# Patient Record
Sex: MALE | Race: BLACK OR AFRICAN AMERICAN | Employment: UNEMPLOYED | URBAN - METROPOLITAN AREA
[De-identification: names, ages, dates, MRNs, and addresses within clinical notes are randomized per-mention and may not be internally consistent; named-entity substitution may affect disease eponyms.]

---

## 2017-04-24 ENCOUNTER — ED HISTORICAL/CONVERTED ENCOUNTER (OUTPATIENT)
Dept: OTHER | Age: 27
End: 2017-04-24

## 2017-06-12 ENCOUNTER — ED HISTORICAL/CONVERTED ENCOUNTER (OUTPATIENT)
Dept: OTHER | Age: 27
End: 2017-06-12

## 2017-11-02 ENCOUNTER — ED HISTORICAL/CONVERTED ENCOUNTER (OUTPATIENT)
Dept: OTHER | Age: 27
End: 2017-11-02

## 2019-05-17 ENCOUNTER — ED HISTORICAL/CONVERTED ENCOUNTER (OUTPATIENT)
Dept: OTHER | Age: 29
End: 2019-05-17

## 2019-06-25 ENCOUNTER — ED HISTORICAL/CONVERTED ENCOUNTER (OUTPATIENT)
Dept: OTHER | Age: 29
End: 2019-06-25

## 2019-10-25 ENCOUNTER — ED HISTORICAL/CONVERTED ENCOUNTER (OUTPATIENT)
Dept: OTHER | Age: 29
End: 2019-10-25

## 2021-05-10 ENCOUNTER — HOSPITAL ENCOUNTER (EMERGENCY)
Age: 31
Discharge: STILL ADMITTED OR ADMINISTRATIVE | End: 2021-05-10

## 2022-01-25 ENCOUNTER — TRANSCRIBE ORDER (OUTPATIENT)
Dept: SCHEDULING | Age: 32
End: 2022-01-25

## 2022-01-25 DIAGNOSIS — M79.89 PAIN AND SWELLING OF LOWER LEG, RIGHT: Primary | ICD-10-CM

## 2022-01-25 DIAGNOSIS — M79.661 PAIN AND SWELLING OF LOWER LEG, RIGHT: Primary | ICD-10-CM

## 2022-01-26 ENCOUNTER — HOSPITAL ENCOUNTER (OUTPATIENT)
Dept: NON INVASIVE DIAGNOSTICS | Age: 32
Discharge: HOME | End: 2022-01-26
Attending: INTERNAL MEDICINE
Payer: MEDICAID

## 2022-01-26 DIAGNOSIS — M79.661 PAIN AND SWELLING OF LOWER LEG, RIGHT: ICD-10-CM

## 2022-01-26 DIAGNOSIS — M79.89 PAIN AND SWELLING OF LOWER LEG, RIGHT: ICD-10-CM

## 2022-01-26 PROCEDURE — 93971 EXTREMITY STUDY: CPT

## 2022-02-04 ENCOUNTER — OFFICE VISIT (OUTPATIENT)
Dept: SURGERY | Age: 32
End: 2022-02-04

## 2022-02-04 DIAGNOSIS — R10.31 RIGHT INGUINAL PAIN: Primary | ICD-10-CM

## 2022-02-11 ENCOUNTER — HOSPITAL ENCOUNTER (OUTPATIENT)
Dept: CT IMAGING | Age: 32
Discharge: HOME | End: 2022-02-11
Attending: SURGERY
Payer: MEDICAID

## 2022-02-11 ENCOUNTER — OFFICE VISIT (OUTPATIENT)
Dept: SURGERY | Age: 32
End: 2022-02-11
Payer: MEDICAID

## 2022-02-11 VITALS
TEMPERATURE: 98.4 F | HEIGHT: 66 IN | HEART RATE: 71 BPM | BODY MASS INDEX: 43.13 KG/M2 | RESPIRATION RATE: 16 BRPM | SYSTOLIC BLOOD PRESSURE: 122 MMHG | DIASTOLIC BLOOD PRESSURE: 72 MMHG | WEIGHT: 268.4 LBS | OXYGEN SATURATION: 98 %

## 2022-02-11 DIAGNOSIS — R19.09 RIGHT GROIN MASS: Primary | ICD-10-CM
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
## 2022-02-11 DIAGNOSIS — R19.09 RIGHT GROIN MASS: ICD-10-CM

## 2022-02-11 PROCEDURE — 74011000636 HC RX REV CODE- 636: Performed by: SURGERY

## 2022-02-11 PROCEDURE — 75635 CT ANGIO ABDOMINAL ARTERIES: CPT

## 2022-02-11 PROCEDURE — 99203 OFFICE O/P NEW LOW 30 MIN: CPT | Performed by: SURGERY

## 2022-02-11 RX ORDER — ALBUTEROL SULFATE 0.63 MG/3ML
3 SOLUTION RESPIRATORY (INHALATION) AS NEEDED
COMMUNITY
Start: 2021-12-30

## 2022-02-11 RX ORDER — BUDESONIDE AND FORMOTEROL FUMARATE DIHYDRATE 160; 4.5 UG/1; UG/1
2 AEROSOL RESPIRATORY (INHALATION) DAILY
COMMUNITY
Start: 2022-02-08

## 2022-02-11 RX ORDER — TORSEMIDE 10 MG/1
1 TABLET ORAL DAILY
COMMUNITY
Start: 2022-01-25 | End: 2022-05-07

## 2022-02-11 RX ORDER — ALBUTEROL SULFATE 90 UG/1
2 AEROSOL, METERED RESPIRATORY (INHALATION)
COMMUNITY
Start: 2022-02-08

## 2022-02-11 RX ORDER — POTASSIUM CHLORIDE 750 MG/1
10 CAPSULE, EXTENDED RELEASE ORAL DAILY
COMMUNITY
Start: 2022-01-25 | End: 2022-03-04

## 2022-02-11 RX ADMIN — IOPAMIDOL 100 ML: 755 INJECTION, SOLUTION INTRAVENOUS at 13:06

## 2022-02-11 NOTE — PROGRESS NOTES
Chief Complaint   Patient presents with    New Patient     Right Leg Swelling for weeks     Visit Vitals  BP (!) 147/92 (BP 1 Location: Left arm, BP Patient Position: Sitting)   Pulse 71   Temp 98.4 °F (36.9 °C) (Temporal)   Resp 16   Ht 5' 6\" (1.676 m)   Wt 268 lb 6.4 oz (121.7 kg)   SpO2 98%   BMI 43.32 kg/m²       Elevated BP rechecked manually and it was 122/72.  Dr Sabine Carmen aware

## 2022-02-11 NOTE — PROGRESS NOTES
Vascular History and Physical    Patient: Daisy Hayes  MRN: [id number]    YOB: 1990  Age: 27 y.o. Sex: male     Chief Complaint:  Chief Complaint   Patient presents with    New Patient     Right Leg Swelling for weeks       History of Present Illness: Daisy Hayes is a 27 y.o. very pleasant man complaining of the right groin thigh and leg swelling. He apparently fell 12 flights of stairs about 6 weeks ago. And since then a few weeks later his right leg started swelling. He had ultrasound done on the right thigh and leg which showed greater than 20 cm size mass. He is complaining of pain and swelling in the right leg. Ultrasound did not show any deep vein thrombosis. Social History:     Social Connections:     Frequency of Communication with Friends and Family: Not on file    Frequency of Social Gatherings with Friends and Family: Not on file    Attends [employer] Services: Not on file    Active Member of Clubs or Organizations: Not on file    Attends Club or Organization Meetings: Not on file    Marital Status: Not on file       Past Medical History:  Past Medical History:   Diagnosis Date    Hypertension        Surgical History:  History reviewed. No pertinent surgical history. Allergies:  No Known Allergies    Current Meds:  Current Outpatient Medications   Medication Sig Dispense Refill    albuterol (ACCUNEB) 0.63 mg/3 mL nebulizer solution 3 mL by Nebulization route as needed.  ProAir HFA 90 mcg/actuation inhaler Take 2 Puffs by inhalation every six (6) hours as needed.  potassium chloride SA (MICRO-K) 10 mEq capsule Take 10 mEq by mouth daily.  torsemide (DEMADEX) 10 mg tablet Take 1 Tablet by mouth daily.  Symbicort 160-4.5 mcg/actuation HFAA Take 2 Puffs by inhalation daily. Review of Systems:  I reviewed the rest of organ systems personally and they are negative. Pertinent findings discussed in HPI.     Physical Examination    Visit Vitals  /72 (BP 1 Location: Left arm, BP Patient Position: Sitting)   Pulse 71   Temp 98.4 °F (36.9 °C) (Temporal)   Resp 16   Ht 5' 6\" (1.676 m)   Wt 268 lb 6.4 oz (121.7 kg)   SpO2 98%   BMI 43.32 kg/m²     Gen: Well developed, well nourished 27 y.o. male in no acute distress  Head: normocephalic, atraumatic  Mouth: Clear, no overt lesions, oral mucosa pink and moist  Neck: supple, no masses, no adenopathy or carotid bruits, trachea midline  Resp: clear to auscultation bilaterally, no wheeze, rhonchi or rales, excursions normal and symmetrical  Cardio: Regular rate and rhythm, no murmurs, clicks, gallops or rubs, no edema or varicosities  Abdomen: soft, nontender, nondistended, normoactive bowel sounds, no hernias, no hepatosplenomegaly   Neuro: sensation and strength grossly intact and symmetrical  Psych: alert and oriented to person, place and time  Vascular examination: Vascular examination he has extensive swelling on the right foot to the all the way to the right groin and forearm mass noted on the right thigh area. Skin: warm and moist.    Labs:  No visits with results within 1 Month(s) from this visit. Latest known visit with results is:   No results found for any previous visit. Images:  No images are attached to the encounter. Duane Leonard MD    Assessments:  Patient Active Problem List   Diagnosis Code    Right groin mass R19.09    Fall W19. XXXA       Plans: I am wondering if he sustained arterial injury or venous injury after fall. So I will order stat CT angiogram of abdominal aorta distal runoff. We will get this test done today. And I will reassess him again 2 days after CT scan is complete.           Duane Leonard MD

## 2022-02-14 ENCOUNTER — OFFICE VISIT (OUTPATIENT)
Dept: SURGERY | Age: 32
End: 2022-02-14
Payer: MEDICAID

## 2022-02-14 VITALS
HEIGHT: 66 IN | OXYGEN SATURATION: 97 % | DIASTOLIC BLOOD PRESSURE: 78 MMHG | HEART RATE: 86 BPM | RESPIRATION RATE: 16 BRPM | SYSTOLIC BLOOD PRESSURE: 127 MMHG | BODY MASS INDEX: 43.04 KG/M2 | WEIGHT: 267.8 LBS | TEMPERATURE: 98 F

## 2022-02-14 DIAGNOSIS — R19.09 RIGHT GROIN MASS: ICD-10-CM

## 2022-02-14 DIAGNOSIS — R10.31 RIGHT INGUINAL PAIN: Primary | ICD-10-CM

## 2022-02-14 PROCEDURE — 99213 OFFICE O/P EST LOW 20 MIN: CPT | Performed by: SURGERY

## 2022-02-14 RX ORDER — OXYCODONE AND ACETAMINOPHEN 5; 325 MG/1; MG/1
1 TABLET ORAL
Qty: 30 TABLET | Refills: 0 | Status: ON HOLD | OUTPATIENT
Start: 2022-02-14 | End: 2022-03-04

## 2022-02-14 NOTE — PROGRESS NOTES
Chief Complaint   Patient presents with    Follow-up     CT Scan results from 02/11/2022     Visit Vitals  /78 (BP 1 Location: Left arm, BP Patient Position: Sitting)   Pulse 86   Temp 98 °F (36.7 °C) (Temporal)   Resp 16   Ht 5' 6\" (1.676 m)   Wt 267 lb 12.8 oz (121.5 kg)   SpO2 97%   BMI 43.22 kg/m²     1. Have you been to the ER, urgent care clinic since your last visit? Hospitalized since your last visit? No    2. Have you seen or consulted any other health care providers outside of the [address] [place] [address] since your last visit? Include any pap smears or colon screening.  No

## 2022-02-17 ENCOUNTER — TELEPHONE (OUTPATIENT)
Dept: SURGERY | Age: 32
End: 2022-02-17

## 2022-02-17 DIAGNOSIS — R19.09 RIGHT GROIN MASS: Primary | ICD-10-CM

## 2022-02-17 PROBLEM — R10.31 RIGHT INGUINAL PAIN: Status: ACTIVE | Noted: 2022-02-17

## 2022-02-17 NOTE — PROGRESS NOTES
VASCULAR FOLLOW UP      Subjective:   CHIEF COMPLAINTS:  Patient had a CT scan  PRESENTATION OF ILLNESS:  Facundo Devries is a 27 y.o. very pleasant man had developed a large groin mass since January. Patient had a CT scan abdomen pelvis and thigh for follow-up. Patient says swelling and pain has not subsided. Also bilateral leg swelling has increased in size as well. Patient denies chest pain shortness breath. Past Medical History:   Diagnosis Date    Hypertension       No past surgical history on file. Family History   Problem Relation Age of Onset    Diabetes Mother     Hypertension Mother     Myasthenia Gravis Maternal Grandmother     Cancer Maternal Grandfather       Social History     Tobacco Use    Smoking status: Former Smoker     Packs/day: 0.50     Years: 2.00     Pack years: 1.00    Smokeless tobacco: Never Used   Substance Use Topics    Alcohol use: Yes       Prior to Admission medications    Medication Sig Start Date End Date Taking? Authorizing Provider   oxyCODONE-acetaminophen (PERCOCET) 5-325 mg per tablet Take 1 Tablet by mouth every eight (8) hours as needed for Pain for up to 14 days. Max Daily Amount: 3 Tablets. 2/14/22 2/28/22 Yes Jesica Kulkarni MD   albuterol (ACCUNEB) 0.63 mg/3 mL nebulizer solution 3 mL by Nebulization route as needed. 12/30/21  Yes Provider, Historical   ProAir HFA 90 mcg/actuation inhaler Take 2 Puffs by inhalation every six (6) hours as needed. 2/8/22  Yes Provider, Historical   potassium chloride SA (MICRO-K) 10 mEq capsule Take 10 mEq by mouth daily. 1/25/22  Yes Provider, Historical   torsemide (DEMADEX) 10 mg tablet Take 1 Tablet by mouth daily. 1/25/22  Yes Provider, Historical   Symbicort 160-4.5 mcg/actuation HFAA Take 2 Puffs by inhalation daily.  2/8/22  Yes Provider, Historical     No Known Allergies     Review of Systems:  I reviewed the rest of organ systems personally and they were negative signed by Dr. Burroughs    Objective:     Visit Vitals  /78 (BP 1 Location: Left arm, BP Patient Position: Sitting)   Pulse 86   Temp 98 °F (36.7 °C) (Temporal)   Resp 16   Ht 5' 6\" (1.676 m)   Wt 267 lb 12.8 oz (121.5 kg)   SpO2 97%   BMI 43.22 kg/m²     VITAL SIGNS REVIEWED. Physical Exam:  Patient is well-nourished pleasant in conversation is appropriate. Head and neck examination atraumatic, normocephalic. Gaze appropriate. Conversation appropriate. Neck examination shows supple. No mass. No obvious carotid bruit. Chest examination shows lungs are clear bilaterally well-expanded, no crackles or wheezes. Cardiovascular system regular rate, no obvious murmur. Skin warm to touch  and moist, no skin lesions. Abdomen is soft ,not tender or distended bowel sounds present. No palpable mass. Neurological examinations, no focal neuro deficits moving all 4 extremities. Cranial nerves intact. Sensation is intact as well. Hematologic: No obvious bruise or swelling or obvious lymphadenopathy. Psychosocial: Appropriate. Has good effect. Musculoskeletal system: No muscle wasting, appropriate movements upper and lower extremity. Clinical examination shows on the right groin and right thigh swelling has not improved. Data Review:   No visits with results within 1 Month(s) from this visit. Latest known visit with results is:   No results found for any previous visit. Assessment:     Problem List Items Addressed This Visit        Other    Right groin mass    Right inguinal pain - Primary    Relevant Medications    oxyCODONE-acetaminophen (PERCOCET) 5-325 mg per tablet              Plan:     CT scan abdomen pelvis and the thigh area shows large complex soft tissue mass extending to the right retroperitoneal iliac vessel area. This most likely not related to vascular structure most likely soft tissue tumor. Patient will need to see specialist on this, we will send him to the surgical oncologist at [facility].   We will make urgent referral.        Yasemin Razo MD

## 2022-02-18 ENCOUNTER — TELEPHONE (OUTPATIENT)
Dept: SURGERY | Age: 32
End: 2022-02-18

## 2022-02-18 NOTE — TELEPHONE ENCOUNTER
Called patient back at number provided. No answer so I left a detailed message letting him know that the referral had been faxed to [facility] and they should call him to schedule him an appointment. Advised to call back with questions/concerns.

## 2022-02-18 NOTE — TELEPHONE ENCOUNTER
Patient called today. He was seen in office yesterday. The provider is referring him to  [facility]  surgical oncology. Patient want to know the physician name and number to make appointment. He can be reach at [phone].

## 2022-02-21 NOTE — TELEPHONE ENCOUNTER
Patient called today regarding referral to [facility]. He has not heard anything about an appointment. He is out of pain medication and is in a great deal of pain. Please call him with an update. Thanks!

## 2022-02-21 NOTE — TELEPHONE ENCOUNTER
Called patient back and explained to him that the referral was faxed on Friday, 2/18/2021, and it takes 24-48 hours for the request to be processed. Advised him that [facility] will call him to schedule. I confirmed patient's pharmacy and advised him that Dr. Sánchez Him isn't in the office yet, but will let him know of the refill request for his pain medication. Patient verbalized understanding.

## 2022-02-22 ENCOUNTER — APPOINTMENT (OUTPATIENT)
Dept: CT IMAGING | Age: 32
DRG: 134 | End: 2022-02-22
Attending: EMERGENCY MEDICINE
Payer: MEDICAID

## 2022-02-22 ENCOUNTER — APPOINTMENT (OUTPATIENT)
Dept: GENERAL RADIOLOGY | Age: 32
DRG: 134 | End: 2022-02-22
Attending: EMERGENCY MEDICINE
Payer: MEDICAID

## 2022-02-22 ENCOUNTER — HOSPITAL ENCOUNTER (INPATIENT)
Age: 32
LOS: 1 days | Discharge: ANOTHER HOSPITAL | DRG: 134 | End: 2022-02-23
Attending: EMERGENCY MEDICINE | Admitting: HOSPITALIST
Payer: MEDICAID

## 2022-02-22 DIAGNOSIS — I26.99 BILATERAL PULMONARY EMBOLISM (HCC): Primary | ICD-10-CM

## 2022-02-22 LAB
ALBUMIN SERPL-MCNC: 3.2 G/DL (ref 3.5–5)
ALBUMIN/GLOB SERPL: 0.8 {RATIO} (ref 1.1–2.2)
ALP SERPL-CCNC: 103 U/L (ref 45–117)
ALT SERPL-CCNC: 42 U/L (ref 12–78)
ANION GAP SERPL CALC-SCNC: 7 MMOL/L (ref 5–15)
APTT PPP: 32.6 SEC (ref 21.2–34.1)
APTT PPP: 38.4 SEC (ref 21.2–34.1)
APTT PPP: 44.1 SEC (ref 21.2–34.1)
AST SERPL W P-5'-P-CCNC: 34 U/L (ref 15–37)
ATRIAL RATE: 88 BPM
BASOPHILS # BLD: 0.1 K/UL (ref 0–0.1)
BASOPHILS NFR BLD: 1 % (ref 0–1)
BILIRUB SERPL-MCNC: 0.6 MG/DL (ref 0.2–1)
BNP SERPL-MCNC: 34 PG/ML
BUN SERPL-MCNC: 12 MG/DL (ref 6–20)
BUN/CREAT SERPL: 11 (ref 12–20)
CA-I BLD-MCNC: 9.5 MG/DL (ref 8.5–10.1)
CALCULATED P AXIS, ECG09: 54 DEGREES
CALCULATED R AXIS, ECG10: 20 DEGREES
CALCULATED T AXIS, ECG11: 55 DEGREES
CHLORIDE SERPL-SCNC: 101 MMOL/L (ref 97–108)
CO2 SERPL-SCNC: 25 MMOL/L (ref 21–32)
CREAT SERPL-MCNC: 1.09 MG/DL (ref 0.7–1.3)
D DIMER PPP FEU-MCNC: 3.54 UG/ML(FEU)
DIAGNOSIS, 93000: NORMAL
DIFFERENTIAL METHOD BLD: ABNORMAL
EOSINOPHIL # BLD: 0.6 K/UL (ref 0–0.4)
EOSINOPHIL NFR BLD: 5 % (ref 0–7)
ERYTHROCYTE [DISTWIDTH] IN BLOOD BY AUTOMATED COUNT: 12.9 % (ref 11.5–14.5)
GLOBULIN SER CALC-MCNC: 4.1 G/DL (ref 2–4)
GLUCOSE SERPL-MCNC: 106 MG/DL (ref 65–100)
HCT VFR BLD AUTO: 35.2 % (ref 36.6–50.3)
HGB BLD-MCNC: 11.6 G/DL (ref 12.1–17)
IMM GRANULOCYTES # BLD AUTO: 0.1 K/UL (ref 0–0.04)
IMM GRANULOCYTES NFR BLD AUTO: 1 % (ref 0–0.5)
LYMPHOCYTES # BLD: 0.8 K/UL (ref 0.8–3.5)
LYMPHOCYTES NFR BLD: 7 % (ref 12–49)
MCH RBC QN AUTO: 30.1 PG (ref 26–34)
MCHC RBC AUTO-ENTMCNC: 33 G/DL (ref 30–36.5)
MCV RBC AUTO: 91.4 FL (ref 80–99)
MONOCYTES # BLD: 1.2 K/UL (ref 0–1)
MONOCYTES NFR BLD: 11 % (ref 5–13)
NEUTS SEG # BLD: 8.4 K/UL (ref 1.8–8)
NEUTS SEG NFR BLD: 75 % (ref 32–75)
NRBC # BLD: 0 K/UL (ref 0–0.01)
NRBC BLD-RTO: 0 PER 100 WBC
P-R INTERVAL, ECG05: 158 MS
PLATELET # BLD AUTO: 299 K/UL (ref 150–400)
PMV BLD AUTO: 9.6 FL (ref 8.9–12.9)
POTASSIUM SERPL-SCNC: 4 MMOL/L (ref 3.5–5.1)
PROT SERPL-MCNC: 7.3 G/DL (ref 6.4–8.2)
Q-T INTERVAL, ECG07: 358 MS
QRS DURATION, ECG06: 74 MS
QTC CALCULATION (BEZET), ECG08: 433 MS
RBC # BLD AUTO: 3.85 M/UL (ref 4.1–5.7)
SODIUM SERPL-SCNC: 133 MMOL/L (ref 136–145)
THERAPEUTIC RANGE,PTTT: ABNORMAL SEC (ref 82–109)
THERAPEUTIC RANGE,PTTT: ABNORMAL SEC (ref 82–109)
THERAPEUTIC RANGE,PTTT: NORMAL SEC (ref 82–109)
TROPONIN-HIGH SENSITIVITY: 18 NG/L (ref 0–76)
VENTRICULAR RATE, ECG03: 88 BPM
WBC # BLD AUTO: 11.1 K/UL (ref 4.1–11.1)

## 2022-02-22 PROCEDURE — 71275 CT ANGIOGRAPHY CHEST: CPT

## 2022-02-22 PROCEDURE — 85730 THROMBOPLASTIN TIME PARTIAL: CPT

## 2022-02-22 PROCEDURE — 85379 FIBRIN DEGRADATION QUANT: CPT

## 2022-02-22 PROCEDURE — 96376 TX/PRO/DX INJ SAME DRUG ADON: CPT

## 2022-02-22 PROCEDURE — 36415 COLL VENOUS BLD VENIPUNCTURE: CPT

## 2022-02-22 PROCEDURE — 65270000029 HC RM PRIVATE

## 2022-02-22 PROCEDURE — 99285 EMERGENCY DEPT VISIT HI MDM: CPT

## 2022-02-22 PROCEDURE — 96375 TX/PRO/DX INJ NEW DRUG ADDON: CPT

## 2022-02-22 PROCEDURE — 74011250636 HC RX REV CODE- 250/636: Performed by: EMERGENCY MEDICINE

## 2022-02-22 PROCEDURE — 84484 ASSAY OF TROPONIN QUANT: CPT

## 2022-02-22 PROCEDURE — 93005 ELECTROCARDIOGRAM TRACING: CPT

## 2022-02-22 PROCEDURE — 94640 AIRWAY INHALATION TREATMENT: CPT

## 2022-02-22 PROCEDURE — 71045 X-RAY EXAM CHEST 1 VIEW: CPT

## 2022-02-22 PROCEDURE — 85025 COMPLETE CBC W/AUTO DIFF WBC: CPT

## 2022-02-22 PROCEDURE — 74011000250 HC RX REV CODE- 250: Performed by: HOSPITALIST

## 2022-02-22 PROCEDURE — 74011250636 HC RX REV CODE- 250/636: Performed by: HOSPITALIST

## 2022-02-22 PROCEDURE — 96374 THER/PROPH/DIAG INJ IV PUSH: CPT

## 2022-02-22 PROCEDURE — 80053 COMPREHEN METABOLIC PANEL: CPT

## 2022-02-22 PROCEDURE — 74011000636 HC RX REV CODE- 636: Performed by: EMERGENCY MEDICINE

## 2022-02-22 PROCEDURE — 83880 ASSAY OF NATRIURETIC PEPTIDE: CPT

## 2022-02-22 RX ORDER — TORSEMIDE 10 MG/1
10 TABLET ORAL DAILY
Status: DISCONTINUED | OUTPATIENT
Start: 2022-02-23 | End: 2022-02-23 | Stop reason: HOSPADM

## 2022-02-22 RX ORDER — ALBUTEROL SULFATE 2.5 MG/.5ML
0.63 SOLUTION RESPIRATORY (INHALATION)
Status: DISCONTINUED | OUTPATIENT
Start: 2022-02-22 | End: 2022-02-23 | Stop reason: HOSPADM

## 2022-02-22 RX ORDER — BUDESONIDE AND FORMOTEROL FUMARATE DIHYDRATE 160; 4.5 UG/1; UG/1
2 AEROSOL RESPIRATORY (INHALATION) DAILY
Status: DISCONTINUED | OUTPATIENT
Start: 2022-02-23 | End: 2022-02-23 | Stop reason: HOSPADM

## 2022-02-22 RX ORDER — SODIUM CHLORIDE 0.9 % (FLUSH) 0.9 %
5-40 SYRINGE (ML) INJECTION AS NEEDED
Status: DISCONTINUED | OUTPATIENT
Start: 2022-02-22 | End: 2022-02-23 | Stop reason: HOSPADM

## 2022-02-22 RX ORDER — ALBUTEROL SULFATE 90 UG/1
2 AEROSOL, METERED RESPIRATORY (INHALATION)
Status: DISCONTINUED | OUTPATIENT
Start: 2022-02-22 | End: 2022-02-23 | Stop reason: HOSPADM

## 2022-02-22 RX ORDER — LORAZEPAM 2 MG/ML
1 INJECTION INTRAMUSCULAR
Status: DISCONTINUED | OUTPATIENT
Start: 2022-02-22 | End: 2022-02-23 | Stop reason: HOSPADM

## 2022-02-22 RX ORDER — SODIUM CHLORIDE 0.9 % (FLUSH) 0.9 %
5-40 SYRINGE (ML) INJECTION EVERY 8 HOURS
Status: DISCONTINUED | OUTPATIENT
Start: 2022-02-22 | End: 2022-02-23 | Stop reason: HOSPADM

## 2022-02-22 RX ORDER — MORPHINE SULFATE 2 MG/ML
1 INJECTION, SOLUTION INTRAMUSCULAR; INTRAVENOUS
Status: DISCONTINUED | OUTPATIENT
Start: 2022-02-22 | End: 2022-02-23 | Stop reason: HOSPADM

## 2022-02-22 RX ORDER — HEPARIN SODIUM 1000 [USP'U]/ML
80 INJECTION, SOLUTION INTRAVENOUS; SUBCUTANEOUS AS NEEDED
Status: DISCONTINUED | OUTPATIENT
Start: 2022-02-22 | End: 2022-02-23 | Stop reason: HOSPADM

## 2022-02-22 RX ORDER — ACETAMINOPHEN 325 MG/1
650 TABLET ORAL
Status: DISCONTINUED | OUTPATIENT
Start: 2022-02-22 | End: 2022-02-23 | Stop reason: HOSPADM

## 2022-02-22 RX ORDER — POLYETHYLENE GLYCOL 3350 17 G/17G
17 POWDER, FOR SOLUTION ORAL DAILY PRN
Status: DISCONTINUED | OUTPATIENT
Start: 2022-02-22 | End: 2022-02-23 | Stop reason: HOSPADM

## 2022-02-22 RX ORDER — HEPARIN SODIUM 1000 [USP'U]/ML
80 INJECTION, SOLUTION INTRAVENOUS; SUBCUTANEOUS ONCE
Status: COMPLETED | OUTPATIENT
Start: 2022-02-22 | End: 2022-02-22

## 2022-02-22 RX ORDER — ACETAMINOPHEN 650 MG/1
650 SUPPOSITORY RECTAL
Status: DISCONTINUED | OUTPATIENT
Start: 2022-02-22 | End: 2022-02-23 | Stop reason: HOSPADM

## 2022-02-22 RX ORDER — POTASSIUM CHLORIDE 750 MG/1
10 TABLET, FILM COATED, EXTENDED RELEASE ORAL DAILY
Status: DISCONTINUED | OUTPATIENT
Start: 2022-02-23 | End: 2022-02-23 | Stop reason: HOSPADM

## 2022-02-22 RX ORDER — OXYCODONE AND ACETAMINOPHEN 5; 325 MG/1; MG/1
1 TABLET ORAL
Status: DISCONTINUED | OUTPATIENT
Start: 2022-02-22 | End: 2022-02-22

## 2022-02-22 RX ORDER — ONDANSETRON 4 MG/1
4 TABLET, ORALLY DISINTEGRATING ORAL
Status: DISCONTINUED | OUTPATIENT
Start: 2022-02-22 | End: 2022-02-23 | Stop reason: HOSPADM

## 2022-02-22 RX ORDER — HEPARIN SODIUM 1000 [USP'U]/ML
40 INJECTION, SOLUTION INTRAVENOUS; SUBCUTANEOUS AS NEEDED
Status: DISCONTINUED | OUTPATIENT
Start: 2022-02-22 | End: 2022-02-23 | Stop reason: HOSPADM

## 2022-02-22 RX ORDER — HEPARIN SODIUM 10000 [USP'U]/100ML
18-36 INJECTION, SOLUTION INTRAVENOUS
Status: DISCONTINUED | OUTPATIENT
Start: 2022-02-22 | End: 2022-02-23 | Stop reason: HOSPADM

## 2022-02-22 RX ORDER — MORPHINE SULFATE 4 MG/ML
4 INJECTION INTRAVENOUS
Status: COMPLETED | OUTPATIENT
Start: 2022-02-22 | End: 2022-02-22

## 2022-02-22 RX ORDER — ONDANSETRON 2 MG/ML
4 INJECTION INTRAMUSCULAR; INTRAVENOUS
Status: DISCONTINUED | OUTPATIENT
Start: 2022-02-22 | End: 2022-02-23 | Stop reason: HOSPADM

## 2022-02-22 RX ADMIN — ALBUTEROL SULFATE 0.65 MG: 2.5 SOLUTION RESPIRATORY (INHALATION) at 19:00

## 2022-02-22 RX ADMIN — LORAZEPAM 1 MG: 2 INJECTION INTRAMUSCULAR; INTRAVENOUS at 21:21

## 2022-02-22 RX ADMIN — SODIUM CHLORIDE, PRESERVATIVE FREE 10 ML: 5 INJECTION INTRAVENOUS at 14:00

## 2022-02-22 RX ADMIN — HEPARIN SODIUM 3470 UNITS: 1000 INJECTION INTRAVENOUS; SUBCUTANEOUS at 23:16

## 2022-02-22 RX ADMIN — HEPARIN SODIUM 3470 UNITS: 1000 INJECTION INTRAVENOUS; SUBCUTANEOUS at 15:20

## 2022-02-22 RX ADMIN — HEPARIN SODIUM 6940 UNITS: 1000 INJECTION INTRAVENOUS; SUBCUTANEOUS at 07:23

## 2022-02-22 RX ADMIN — IOPAMIDOL 100 ML: 755 INJECTION, SOLUTION INTRAVENOUS at 04:04

## 2022-02-22 RX ADMIN — MORPHINE SULFATE 1 MG: 2 INJECTION, SOLUTION INTRAMUSCULAR; INTRAVENOUS at 21:20

## 2022-02-22 RX ADMIN — Medication 20 UNITS/KG/HR: at 19:46

## 2022-02-22 RX ADMIN — Medication 22 UNITS/KG/HR: at 23:18

## 2022-02-22 RX ADMIN — MORPHINE SULFATE 4 MG: 4 INJECTION INTRAVENOUS at 02:13

## 2022-02-22 RX ADMIN — MORPHINE SULFATE 1 MG: 2 INJECTION, SOLUTION INTRAMUSCULAR; INTRAVENOUS at 17:46

## 2022-02-22 RX ADMIN — Medication 18 UNITS/KG/HR: at 07:38

## 2022-02-22 RX ADMIN — MORPHINE SULFATE 1 MG: 2 INJECTION, SOLUTION INTRAMUSCULAR; INTRAVENOUS at 10:13

## 2022-02-22 RX ADMIN — ALBUTEROL SULFATE 0.65 MG: 2.5 SOLUTION RESPIRATORY (INHALATION) at 14:07

## 2022-02-22 NOTE — CONSULTS
Hematology and Oncology Inpatient Consult Note     Patient: Maricruz Groves MRN: [id number]  SSN: [id number]    YOB: 1990  Age: 27 y.o. Sex: male    Chief Complaint: Patient came with increasing shortness of breath    Reason for consult: Valuation management of patient with right groin mass    Subjective:      Maricruz Groves is a 27 y.o. -American male who started having right groin mass for last several months which gradually increased in the size. He has seen Dr. Matilda Bee from surgery recently and according to patient and his mother patient was supposed to be referred to [facility] to oncology. They could not give me further details. Patient started having more swelling of the right leg which gradually got worse. Patient was not able to move much because of pain in his right leg and it was difficult to move around. And gradually his breathing got worse and yesterday he became significantly dyspneic and he came to the emergency room and was found to have pulmonary embolism and he was started on IV heparin. I was asked to see him for further oncologic evaluation and recommendations. Patient did not have any biopsy of his mass and does not have any previous diagnosis of known cancer. Past Medical History:   Diagnosis Date    Asthma     Hypertension    -Right groin mass and right leg swelling  -Pulmonary embolism in February 2022  History reviewed. No pertinent surgical history. Family History   Problem Relation Age of Onset    Diabetes Mother     Hypertension Mother     Myasthenia Gravis Maternal Grandmother     Cancer Maternal Grandfather    -His maternal grand father did die of cancer  Social History     Tobacco Use    Smoking status: Former Smoker     Packs/day: 0.50     Years: 2.00     Pack years: 1.00    Smokeless tobacco: Never Used   Substance Use Topics    Alcohol use: Yes      Occupational history: Patient is currently unemployed.   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     Current Facility-Administered Medications   Medication Dose Route Frequency Provider Last Rate Last Admin    heparin 25,000 units in D5W 250 ml infusion  18-36 Units/kg/hr (Adjusted) IntraVENous TITRATE Shayan Acosta MD 15.6 mL/hr at 02/22/22 1213 18 Units/kg/hr at 02/22/22 1213    heparin (porcine) 1,000 unit/mL injection 6,940 Units  80 Units/kg (Adjusted) IntraVENous PRN Shayan Acosta MD        Or    heparin (porcine) 1,000 unit/mL injection 3,470 Units  40 Units/kg (Adjusted) IntraVENous PRN Shayan Acosta MD        morphine injection 1 mg  1 mg IntraVENous Q4H PRN Serafin Brody MD   1 mg at 02/22/22 1013    sodium chloride (NS) flush 5-40 mL  5-40 mL IntraVENous Q8H Serafin Brody MD        sodium chloride (NS) flush 5-40 mL  5-40 mL IntraVENous PRN Serafin Brody MD        acetaminophen (TYLENOL) tablet 650 mg  650 mg Oral Q6H PRN Serafin Brody MD        Or   Moe Lobe acetaminophen (TYLENOL) suppository 650 mg  650 mg Rectal Q6H PRN Serafin Brody MD        polyethylene glycol (MIRALAX) packet 17 g  17 g Oral DAILY PRN Serafin Brody MD        ondansetron (ZOFRAN ODT) tablet 4 mg  4 mg Oral Q8H PRN Serafin Brody MD        Or    [facility] injection 4 mg  4 mg IntraVENous Q6H PRN Serafin Brody MD        albuterol CONCENTRATE 2.5mg/0.5 mL neb soln  0.65 mg Inhalation Q6H PRN Serafin Brody MD        [START ON 2/23/2022] potassium chloride SR (KLOR-CON 10) tablet 10 mEq  10 mEq Oral DAILY Serafin Brody MD       Moe Lobe Ren Gosselin ON 2/23/2022] budesonide-formoteroL (SYMBICORT) 160-4.5 mcg/actuation HFA inhaler 2 Puff  2 Puff Inhalation DAILY Serafin Brody MD        albuterol (PROVENTIL HFA, VENTOLIN HFA, PROAIR HFA) inhaler 2 Puff  2 Puff Inhalation Q6H PRN Serafin Brody MD        [START ON 2/23/2022] torsemide (DEMADEX) tablet 10 mg  10 mg Oral DAILY Serafin Brody MD        LORazepam (ATIVAN) injection 1 mg  1 mg IntraVENous Q4H PRN Zahraa Peacock MD            No Known Allergies    Review of Systems:  CONSTITUTIONAL: No fever, no chills. No repeated infections. He does have some night sweats. He is feeling tired. HEENT: No mouth sores. No Epistaxis. No hearing impairment. No change in taste or smell sensations. CARDIOVASCULAR: No  palpitations or chest pain. Started having increasing right lower extremity swelling. No syncope. RESPIRATORY: No cough. He had dyspnea and dyspnea on exertion. No Hemoptysis. No wheezing. No hoarseness of voice. GI: No nausea or vomiting. No diarrhea, constipation, no bright red blood per rectum. No hematemesis or melena. No weight loss. No dysphagia. : No dysuria, no hematuria. No frequency of urination. INTEGUMENTARY: He has no skin rash. He has mass in his right groin. HEMATOLOGIC: No history of easy bruisability. No gingival bleeding  NEURO: No focal weakness, No paresthesia. No headache or seizures. MUSCULOSKELETAL: No back pain. Objective:     Vitals:    02/22/22 0743 02/22/22 0911 02/22/22 1015 02/22/22 1253   BP: (!) 145/87 (!) 149/77 (!) 140/81 134/84   Pulse: 99 99 94 95   Resp: 18  17 18   Temp: 99.3 °F (37.4 °C)   98.6 °F (37 °C)   SpO2: 97% 99% 99% 98%   Weight:       Height:            Physical Exam:  Constitutional: Young -American male not in any acute distress or pain. Eyes: Sclerae anicteric. Conjunctivae no pallor. ENMT: Oral mucosa is moist, no thrush, mucositis, or petechiae. Neck: No adenopathy, JVD or thyromegaly. Hematologic/Lymphatic: Bilateral axillary regions showed no adenopathy. Left inguinal region shows no mass or adenopathy. Patient has huge mass in his right groin which is extending down toward his medially. He also has small left lymphedema like changes around the mass. He also has swelling extending to entire right lower extremity  Respiratory: Lungs are clear bilaterally.   Cardiovascular: Normal sinus rhythm; no gallop or murmur; peripheral pulses are palpable. Abdomen: Soft, nontender, no hepatosplenomegaly. No guarding or rigidity. Bowel sounds present. Back/Spine: No spinal tenderness; no costovertebral angle tenderness. Extremities: Patient has extensive swelling of his right lower extremity. No left lower extremity swelling. No cyanosis or clubbing. Skin: No petechiae; no skin rash. Neurologic: Alert/oriented x 3; no focal neurological deficits. Recent Results (from the past 24 hour(s))   CBC WITH AUTOMATED DIFF    Collection Time: 02/22/22  2:11 AM   Result Value Ref Range    WBC 11.1 4.1 - 11.1 K/uL    RBC 3.85 (L) 4.10 - 5.70 M/uL    HGB 11.6 (L) 12.1 - 17.0 g/dL    HCT 35.2 (L) 36.6 - 50.3 %    MCV 91.4 80.0 - 99.0 FL    MCH 30.1 26.0 - 34.0 PG    MCHC 33.0 30.0 - 36.5 g/dL    RDW 12.9 11.5 - 14.5 %    PLATELET 633 007 - 971 K/uL    MPV 9.6 8.9 - 12.9 FL    NRBC 0.0 0.0  WBC    ABSOLUTE NRBC 0.00 0.00 - 0.01 K/uL    NEUTROPHILS 75 32 - 75 %    LYMPHOCYTES 7 (L) 12 - 49 %    MONOCYTES 11 5 - 13 %    EOSINOPHILS 5 0 - 7 %    BASOPHILS 1 0 - 1 %    IMMATURE GRANULOCYTES 1 (H) 0 - 0.5 %    ABS. NEUTROPHILS 8.4 (H) 1.8 - 8.0 K/UL    ABS. LYMPHOCYTES 0.8 0.8 - 3.5 K/UL    ABS. MONOCYTES 1.2 (H) 0.0 - 1.0 K/UL    ABS. EOSINOPHILS 0.6 (H) 0.0 - 0.4 K/UL    ABS. BASOPHILS 0.1 0.0 - 0.1 K/UL    ABS. IMM.  GRANS. 0.1 (H) 0.00 - 0.04 K/UL    DF AUTOMATED     METABOLIC PANEL, COMPREHENSIVE    Collection Time: 02/22/22  2:11 AM   Result Value Ref Range    Sodium 133 (L) 136 - 145 mmol/L    Potassium 4.0 3.5 - 5.1 mmol/L    Chloride 101 97 - 108 mmol/L    CO2 25 21 - 32 mmol/L    Anion gap 7 5 - 15 mmol/L    Glucose 106 (H) 65 - 100 mg/dL    BUN 12 6 - 20 mg/dL    Creatinine 1.09 0.70 - 1.30 mg/dL    BUN/Creatinine ratio 11 (L) 12 - 20      GFR est AA >60 >60 ml/min/1.73m2    GFR est non-AA >60 >60 ml/min/1.73m2    Calcium 9.5 8.5 - 10.1 mg/dL    Bilirubin, total 0.6 0.2 - 1.0 mg/dL    AST (SGOT) 34 15 - 37 U/L    ALT (SGPT) 42 12 - 78 U/L    Alk. phosphatase 103 45 - 117 U/L    Protein, total 7.3 6.4 - 8.2 g/dL    Albumin 3.2 (L) 3.5 - 5.0 g/dL    Globulin 4.1 (H) 2.0 - 4.0 g/dL    A-G Ratio 0.8 (L) 1.1 - 2.2     TROPONIN-HIGH SENSITIVITY    Collection Time: 02/22/22  2:11 AM   Result Value Ref Range    Troponin-High Sensitivity 18 0 - 76 ng/L   D DIMER    Collection Time: 02/22/22  2:11 AM   Result Value Ref Range    D DIMER 3.54 (H) <0.50 ug/ml(FEU)   PTT    Collection Time: 02/22/22  7:12 AM   Result Value Ref Range    aPTT 32.6 21.2 - 34.1 sec    aPTT, therapeutic range   82 - 109 sec        CTA CHEST W OR W WO CONT   Final Result      Bilateral pulmonary emboli. No CT evidence of right heart strain. The results   were called and verbally communicated to Dr. Jocelyne Mcdowell on 2/22/2022 at 6:25 AM.   Please see full report. XR CHEST PORT   Final Result   No acute cardiopulmonary process. Assessment:     Hospital Problems  Date Reviewed: 2/17/2022          Codes Class Noted POA    Pulmonary embolism [facility]) ICD-10-CM: I26.99  ICD-9-CM: 415.19  2/22/2022 Unknown              Assessment & Plan:     79-year-old -American male who has developed right inguinal area mass which is extending from pelvic wall to medial thigh. This mass is compressing vascular structure and has caused his DVT and pulmonary embolism. I have reviewed patient's CT scan report as well as images personally. Obviously there is a concern about possible malignancy. -Differential diagnosis includes lymphoma, sarcoma or other  Malignancies.  -Patient was seen by Dr. Chano Calderon earlier and I have discussed case with him. I agree with Dr. Chano Calderon about referring patient to surgical oncology for consideration of resection. I will hold off doing biopsy for now until we have surgical oncology input.   Patient does need IV heparin for now for his acute PE.  -Discussed with patient and his mother and answered to their questions to their apparent satisfaction. This dictation was done by [employer] voice recognition software. Often unanticipated grammatical, syntax, phones and other interpretive errors are inadvertently transcribed. Please excuse errors that have escaped final proofreading.      Signed By: Benedict Mcclure MD     February 22, 2022

## 2022-02-22 NOTE — ED TRIAGE NOTES
Patient states he has been having increasing right leg pain and swelling x1 month; pt states he has seen Dr. Sabine Carmen and other doctors regarding his leg, and they stated having a possible mass in his upper leg/groin area; pt also reports having chest pain since yesterday, but currently not having any; pt very diaphoretic

## 2022-02-22 NOTE — ED NOTES
Past Medical History:   Diagnosis Date    Asthma     Hypertension      History reviewed. No pertinent surgical history. Care assumed and bedside SBAR report endorsed on 27 y.o. male with a past medical history significant Right groin mass presents to the ED with cc of worsening right groin and right leg pain and swelling. Patient reports the symptoms have been getting worse last month, has followed up with surgery and diagnosed with a nonvascular mass. Referral given for surgical oncology at [facility] the patient has yet to have an appointment with them. Patient reports pain is sharp, worse with ambulation or movement, improved with rest.   7:10 AM  Pt alert and oriented x3, pain currently within manageable limits, IV patent, plan of care reinforced for admission for bilateral pulmonary embolism dx per CT scan, bed in lowest position, side rails up x2, call bell within reach, will continue to monitor.

## 2022-02-22 NOTE — ED NOTES
TRANSFER - OUT REPORT:    Verbal report given to Lisa RN on Evan Lantigua  being transferred to [facility] for routine progression of care       Report consisted of patients Situation, Background, Assessment and   Recommendations(SBAR). Information from the following report(s) SBAR, ED Summary, [facility] and Recent Results was reviewed with the receiving nurse. Lines:   Peripheral IV 02/22/22 Right Antecubital (Active)        Opportunity for questions and clarification was provided.       Patient transported with:   Monitor  Tech

## 2022-02-22 NOTE — PROGRESS NOTES
Report received from [address] [place] [address] in the emergency room. Patient arrived to the unit in stable condition with pants shirt, cell phone, and shirt. Primary Nurse Karis Quinn RN and Driscilla Schirmer , RN performed a dual skin assessment on this patient. Skin warm and dry right leg from top of groin to toes of right leg has + 4 edema noted. Leg hard and warm unable to find pedal pulse or femoral pulse. Dr. Medellin Public made aware.  Patient    Valentino score is 16

## 2022-02-22 NOTE — H&P
History and Physical    Subjective:   Chief Complaint : right leg pain since 2 months  Source of information : patient     History of present illness:     31M, pleasant, h/o HTN and asthma with right leg pain since 2 months    He states, it started as leg pain and noticed worsening pain. Was seen by PCP and then [facility] surgical where was diagnosed with non-vascular mass and was referred to oncology. He is scheduled to f/u with them but developed worsening leg pain     Patient reports pain is sharp, worse with ambulation or movement, improved with rest.  Patient denies numbness or tingling    ER: found to have pulmonary embolism, no right heart strain. Troponin 18, BNP pending. He denies any chest pain, shortness of breath, syncope,     ++ severe right leg pain and unable to walk die to pain,     Past Medical History:   Diagnosis Date    Asthma     Hypertension      History reviewed. No pertinent surgical history. Family History   Problem Relation Age of Onset    Diabetes Mother     Hypertension Mother     Myasthenia Gravis Maternal Grandmother     Cancer Maternal Grandfather       Social History     Tobacco Use    Smoking status: Former Smoker     Packs/day: 0.50     Years: 2.00     Pack years: 1.00    Smokeless tobacco: Never Used   Substance Use Topics    Alcohol use: Yes       Prior to Admission medications    Medication Sig Start Date End Date Taking? Authorizing Provider   oxyCODONE-acetaminophen (PERCOCET) 5-325 mg per tablet Take 1 Tablet by mouth every eight (8) hours as needed for Pain for up to 14 days. Max Daily Amount: 3 Tablets. 2/14/22 2/28/22  Shad, Flakito Hansen MD   albuterol (ACCUNEB) 0.63 mg/3 mL nebulizer solution 3 mL by Nebulization route as needed. 12/30/21   Provider, Historical   ProAir HFA 90 mcg/actuation inhaler Take 2 Puffs by inhalation every six (6) hours as needed. 2/8/22   Provider, Historical   potassium chloride SA (MICRO-K) 10 mEq capsule Take 10 mEq by mouth daily. 1/25/22   Provider, Historical   torsemide (DEMADEX) 10 mg tablet Take 1 Tablet by mouth daily. 1/25/22   Provider, Historical   Symbicort 160-4.5 mcg/actuation HFAA Take 2 Puffs by inhalation daily. 2/8/22   Provider, Historical     No Known Allergies          Review of Systems:  Review of Systems   Constitutional: Negative for chills and fever. HENT: Negative for sinus pressure and sinus pain. Eyes: Negative for photophobia and redness. Respiratory: Negative for shortness of breath and wheezing. Cardiovascular: Negative for chest pain and palpitations. Gastrointestinal: Negative for abdominal pain and nausea. Genitourinary: Negative for flank pain and hematuria. Musculoskeletal: Positive for right leg pain and gait problem. Skin: Negative for color change and pallor. Neurological: Negative for dizziness and weakness. Vitals:     Visit Vitals  BP (!) 140/81 (BP 1 Location: Left upper arm, BP Patient Position: Semi fowlers)   Pulse 94   Temp 99.3 °F (37.4 °C)   Resp 17   Ht 5' 7\" (1.702 m)   Wt 117.9 kg (260 lb)   SpO2 99%   BMI 40.72 kg/m²       Physical Exam:   Physical Exam  Constitutional:       General: No acute distress. Appearance: Normal appearance. Not toxic-appearing. HENT:      Head: Normocephalic and atraumatic. Nose: Nose normal.      Mouth/Throat:      Mouth: Mucous membranes are moist.   Eyes:      Extraocular Movements: Extraocular movements intact. Pupils: Pupils are equal, round, and reactive to light. Cardiovascular:      Rate and Rhythm: Normal rate. Pulses: Normal pulses. Pulmonary:      Effort: Pulmonary effort is normal.      Breath sounds: No stridor. Abdominal:      General: Abdomen is flat. There is no distension. Musculoskeletal:         General: Normal range of motion. Cervical back: Normal range of motion and neck supple. Skin:     General: Skin is warm and dry.   2+ pitting edema right lower extremity     Capillary Refill: Capillary refill takes less than 2 seconds. Neurological:      General: No focal deficit present. Mental Status: Aert and oriented to person, place, and time. Psychiatric:         Mood and Affect: Mood normal.         Behavior: Behavior normal.         Data Review:   Recent Results (from the past 24 hour(s))   CBC WITH AUTOMATED DIFF    Collection Time: 02/22/22  2:11 AM   Result Value Ref Range    WBC 11.1 4.1 - 11.1 K/uL    RBC 3.85 (L) 4.10 - 5.70 M/uL    HGB 11.6 (L) 12.1 - 17.0 g/dL    HCT 35.2 (L) 36.6 - 50.3 %    MCV 91.4 80.0 - 99.0 FL    MCH 30.1 26.0 - 34.0 PG    MCHC 33.0 30.0 - 36.5 g/dL    RDW 12.9 11.5 - 14.5 %    PLATELET 560 079 - 844 K/uL    MPV 9.6 8.9 - 12.9 FL    NRBC 0.0 0.0  WBC    ABSOLUTE NRBC 0.00 0.00 - 0.01 K/uL    NEUTROPHILS 75 32 - 75 %    LYMPHOCYTES 7 (L) 12 - 49 %    MONOCYTES 11 5 - 13 %    EOSINOPHILS 5 0 - 7 %    BASOPHILS 1 0 - 1 %    IMMATURE GRANULOCYTES 1 (H) 0 - 0.5 %    ABS. NEUTROPHILS 8.4 (H) 1.8 - 8.0 K/UL    ABS. LYMPHOCYTES 0.8 0.8 - 3.5 K/UL    ABS. MONOCYTES 1.2 (H) 0.0 - 1.0 K/UL    ABS. EOSINOPHILS 0.6 (H) 0.0 - 0.4 K/UL    ABS. BASOPHILS 0.1 0.0 - 0.1 K/UL    ABS. IMM. GRANS. 0.1 (H) 0.00 - 0.04 K/UL    DF AUTOMATED     METABOLIC PANEL, COMPREHENSIVE    Collection Time: 02/22/22  2:11 AM   Result Value Ref Range    Sodium 133 (L) 136 - 145 mmol/L    Potassium 4.0 3.5 - 5.1 mmol/L    Chloride 101 97 - 108 mmol/L    CO2 25 21 - 32 mmol/L    Anion gap 7 5 - 15 mmol/L    Glucose 106 (H) 65 - 100 mg/dL    BUN 12 6 - 20 mg/dL    Creatinine 1.09 0.70 - 1.30 mg/dL    BUN/Creatinine ratio 11 (L) 12 - 20      GFR est AA >60 >60 ml/min/1.73m2    GFR est non-AA >60 >60 ml/min/1.73m2    Calcium 9.5 8.5 - 10.1 mg/dL    Bilirubin, total 0.6 0.2 - 1.0 mg/dL    AST (SGOT) 34 15 - 37 U/L    ALT (SGPT) 42 12 - 78 U/L    Alk.  phosphatase 103 45 - 117 U/L    Protein, total 7.3 6.4 - 8.2 g/dL    Albumin 3.2 (L) 3.5 - 5.0 g/dL    Globulin 4.1 (H) 2.0 - 4.0 g/dL    A-G Ratio 0.8 (L) 1.1 - 2.2     TROPONIN-HIGH SENSITIVITY    Collection Time: 02/22/22  2:11 AM   Result Value Ref Range    Troponin-High Sensitivity 18 0 - 76 ng/L   D DIMER    Collection Time: 02/22/22  2:11 AM   Result Value Ref Range    D DIMER 3.54 (H) <0.50 ug/ml(FEU)   PTT    Collection Time: 02/22/22  7:12 AM   Result Value Ref Range    aPTT 32.6 21.2 - 34.1 sec    aPTT, therapeutic range   82 - 109 sec             Assessment and Plan :     (1) acute pulmonary embolism: non massive. Will start on heparin gtt, continue to next 24 hours, morphine and precedex PRN for pain. 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 Patient had f/u with surgery and was found to have non-obstructive mass. Consulting oncology. (3) Asthma: controlled on albuterol and Symbicort    (4) alcohol dependence: CIWA, PRN ativan    DVT ppx: heparin gtt    DISPO: home when workup complete. Likely in 48-72 hours.      Signed By: Emilie Hauser MD     February 22, 2022

## 2022-02-22 NOTE — PROGRESS NOTES
2/22/22. PCP is Dr. Brooks Mckeon. - seen 3 weeks ago. D/C Plan is home with family & mother Quiana Hinojosa @ [phone])  To transport pt home upon discharge. Pt uses no DME/no home health.

## 2022-02-22 NOTE — ACP (ADVANCE CARE PLANNING)
Advance Care Planning   Healthcare Decision Maker:       Primary Decision Maker: Qing Lala - Mother - [phone]

## 2022-02-22 NOTE — PROGRESS NOTES
Reason for Admission:  PE                     RUR Score:   6%                  Plan for utilizing home health:  None @ this time/uses no DME      PCP: First and Last name:  Samson Sanches MD     Name of Practice:    Are you a current patient: Yes/No: Yes   Approximate date of last visit: 7 mos ago. Can you participate in a virtual visit with your PCP: Yes/Call/Has cell phone. Current Advanced Directive/Advance Care Plan: No Order      Healthcare Decision Maker:              Primary Decision Maker: Mayra Pyle - Mother - [phone]                  Transition of Care Plan:   D/C Plan is home with family & mother to transport pt home upon discharge.

## 2022-02-22 NOTE — ED PROVIDER NOTES
EMERGENCY DEPARTMENT HISTORY AND PHYSICAL EXAM      Date: 2/22/2022  Patient Name: Facundo Devries    History of Presenting Illness     Chief Complaint   Patient presents with    Leg Pain       History Provided By: Patient    HPI: Facundo Devries, 27 y.o. male with a past medical history significant Right groin mass presents to the ED with cc of worsening right groin and right leg pain and swelling. Patient reports the symptoms have been getting worse last month, has followed up with surgery and diagnosed with a nonvascular mass. Referral given for surgical oncology at [facility] the patient has yet to have an appointment with them. Patient reports pain is sharp, worse with ambulation or movement, improved with rest.  Patient denies numbness or tingling. Patient reports chest pain, which is diffuse, worse with inspiration or expiration. Patient denies fevers or chills, denies nausea or vomiting. There are no other complaints, changes, or physical findings at this time. PCP: Samson Sanches MD    No current facility-administered medications on file prior to encounter. Current Outpatient Medications on File Prior to Encounter   Medication Sig Dispense Refill    oxyCODONE-acetaminophen (PERCOCET) 5-325 mg per tablet Take 1 Tablet by mouth every eight (8) hours as needed for Pain for up to 14 days. Max Daily Amount: 3 Tablets. 30 Tablet 0    albuterol (ACCUNEB) 0.63 mg/3 mL nebulizer solution 3 mL by Nebulization route as needed.  ProAir HFA 90 mcg/actuation inhaler Take 2 Puffs by inhalation every six (6) hours as needed.  potassium chloride SA (MICRO-K) 10 mEq capsule Take 10 mEq by mouth daily.  torsemide (DEMADEX) 10 mg tablet Take 1 Tablet by mouth daily.  Symbicort 160-4.5 mcg/actuation HFAA Take 2 Puffs by inhalation daily.          Past History     Past Medical History:  Past Medical History:   Diagnosis Date    Asthma     Hypertension        Past Surgical History:  History reviewed. No pertinent surgical history. Family History:  Family History   Problem Relation Age of Onset    Diabetes Mother     Hypertension Mother     Myasthenia Gravis Maternal Grandmother     Cancer Maternal Grandfather        Social History:  Social History     Tobacco Use    Smoking status: Former Smoker     Packs/day: 0.50     Years: 2.00     Pack years: 1.00    Smokeless tobacco: Never Used   Vaping Use    Vaping Use: Never used   Substance Use Topics    Alcohol use: Yes    Drug use: Not Currently     Types: Marijuana       Allergies:  No Known Allergies      Review of Systems   Review of Systems   Constitutional: Negative for chills and fever. HENT: Negative for sinus pressure and sinus pain. Eyes: Negative for photophobia and redness. Respiratory: Negative for shortness of breath and wheezing. Cardiovascular: Negative for chest pain and palpitations. Gastrointestinal: Negative for abdominal pain and nausea. Genitourinary: Negative for flank pain and hematuria. Musculoskeletal: Positive for right leg pain and gait problem. Skin: Negative for color change and pallor. Neurological: Negative for dizziness and weakness. Review of Systems    Physical Exam   Physical Exam  Constitutional:       General: No acute distress. Appearance: Normal appearance. Not toxic-appearing. HENT:      Head: Normocephalic and atraumatic. Nose: Nose normal.      Mouth/Throat:      Mouth: Mucous membranes are moist.   Eyes:      Extraocular Movements: Extraocular movements intact. Pupils: Pupils are equal, round, and reactive to light. Cardiovascular:      Rate and Rhythm: Normal rate. Pulses: Normal pulses. Pulmonary:      Effort: Pulmonary effort is normal.      Breath sounds: No stridor. Abdominal:      General: Abdomen is flat. There is no distension. Musculoskeletal:         General: Normal range of motion.       Cervical back: Normal range of motion and neck supple. Skin:     General: Skin is warm and dry. 2+ pitting edema right lower extremity     Capillary Refill: Capillary refill takes less than 2 seconds. Neurological:      General: No focal deficit present. Mental Status: Aert and oriented to person, place, and time. Psychiatric:         Mood and Affect: Mood normal.         Behavior: Behavior normal.       Physical Exam    Lab and Diagnostic Study Results     Labs -     Recent Results (from the past 12 hour(s))   CBC WITH AUTOMATED DIFF    Collection Time: 02/22/22  2:11 AM   Result Value Ref Range    WBC 11.1 4.1 - 11.1 K/uL    RBC 3.85 (L) 4.10 - 5.70 M/uL    HGB 11.6 (L) 12.1 - 17.0 g/dL    HCT 35.2 (L) 36.6 - 50.3 %    MCV 91.4 80.0 - 99.0 FL    MCH 30.1 26.0 - 34.0 PG    MCHC 33.0 30.0 - 36.5 g/dL    RDW 12.9 11.5 - 14.5 %    PLATELET 490 444 - 362 K/uL    MPV 9.6 8.9 - 12.9 FL    NRBC 0.0 0.0  WBC    ABSOLUTE NRBC 0.00 0.00 - 0.01 K/uL    NEUTROPHILS 75 32 - 75 %    LYMPHOCYTES 7 (L) 12 - 49 %    MONOCYTES 11 5 - 13 %    EOSINOPHILS 5 0 - 7 %    BASOPHILS 1 0 - 1 %    IMMATURE GRANULOCYTES 1 (H) 0 - 0.5 %    ABS. NEUTROPHILS 8.4 (H) 1.8 - 8.0 K/UL    ABS. LYMPHOCYTES 0.8 0.8 - 3.5 K/UL    ABS. MONOCYTES 1.2 (H) 0.0 - 1.0 K/UL    ABS. EOSINOPHILS 0.6 (H) 0.0 - 0.4 K/UL    ABS. BASOPHILS 0.1 0.0 - 0.1 K/UL    ABS. IMM.  GRANS. 0.1 (H) 0.00 - 0.04 K/UL    DF AUTOMATED     METABOLIC PANEL, COMPREHENSIVE    Collection Time: 02/22/22  2:11 AM   Result Value Ref Range    Sodium 133 (L) 136 - 145 mmol/L    Potassium 4.0 3.5 - 5.1 mmol/L    Chloride 101 97 - 108 mmol/L    CO2 25 21 - 32 mmol/L    Anion gap 7 5 - 15 mmol/L    Glucose 106 (H) 65 - 100 mg/dL    BUN 12 6 - 20 mg/dL    Creatinine 1.09 0.70 - 1.30 mg/dL    BUN/Creatinine ratio 11 (L) 12 - 20      GFR est AA >60 >60 ml/min/1.73m2    GFR est non-AA >60 >60 ml/min/1.73m2    Calcium 9.5 8.5 - 10.1 mg/dL    Bilirubin, total 0.6 0.2 - 1.0 mg/dL    AST (SGOT) 34 15 - 37 U/L ALT (SGPT) 42 12 - 78 U/L    Alk. phosphatase 103 45 - 117 U/L    Protein, total 7.3 6.4 - 8.2 g/dL    Albumin 3.2 (L) 3.5 - 5.0 g/dL    Globulin 4.1 (H) 2.0 - 4.0 g/dL    A-G Ratio 0.8 (L) 1.1 - 2.2     TROPONIN-HIGH SENSITIVITY    Collection Time: 02/22/22  2:11 AM   Result Value Ref Range    Troponin-High Sensitivity 18 0 - 76 ng/L   D DIMER    Collection Time: 02/22/22  2:11 AM   Result Value Ref Range    D DIMER 3.54 (H) <0.50 ug/ml(FEU)       Radiologic Studies -   @lastxrresult@  CT Results  (Last 48 hours)               02/22/22 0405  CTA CHEST W OR W WO CONT Final result    Impression:      Bilateral pulmonary emboli. No CT evidence of right heart strain. The results   were called and verbally communicated to Dr. Gretchen Meyer on 2/22/2022 at 6:25 AM.   Please see full report. Narrative:  CTA chest with intravenous contrast, 100 cc Isovue-370, 3-D MIP images       Dose reduction: All CT scans at this facility are performed using dose reduction   optimization techniques as appropriate to a performed exam including the   following: Automated exposure control, adjustments of the mA and/or kV according   to patient size, or use of iterative reconstruction technique. INDICATION: Chest pain, elevated d-dimer, evaluate for pulmonary embolism       FINDINGS:       There are bilateral pulmonary emboli including right lower lobe, left upper   lobe/lingula, left lower lobe and possibly right upper lobe and right middle   lobe branches. There is thrombus in the distal right main pulmonary artery. No   significant enlargement of right ventricle to suggest right heart strain. No   pleural or pericardial effusions. No mediastinal adenopathy. Apparent   low-density liver may be due to phase of injection of fatty infiltration. No airspace disease in the lungs. No pneumothorax. No acute fracture in the   visualized bony structures.                CXR Results  (Last 48 hours)               02/22/22 0259  XR CHEST PORT Final result    Impression:  No acute cardiopulmonary process. Narrative:  XR CHEST PORT       Comparison: Chest radiograph dated October 25, 2019       Findings: The lungs are adequately inflated without focal consolidation. Cardiac   silhouette is within normal limits for size, no evidence of cardiac   decompensation. The osseous structures are intact. Medical Decision Making   - I am the first provider for this patient. - I reviewed the vital signs, available nursing notes, past medical history, past surgical history, family history and social history. - Initial assessment performed. The patients presenting problems have been discussed, and they are in agreement with the care plan formulated and outlined with them. I have encouraged them to ask questions as they arise throughout their visit. Vital Signs-Reviewed the patient's vital signs. Patient Vitals for the past 12 hrs:   Temp Pulse Resp BP SpO2   02/22/22 0136 98.7 °F (37.1 °C) 90 18 (!) 141/87 98 %       Records Reviewed: Nursing Notes and Old Medical Records      ED Course:     ED Course as of 02/22/22 0658   Tue Feb 22, 2022   0628 Discussed findings with patient and mother present regarding the pulmonary emboli, treatment plan and need for admission. Patient states understanding of the plan. [CS]      ED Course User Index  [CS] Keyur Jung MD       Disposition   Disposition: Admitted to Floor Medical Floor the case was discussed with the admitting physician     Admitted        Diagnosis     Clinical Impression:   1. Bilateral pulmonary embolism [facility])        Attestations:    Juan Pablo Tran MD    Please note that this dictation was completed with [employer], the computer voice recognition software. Quite often unanticipated grammatical, syntax, homophones, and other interpretive errors are inadvertently transcribed by the computer software. Please disregard these errors.   Please excuse any errors that have escaped final proofreading. Thank you.

## 2022-02-23 ENCOUNTER — HOSPITAL ENCOUNTER (INPATIENT)
Age: 32
LOS: 9 days | Discharge: HOME HEALTH | DRG: 681 | End: 2022-03-04
Attending: HOSPITALIST | Admitting: HOSPITALIST
Payer: MEDICAID

## 2022-02-23 ENCOUNTER — APPOINTMENT (OUTPATIENT)
Dept: ULTRASOUND IMAGING | Age: 32
DRG: 681 | End: 2022-02-23
Attending: HOSPITALIST
Payer: MEDICAID

## 2022-02-23 VITALS
SYSTOLIC BLOOD PRESSURE: 142 MMHG | HEIGHT: 67 IN | DIASTOLIC BLOOD PRESSURE: 82 MMHG | HEART RATE: 100 BPM | TEMPERATURE: 98.2 F | RESPIRATION RATE: 18 BRPM | WEIGHT: 260 LBS | BODY MASS INDEX: 40.81 KG/M2 | OXYGEN SATURATION: 99 %

## 2022-02-23 DIAGNOSIS — R19.09 RIGHT GROIN MASS: Primary | ICD-10-CM

## 2022-02-23 DIAGNOSIS — R10.31 RIGHT INGUINAL PAIN: ICD-10-CM

## 2022-02-23 DIAGNOSIS — R19.00 PELVIC MASS: ICD-10-CM

## 2022-02-23 DIAGNOSIS — C85.95 NON-HODGKIN LYMPHOMA OF LYMPH NODES OF LOWER EXTREMITY, UNSPECIFIED NON-HODGKIN LYMPHOMA TYPE (HCC): ICD-10-CM

## 2022-02-23 PROBLEM — I26.99 BILATERAL PULMONARY EMBOLISM (HCC): Status: ACTIVE | Noted: 2022-02-23

## 2022-02-23 LAB
APTT PPP: 43 SEC (ref 21.2–34.1)
APTT PPP: 47.1 SEC (ref 21.2–34.1)
COVID-19 RAPID TEST, COVR: NOT DETECTED
SPECIMEN SOURCE: NORMAL
THERAPEUTIC RANGE,PTTT: ABNORMAL SEC (ref 82–109)
THERAPEUTIC RANGE,PTTT: ABNORMAL SEC (ref 82–109)

## 2022-02-23 PROCEDURE — 36415 COLL VENOUS BLD VENIPUNCTURE: CPT

## 2022-02-23 PROCEDURE — 94640 AIRWAY INHALATION TREATMENT: CPT

## 2022-02-23 PROCEDURE — 74011250636 HC RX REV CODE- 250/636: Performed by: EMERGENCY MEDICINE

## 2022-02-23 PROCEDURE — 74011250637 HC RX REV CODE- 250/637: Performed by: HOSPITALIST

## 2022-02-23 PROCEDURE — 74011250636 HC RX REV CODE- 250/636: Performed by: HOSPITALIST

## 2022-02-23 PROCEDURE — 87635 SARS-COV-2 COVID-19 AMP PRB: CPT

## 2022-02-23 PROCEDURE — 74011000250 HC RX REV CODE- 250: Performed by: HOSPITALIST

## 2022-02-23 PROCEDURE — 99231 SBSQ HOSP IP/OBS SF/LOW 25: CPT | Performed by: PHYSICIAN ASSISTANT

## 2022-02-23 PROCEDURE — 94760 N-INVAS EAR/PLS OXIMETRY 1: CPT

## 2022-02-23 PROCEDURE — 65660000000 HC RM CCU STEPDOWN

## 2022-02-23 PROCEDURE — 85730 THROMBOPLASTIN TIME PARTIAL: CPT

## 2022-02-23 RX ORDER — ONDANSETRON 2 MG/ML
4 INJECTION INTRAMUSCULAR; INTRAVENOUS
Status: DISCONTINUED | OUTPATIENT
Start: 2022-02-23 | End: 2022-03-04 | Stop reason: HOSPADM

## 2022-02-23 RX ORDER — IPRATROPIUM BROMIDE AND ALBUTEROL SULFATE 2.5; .5 MG/3ML; MG/3ML
3 SOLUTION RESPIRATORY (INHALATION)
Status: DISCONTINUED | OUTPATIENT
Start: 2022-02-23 | End: 2022-03-02

## 2022-02-23 RX ORDER — ACETAMINOPHEN 325 MG/1
650 TABLET ORAL
Status: DISCONTINUED | OUTPATIENT
Start: 2022-02-23 | End: 2022-03-04 | Stop reason: HOSPADM

## 2022-02-23 RX ORDER — OXYCODONE AND ACETAMINOPHEN 5; 325 MG/1; MG/1
1 TABLET ORAL
Status: DISCONTINUED | OUTPATIENT
Start: 2022-02-23 | End: 2022-03-04 | Stop reason: HOSPADM

## 2022-02-23 RX ORDER — HEPARIN SODIUM 10000 [USP'U]/100ML
18-36 INJECTION, SOLUTION INTRAVENOUS
Status: DISCONTINUED | OUTPATIENT
Start: 2022-02-23 | End: 2022-02-23 | Stop reason: DRUGHIGH

## 2022-02-23 RX ORDER — POLYETHYLENE GLYCOL 3350 17 G/17G
17 POWDER, FOR SOLUTION ORAL DAILY PRN
Status: DISCONTINUED | OUTPATIENT
Start: 2022-02-23 | End: 2022-03-04 | Stop reason: HOSPADM

## 2022-02-23 RX ORDER — SODIUM CHLORIDE 0.9 % (FLUSH) 0.9 %
5-40 SYRINGE (ML) INJECTION AS NEEDED
Status: DISCONTINUED | OUTPATIENT
Start: 2022-02-23 | End: 2022-03-04 | Stop reason: HOSPADM

## 2022-02-23 RX ORDER — HYDROMORPHONE HYDROCHLORIDE 1 MG/ML
1 INJECTION, SOLUTION INTRAMUSCULAR; INTRAVENOUS; SUBCUTANEOUS
Status: DISCONTINUED | OUTPATIENT
Start: 2022-02-23 | End: 2022-03-01

## 2022-02-23 RX ORDER — HEPARIN SODIUM 1000 [USP'U]/ML
3470 INJECTION, SOLUTION INTRAVENOUS; SUBCUTANEOUS ONCE
Status: COMPLETED | OUTPATIENT
Start: 2022-02-23 | End: 2022-02-23

## 2022-02-23 RX ORDER — SODIUM CHLORIDE 0.9 % (FLUSH) 0.9 %
5-40 SYRINGE (ML) INJECTION EVERY 8 HOURS
Status: DISCONTINUED | OUTPATIENT
Start: 2022-02-23 | End: 2022-03-04 | Stop reason: HOSPADM

## 2022-02-23 RX ORDER — ACETAMINOPHEN 650 MG/1
650 SUPPOSITORY RECTAL
Status: DISCONTINUED | OUTPATIENT
Start: 2022-02-23 | End: 2022-03-04 | Stop reason: HOSPADM

## 2022-02-23 RX ORDER — ONDANSETRON 4 MG/1
4 TABLET, ORALLY DISINTEGRATING ORAL
Status: DISCONTINUED | OUTPATIENT
Start: 2022-02-23 | End: 2022-03-04 | Stop reason: HOSPADM

## 2022-02-23 RX ADMIN — POTASSIUM CHLORIDE 10 MEQ: 750 TABLET, FILM COATED, EXTENDED RELEASE ORAL at 08:04

## 2022-02-23 RX ADMIN — HEPARIN SODIUM 3470 UNITS: 1000 INJECTION INTRAVENOUS; SUBCUTANEOUS at 06:44

## 2022-02-23 RX ADMIN — Medication 26 UNITS/KG/HR: at 17:40

## 2022-02-23 RX ADMIN — HYDROMORPHONE HYDROCHLORIDE 1 MG: 1 INJECTION, SOLUTION INTRAMUSCULAR; INTRAVENOUS; SUBCUTANEOUS at 17:30

## 2022-02-23 RX ADMIN — MORPHINE SULFATE 1 MG: 2 INJECTION, SOLUTION INTRAMUSCULAR; INTRAVENOUS at 08:04

## 2022-02-23 RX ADMIN — HYDROMORPHONE HYDROCHLORIDE 1 MG: 1 INJECTION, SOLUTION INTRAMUSCULAR; INTRAVENOUS; SUBCUTANEOUS at 23:38

## 2022-02-23 RX ADMIN — ARFORMOTEROL TARTRATE: 15 SOLUTION RESPIRATORY (INHALATION) at 19:47

## 2022-02-23 RX ADMIN — Medication 24 UNITS/KG/HR: at 06:43

## 2022-02-23 RX ADMIN — SODIUM CHLORIDE, PRESERVATIVE FREE 10 ML: 5 INJECTION INTRAVENOUS at 17:31

## 2022-02-23 RX ADMIN — BUDESONIDE AND FORMOTEROL FUMARATE DIHYDRATE 2 PUFF: 160; 4.5 AEROSOL RESPIRATORY (INHALATION) at 09:04

## 2022-02-23 RX ADMIN — HEPARIN SODIUM 3470 UNITS: 1000 INJECTION INTRAVENOUS; SUBCUTANEOUS at 17:45

## 2022-02-23 RX ADMIN — TORSEMIDE 10 MG: 10 TABLET ORAL at 08:04

## 2022-02-23 RX ADMIN — SODIUM CHLORIDE, PRESERVATIVE FREE 10 ML: 5 INJECTION INTRAVENOUS at 06:20

## 2022-02-23 RX ADMIN — SODIUM CHLORIDE, PRESERVATIVE FREE 10 ML: 5 INJECTION INTRAVENOUS at 00:35

## 2022-02-23 RX ADMIN — OXYCODONE AND ACETAMINOPHEN 1 TABLET: 5; 325 TABLET ORAL at 21:18

## 2022-02-23 NOTE — DISCHARGE SUMMARY
Admit date: 2/22/2022   Admitting Provider: Boyd Kohler MD    Discharge date: 2/23/2022  Discharging Provider: Andre Clements PA-C      * Admission Diagnoses: Pulmonary embolism [facility]) [I26.99]    * Discharge Diagnoses:    Hospital Problems as of 2/23/2022 Date Reviewed: 2/17/2022          Codes Class Noted - Resolved POA    Pulmonary embolism ([facility] [address] ICD-10-CM: I26.99  ICD-9-CM: 415.19  2/22/2022 - Present Unknown              * Hospital Course: This a 41-year-old male admitted on 2/22/2022 with severe right leg swelling and shortness of breath. CT a of the chest revealed bilateral pulmonary emboli without heart strain. Patient had a  Right pelvic and groin mass and was evaluated by vascular surgery. Recommendation was the patient we will require surgical oncology at [address] [place] [address] although no appointment was established and [facility] is currently on diversion and [facility] has graciously accepted the patient. During the time frame to set up this appointment patient developed shortness of breath and was evaluated at which time he was diagnosed with bilateral pulmonary emboli. Patient is currently on a heparin drip. There is extensive swelling of the right leg consistent with probable venous obstruction due to the mass caseating the vascularity of the right leg. There is no obvious signs of ischemia. Patient is requesting transfer for definitive surgical intervention. Oncology consultation and in agreement. Differential diagnosis to include sarcoma, lymphoma. [facility] services agree with transfer and awaiting transfer at this point.     Impression\plan:     1.  Bilateral pulmonary emboli  Continue heparin drip  This appears to be secondary to a probable malignant mass caseating the vascular structure at the level of the right groin and pelvis. Echocardiogram     2.   Right groin and pelvic mass  Most likely malignant process  We will need surgical evaluation and biopsy  Requesting transfer to [facility] surgical oncology service. Surgical evaluation by Dr. Fercho Gonzalez and recommendation as above     3. Asthma  Continue Symbicort  No active exacerbation    * Procedures  * No surgery found *      Consults: Oncology services    Significant Diagnostic Studies: As discussed in hospital course    Discharge Exam:  Visit Vitals  /70   Pulse 62   Temp 98.5 °F (36.9 °C)   Resp 18   Ht 5' 7\" (1.702 m)   Wt 117.9 kg (260 lb)   SpO2 98%   BMI 40.72 kg/m²     PHYSICAL EXAM:  Vitals reviewed. Constitutional:       Appearance: He is ill-appearing. HENT:      Head: Normocephalic and atraumatic. Mouth/Throat:      Mouth: Mucous membranes are moist.      Pharynx: Oropharynx is clear. Eyes:      Conjunctiva/sclera: Conjunctivae normal.   Cardiovascular:      Rate and Rhythm: Regular rhythm. Normal rate present. Heart sounds: Normal heart sounds. Pulmonary:      Effort: Pulmonary effort is normal.      Breath sounds: Normal breath sounds. Abdominal:      General: Abdomen is flat. Bowel sounds are normal.      Palpations: Abdomen is soft. Comments: Prominence of the right lower abdomen   Musculoskeletal:      Cervical back: Normal range of motion and neck supple. Right lower leg: Edema present. Comments: Right leg edema 3+   Neurological:      General: No focal deficit present. Mental Status: He is alert and oriented to person, place, and time. Mental status is at baseline. Psychiatric:         Mood and Affect: Mood normal.       * Discharge Condition: stable  * Disposition: Transfer to [address] [place] [address]    Discharge Medications:  Current Discharge Medication List          * Follow-up Care/Patient Instructions:   Activity: Bedrest  Diet: Regular Diet  Wound Care: None needed    Follow-up Information    None         Discharge summary greater than 35 minutes spent with the patient performing discharge instructions, medication review and physical exam    Signed:  Marquis Vernon PA-C  2/23/2022  10:09 AM

## 2022-02-23 NOTE — PROGRESS NOTES
CM notified by Calos Corbett that patient will transfer to [facility] for further medical care. , Pj Moses, stated that [employer] transport is already arranged for patient.

## 2022-02-23 NOTE — CONSULTS
Surgery Oncology Consultation    Admit Date: 2/23/2022  Reason for Consultation: Right Groin Mass    HPI:  Christina Harding is a 27 y.o. male with PMH as noted below who we are asked to see in Surgical Oncology consultation by Dr. Tian Araujo for a right groin mass, swelling & pain. Patient states that 2 months ago he began with leg pain and noticed worsening pain. Was seen by PCP and then [facility] surgical where was diagnosed with non-vascular mass and was referred to oncology. He is scheduled to f/u with them but developed worsening leg pain . Due to worsening right groin pain & new onset chest pain, he presented to ED yesterday. He was diagnosed with bilateral pulmonary emboli, started on a heparin drip, stabilized & transferred to [facility] for further evaluation. Right leg pain is sharp, worse with ambulation or movement, improved with rest.  Patient denies numbness or tingling. No change in bowel or bladder habits. CTA Abdomen and lower extremities (2/11/2022) shows:  IMPRESSION  1.  Large right pelvic sidewall and right groin/upper thigh mass  most  consistent with underlying malignancy. Of note, this mass was visualized in the  duplex ultrasound in January 26, 2022.  2.  There is subcutaneous edema involving the right lower extremity. Given  location and characteristics of the mass as above suspected most likely  compression and possibly occlusion of the right external iliac vein causing  downstream venous stasis and consequent edema. 3.  Lower abdominal periaortic lymphadenopathy. Moderate left groin  lymphadenopathy measuring up to 2.2 cm. 4.  No evidence of arterial injury.     Patient Active Problem List    Diagnosis Date Noted    Pelvic mass 02/23/2022    Bilateral pulmonary embolism ([address] 02/23/2022    Pulmonary embolism ([address] 02/22/2022    Right inguinal pain 02/17/2022    Right groin mass 02/11/2022    Fall 02/11/2022     Past Medical History:   Diagnosis Date    Asthma     Hypertension       No past surgical history on file. Social History     Tobacco Use    Smoking status: Former Smoker     Packs/day: 0.50     Years: 2.00     Pack years: 1.00    Smokeless tobacco: Never Used   Substance Use Topics    Alcohol use: Yes      Family History   Problem Relation Age of Onset    Diabetes Mother     Hypertension Mother     Myasthenia Gravis Maternal Grandmother     Cancer Maternal Grandfather       Prior to Admission medications    Medication Sig Start Date End Date Taking? Authorizing Provider   oxyCODONE-acetaminophen (PERCOCET) 5-325 mg per tablet Take 1 Tablet by mouth every eight (8) hours as needed for Pain for up to 14 days. Max Daily Amount: 3 Tablets. 2/14/22 2/28/22  Wiley Kulkarni MD   albuterol (ACCUNEB) 0.63 mg/3 mL nebulizer solution 3 mL by Nebulization route as needed. 12/30/21   Provider, Historical   ProAir HFA 90 mcg/actuation inhaler Take 2 Puffs by inhalation every six (6) hours as needed. 2/8/22   Provider, Historical   potassium chloride SA (MICRO-K) 10 mEq capsule Take 10 mEq by mouth daily. 1/25/22   Provider, Historical   torsemide (DEMADEX) 10 mg tablet Take 1 Tablet by mouth daily. Patient not taking: Reported on 2/22/2022 1/25/22   Provider, Historical   Symbicort 160-4.5 mcg/actuation HFAA Take 2 Puffs by inhalation daily. 2/8/22   Provider, Historical     No Known Allergies       Subjective:     Review of Systems:    A comprehensive review of systems was negative except for that written in the History of Present Illness. Objective: There were no vitals taken for this visit.   Recent Results (from the past 24 hour(s))   PTT    Collection Time: 02/22/22  8:28 PM   Result Value Ref Range    aPTT 44.1 (H) 21.2 - 34.1 sec    aPTT, therapeutic range   82 - 109 sec   PTT    Collection Time: 02/23/22  5:22 AM   Result Value Ref Range    aPTT 43.0 (H) 21.2 - 34.1 sec    aPTT, therapeutic range   82 - 109 sec   COVID-19 RAPID TEST    Collection Time: 02/23/22 10:15 AM Result Value Ref Range    Specimen source Nasopharyngeal      COVID-19 rapid test Not Detected Not Detected     PTT    Collection Time: 02/23/22 11:51 AM   Result Value Ref Range    aPTT 47.1 (H) 21.2 - 34.1 sec    aPTT, therapeutic range   82 - 109 sec     _____________________  Physical Exam:     General:  Alert, cooperative, obese, appears stated age. Eyes:   Sclera clear. Throat: Lips, mucosa, and tongue normal.   Neck: Supple, symmetrical, trachea midline. Lungs:   No apparent resp distress   Heart:  RRR. Abdomen:   Soft, obese, non-tender. Extremities: RLE w marked swelling. +TTP in right groin & palpable large right inguinal mass    Skin: Skin w/d/i           Assessment:   Active Problems:    Right groin mass (2/11/2022)      Pelvic mass (2/23/2022)      Bilateral pulmonary embolism ([address] (2/23/2022)            Plan:     No urgent surgical intervention warranted at this time. Rec Ultrasound-guided biopsy of right leg lesion   Further treatment pending biopsy results. Rest of care per Primary Team    Thank you for including us in the care of your patient.       Signed By: ALL Giraldo     February 23, 2022

## 2022-02-23 NOTE — PROGRESS NOTES
Hospitalist Progress Note    Subjective:   Daily Progress Note: 2/23/2022 8:52 AM    Right leg swelling and SOB    Current Facility-Administered Medications   Medication Dose Route Frequency    heparin 25,000 units in D5W 250 ml infusion  18-36 Units/kg/hr (Adjusted) IntraVENous TITRATE    heparin (porcine) 1,000 unit/mL injection 6,940 Units  80 Units/kg (Adjusted) IntraVENous PRN    Or    heparin (porcine) 1,000 unit/mL injection 3,470 Units  40 Units/kg (Adjusted) IntraVENous PRN    morphine injection 1 mg  1 mg IntraVENous Q4H PRN    sodium chloride (NS) flush 5-40 mL  5-40 mL IntraVENous Q8H    sodium chloride (NS) flush 5-40 mL  5-40 mL IntraVENous PRN    acetaminophen (TYLENOL) tablet 650 mg  650 mg Oral Q6H PRN    Or    acetaminophen (TYLENOL) suppository 650 mg  650 mg Rectal Q6H PRN    polyethylene glycol (MIRALAX) packet 17 g  17 g Oral DAILY PRN    ondansetron (ZOFRAN ODT) tablet 4 mg  4 mg Oral Q8H PRN    Or    ondansetron (ZOFRAN) injection 4 mg  4 mg IntraVENous Q6H PRN    albuterol CONCENTRATE 2.5mg/0.5 mL neb soln  0.65 mg Inhalation Q6H PRN    potassium chloride SR (KLOR-CON 10) tablet 10 mEq  10 mEq Oral DAILY    budesonide-formoteroL (SYMBICORT) 160-4.5 mcg/actuation HFA inhaler 2 Puff  2 Puff Inhalation DAILY    albuterol (PROVENTIL HFA, VENTOLIN HFA, PROAIR HFA) inhaler 2 Puff  2 Puff Inhalation Q6H PRN    torsemide (DEMADEX) tablet 10 mg  10 mg Oral DAILY    LORazepam (ATIVAN) injection 1 mg  1 mg IntraVENous Q4H PRN        Review of Systems  Review of Systems   Constitutional: Positive for malaise/fatigue. HENT: Negative. Eyes: Negative. Respiratory: Positive for shortness of breath. Cardiovascular: Positive for leg swelling. Negative for chest pain, orthopnea and claudication. Gastrointestinal: Negative for abdominal pain, nausea and vomiting. Genitourinary: Negative. Musculoskeletal: Negative. Skin: Negative. Neurological: Negative. Psychiatric/Behavioral: Negative. Objective:     Visit Vitals  /70   Pulse 62   Temp 98.5 °F (36.9 °C)   Resp 18   Ht 5' 7\" (1.702 m)   Wt 117.9 kg (260 lb)   SpO2 98%   BMI 40.72 kg/m²      O2 Device: None (Room air)    Temp (24hrs), Av.8 °F (37.1 °C), Min:98.5 °F (36.9 °C), Max:99.4 °F (37.4 °C)      No intake/output data recorded.  1901 -  0700  In: 720 [P.O.:720]  Out: 800 [Urine:800]    Recent Results (from the past 24 hour(s))   PTT    Collection Time: 22  1:23 PM   Result Value Ref Range    aPTT 38.4 (H) 21.2 - 34.1 sec    aPTT, therapeutic range   82 - 109 sec   PTT    Collection Time: 22  8:28 PM   Result Value Ref Range    aPTT 44.1 (H) 21.2 - 34.1 sec    aPTT, therapeutic range   82 - 109 sec   PTT    Collection Time: 22  5:22 AM   Result Value Ref Range    aPTT 43.0 (H) 21.2 - 34.1 sec    aPTT, therapeutic range   82 - 109 sec        CTA CHEST W OR W WO CONT   Final Result      Bilateral pulmonary emboli. No CT evidence of right heart strain. The results   were called and verbally communicated to Dr. Emile Bernabe on 2022 at 6:25 AM.   Please see full report. XR CHEST PORT   Final Result   No acute cardiopulmonary process. PHYSICAL EXAM:    Physical Exam  Vitals reviewed. Constitutional:       Appearance: He is ill-appearing. HENT:      Head: Normocephalic and atraumatic. Mouth/Throat:      Mouth: Mucous membranes are moist.      Pharynx: Oropharynx is clear. Eyes:      Conjunctiva/sclera: Conjunctivae normal.   Cardiovascular:      Rate and Rhythm: Regular rhythm. Tachycardia present. Heart sounds: Normal heart sounds. Pulmonary:      Effort: Pulmonary effort is normal.      Breath sounds: Normal breath sounds. Abdominal:      General: Abdomen is flat. Bowel sounds are normal.      Palpations: Abdomen is soft.       Comments: Prominence of the right lower abdomen   Musculoskeletal:      Cervical back: Normal range of motion and neck supple. Right lower leg: Edema present. Comments: Right leg edema 3+   Neurological:      General: No focal deficit present. Mental Status: He is alert and oriented to person, place, and time. Mental status is at baseline. Psychiatric:         Mood and Affect: Mood normal.          Data Review    Recent Results (from the past 24 hour(s))   PTT    Collection Time: 02/22/22  1:23 PM   Result Value Ref Range    aPTT 38.4 (H) 21.2 - 34.1 sec    aPTT, therapeutic range   82 - 109 sec   PTT    Collection Time: 02/22/22  8:28 PM   Result Value Ref Range    aPTT 44.1 (H) 21.2 - 34.1 sec    aPTT, therapeutic range   82 - 109 sec   PTT    Collection Time: 02/23/22  5:22 AM   Result Value Ref Range    aPTT 43.0 (H) 21.2 - 34.1 sec    aPTT, therapeutic range   82 - 109 sec        Assessment/Plan:     Active Problems:    Pulmonary embolism ([address] (2/22/2022)      Hospital course: This a 76-year-old male admitted on 2/22/2022 with severe right leg swelling and shortness of breath. CT a of the chest revealed bilateral pulmonary emboli without heart strain. Patient had a  Right pelvic and groin mass and was evaluated by vascular surgery. Recommendation was the patient we will require surgical oncology at [facility]. During the time frame to set up this appointment patient developed shortness of breath and was evaluated at which time he was diagnosed with bilateral pulmonary emboli. Patient is currently on a heparin drip. There is extensive swelling of the right leg consistent with probable venous obstruction due to the mass in caseating the vascularity of the right leg. There is no obvious signs of ischemia. Patient is requesting transfer to [facility] surgical oncology service for definitive treatment. Oncology consultation and in agreement. Differential diagnosis to include sarcoma, lymphoma    Impression\plan:    1.   Bilateral pulmonary emboli  Continue heparin drip  This appears to be secondary to a probable malignant mass caseating the vascular structure at the level of the right groin and pelvis. Echocardiogram    2. Right groin and pelvic mass  Most likely malignant process  We will need surgical evaluation and biopsy  Requesting transfer to [facility] surgical oncology service. Surgical evaluation by Dr. Shabnam Padron and recommendation as above    3. Asthma  Continue Symbicort  No active exacerbation    CODE STATUS: Full code    DVT prophylaxis: Heparin drip  Ulcer prophylaxis: Not indicated    Discussed case with transfer center and pending transfer at this point    Care Plan discussed with: Patient/Family    Total time spent with patient: >35 minutes.

## 2022-02-23 NOTE — PROGRESS NOTES
Verbal shift change report given to TONY Jasso at [facility] by Ele Moses RN. Report was given in [employer].  Patient will be going to room 515 bed 2

## 2022-02-23 NOTE — PROGRESS NOTES
Patient had a CHG bath he is tolerating Heparin Drip without any voiced complaints of discomfort. Call bell remains within place.

## 2022-02-23 NOTE — H&P
HISTORY AND PHYSICAL  Omero Sinclair MD        PCP: Zaheer Mae MD    Please note that this dictation was completed with [employer], the computer voice recognition software. Quite often unanticipated grammatical, syntax, homophones, and other interpretive errors are inadvertently transcribed by the computer software. Please disregard these errors. Please excuse any errors that have escaped final proofreading. C/C:  Patient Arnold Varma transferred from [facility] where he was diagnosed with right groin and pelvic mass and pulmonary embolism    HPI  This 69-year-old gentleman presented to [facility] on 2/22 with right leg swelling and shortness of breath. On work-up he was found to have PE, hemodynamically stable and right groin and pelvic mass. Patient was to be transferred to [address] [place] [address] surgical oncology services however [facility] was on diversion so he was transferred to [facility]. I saw him in room 515/2, his girlfriend present. He said the right thigh pain started about 2 months ago. From what he describes he was initially treated with diuretics. The pain and swelling progressed to the point where he could not walk so he presented to the ED yesterday and work-up revealed the above listed problems. As I seen the patient, the surgical team came in to see patient. Dr. Shannon Saenz recommended ultrasound guided biopsy by radiology to determine further management course. PMH/PSH:  Past Medical History:   Diagnosis Date    Asthma     Hypertension      No past surgical history on file. Home meds:   Prior to Admission medications    Medication Sig Start Date End Date Taking? Authorizing Provider   oxyCODONE-acetaminophen (PERCOCET) 5-325 mg per tablet Take 1 Tablet by mouth every eight (8) hours as needed for Pain for up to 14 days. Max Daily Amount: 3 Tablets. 2/14/22 2/28/22  Shad, Jose Muller MD   albuterol (ACCUNEB) 0.63 mg/3 mL nebulizer solution 3 mL by Nebulization route as needed.  12/30/21   Provider, Historical   ProAir HFA 90 mcg/actuation inhaler Take 2 Puffs by inhalation every six (6) hours as needed. 2/8/22   Provider, Historical   potassium chloride SA (MICRO-K) 10 mEq capsule Take 10 mEq by mouth daily. 1/25/22   Provider, Historical   torsemide (DEMADEX) 10 mg tablet Take 1 Tablet by mouth daily. Patient not taking: Reported on 2/22/2022 1/25/22   Provider, Historical   Symbicort 160-4.5 mcg/actuation HFAA Take 2 Puffs by inhalation daily. 2/8/22   Provider, Historical       Allergies:  No Known Allergies    FH:  Family History   Problem Relation Age of Onset    Diabetes Mother     Hypertension Mother     Myasthenia Gravis Maternal Grandmother     Cancer Maternal Grandfather        SH:  Social History     Tobacco Use    Smoking status: Former Smoker     Packs/day: 0.50     Years: 2.00     Pack years: 1.00    Smokeless tobacco: Never Used   Substance Use Topics    Alcohol use: Yes       ROS: A comprehensive review of systems was negative except for that written in the HPI. PHYSICAL EXAM:    General:      alert oriented x4. Not in distress, on room air. HEENT:           Atraumatic, anicteric sclerae, pink conjunctivae                          No oral ulcers, mucosa moist, throat clear, dentition fair  Neck:               Supple, symmetrical,  thyroid: non tender  Lungs: On room air, lungs are clear. Clear to auscultation bilaterally. No Wheezing or Rhonchi. No rales. Chest wall:      No tenderness  No Accessory muscle use. Heart:              Regular  rhythm,  No  murmur   No edema  Abdomen:        Soft, non-tender. Not distended. Bowel sounds normal  Extremities:     Marked swelling of the right lower extremity mainly the thigh with palpable lump on the groin. No erythema, warmth or tenderness. Distal neurovascular structures intact. Neurologic:     Alert and oriented to PPT, CNII-XII intact. Motor and sensory exam grossly intact.         Labs/Imaging:  Available labs and imaging studies reviewed. Assessment & Plan:  Acute bilateral pulmonary embolism, hemodynamically stable, not hypoxic. No CT evidence of right heart strain. Suspect he has right lower extremity DVT due to obstructive mass that resulted in PE. Of note, Doppler ultrasound on 1/26/2022 was negative for DVT but this was about a month ago. --Continue heparin drip  --Monitor on remote telemetry    Right pelvic and groin upper thigh mass  --CT angiogram of the abdomen on 2/11 showed large right pelvic sidewall and right groin/upper thigh mass most consistent with underlying malignancy it is indicated that this mass was visualized in the duplex ultrasound in January 26, 2022. There is also subcutaneous edema involving the right lower extremity with suspicion of possible occlusion of the right external iliac vein. There were also lower abdominal periaortic lymphadenopathy, moderate left groin lymphadenopathy. --Patient seen by surgical oncology  --Plan for imaging guided biopsy  --Obtain vascular input. --Pain management    History of asthma without bronchospasm: Inhaled steroids, we will use as needed bronchodilators      Patient's Baseline:   DVT ppx: On heparin drip  Code status: Full code  Disposition:  To be determined                Signed By: Alexei Buenrostro MD     February 23, 2022

## 2022-02-24 ENCOUNTER — APPOINTMENT (OUTPATIENT)
Dept: ULTRASOUND IMAGING | Age: 32
DRG: 681 | End: 2022-02-24
Attending: HOSPITALIST
Payer: MEDICAID

## 2022-02-24 LAB
ALBUMIN SERPL-MCNC: 3 G/DL (ref 3.5–5)
ALBUMIN/GLOB SERPL: 0.6 {RATIO} (ref 1.1–2.2)
ALP SERPL-CCNC: 105 U/L (ref 45–117)
ALT SERPL-CCNC: 45 U/L (ref 12–78)
ANION GAP SERPL CALC-SCNC: 9 MMOL/L (ref 5–15)
APTT PPP: 29.7 SEC (ref 22.1–31)
APTT PPP: 33.5 SEC (ref 22.1–31)
APTT PPP: 38.6 SEC (ref 22.1–31)
APTT PPP: 53.5 SEC (ref 22.1–31)
AST SERPL-CCNC: 32 U/L (ref 15–37)
BASOPHILS # BLD: 0.1 K/UL (ref 0–0.1)
BASOPHILS NFR BLD: 1 % (ref 0–1)
BILIRUB SERPL-MCNC: 0.5 MG/DL (ref 0.2–1)
BUN SERPL-MCNC: 17 MG/DL (ref 6–20)
BUN/CREAT SERPL: 17 (ref 12–20)
CALCIUM SERPL-MCNC: 10.1 MG/DL (ref 8.5–10.1)
CHLORIDE SERPL-SCNC: 102 MMOL/L (ref 97–108)
CO2 SERPL-SCNC: 22 MMOL/L (ref 21–32)
CREAT SERPL-MCNC: 0.98 MG/DL (ref 0.7–1.3)
DIFFERENTIAL METHOD BLD: ABNORMAL
EOSINOPHIL # BLD: 0.5 K/UL (ref 0–0.4)
EOSINOPHIL NFR BLD: 5 % (ref 0–7)
ERYTHROCYTE [DISTWIDTH] IN BLOOD BY AUTOMATED COUNT: 13.1 % (ref 11.5–14.5)
GLOBULIN SER CALC-MCNC: 5.2 G/DL (ref 2–4)
GLUCOSE SERPL-MCNC: 100 MG/DL (ref 65–100)
HCT VFR BLD AUTO: 38 % (ref 36.6–50.3)
HGB BLD-MCNC: 12.3 G/DL (ref 12.1–17)
IMM GRANULOCYTES # BLD AUTO: 0.1 K/UL (ref 0–0.04)
IMM GRANULOCYTES NFR BLD AUTO: 1 % (ref 0–0.5)
LYMPHOCYTES # BLD: 0.7 K/UL (ref 0.8–3.5)
LYMPHOCYTES NFR BLD: 7 % (ref 12–49)
MCH RBC QN AUTO: 29.4 PG (ref 26–34)
MCHC RBC AUTO-ENTMCNC: 32.4 G/DL (ref 30–36.5)
MCV RBC AUTO: 90.9 FL (ref 80–99)
MONOCYTES # BLD: 1.1 K/UL (ref 0–1)
MONOCYTES NFR BLD: 11 % (ref 5–13)
NEUTS SEG # BLD: 7.5 K/UL (ref 1.8–8)
NEUTS SEG NFR BLD: 75 % (ref 32–75)
NRBC # BLD: 0 K/UL (ref 0–0.01)
NRBC BLD-RTO: 0 PER 100 WBC
PLATELET # BLD AUTO: 358 K/UL (ref 150–400)
PLATELET COMMENTS,PCOM: ABNORMAL
PMV BLD AUTO: 9.2 FL (ref 8.9–12.9)
POTASSIUM SERPL-SCNC: 4.2 MMOL/L (ref 3.5–5.1)
PROT SERPL-MCNC: 8.2 G/DL (ref 6.4–8.2)
RBC # BLD AUTO: 4.18 M/UL (ref 4.1–5.7)
RBC MORPH BLD: ABNORMAL
SODIUM SERPL-SCNC: 133 MMOL/L (ref 136–145)
THERAPEUTIC RANGE,PTTT: ABNORMAL SECS (ref 58–77)
THERAPEUTIC RANGE,PTTT: NORMAL SECS (ref 58–77)
WBC # BLD AUTO: 10 K/UL (ref 4.1–11.1)

## 2022-02-24 PROCEDURE — 88360 TUMOR IMMUNOHISTOCHEM/MANUAL: CPT

## 2022-02-24 PROCEDURE — 85730 THROMBOPLASTIN TIME PARTIAL: CPT

## 2022-02-24 PROCEDURE — 88333 PATH CONSLTJ SURG CYTO XM 1: CPT

## 2022-02-24 PROCEDURE — 87176 TISSUE HOMOGENIZATION CULTR: CPT

## 2022-02-24 PROCEDURE — 88185 FLOWCYTOMETRY/TC ADD-ON: CPT

## 2022-02-24 PROCEDURE — 76942 ECHO GUIDE FOR BIOPSY: CPT

## 2022-02-24 PROCEDURE — 88342 IMHCHEM/IMCYTCHM 1ST ANTB: CPT

## 2022-02-24 PROCEDURE — 36415 COLL VENOUS BLD VENIPUNCTURE: CPT

## 2022-02-24 PROCEDURE — 74011250636 HC RX REV CODE- 250/636: Performed by: PHYSICIAN ASSISTANT

## 2022-02-24 PROCEDURE — 74011000250 HC RX REV CODE- 250: Performed by: HOSPITALIST

## 2022-02-24 PROCEDURE — 88305 TISSUE EXAM BY PATHOLOGIST: CPT

## 2022-02-24 PROCEDURE — 88365 INSITU HYBRIDIZATION (FISH): CPT

## 2022-02-24 PROCEDURE — 74011250636 HC RX REV CODE- 250/636: Performed by: HOSPITALIST

## 2022-02-24 PROCEDURE — 77030014115

## 2022-02-24 PROCEDURE — 94640 AIRWAY INHALATION TREATMENT: CPT

## 2022-02-24 PROCEDURE — 74011250636 HC RX REV CODE- 250/636: Performed by: INTERNAL MEDICINE

## 2022-02-24 PROCEDURE — 80053 COMPREHEN METABOLIC PANEL: CPT

## 2022-02-24 PROCEDURE — 88377 M/PHMTRC ALYS ISHQUANT/SEMIQ: CPT

## 2022-02-24 PROCEDURE — 88364 INSITU HYBRIDIZATION (FISH): CPT

## 2022-02-24 PROCEDURE — 74011250637 HC RX REV CODE- 250/637: Performed by: HOSPITALIST

## 2022-02-24 PROCEDURE — 85025 COMPLETE CBC W/AUTO DIFF WBC: CPT

## 2022-02-24 PROCEDURE — 88341 IMHCHEM/IMCYTCHM EA ADD ANTB: CPT

## 2022-02-24 PROCEDURE — 74011000250 HC RX REV CODE- 250: Performed by: PHYSICIAN ASSISTANT

## 2022-02-24 PROCEDURE — 88184 FLOWCYTOMETRY/ TC 1 MARKER: CPT

## 2022-02-24 PROCEDURE — 87205 SMEAR GRAM STAIN: CPT

## 2022-02-24 PROCEDURE — 65660000000 HC RM CCU STEPDOWN

## 2022-02-24 RX ORDER — HEPARIN SODIUM 1000 [USP'U]/ML
6944 INJECTION, SOLUTION INTRAVENOUS; SUBCUTANEOUS ONCE
Status: COMPLETED | OUTPATIENT
Start: 2022-02-24 | End: 2022-02-24

## 2022-02-24 RX ORDER — HEPARIN SODIUM 1000 [USP'U]/ML
6900 INJECTION, SOLUTION INTRAVENOUS; SUBCUTANEOUS ONCE
Status: COMPLETED | OUTPATIENT
Start: 2022-02-24 | End: 2022-02-24

## 2022-02-24 RX ORDER — SODIUM BICARBONATE 42 MG/ML
1 INJECTION, SOLUTION INTRAVENOUS
Status: COMPLETED | OUTPATIENT
Start: 2022-02-24 | End: 2022-02-24

## 2022-02-24 RX ORDER — MIDAZOLAM HYDROCHLORIDE 1 MG/ML
.5-5 INJECTION, SOLUTION INTRAMUSCULAR; INTRAVENOUS
Status: DISCONTINUED | OUTPATIENT
Start: 2022-02-24 | End: 2022-02-24

## 2022-02-24 RX ORDER — LIDOCAINE HYDROCHLORIDE 10 MG/ML
10 INJECTION, SOLUTION EPIDURAL; INFILTRATION; INTRACAUDAL; PERINEURAL
Status: COMPLETED | OUTPATIENT
Start: 2022-02-24 | End: 2022-02-24

## 2022-02-24 RX ORDER — HEPARIN SODIUM 1000 [USP'U]/ML
6900 INJECTION, SOLUTION INTRAVENOUS; SUBCUTANEOUS ONCE
Status: DISCONTINUED | OUTPATIENT
Start: 2022-02-24 | End: 2022-02-24

## 2022-02-24 RX ORDER — FENTANYL CITRATE 50 UG/ML
12.5-2 INJECTION, SOLUTION INTRAMUSCULAR; INTRAVENOUS
Status: DISCONTINUED | OUTPATIENT
Start: 2022-02-24 | End: 2022-02-24

## 2022-02-24 RX ORDER — SODIUM CHLORIDE 9 MG/ML
25 INJECTION, SOLUTION INTRAVENOUS CONTINUOUS
Status: DISCONTINUED | OUTPATIENT
Start: 2022-02-24 | End: 2022-02-24

## 2022-02-24 RX ORDER — NALOXONE HYDROCHLORIDE 1 MG/ML
0.4 INJECTION INTRAMUSCULAR; INTRAVENOUS; SUBCUTANEOUS
Status: DISCONTINUED | OUTPATIENT
Start: 2022-02-24 | End: 2022-02-24

## 2022-02-24 RX ORDER — FLUMAZENIL 0.1 MG/ML
0.5 INJECTION INTRAVENOUS
Status: DISCONTINUED | OUTPATIENT
Start: 2022-02-24 | End: 2022-02-24

## 2022-02-24 RX ADMIN — ARFORMOTEROL TARTRATE: 15 SOLUTION RESPIRATORY (INHALATION) at 20:05

## 2022-02-24 RX ADMIN — FENTANYL CITRATE 50 MCG: 50 INJECTION, SOLUTION INTRAMUSCULAR; INTRAVENOUS at 15:26

## 2022-02-24 RX ADMIN — FENTANYL CITRATE 50 MCG: 50 INJECTION, SOLUTION INTRAMUSCULAR; INTRAVENOUS at 15:46

## 2022-02-24 RX ADMIN — Medication 31 UNITS/KG/HR: at 23:11

## 2022-02-24 RX ADMIN — MIDAZOLAM 1 MG: 1 INJECTION INTRAMUSCULAR; INTRAVENOUS at 15:39

## 2022-02-24 RX ADMIN — SODIUM CHLORIDE, PRESERVATIVE FREE 10 ML: 5 INJECTION INTRAVENOUS at 02:54

## 2022-02-24 RX ADMIN — HYDROMORPHONE HYDROCHLORIDE 1 MG: 1 INJECTION, SOLUTION INTRAMUSCULAR; INTRAVENOUS; SUBCUTANEOUS at 05:40

## 2022-02-24 RX ADMIN — SODIUM CHLORIDE 25 ML/HR: 9 INJECTION, SOLUTION INTRAVENOUS at 15:24

## 2022-02-24 RX ADMIN — SODIUM BICARBONATE 42 MG: 42 INJECTION, SOLUTION INTRAVENOUS at 16:12

## 2022-02-24 RX ADMIN — FENTANYL CITRATE 50 MCG: 50 INJECTION, SOLUTION INTRAMUSCULAR; INTRAVENOUS at 15:36

## 2022-02-24 RX ADMIN — MIDAZOLAM 1 MG: 1 INJECTION INTRAMUSCULAR; INTRAVENOUS at 15:26

## 2022-02-24 RX ADMIN — MIDAZOLAM 1 MG: 1 INJECTION INTRAMUSCULAR; INTRAVENOUS at 15:49

## 2022-02-24 RX ADMIN — Medication 25.7 UNITS/KG/HR: at 07:38

## 2022-02-24 RX ADMIN — ARFORMOTEROL TARTRATE: 15 SOLUTION RESPIRATORY (INHALATION) at 08:19

## 2022-02-24 RX ADMIN — HEPARIN SODIUM 6.94 UNITS: 1000 INJECTION, SOLUTION INTRAVENOUS; SUBCUTANEOUS at 02:51

## 2022-02-24 RX ADMIN — OXYCODONE AND ACETAMINOPHEN 1 TABLET: 5; 325 TABLET ORAL at 18:37

## 2022-02-24 RX ADMIN — HEPARIN SODIUM 6900 UNITS: 1000 INJECTION, SOLUTION INTRAVENOUS; SUBCUTANEOUS at 13:51

## 2022-02-24 RX ADMIN — LIDOCAINE HYDROCHLORIDE 10 ML: 10 INJECTION, SOLUTION EPIDURAL; INFILTRATION; INTRACAUDAL; PERINEURAL at 16:12

## 2022-02-24 RX ADMIN — MIDAZOLAM 1 MG: 1 INJECTION INTRAMUSCULAR; INTRAVENOUS at 15:31

## 2022-02-24 RX ADMIN — FENTANYL CITRATE 50 MCG: 50 INJECTION, SOLUTION INTRAMUSCULAR; INTRAVENOUS at 15:51

## 2022-02-24 RX ADMIN — HYDROMORPHONE HYDROCHLORIDE 1 MG: 1 INJECTION, SOLUTION INTRAMUSCULAR; INTRAVENOUS; SUBCUTANEOUS at 21:02

## 2022-02-24 RX ADMIN — HYDROMORPHONE HYDROCHLORIDE 1 MG: 1 INJECTION, SOLUTION INTRAMUSCULAR; INTRAVENOUS; SUBCUTANEOUS at 10:17

## 2022-02-24 NOTE — CONSULTS
[address]    Name:  Cait Aleman  MR#:  [id number]  :  1990  ACCOUNT #:  [de-identified]  DATE OF SERVICE:  2022    REASON FOR CONSULTATION:  Pulmonary embolus. HISTORY OF PRESENT ILLNESS:  The patient is a very pleasant 48-year-old gentleman who has had approximately a two-month history of right leg swelling. Ultrasound and then eventually CAT scans have further characterized two large masses involving the right pelvic side wall and right groin; he is currently in the process of an oncologic workup. He has also obtained a CT scan of the chest just to make sure there is some bilateral pulmonary emboli with no heart strain on CT, these are non-segmental areas although there is some thrombus in the distal right main pulmonary artery. The images have not pulled up for me yet, will look at these. PAST MEDICAL HISTORY:  Significant for as above and also include  1. Asthma. 2.  Hypertension. CURRENT MEDICATIONS:  Included,  1. IV heparin. 2.  P.r.n. pain medicine. 3.  Nausea medicine. ALLERGIES:  HE HAS NO KNOWN DRUG ALLERGIES. SOCIAL HISTORY:  Positive for previous tobacco.  No daily alcohol. REVIEW OF SYSTEMS:  Negative other than what has been mentioned. The patient does report frequent sweating. PHYSICAL EXAMINATION:  GENERAL:  Currently in no acute distress. VITAL SIGNS:  His temperature is 98.3, heart rate 98, blood pressure is 149/68, and he is 97% on room air. LUNGS:  Clear to auscultation bilaterally. HEART:  Regular rate and rhythm to sinus tachycardia. ABDOMEN:  Soft, nontender. The masses are easily palpable in the right groin and right thigh. He has severe swelling of the right lower leg and has some pain associated with the masses. LABORATORY DATA:  Lab values have been reviewed along with the imaging of the abdomen.   I have been unable to review the imaging of the chest.    IMPRESSION AND PLAN:  A very pleasant 27-year-old gentleman with large masses in the right pelvis and right thigh likely with external iliac vein involvement. At this point, I do not think he needs inferior vena cava filter or pulmonary embolectomy. Should his clinical situation change, he could have a right-sided pulmonary embolectomy. We will discuss his care with Dr. Lila Brush and Dr. Saw Chakraborty. Thank you for the consultation.       Janiece Paget, MD [id number]  D:  02/23/2022 18:19  T:  02/23/2022 20:48  JOB #:  [id number]

## 2022-02-24 NOTE — PROGRESS NOTES
[address] [facility] Adult  Hospitalist Group                                                                                          Hospitalist Progress Note  Yonifarida AbreuDO  Answering service: [phone] from in house phone        Date of Service:  2022  NAME:  Gracia Alvarado  :  1990  MRN:  [id number]      Admission Summary:   39-year-old gentleman presented to [facility] on  with right leg swelling and shortness of breath. On work-up he was found to have PE, hemodynamically stable and right groin and pelvic mass. Patient was to be transferred to [facility] surgical oncology services however [facility] was on diversion so he was transferred to [facility]. I saw him in room 515/2, his girlfriend present. He said the right thigh pain started about 2 months ago. From what he describes he was initially treated with diuretics. The pain and swelling progressed to the point where he could not walk so he presented to the ED yesterday and work-up revealed the above listed problems. As I seen the patient, the surgical team came in to see patient. Dr. Christy Shukla recommended ultrasound guided biopsy by radiology to determine further management course.          Interval history / Subjective: Follow up PE and right pelvic/groin upper thigh mass. Patient seen and examined. Has continued right lower extremity pain and swelling. No shortness of breath. Assessment & Plan:     Acute bilateral pulmonary embolism, hemodynamically stable, not hypoxic:  -CT with bilateral PE- right lower lobe, left upper lobe/lingula, left lower lobe. No evidence of right heart strain.    -Suspect he has right lower extremity DVT due to obstructive mass that resulted in PE. Of note, -Doppler ultrasound on 2022 was negative for DVT but this was about a month ago. -Continue heparin drip.  Will need to transition to oral anticoagulation  -Monitor on remote telemetry     Right pelvic and groin upper thigh mass  -CT angiogram of the abdomen on 2/11 showed large right pelvic sidewall and right groin/upper thigh mass most consistent with underlying malignancy it is indicated that this mass was visualized in the duplex ultrasound in January 26, 2022. There is also subcutaneous edema involving the right lower extremity with suspicion of possible occlusion of the right external iliac vein. There were also lower abdominal periaortic lymphadenopathy, moderate left groin lymphadenopathy.  -Patient seen by surgical oncology  -Plan for imaging guided biopsy 2/24  -Pain management     History of asthma without bronchospasm: Inhaled steroids, we will use as needed bronchodilators             Code status: full   Prophylaxis: heparin drip   Care Plan discussed with: patient, nurse  Anticipated Disposition: 1-2 days      Hospital Problems  Date Reviewed: 2/17/2022          Codes Class Noted POA    Pelvic mass ICD-10-CM: R19.00  ICD-9-CM: 789.30  2/23/2022 Unknown        Bilateral pulmonary embolism ([facility] [address] ICD-10-CM: I26.99  ICD-9-CM: 415.19  2/23/2022 Unknown        Right groin mass ICD-10-CM: R19.09  ICD-9-CM: 789.39  2/11/2022 Yes                Review of Systems:   Negative unless stated above     Vital Signs:    Last 24hrs VS reviewed since prior progress note. Most recent are:  Visit Vitals  BP (!) 148/113 (BP 1 Location: Left upper arm, BP Patient Position: At rest;Sitting)   Pulse 92   Temp 99.4 °F (37.4 °C)   Resp 16   SpO2 97%         Intake/Output Summary (Last 24 hours) at 2/24/2022 1022  Last data filed at 2/24/2022 2716  Gross per 24 hour   Intake --   Output 400 ml   Net -400 ml        Physical Examination:     I had a face to face encounter with this patient and independently examined them on 2/24/2022 as outlined below:          Constitutional:  No acute distress, cooperative, pleasant    ENT:  Oral mucosa moist, oropharynx benign. Resp:  CTA bilaterally. No wheezing/rhonchi/rales. No accessory muscle use.     CV:  Regular rhythm, normal rate, no murmurs, gallops, rubs    GI:  Soft, non distended, non tender. normoactive bowel sounds, no hepatosplenomegaly     Musculoskeletal:  right lower extremity edema +++, non-pitting. Right apparent groin mass     Neurologic:  Moves all extremities            Data Review:    Review and/or order of clinical lab test  Review and/or order of tests in the radiology section of CPT  Review and/or order of tests in the medicine section of CPT      Labs:     Recent Labs     02/24/22 0528 02/22/22 0211   WBC 10.0 11.1   HGB 12.3 11.6*   HCT 38.0 35.2*    299     Recent Labs     02/24/22 0528 02/22/22 0211   * 133*   K 4.2 4.0    101   CO2 22 25   BUN 17 12   CREA 0.98 1.09    106*   CA 10.1 9.5     Recent Labs     02/24/22 0528 02/22/22 0211   ALT 45 42    103   TBILI 0.5 0.6   TP 8.2 7.3   ALB 3.0* 3.2*   GLOB 5.2* 4.1*     Recent Labs     02/24/22 0528 02/23/22  2330 02/23/22  1151   APTT 53.5* 33.5* 47.1*      No results for input(s): FE, TIBC, PSAT, FERR in the last 72 hours. No results found for: FOL, RBCF   No results for input(s): PH, PCO2, PO2 in the last 72 hours. No results for input(s): CPK, CKNDX, TROIQ in the last 72 hours.     No lab exists for component: CPKMB  No results found for: CHOL, CHOLX, CHLST, CHOLV, HDL, HDLP, LDL, LDLC, DLDLP, TGLX, TRIGL, TRIGP, CHHD, CHHDX  No results found for: GLUCPOC  No results found for: COLOR, APPRN, SPGRU, REFSG, JONO, PROTU, GLUCU, KETU, BILU, UROU, MATI, LEUKU, GLUKE, EPSU, BACTU, WBCU, RBCU, CASTS, UCRY      Medications Reviewed:     Current Facility-Administered Medications   Medication Dose Route Frequency    sodium chloride (NS) flush 5-40 mL  5-40 mL IntraVENous Q8H    sodium chloride (NS) flush 5-40 mL  5-40 mL IntraVENous PRN    acetaminophen (TYLENOL) tablet 650 mg  650 mg Oral Q6H PRN    Or    acetaminophen (TYLENOL) suppository 650 mg  650 mg Rectal Q6H PRN    polyethylene glycol (MIRALAX) packet 17 g  17 g Oral DAILY PRN    ondansetron (ZOFRAN ODT) tablet 4 mg  4 mg Oral Q8H PRN    Or    ondansetron (ZOFRAN) injection 4 mg  4 mg IntraVENous Q6H PRN    oxyCODONE-acetaminophen (PERCOCET) 5-325 mg per tablet 1 Tablet  1 Tablet Oral Q4H PRN    HYDROmorphone (DILAUDID) injection 1 mg  1 mg IntraVENous Q6H PRN    albuterol-ipratropium (DUO-NEB) 2.5 MG-0.5 MG/3 ML  3 mL Nebulization Q6H PRN    heparin 25,000 units in  ml infusion  17-36 Units/kg/hr (Adjusted) IntraVENous TITRATE    arformoterol 15 mcg/budesonide 0.5 mg neb solution   Nebulization BID RT     ______________________________________________________________________  EXPECTED LENGTH OF STAY: - - -  ACTUAL LENGTH OF STAY:          [address] [facility],

## 2022-02-24 NOTE — PROGRESS NOTES
This is a addendum from last night at 845pm,spoke with Dr Mary Grace Neal in regards to turning offf Pts Heparin at midnight,Dr ruano stated its Ok no need to turn heparin off at [facility] spoke with Ramona Mccord from ultrasound ,she had already spoken with Dr Mary Grace Neal.

## 2022-02-24 NOTE — PROGRESS NOTES
Hematology and Oncology Progress Note    Patient: Facundo Devries MRN: [id number]  SSN: [id number]    YOB: 1990  Age: 27 y.o. Sex: male      Admit Date: 2/22/2022    LOS: 1 day     Chief Complaint: Patient was admitted with dyspnea    Subjective:     Patient is feeling slightly better. His breathing is better. He has significant right lower extremity edema    Objective:     Vitals:    02/22/22 1942 02/22/22 2120 02/23/22 0905 02/23/22 1327   BP: (!) 142/94 133/70  (!) 142/82   Pulse: 63 62  100   Resp: 20 18  18   Temp: 99.4 °F (37.4 °C) 98.5 °F (36.9 °C)  98.2 °F (36.8 °C)   SpO2: 96% 98% 98% 99%   Weight:       Height:          Physical Exam:  Constitutional: Young -American male not in any acute distress or pain. Eyes: Sclerae anicteric. Conjunctivae no pallor. ENMT: Oral mucosa is moist, no thrush, mucositis, or petechiae. Neck: No adenopathy. Hematologic/Lymphatic: Patient has huge mass in his right groin which is extending down toward his medially. He also has small left lymphedema like changes around the mass. He also has swelling extending to entire right lower extremity  Respiratory: Lungs are clear bilaterally. Cardiovascular: Normal sinus rhythm; no gallop or murmur; peripheral pulses are palpable. Abdomen: Soft, nontender, no hepatosplenomegaly. No guarding or rigidity. Bowel sounds present. Extremities: Patient has extensive swelling of his right lower extremity. No left lower extremity swelling. No cyanosis or clubbing. Skin: No petechiae; no skin rash. Neurologic: Alert/oriented x 3.     Lab/Data Review:    Recent Results (from the past 24 hour(s))   COVID-19 RAPID TEST    Collection Time: 02/23/22 10:15 AM   Result Value Ref Range    Specimen source Nasopharyngeal      COVID-19 rapid test Not Detected Not Detected     PTT    Collection Time: 02/23/22 11:51 AM   Result Value Ref Range    aPTT 47.1 (H) 21.2 - 34.1 sec    aPTT, therapeutic range   82 - 109 sec   PTT    Collection Time: 02/23/22 11:30 PM   Result Value Ref Range    aPTT 33.5 (H) 22.1 - 31.0 sec    aPTT, therapeutic range     58.0 - 31.0 SECS   METABOLIC PANEL, COMPREHENSIVE    Collection Time: 02/24/22  5:28 AM   Result Value Ref Range    Sodium 133 (L) 136 - 145 mmol/L    Potassium 4.2 3.5 - 5.1 mmol/L    Chloride 102 97 - 108 mmol/L    CO2 22 21 - 32 mmol/L    Anion gap 9 5 - 15 mmol/L    Glucose 100 65 - 100 mg/dL    BUN 17 6 - 20 MG/DL    Creatinine 0.98 0.70 - 1.30 MG/DL    BUN/Creatinine ratio 17 12 - 20      GFR est AA >60 >60 ml/min/1.73m2    GFR est non-AA >60 >60 ml/min/1.73m2    Calcium 10.1 8.5 - 10.1 MG/DL    Bilirubin, total 0.5 0.2 - 1.0 MG/DL    ALT (SGPT) 45 12 - 78 U/L    AST (SGOT) 32 15 - 37 U/L    Alk. phosphatase 105 45 - 117 U/L    Protein, total 8.2 6.4 - 8.2 g/dL    Albumin 3.0 (L) 3.5 - 5.0 g/dL    Globulin 5.2 (H) 2.0 - 4.0 g/dL    A-G Ratio 0.6 (L) 1.1 - 2.2     CBC WITH AUTOMATED DIFF    Collection Time: 02/24/22  5:28 AM   Result Value Ref Range    WBC 10.0 4.1 - 11.1 K/uL    RBC 4.18 4.10 - 5.70 M/uL    HGB 12.3 12.1 - 17.0 g/dL    HCT 38.0 36.6 - 50.3 %    MCV 90.9 80.0 - 99.0 FL    MCH 29.4 26.0 - 34.0 PG    MCHC 32.4 30.0 - 36.5 g/dL    RDW 13.1 11.5 - 14.5 %    PLATELET 473 417 - 716 K/uL    MPV 9.2 8.9 - 12.9 FL    NRBC 0.0 0  WBC    ABSOLUTE NRBC 0.00 0.00 - 0.01 K/uL    NEUTROPHILS 75 32 - 75 %    LYMPHOCYTES 7 (L) 12 - 49 %    MONOCYTES 11 5 - 13 %    EOSINOPHILS 5 0 - 7 %    BASOPHILS 1 0 - 1 %    IMMATURE GRANULOCYTES 1 (H) 0.0 - 0.5 %    ABS. NEUTROPHILS 7.5 1.8 - 8.0 K/UL    ABS. LYMPHOCYTES 0.7 (L) 0.8 - 3.5 K/UL    ABS. MONOCYTES 1.1 (H) 0.0 - 1.0 K/UL    ABS. EOSINOPHILS 0.5 (H) 0.0 - 0.4 K/UL    ABS. BASOPHILS 0.1 0.0 - 0.1 K/UL    ABS. IMM.  GRANS. 0.1 (H) 0.00 - 0.04 K/UL    DF SMEAR SCANNED      PLATELET COMMENTS CLUMPED PLATELETS      RBC COMMENTS NORMOCYTIC, NORMOCHROMIC     PTT    Collection Time: 02/24/22  5:28 AM   Result Value Ref Range aPTT 53.5 (H) 22.1 - 31.0 sec    aPTT, therapeutic range     58.0 - 77.0 SECS           Assessment and plan:   67-year-old -American male who has developed right inguinal area mass which is extending from pelvic wall to medial thigh. This mass is compressing vascular structure and has caused his DVT and pulmonary embolism. I have reviewed patient's CT scan report as well as images personally. Obviously there is a concern about possible malignancy. -Differential diagnosis includes lymphoma, sarcoma or other  Malignancies.  -Patient was seen by Dr. Tiffanie Cain earlier and I have discussed case with him. I agree with Dr. Tiffanie Cain about referring patient to surgical oncology for consideration of resection. I will hold off doing biopsy for now until we have surgical oncology input. Patient does need IV heparin for now for his acute PE.  -Discussed with patient about surgical oncology consult at [facility]. I had contacted surgical oncologist at [facility] for getting outpatient appointment. Patient actually informed me that they are transferring him to [facility] for further surgical evaluation.   -So basically I stopped my communication with [facility] and will let send [facility] surgery take over to surgical care for his right groin mass. Will be available if any medical oncologic need arise in the future. This dictation was done by [employer] voice recognition software. Often unanticipated grammatical, syntax, phones and other interpretive errors are inadvertently transcribed. Please excuse errors that have escaped final proofreading.        Signed By: Mikaela De Anda MD     February 24, 2022

## 2022-02-24 NOTE — ROUTINE PROCESS
TRANSFER - OUT REPORT:    Verbal report given to Bedside RN(name) on Tsering Jimenez  being transferred to 5 E(unit) for routine progression of care       Report consisted of patients Situation, Background, Assessment and   Recommendations(SBAR). Information from the following report(s) SBAR, Kardex, Procedure Summary and MAR was reviewed with the receiving nurse. Lines:   Peripheral IV 02/22/22 Right Antecubital (Active)   Site Assessment Clean, dry, & intact 02/24/22 1054   Phlebitis Assessment 0 02/24/22 1054   Infiltration Assessment 0 02/24/22 1054   Dressing Status Clean, dry, & intact 02/24/22 1054   Dressing Type Tape;Transparent 02/24/22 1054   Hub Color/Line Status Infusing 02/24/22 1054   Action Taken Open ports on tubing capped 02/24/22 1054   Alcohol Cap Used Yes 02/24/22 1054       Peripheral IV 02/22/22 Left Antecubital (Active)   Site Assessment Clean, dry, & intact 02/24/22 1054   Phlebitis Assessment 0 02/24/22 1054   Infiltration Assessment 0 02/24/22 1054   Dressing Status Clean, dry, & intact 02/24/22 1054   Dressing Type Transparent;Tape 02/24/22 1054   Hub Color/Line Status Capped 02/24/22 1054   Action Taken Open ports on tubing capped 02/24/22 1054   Alcohol Cap Used Yes 02/24/22 1054        Opportunity for questions and clarification was provided.       Patient transported with:   [employer]

## 2022-02-24 NOTE — PROGRESS NOTES
PTT 38.6, per protocol give bolus and increase heparin drip by 4 units.  Order placed and both placed by pharmacy     Told by pharmacy weight used is not actual weight, the KG being used is 86.8

## 2022-02-24 NOTE — PROGRESS NOTES
Pt was instructed not to eat or drink anything past midnight. Pt stated this AM his last sip of water was 1230am,,Pt s Rt leg remains very edematous,pulses to rt leg were checked Q1/H,8pm,7ko32va,11pm,12am.1am,2am,3am,4am,5am.6am,7am,Pt also has a good popiteal pulse to Rt leg,pts Leg remains warm to touch,toes remain warm and moveable,Pt is scheduled for Biopsy to rt leg where 2 masses are palpable and hard. Pt remains on heparin drip. AM labs obtained and sent all comfort measures provided,Pt denies any SOB or and difficulty breathing,Lungs are Clear,abd soft,postive bowel sounds x4.

## 2022-02-24 NOTE — PROGRESS NOTES
Order to not restart heparin drip until at least 1800. Obtained 1800 aPTT d/t this was next scheduled blood draw at 1800 and sent as stat, currently 1847 and waiting on results.  Will resume once able    Called pharm to comfirm, d/t patient was off for 3-4 hours for liver biopsy and needed to remain off told by pharm to resume dosage as prior (31 units/kg/hr and 27 ml/hr) and to do blood draw as before; same blood draw time due next at 0000

## 2022-02-24 NOTE — PROGRESS NOTES
RUR: 9% Low     ERIN: Home. Patient's mother to provide transport once medically stable. Follow-up with PCP/specialist.     Primary Contact: Mother, Luis Miguel Whitley, [phone]    Care Management Interventions  PCP Verified by CM: Yes (Dr. Melania Khan, last seen Feb. 2022)  Mode of Transport at Discharge: Other (see comment) (Mother)  Transition of Care Consult (CM Consult): Discharge Planning  Discharge Durable Medical Equipment: No  Physical Therapy Consult: No  Occupational Therapy Consult: No  Speech Therapy Consult: No  Support Systems: Spouse/Significant Other  Confirm Follow Up Transport: Family  The Plan for Transition of Care is Related to the Following Treatment Goals : Home  Discharge Location  Patient Expects to be Discharged to[de-identified] Home with family assistance    Reason for Admission:  Pelvic mass                    RUR Score:     9% Low                 Plan for utilizing home health:  Likely none        PCP: First and Last name:  Diamond Marks MD     Name of Practice:    Are you a current patient: Yes/No: Yes   Approximate date of last visit: Feb. 2022   Can you participate in a virtual visit with your PCP: Yes                    Current Advanced Directive/Advance Care Plan: Full Code    Healthcare Decision Maker:   Click here to complete [address] including selection of the Healthcare Decision Maker Relationship (ie \"Primary\")             Primary Decision Maker: Shauna Welch - Mother - [phone]                  Transition of Care Plan:   Home     CM met with patient at bedside to introduce self and explain role. Patient lives with his girlfriend and 6 children in a 2 story home with 1 step to enter. Patient has 12 steps to enter the 2nd floor. Patient was independent with ADL's, IADL's and ambulation. Patient does not own any DME. Patient's mother to provide transport home once medically stable. CM verified patient's PCP, demographics, and insurance.  Preferred pharmacy is [facility] on [address] [facility] in [place] with no barriers obtaining needed prescriptions. CM to continue to follow as needed.     Robyn Moody, MSW   [phone]

## 2022-02-25 LAB
ALBUMIN SERPL-MCNC: 2.9 G/DL (ref 3.5–5)
ALBUMIN/GLOB SERPL: 0.6 {RATIO} (ref 1.1–2.2)
ALP SERPL-CCNC: 95 U/L (ref 45–117)
ALT SERPL-CCNC: 42 U/L (ref 12–78)
ANION GAP SERPL CALC-SCNC: 8 MMOL/L (ref 5–15)
APTT PPP: 31.2 SEC (ref 22.1–31)
APTT PPP: 40.7 SEC (ref 22.1–31)
APTT PPP: 82.5 SEC (ref 22.1–31)
AST SERPL-CCNC: 29 U/L (ref 15–37)
BASOPHILS # BLD: 0.1 K/UL (ref 0–0.1)
BASOPHILS NFR BLD: 1 % (ref 0–1)
BILIRUB SERPL-MCNC: 0.4 MG/DL (ref 0.2–1)
BUN SERPL-MCNC: 16 MG/DL (ref 6–20)
BUN/CREAT SERPL: 17 (ref 12–20)
CALCIUM SERPL-MCNC: 9.8 MG/DL (ref 8.5–10.1)
CHLORIDE SERPL-SCNC: 104 MMOL/L (ref 97–108)
CO2 SERPL-SCNC: 22 MMOL/L (ref 21–32)
CREAT SERPL-MCNC: 0.93 MG/DL (ref 0.7–1.3)
DIFFERENTIAL METHOD BLD: ABNORMAL
EOSINOPHIL # BLD: 0.7 K/UL (ref 0–0.4)
EOSINOPHIL NFR BLD: 8 % (ref 0–7)
ERYTHROCYTE [DISTWIDTH] IN BLOOD BY AUTOMATED COUNT: 13.1 % (ref 11.5–14.5)
GLOBULIN SER CALC-MCNC: 5.1 G/DL (ref 2–4)
GLUCOSE SERPL-MCNC: 99 MG/DL (ref 65–100)
HCT VFR BLD AUTO: 37.3 % (ref 36.6–50.3)
HGB BLD-MCNC: 12 G/DL (ref 12.1–17)
IMM GRANULOCYTES # BLD AUTO: 0.1 K/UL (ref 0–0.04)
IMM GRANULOCYTES NFR BLD AUTO: 1 % (ref 0–0.5)
LYMPHOCYTES # BLD: 0.7 K/UL (ref 0.8–3.5)
LYMPHOCYTES NFR BLD: 9 % (ref 12–49)
MCH RBC QN AUTO: 29.4 PG (ref 26–34)
MCHC RBC AUTO-ENTMCNC: 32.2 G/DL (ref 30–36.5)
MCV RBC AUTO: 91.4 FL (ref 80–99)
MONOCYTES # BLD: 0.9 K/UL (ref 0–1)
MONOCYTES NFR BLD: 11 % (ref 5–13)
NEUTS SEG # BLD: 5.8 K/UL (ref 1.8–8)
NEUTS SEG NFR BLD: 70 % (ref 32–75)
NRBC # BLD: 0 K/UL (ref 0–0.01)
NRBC BLD-RTO: 0 PER 100 WBC
PLATELET # BLD AUTO: 367 K/UL (ref 150–400)
PMV BLD AUTO: 9.4 FL (ref 8.9–12.9)
POTASSIUM SERPL-SCNC: 4.2 MMOL/L (ref 3.5–5.1)
PROT SERPL-MCNC: 8 G/DL (ref 6.4–8.2)
RBC # BLD AUTO: 4.08 M/UL (ref 4.1–5.7)
RBC MORPH BLD: ABNORMAL
SODIUM SERPL-SCNC: 134 MMOL/L (ref 136–145)
THERAPEUTIC RANGE,PTTT: ABNORMAL SECS (ref 58–77)
WBC # BLD AUTO: 8.3 K/UL (ref 4.1–11.1)

## 2022-02-25 PROCEDURE — 85730 THROMBOPLASTIN TIME PARTIAL: CPT

## 2022-02-25 PROCEDURE — 94760 N-INVAS EAR/PLS OXIMETRY 1: CPT

## 2022-02-25 PROCEDURE — 74011250636 HC RX REV CODE- 250/636: Performed by: INTERNAL MEDICINE

## 2022-02-25 PROCEDURE — 74011250637 HC RX REV CODE- 250/637: Performed by: HOSPITALIST

## 2022-02-25 PROCEDURE — 94640 AIRWAY INHALATION TREATMENT: CPT

## 2022-02-25 PROCEDURE — 65660000000 HC RM CCU STEPDOWN

## 2022-02-25 PROCEDURE — 99231 SBSQ HOSP IP/OBS SF/LOW 25: CPT | Performed by: SURGERY

## 2022-02-25 PROCEDURE — 74011250636 HC RX REV CODE- 250/636: Performed by: HOSPITALIST

## 2022-02-25 PROCEDURE — 97161 PT EVAL LOW COMPLEX 20 MIN: CPT

## 2022-02-25 PROCEDURE — 74011000250 HC RX REV CODE- 250: Performed by: HOSPITALIST

## 2022-02-25 PROCEDURE — 0YB53ZX EXCISION OF RIGHT INGUINAL REGION, PERCUTANEOUS APPROACH, DIAGNOSTIC: ICD-10-PCS | Performed by: RADIOLOGY

## 2022-02-25 PROCEDURE — 97116 GAIT TRAINING THERAPY: CPT

## 2022-02-25 PROCEDURE — 36415 COLL VENOUS BLD VENIPUNCTURE: CPT

## 2022-02-25 RX ORDER — HEPARIN SODIUM 1000 [USP'U]/ML
6944 INJECTION, SOLUTION INTRAVENOUS; SUBCUTANEOUS ONCE
Status: COMPLETED | OUTPATIENT
Start: 2022-02-26 | End: 2022-02-26

## 2022-02-25 RX ORDER — HEPARIN SODIUM 1000 [USP'U]/ML
6944 INJECTION, SOLUTION INTRAVENOUS; SUBCUTANEOUS ONCE
Status: COMPLETED | OUTPATIENT
Start: 2022-02-25 | End: 2022-02-25

## 2022-02-25 RX ADMIN — HYDROMORPHONE HYDROCHLORIDE 1 MG: 1 INJECTION, SOLUTION INTRAMUSCULAR; INTRAVENOUS; SUBCUTANEOUS at 11:51

## 2022-02-25 RX ADMIN — HYDROMORPHONE HYDROCHLORIDE 1 MG: 1 INJECTION, SOLUTION INTRAMUSCULAR; INTRAVENOUS; SUBCUTANEOUS at 18:25

## 2022-02-25 RX ADMIN — OXYCODONE AND ACETAMINOPHEN 1 TABLET: 5; 325 TABLET ORAL at 20:37

## 2022-02-25 RX ADMIN — HYDROMORPHONE HYDROCHLORIDE 1 MG: 1 INJECTION, SOLUTION INTRAMUSCULAR; INTRAVENOUS; SUBCUTANEOUS at 05:05

## 2022-02-25 RX ADMIN — OXYCODONE AND ACETAMINOPHEN 1 TABLET: 5; 325 TABLET ORAL at 14:49

## 2022-02-25 RX ADMIN — Medication 37 UNITS/KG/HR: at 22:54

## 2022-02-25 RX ADMIN — Medication 35 UNITS/KG/HR: at 05:58

## 2022-02-25 RX ADMIN — SODIUM CHLORIDE, PRESERVATIVE FREE 10 ML: 5 INJECTION INTRAVENOUS at 13:55

## 2022-02-25 RX ADMIN — ARFORMOTEROL TARTRATE: 15 SOLUTION RESPIRATORY (INHALATION) at 19:21

## 2022-02-25 RX ADMIN — HEPARIN SODIUM 6944 UNITS: 1000 INJECTION, SOLUTION INTRAVENOUS; SUBCUTANEOUS at 05:59

## 2022-02-25 RX ADMIN — ARFORMOTEROL TARTRATE: 15 SOLUTION RESPIRATORY (INHALATION) at 07:17

## 2022-02-25 RX ADMIN — OXYCODONE AND ACETAMINOPHEN 1 TABLET: 5; 325 TABLET ORAL at 00:31

## 2022-02-25 NOTE — PROGRESS NOTES
Patient is a 27year old male with right pelvic lymphadenopathy concerning for malignancy who is s/p US guided right inguinal mass core biopsy on 2/24/2022. IR consulted for CT guided bone marrow biopsy today. Patient will need to be NPO for procedure and unfortunately he ate breakfast this morning. Will plan for CT guided bone marrow biopsy on Monday, 1/28/2022, with moderate sedation. Hold Heparin gtt 90 mins prior to scheduled procedure time. Will coordinate with nursing staff. Please call with any questions. Catalina Marrero, [place]  Interventional Radiology

## 2022-02-25 NOTE — CONSULTS
Patient seen, chart reviewed, note dictated. 28 y/o man with at least 4 weeks h/o enlarged right pelvic LN. Seen at [facility] by my partner Dr. Magan Ellsworth. Transferred here and had US guided biopsy done yesterday. 1. Enlarged right pelvic LN causing venous compression/pedal edema along with bilateral para-aortic LAD and PE: await biopsy. On heparin gtt. I told the patient and his mother that the diff dx includes lymphoma and will request a BM biospy. Check LDH/uric acid/HIV/Hep B/Hep C serologies. He will need his first dose of chemotherapy prior to leaving the hospital given the degree of his edema. Thank you for consult.      Selvin Mcgee MD  Hem/Onc  [id number]
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
## 2022-02-25 NOTE — PROGRESS NOTES
PHYSICAL THERAPY EVALUATION/DISCHARGE  Patient: Mauro De La Cruz (80 y.o. male)  Date: 2/25/2022  Primary Diagnosis: Bilateral pulmonary embolism (HCC) [I26.99]  Pelvic mass [R19.00]       Precautions:          ASSESSMENT  Based on the objective data described below, the patient presents with independent mobility with gait deviations related to right LE edema and a pelvic mass. He has bilateral PEs identified on admission but is therapeutic on heparin. No overt BARBER with activity but HR increased from 105 BPM at rest to 142 with exertion. SpO2 remained >95% on RA. Gait training completed x300' independently without difficulty. The patient is independent for mobility and likely near his functional baseline. Encouraged him to spend time OOB to his bedside recliner and with this RLE elevated. He is safe to mobilize with nursing or family and no further therapy needs identified at present. Will sign off but please re consult if further concerns arise during his medical course. Further skilled acute physical therapy is not indicated at this time. PLAN :  Recommendation for discharge: (in order for the patient to meet his/her long term goals)  No skilled physical therapy/ follow up rehabilitation needs identified at this time. IF patient discharges home will need the following DME: none       SUBJECTIVE:   Patient stated I can walk. I'm just so swollen.     OBJECTIVE DATA SUMMARY:   HISTORY:    Past Medical History:   Diagnosis Date    Asthma     Hypertension    No past surgical history on file.     Prior level of function: independent  Personal factors and/or comorbidities impacting plan of care: has 6 children, was working full time    Home Situation  Home Environment: Private residence  # Steps to Enter: 1  One/Two Story Residence: Two story  # of Interior Steps: 12  Living Alone: No  Support Systems: Spouse/Significant Other,Child(maribel)  Patient Expects to be Discharged to[de-identified] Home with family assistance  Current DME Used/Available at Home: Cane, straight    EXAMINATION/PRESENTATION/DECISION MAKING:   Critical Behavior:  Neurologic State: Alert  Orientation Level: Oriented X4        Hearing:     Skin:    Edema:   Range Of Motion:  AROM: Within functional limits                       Strength:    Strength: Within functional limits                    Tone & Sensation:   Tone: Normal              Sensation: Intact               Coordination:  Coordination: Within functional limits  Vision:      Functional Mobility:  Bed Mobility:     Supine to Sit: Contact guard assistance (for RLE managenent)  Sit to Supine: Contact guard assistance  Scooting: Independent  Transfers:  Sit to Stand: Independent  Stand to Sit: Independent                       Balance:   Sitting: Intact  Standing: Intact  Ambulation/Gait Training:  Distance (ft): 300 Feet (ft)  Assistive Device: Gait belt  Ambulation - Level of Assistance: Independent     Gait Description (WDL): Exceptions to WDL  Gait Abnormalities: Circumduction;Trunk sway increased        Base of Support: Widened  Stance: Right decreased  Speed/Sandi: Accelerated        Physical Therapy Evaluation Charge Determination   History Examination Presentation Decision-Making   MEDIUM  Complexity : 1-2 comorbidities / personal factors will impact the outcome/ POC  LOW Complexity : 1-2 Standardized tests and measures addressing body structure, function, activity limitation and / or participation in recreation  LOW Complexity : Stable, uncomplicated  LOW Complexity : FOTO score of       Based on the above components, the patient evaluation is determined to be of the following complexity level: LOW     Pain Rating:      Activity Tolerance:   Good HR up to 142 BPM with exertion      After treatment patient left in no apparent distress:   Sitting in chair and Call bell within reach    COMMUNICATION/EDUCATION:   The patients plan of care was discussed with: Registered nurse. Fall prevention education was provided and the patient/caregiver indicated understanding., Patient/family have participated as able in goal setting and plan of care. and Patient/family agree to work toward stated goals and plan of care.     Thank you for this referral.  Natalia Ortiz, PT, DPT   Time Calculation: 23 mins

## 2022-02-25 NOTE — PROGRESS NOTES
Plan for patient to have biopsy today. Plan for discharge on Monday. Problem: Pressure Injury - Risk of  Goal: *Prevention of pressure injury  Description: Document Valentino Scale and appropriate interventions in the flowsheet. Outcome: Progressing Towards Goal  Note: Pressure Injury Interventions:  Sensory Interventions: Assess changes in LOC         Activity Interventions: Pressure redistribution bed/mattress(bed type)    Mobility Interventions: Float heels,HOB 30 degrees or less    Nutrition Interventions: Document food/fluid/supplement intake                     Problem: Patient Education: Go to Patient Education Activity  Goal: Patient/Family Education  Outcome: Progressing Towards Goal     Problem: Falls - Risk of  Goal: *Absence of Falls  Description: Document Liu Fall Risk and appropriate interventions in the flowsheet.   Outcome: Progressing Towards Goal  Note: Fall Risk Interventions:            Medication Interventions: Teach patient to arise slowly                   Problem: Patient Education: Go to Patient Education Activity  Goal: Patient/Family Education  Outcome: Progressing Towards Goal

## 2022-02-25 NOTE — PROGRESS NOTES
RUR: 7% Low      ERIN: Home. PT has cleared patient, recommending no physical therapy needs upon discharge. Patient's mother to provide transport once medically stable. Follow-up with PCP/specialist.      Primary Contact: Mother, Judah Callejas, [phone]    2:30PM - CM reviewed chart. Per review and ID rounds, IR consulted for CT guided bone marrow biopsy today, 2/25. Plan for CT guided bone marrow biopsy on Monday, 1/28. PT has cleared patient, recommending no physical therapy needs upon discharge. Discharge pending medical progress and final recommendations. CM to continue to follow as needed.     ANGIE Gardner   [phone]

## 2022-02-25 NOTE — PROGRESS NOTES
[facility]  Hospitalist Group                                                                                          Hospitalist Progress Note  [facility],   Answering service: [phone] from in house phone        Date of Service:  2022  NAME:  Mauro De La Cruz  :  1990  MRN:  [id number]      Admission Summary:   79-year-old gentleman presented to [facility] on  with right leg swelling and shortness of breath. On work-up he was found to have PE, hemodynamically stable and right groin and pelvic mass. Patient was to be transferred to [facility] surgical oncology services however [facility] was on diversion so he was transferred to [facility]. I saw him in room 515/2, his girlfriend present. He said the right thigh pain started about 2 months ago. From what he describes he was initially treated with diuretics. The pain and swelling progressed to the point where he could not walk so he presented to the ED yesterday and work-up revealed the above listed problems. As I seen the patient, the surgical team came in to see patient. Dr. Sandhya Garcia recommended ultrasound guided biopsy by radiology to determine further management course.          Interval history / Subjective: Follow up PE and right pelvic/groin upper thigh mass. Patient seen and examined earlier this AM. Has persistent swelling, unchanged. Wants to get out of bed to use the restroom. Appreciate oncology. Bone biopsy ordered- plans to be completed Monday,      Assessment & Plan:     Acute bilateral pulmonary embolism, hemodynamically stable, not hypoxic:  -CT with bilateral PE- right lower lobe, left upper lobe/lingula, left lower lobe. No evidence of right heart strain.    -Suspect he has right lower extremity DVT due to obstructive mass that resulted in PE. Of note, -Doppler ultrasound on 2022 was negative for DVT but this was about a month ago. -Continue heparin drip.  Will need to transition to oral anticoagulation once procedures completed  -Monitor on remote telemetry     Right pelvic and groin upper thigh mass  -CT angiogram of the abdomen on 2/11 showed large right pelvic sidewall and right groin/upper thigh mass most consistent with underlying malignancy it is indicated that this mass was visualized in the duplex ultrasound in January 26, 2022. There is also subcutaneous edema involving the right lower extremity with suspicion of possible occlusion of the right external iliac vein. There were also lower abdominal periaortic lymphadenopathy, moderate left groin lymphadenopathy. -s/p FNA biopsy 2/24. Pathology pending  -Hematology/oncology consulted. Plans for bone marrow biopsy 2/28  -Pain management  -will need chemotherapy/treatment prior to discharge     History of asthma without bronchospasm: Inhaled steroids, as needed bronchodilators             Code status: full   Prophylaxis: heparin drip   Care Plan discussed with: patient, nurse  Anticipated Disposition: >48 hours     Hospital Problems  Date Reviewed: 2/17/2022          Codes Class Noted POA    Pelvic mass ICD-10-CM: R19.00  ICD-9-CM: 789.30  2/23/2022 Unknown        Bilateral pulmonary embolism ([facility] [address] ICD-10-CM: I26.99  ICD-9-CM: 415.19  2/23/2022 Unknown        Right groin mass ICD-10-CM: R19.09  ICD-9-CM: 789.39  2/11/2022 Yes                Review of Systems:   Negative unless stated above     Vital Signs:    Last 24hrs VS reviewed since prior progress note.  Most recent are:  Visit Vitals  BP (!) 151/94   Pulse (!) 142   Temp 98.3 °F (36.8 °C)   Resp 18   Wt 114.7 kg (252 lb 14.4 oz)   SpO2 95%   BMI 39.61 kg/m²         Intake/Output Summary (Last 24 hours) at 2/25/2022 1435  Last data filed at 2/24/2022 1716  Gross per 24 hour   Intake 300 ml   Output --   Net 300 ml        Physical Examination:     I had a face to face encounter with this patient and independently examined them on 2/25/2022 as outlined below:          Constitutional:  No acute distress, cooperative, pleasant    ENT:  Oral mucosa moist, oropharynx benign. Resp:  CTA bilaterally. No wheezing/rhonchi/rales. No accessory muscle use. CV:  Regular rhythm, normal rate, no murmurs, gallops, rubs    GI:  Soft, non distended, non tender. normoactive bowel sounds, no hepatosplenomegaly     Musculoskeletal:  right lower extremity edema +++, non-pitting. Right apparent groin mass     Neurologic:  Moves all extremities            Data Review:    Review and/or order of clinical lab test  Review and/or order of tests in the radiology section of CPT  Review and/or order of tests in the medicine section of CPT      Labs:     Recent Labs     02/24/22  0545 02/24/22  0528   WBC 8.3 10.0   HGB 12.0* 12.3   HCT 37.3 38.0    358     Recent Labs     02/24/22  0545 02/24/22  0528   * 133*   K 4.2 4.2    102   CO2 22 22   BUN 16 17   CREA 0.93 0.98   GLU 99 100   CA 9.8 10.1     Recent Labs     02/24/22  0545 02/24/22  0528   ALT 42 45   AP 95 105   TBILI 0.4 0.5   TP 8.0 8.2   ALB 2.9* 3.0*   GLOB 5.1* 5.2*     Recent Labs     02/25/22  0724 02/25/22  0051 02/24/22  1801   APTT 82.5* 31.2* 29.7      No results for input(s): FE, TIBC, PSAT, FERR in the last 72 hours. No results found for: FOL, RBCF   No results for input(s): PH, PCO2, PO2 in the last 72 hours. No results for input(s): CPK, CKNDX, TROIQ in the last 72 hours.     No lab exists for component: CPKMB  No results found for: CHOL, CHOLX, CHLST, CHOLV, HDL, HDLP, LDL, LDLC, DLDLP, TGLX, TRIGL, TRIGP, CHHD, CHHDX  No results found for: GLUCPOC  No results found for: COLOR, APPRN, SPGRU, REFSG, JONO, PROTU, GLUCU, KETU, BILU, UROU, MATI, LEUKU, GLUKE, EPSU, BACTU, WBCU, RBCU, CASTS, UCRY      Medications Reviewed:     Current Facility-Administered Medications   Medication Dose Route Frequency    sodium chloride (NS) flush 5-40 mL  5-40 mL IntraVENous Q8H    sodium chloride (NS) flush 5-40 mL  5-40 mL IntraVENous PRN    acetaminophen (TYLENOL) tablet 650 mg  650 mg Oral Q6H PRN    Or    acetaminophen (TYLENOL) suppository 650 mg  650 mg Rectal Q6H PRN    polyethylene glycol (MIRALAX) packet 17 g  17 g Oral DAILY PRN    ondansetron (ZOFRAN ODT) tablet 4 mg  4 mg Oral Q8H PRN    Or    ondansetron (ZOFRAN) injection 4 mg  4 mg IntraVENous Q6H PRN    oxyCODONE-acetaminophen (PERCOCET) 5-325 mg per tablet 1 Tablet  1 Tablet Oral Q4H PRN    HYDROmorphone (DILAUDID) injection 1 mg  1 mg IntraVENous Q6H PRN    albuterol-ipratropium (DUO-NEB) 2.5 MG-0.5 MG/3 ML  3 mL Nebulization Q6H PRN    heparin 25,000 units in  ml infusion  17-36 Units/kg/hr (Adjusted) IntraVENous TITRATE    arformoterol 15 mcg/budesonide 0.5 mg neb solution   Nebulization BID RT     ______________________________________________________________________  EXPECTED LENGTH OF STAY: - - -  ACTUAL LENGTH OF STAY:          [address] DO

## 2022-02-26 LAB
APTT PPP: 29.3 SEC (ref 22.1–31)
APTT PPP: 52.5 SEC (ref 22.1–31)
APTT PPP: 52.7 SEC (ref 22.1–31)
APTT PPP: >130 SEC (ref 22.1–31)
COMMENT, HOLDF: NORMAL
SAMPLES BEING HELD,HOLD: NORMAL
THERAPEUTIC RANGE,PTTT: ABNORMAL SECS (ref 58–77)
THERAPEUTIC RANGE,PTTT: NORMAL SECS (ref 58–77)

## 2022-02-26 PROCEDURE — 65270000032 HC RM SEMIPRIVATE

## 2022-02-26 PROCEDURE — 65660000000 HC RM CCU STEPDOWN

## 2022-02-26 PROCEDURE — 74011250636 HC RX REV CODE- 250/636: Performed by: INTERNAL MEDICINE

## 2022-02-26 PROCEDURE — 74011000250 HC RX REV CODE- 250: Performed by: HOSPITALIST

## 2022-02-26 PROCEDURE — 74011250637 HC RX REV CODE- 250/637: Performed by: HOSPITALIST

## 2022-02-26 PROCEDURE — 94664 DEMO&/EVAL PT USE INHALER: CPT

## 2022-02-26 PROCEDURE — 36415 COLL VENOUS BLD VENIPUNCTURE: CPT

## 2022-02-26 PROCEDURE — 74011250636 HC RX REV CODE- 250/636: Performed by: HOSPITALIST

## 2022-02-26 PROCEDURE — 94640 AIRWAY INHALATION TREATMENT: CPT

## 2022-02-26 PROCEDURE — 85730 THROMBOPLASTIN TIME PARTIAL: CPT

## 2022-02-26 RX ORDER — HEPARIN SODIUM 1000 [USP'U]/ML
80 INJECTION, SOLUTION INTRAVENOUS; SUBCUTANEOUS ONCE
Status: DISCONTINUED | OUTPATIENT
Start: 2022-02-26 | End: 2022-02-26

## 2022-02-26 RX ADMIN — OXYCODONE AND ACETAMINOPHEN 1 TABLET: 5; 325 TABLET ORAL at 16:37

## 2022-02-26 RX ADMIN — HYDROMORPHONE HYDROCHLORIDE 1 MG: 1 INJECTION, SOLUTION INTRAMUSCULAR; INTRAVENOUS; SUBCUTANEOUS at 00:11

## 2022-02-26 RX ADMIN — HYDROMORPHONE HYDROCHLORIDE 1 MG: 1 INJECTION, SOLUTION INTRAMUSCULAR; INTRAVENOUS; SUBCUTANEOUS at 10:55

## 2022-02-26 RX ADMIN — Medication 43 UNITS/KG/HR: at 22:06

## 2022-02-26 RX ADMIN — HEPARIN SODIUM 6944 UNITS: 1000 INJECTION, SOLUTION INTRAVENOUS; SUBCUTANEOUS at 00:07

## 2022-02-26 RX ADMIN — SODIUM CHLORIDE, PRESERVATIVE FREE 10 ML: 5 INJECTION INTRAVENOUS at 14:00

## 2022-02-26 RX ADMIN — HYDROMORPHONE HYDROCHLORIDE 1 MG: 1 INJECTION, SOLUTION INTRAMUSCULAR; INTRAVENOUS; SUBCUTANEOUS at 20:48

## 2022-02-26 RX ADMIN — Medication 42 UNITS/KG/HR: at 13:16

## 2022-02-26 RX ADMIN — ARFORMOTEROL TARTRATE: 15 SOLUTION RESPIRATORY (INHALATION) at 20:50

## 2022-02-26 RX ADMIN — ARFORMOTEROL TARTRATE: 15 SOLUTION RESPIRATORY (INHALATION) at 07:18

## 2022-02-26 RX ADMIN — HYDROMORPHONE HYDROCHLORIDE 1 MG: 1 INJECTION, SOLUTION INTRAMUSCULAR; INTRAVENOUS; SUBCUTANEOUS at 06:33

## 2022-02-26 NOTE — PROGRESS NOTES
Heme/Onc Progress Note; [facility]    EMR reviewed. CT scan of a/p from 2/11/22 reviewed. Large R pelvic sidewall, periaortic LAD, and R groin / thigh mass. CTA chest from 2/22/22: bilat PE with no R heart strain. R inguinal mass core biopsy on 2/23/22 with path pending. He reports feeling well today. No complaints. 98.4; 90; 125/85; 98%  NAD  Sclera anicteric  Regular  No respiratory distress  Abd non-distended  No LE edema. 2/24/22 labs:  WBC 8.3, Hgb 12, Plat 367; Cr 0.93    A/P   A 65-year-old male with at least four-week history of right enlarged pelvic lymphadenopathy/mass, transferred here with ultrasound-guided biopsy done yesterday. 1.  Enlarged right pelvic lymphadenopathy causing venous compression pedal edema as well as bilateral periaortic lymphadenopathy and pulmonary embolus. We will await core biopsy results. Plan on possible BM biopsy on Monday. Pending uric acid level (ordered 2/25/22)    2. PE.  Currently, on heparin drip. Can likely be transferred to [address] at discharge.   VTE likely malignancy related.

## 2022-02-26 NOTE — PROGRESS NOTES
[facility] Adult  Hospitalist Group                                                                                          Hospitalist Progress Note  Jenna Martinez,   Answering service: [phone] from in house phone        Date of Service:  2022  NAME:  Royce Gurrola  :  1990  MRN:  [id number]      Admission Summary:   25-year-old gentleman presented to [facility] on  with right leg swelling and shortness of breath. On work-up he was found to have PE, hemodynamically stable and right groin and pelvic mass. Patient was to be transferred to [facility] surgical oncology services however [facility] was on diversion so he was transferred to [facility]. I saw him in room 515/2, his girlfriend present. He said the right thigh pain started about 2 months ago. From what he describes he was initially treated with diuretics. The pain and swelling progressed to the point where he could not walk so he presented to the ED yesterday and work-up revealed the above listed problems. As I seen the patient, the surgical team came in to see patient. Dr. Tyrone Mendoza recommended ultrasound guided biopsy by radiology to determine further management course.          Interval history / Subjective: Follow up PE and right pelvic/groin upper thigh mass. Patient seen and examined. No acute events. Awaiting pathology. Continues on heparin drip. Assessment & Plan:     Acute bilateral pulmonary embolism, hemodynamically stable, not hypoxic:  -CT with bilateral PE- right lower lobe, left upper lobe/lingula, left lower lobe. No evidence of right heart strain.    -Suspect he has right lower extremity DVT due to obstructive mass that resulted in PE  -Doppler ultrasound on 2022 was negative for DVT but this was about a month ago. -Continue heparin drip.  Will need to transition to oral anticoagulation once procedures completed  -Monitor on remote telemetry     Right pelvic and groin upper thigh mass  -CT angiogram of the abdomen on 2/11 showed large right pelvic sidewall and right groin/upper thigh mass most consistent with underlying malignancy it is indicated that this mass was visualized in the duplex ultrasound in January 26, 2022. There is also subcutaneous edema involving the right lower extremity with suspicion of possible occlusion of the right external iliac vein. There were also lower abdominal periaortic lymphadenopathy, moderate left groin lymphadenopathy. -s/p FNA biopsy 2/24. Pathology pending  -Hematology/oncology consulted. Plans for bone marrow biopsy 2/28  -Pain management  -will need chemotherapy/treatment prior to discharge     History of asthma without bronchospasm: Inhaled steroids, as needed bronchodilators             Code status: full   Prophylaxis: heparin drip   Care Plan discussed with: patient, nurse  Anticipated Disposition: >48 hours     Hospital Problems  Date Reviewed: 2/17/2022          Codes Class Noted POA    Pelvic mass ICD-10-CM: R19.00  ICD-9-CM: 789.30  2/23/2022 Unknown        Bilateral pulmonary embolism ([facility] [address] ICD-10-CM: I26.99  ICD-9-CM: 415.19  2/23/2022 Unknown        Right groin mass ICD-10-CM: R19.09  ICD-9-CM: 789.39  2/11/2022 Yes                Review of Systems:   Negative unless stated above     Vital Signs:    Last 24hrs VS reviewed since prior progress note. Most recent are:  Visit Vitals  /85   Pulse 90   Temp 98.4 °F (36.9 °C)   Resp 18   Wt 114.7 kg (252 lb 14.4 oz)   SpO2 98%   BMI 39.61 kg/m²         Intake/Output Summary (Last 24 hours) at 2/26/2022 1633  Last data filed at 2/26/2022 1432  Gross per 24 hour   Intake --   Output 600 ml   Net -600 ml        Physical Examination:     I had a face to face encounter with this patient and independently examined them on 2/26/2022 as outlined below:          Constitutional:  No acute distress, cooperative, pleasant    ENT:  Oral mucosa moist, oropharynx benign. Resp:  CTA bilaterally.  No wheezing/rhonchi/rales. No accessory muscle use. CV:  Regular rhythm, normal rate, no murmurs, gallops, rubs    GI:  Soft, non distended, non tender. normoactive bowel sounds, no hepatosplenomegaly     Musculoskeletal:  right lower extremity edema +++, non-pitting. Right apparent groin mass     Neurologic:  Moves all extremities            Data Review:    Review and/or order of clinical lab test  Review and/or order of tests in the radiology section of CPT  Review and/or order of tests in the medicine section of CPT      Labs:     Recent Labs     02/24/22 0545 02/24/22  0528   WBC 8.3 10.0   HGB 12.0* 12.3   HCT 37.3 38.0    358     Recent Labs     02/24/22 0545 02/24/22 0528   * 133*   K 4.2 4.2    102   CO2 22 22   BUN 16 17   CREA 0.93 0.98   GLU 99 100   CA 9.8 10.1     Recent Labs     02/24/22 0545 02/24/22  0528   ALT 42 45   AP 95 105   TBILI 0.4 0.5   TP 8.0 8.2   ALB 2.9* 3.0*   GLOB 5.1* 5.2*     Recent Labs     02/26/22  1140 02/26/22  0626 02/26/22  0137   APTT 52.7* 29.3 >130.0*      No results for input(s): FE, TIBC, PSAT, FERR in the last 72 hours. No results found for: FOL, RBCF   No results for input(s): PH, PCO2, PO2 in the last 72 hours. No results for input(s): CPK, CKNDX, TROIQ in the last 72 hours.     No lab exists for component: CPKMB  No results found for: CHOL, CHOLX, CHLST, CHOLV, HDL, HDLP, LDL, LDLC, DLDLP, TGLX, TRIGL, TRIGP, CHHD, CHHDX  No results found for: GLUCPOC  No results found for: COLOR, APPRN, SPGRU, REFSG, JONO, PROTU, GLUCU, KETU, BILU, UROU, MATI, LEUKU, GLUKE, EPSU, BACTU, WBCU, RBCU, CASTS, UCRY      Medications Reviewed:     Current Facility-Administered Medications   Medication Dose Route Frequency    sodium chloride (NS) flush 5-40 mL  5-40 mL IntraVENous Q8H    sodium chloride (NS) flush 5-40 mL  5-40 mL IntraVENous PRN    acetaminophen (TYLENOL) tablet 650 mg  650 mg Oral Q6H PRN    Or    acetaminophen (TYLENOL) suppository 650 mg  650 mg Rectal Q6H PRN    polyethylene glycol (MIRALAX) packet 17 g  17 g Oral DAILY PRN    ondansetron (ZOFRAN ODT) tablet 4 mg  4 mg Oral Q8H PRN    Or    ondansetron (ZOFRAN) injection 4 mg  4 mg IntraVENous Q6H PRN    oxyCODONE-acetaminophen (PERCOCET) 5-325 mg per tablet 1 Tablet  1 Tablet Oral Q4H PRN    HYDROmorphone (DILAUDID) injection 1 mg  1 mg IntraVENous Q6H PRN    albuterol-ipratropium (DUO-NEB) 2.5 MG-0.5 MG/3 ML  3 mL Nebulization Q6H PRN    heparin 25,000 units in  ml infusion  17-36 Units/kg/hr (Adjusted) IntraVENous TITRATE    arformoterol 15 mcg/budesonide 0.5 mg neb solution   Nebulization BID RT     ______________________________________________________________________  EXPECTED LENGTH OF STAY: - - -  ACTUAL LENGTH OF STAY:          [address] [facility], DO

## 2022-02-26 NOTE — PROGRESS NOTES
Heparin running at 37units/kg/hr at [facility] of shift. aPTT drawn and resulted at 0900, 29.3. Increased to 41u/kg/hr. Called pharmacy to confirm.  Will redraw levels at 1200

## 2022-02-26 NOTE — PROGRESS NOTES
Patient seen and examined. No new complaints. Still with severe RLE edema. He is otherwise feeling well. Biopsy results still pending. Appreciate input from Oncology. Will follow up on Monday. No plans for surgical intervention at this time.       Leila Easley MD  2/25/2022  9:57 PM

## 2022-02-26 NOTE — PROGRESS NOTES
Transition of Care Plan   RUR- 8% Moderate Risk    DISPOSITION: The disposition plan is pending medical progression and recommendation.  F/U with PCP/Specialist     Transport: [facility]/family     Per assigned CM note, patient to have procedure on Monday 2/28/22. CM to assist with ERIN needs as they arise.     ANGIE Rodriguez, CRM, LMHP-e

## 2022-02-27 LAB
APTT PPP: 59.1 SEC (ref 22.1–31)
APTT PPP: 70.9 SEC (ref 22.1–31)
APTT PPP: 89 SEC (ref 22.1–31)
COMMENT, HOLDF: NORMAL
ERYTHROCYTE [DISTWIDTH] IN BLOOD BY AUTOMATED COUNT: 13 % (ref 11.5–14.5)
HBV CORE IGM SER QL: NONREACTIVE
HBV SURFACE AG SER QL: 0.53 INDEX
HBV SURFACE AG SER QL: NEGATIVE
HCT VFR BLD AUTO: 37.6 % (ref 36.6–50.3)
HCV AB SERPL QL IA: NONREACTIVE
HGB BLD-MCNC: 12 G/DL (ref 12.1–17)
IGA SERPL-MCNC: 334 MG/DL (ref 70–400)
IGG SERPL-MCNC: 903 MG/DL (ref 700–1600)
IGM SERPL-MCNC: 54 MG/DL (ref 40–230)
LDH SERPL L TO P-CCNC: 391 U/L (ref 85–241)
MCH RBC QN AUTO: 29.6 PG (ref 26–34)
MCHC RBC AUTO-ENTMCNC: 31.9 G/DL (ref 30–36.5)
MCV RBC AUTO: 92.8 FL (ref 80–99)
NRBC # BLD: 0 K/UL (ref 0–0.01)
NRBC BLD-RTO: 0 PER 100 WBC
PLATELET # BLD AUTO: 406 K/UL (ref 150–400)
PMV BLD AUTO: 9.2 FL (ref 8.9–12.9)
RBC # BLD AUTO: 4.05 M/UL (ref 4.1–5.7)
SAMPLES BEING HELD,HOLD: NORMAL
THERAPEUTIC RANGE,PTTT: ABNORMAL SECS (ref 58–77)
URATE SERPL-MCNC: 6.6 MG/DL (ref 3.5–7.2)
WBC # BLD AUTO: 10 K/UL (ref 4.1–11.1)

## 2022-02-27 PROCEDURE — 74011250637 HC RX REV CODE- 250/637: Performed by: HOSPITALIST

## 2022-02-27 PROCEDURE — 94664 DEMO&/EVAL PT USE INHALER: CPT

## 2022-02-27 PROCEDURE — 83521 IG LIGHT CHAINS FREE EACH: CPT

## 2022-02-27 PROCEDURE — 85730 THROMBOPLASTIN TIME PARTIAL: CPT

## 2022-02-27 PROCEDURE — 36415 COLL VENOUS BLD VENIPUNCTURE: CPT

## 2022-02-27 PROCEDURE — 86803 HEPATITIS C AB TEST: CPT

## 2022-02-27 PROCEDURE — 84155 ASSAY OF PROTEIN SERUM: CPT

## 2022-02-27 PROCEDURE — 94640 AIRWAY INHALATION TREATMENT: CPT

## 2022-02-27 PROCEDURE — 74011000250 HC RX REV CODE- 250: Performed by: HOSPITALIST

## 2022-02-27 PROCEDURE — 74011250636 HC RX REV CODE- 250/636: Performed by: HOSPITALIST

## 2022-02-27 PROCEDURE — 85027 COMPLETE CBC AUTOMATED: CPT

## 2022-02-27 PROCEDURE — 86705 HEP B CORE ANTIBODY IGM: CPT

## 2022-02-27 PROCEDURE — 82784 ASSAY IGA/IGD/IGG/IGM EACH: CPT

## 2022-02-27 PROCEDURE — 94760 N-INVAS EAR/PLS OXIMETRY 1: CPT

## 2022-02-27 PROCEDURE — 87340 HEPATITIS B SURFACE AG IA: CPT

## 2022-02-27 PROCEDURE — 65660000000 HC RM CCU STEPDOWN

## 2022-02-27 PROCEDURE — 83615 LACTATE (LD) (LDH) ENZYME: CPT

## 2022-02-27 PROCEDURE — 84550 ASSAY OF BLOOD/URIC ACID: CPT

## 2022-02-27 RX ORDER — OXYCODONE HYDROCHLORIDE 5 MG/1
5 TABLET ORAL
Status: DISCONTINUED | OUTPATIENT
Start: 2022-02-27 | End: 2022-02-27

## 2022-02-27 RX ADMIN — HYDROMORPHONE HYDROCHLORIDE 1 MG: 1 INJECTION, SOLUTION INTRAMUSCULAR; INTRAVENOUS; SUBCUTANEOUS at 09:35

## 2022-02-27 RX ADMIN — SODIUM CHLORIDE, PRESERVATIVE FREE 10 ML: 5 INJECTION INTRAVENOUS at 15:25

## 2022-02-27 RX ADMIN — OXYCODONE AND ACETAMINOPHEN 1 TABLET: 5; 325 TABLET ORAL at 16:23

## 2022-02-27 RX ADMIN — OXYCODONE AND ACETAMINOPHEN 1 TABLET: 5; 325 TABLET ORAL at 21:02

## 2022-02-27 RX ADMIN — Medication 40 UNITS/KG/HR: at 21:40

## 2022-02-27 RX ADMIN — ARFORMOTEROL TARTRATE: 15 SOLUTION RESPIRATORY (INHALATION) at 22:20

## 2022-02-27 RX ADMIN — ARFORMOTEROL TARTRATE: 15 SOLUTION RESPIRATORY (INHALATION) at 07:13

## 2022-02-27 RX ADMIN — Medication 43 UNITS/KG/HR: at 06:03

## 2022-02-27 RX ADMIN — HYDROMORPHONE HYDROCHLORIDE 1 MG: 1 INJECTION, SOLUTION INTRAMUSCULAR; INTRAVENOUS; SUBCUTANEOUS at 03:23

## 2022-02-27 RX ADMIN — OXYCODONE AND ACETAMINOPHEN 1 TABLET: 5; 325 TABLET ORAL at 12:00

## 2022-02-27 RX ADMIN — Medication 43 UNITS/KG/HR: at 08:15

## 2022-02-27 RX ADMIN — HYDROMORPHONE HYDROCHLORIDE 1 MG: 1 INJECTION, SOLUTION INTRAMUSCULAR; INTRAVENOUS; SUBCUTANEOUS at 23:02

## 2022-02-27 RX ADMIN — Medication 40 UNITS/KG/HR: at 17:40

## 2022-02-27 RX ADMIN — Medication 43 UNITS/KG/HR: at 12:15

## 2022-02-27 RX ADMIN — HYDROMORPHONE HYDROCHLORIDE 1 MG: 1 INJECTION, SOLUTION INTRAMUSCULAR; INTRAVENOUS; SUBCUTANEOUS at 15:25

## 2022-02-27 NOTE — PROGRESS NOTES
Received Pt awake,alert and oriented x4,Pt was sitting in a chair with his head down crying,when I asked Pt what was wrong he stated I would rather be dead then be mistreated like this,I asked him what was he upset about,Pt stated the room smelled very bad even the night before,I took care of pt then as well ,he went on to say,the charge nurse came to talk with him because he requested to speak with someone in [address] looked under the bed and found a bed pan full of his feces from 2 days ago,Pt broke down and started crying again,he asked me would you  Like to be treated like this,would anybody,I want to see the Dr and go home ASAP,I sat with Pt and explained the importance of him continuing on his  Heparin Drip,Pts mother was on the phone talking with him as well,I went and spoke with Kannan Gutiérrez RN charge nurse ,I asked If I could Please move my Pt to A single room,she said yes,Pt was moved to room 517,he was still sad and tearful at times,however in the morning he got up and walked to the bathroom on his own,he was in better spirits,was very supportive to this Pt and made my presence to sit and talk with him throughout the night,Explained to Pt that he is scheduled for bone biopsy on Monday ,he is not to eat or drink anything past midnight tonight ,Pts Heparin is now therapeutic he was glad to hear that,Pts Rt leg remains very  Edematous ,I am only able to get a pulse with the doppler,Pt has  A good pedal pulse and popteal pulse,unable to get femoral pulse,area is very tender to touch,pt remained in good spirits this am.Stable.

## 2022-02-27 NOTE — PROGRESS NOTES
Bedside shift change report given to [address] [place] [address] Road (oncoming nurse) by Zach Antonio RN (offgoing nurse). Report included the following information SBAR, Kardex, Intake/Output, MAR and Recent Results.

## 2022-02-27 NOTE — PROGRESS NOTES
[address] [facility] Adult  Hospitalist Group                                                                                          Hospitalist Progress Note  Marsha Leigh DO  Answering service: [phone] from in house phone        Date of Service:  2022  NAME:  Zenia Arias  :  1990  MRN:  [id number]      Admission Summary:   18-year-old gentleman presented to [facility] on  with right leg swelling and shortness of breath. On work-up he was found to have PE, hemodynamically stable and right groin and pelvic mass. Patient was to be transferred to [facility] surgical oncology services however [facility] was on diversion so he was transferred to [facility]. I saw him in room 515/2, his girlfriend present. He said the right thigh pain started about 2 months ago. From what he describes he was initially treated with diuretics. The pain and swelling progressed to the point where he could not walk so he presented to the ED yesterday and work-up revealed the above listed problems. As I seen the patient, the surgical team came in to see patient. Dr. Main Ladd recommended ultrasound guided biopsy by radiology to determine further management course.          Interval history / Subjective: Follow up PE and right pelvic/groin upper thigh mass. Patient seen and examined. In good spirits. Had rough night- see nursing note. Leg swelling persistent. Has tachycardia when ambulating      Assessment & Plan:     Acute bilateral pulmonary embolism, hemodynamically stable, not hypoxic:  -CT with bilateral PE- right lower lobe, left upper lobe/lingula, left lower lobe. No evidence of right heart strain.    -Suspect he has right lower extremity DVT due to obstructive mass that resulted in PE  -Doppler ultrasound on 2022 was negative for DVT but this was about a month ago. -Continue heparin drip.  Will need to transition to oral anticoagulation once procedures completed  -Monitor on remote telemetry     Right pelvic and groin upper thigh mass  -CT angiogram of the abdomen on 2/11 showed large right pelvic sidewall and right groin/upper thigh mass most consistent with underlying malignancy it is indicated that this mass was visualized in the duplex ultrasound in January 26, 2022. There is also subcutaneous edema involving the right lower extremity with suspicion of possible occlusion of the right external iliac vein. There were also lower abdominal periaortic lymphadenopathy, moderate left groin lymphadenopathy. -s/p FNA biopsy 2/24. Pathology pending  -Hematology/oncology consulted. Plans for bone marrow biopsy 2/28. NPO after midnight   -Pain management  -will need chemotherapy/treatment prior to discharge     History of asthma without bronchospasm: Inhaled steroids, as needed bronchodilators             Code status: full   Prophylaxis: heparin drip   Care Plan discussed with: patient, nurse  Anticipated Disposition: >48 hours     Hospital Problems  Date Reviewed: 2/17/2022          Codes Class Noted POA    Pelvic mass ICD-10-CM: R19.00  ICD-9-CM: 789.30  2/23/2022 Unknown        Bilateral pulmonary embolism ([facility] [address] ICD-10-CM: I26.99  ICD-9-CM: 415.19  2/23/2022 Unknown        Right groin mass ICD-10-CM: R19.09  ICD-9-CM: 789.39  2/11/2022 Yes                Review of Systems:   Negative unless stated above     Vital Signs:    Last 24hrs VS reviewed since prior progress note.  Most recent are:  Visit Vitals  /82   Pulse (!) 103   Temp 98.3 °F (36.8 °C)   Resp 18   Wt 114.7 kg (252 lb 14.4 oz)   SpO2 98%   BMI 39.61 kg/m²         Intake/Output Summary (Last 24 hours) at 2/27/2022 1119  Last data filed at 2/26/2022 1432  Gross per 24 hour   Intake --   Output 600 ml   Net -600 ml        Physical Examination:     I had a face to face encounter with this patient and independently examined them on 2/27/2022 as outlined below:          Constitutional:  No acute distress, cooperative, pleasant    ENT:  Oral mucosa moist, oropharynx benign. Resp:  CTA bilaterally. No wheezing/rhonchi/rales. No accessory muscle use. CV:  Regular rhythm, normal rate, no murmurs, gallops, rubs    GI:  Soft, non distended, non tender. normoactive bowel sounds, no hepatosplenomegaly     Musculoskeletal:  right lower extremity edema +++, non-pitting. Right apparent groin mass     Neurologic:  Moves all extremities            Data Review:    Review and/or order of clinical lab test  Review and/or order of tests in the radiology section of CPT  Review and/or order of tests in the medicine section of CPT      Labs:     Recent Labs     02/27/22  0530   WBC 10.0   HGB 12.0*   HCT 37.6   *     Recent Labs     02/27/22  0530   URICA 6.6     No results for input(s): ALT, AP, TBIL, TBILI, TP, ALB, GLOB, GGT, AML, LPSE in the last 72 hours. No lab exists for component: SGOT, GPT, AMYP, HLPSE  Recent Labs     02/27/22  0611 02/27/22  0034 02/26/22  1759   APTT 70.9* 59.1* 52.5*      No results for input(s): FE, TIBC, PSAT, FERR in the last 72 hours. No results found for: FOL, RBCF   No results for input(s): PH, PCO2, PO2 in the last 72 hours. No results for input(s): CPK, CKNDX, TROIQ in the last 72 hours.     No lab exists for component: CPKMB  No results found for: CHOL, CHOLX, CHLST, CHOLV, HDL, HDLP, LDL, LDLC, DLDLP, TGLX, TRIGL, TRIGP, CHHD, CHHDX  No results found for: GLUCPOC  No results found for: COLOR, APPRN, SPGRU, REFSG, JONO, PROTU, GLUCU, KETU, BILU, UROU, MATI, LEUKU, GLUKE, EPSU, BACTU, WBCU, RBCU, CASTS, UCRY      Medications Reviewed:     Current Facility-Administered Medications   Medication Dose Route Frequency    sodium chloride (NS) flush 5-40 mL  5-40 mL IntraVENous Q8H    sodium chloride (NS) flush 5-40 mL  5-40 mL IntraVENous PRN    acetaminophen (TYLENOL) tablet 650 mg  650 mg Oral Q6H PRN    Or    acetaminophen (TYLENOL) suppository 650 mg  650 mg Rectal Q6H PRN    polyethylene glycol (MIRALAX) packet 17 g  17 g Oral DAILY PRN    ondansetron (ZOFRAN ODT) tablet 4 mg  4 mg Oral Q8H PRN    Or    ondansetron (ZOFRAN) injection 4 mg  4 mg IntraVENous Q6H PRN    oxyCODONE-acetaminophen (PERCOCET) 5-325 mg per tablet 1 Tablet  1 Tablet Oral Q4H PRN    HYDROmorphone (DILAUDID) injection 1 mg  1 mg IntraVENous Q6H PRN    albuterol-ipratropium (DUO-NEB) 2.5 MG-0.5 MG/3 ML  3 mL Nebulization Q6H PRN    heparin 25,000 units in  ml infusion  17-36 Units/kg/hr (Adjusted) IntraVENous TITRATE    arformoterol 15 mcg/budesonide 0.5 mg neb solution   Nebulization BID RT     ______________________________________________________________________  EXPECTED LENGTH OF STAY: - - -  ACTUAL LENGTH OF STAY:          [address], DO

## 2022-02-28 ENCOUNTER — APPOINTMENT (OUTPATIENT)
Dept: CT IMAGING | Age: 32
DRG: 681 | End: 2022-02-28
Attending: INTERNAL MEDICINE
Payer: MEDICAID

## 2022-02-28 LAB
APTT PPP: 80.3 SEC (ref 22.1–31)
APTT PPP: 84 SEC (ref 22.1–31)
BACTERIA SPEC CULT: NORMAL
BASOPHILS # BLD: 0.1 K/UL (ref 0–0.1)
BASOPHILS NFR BLD: 1 % (ref 0–1)
COMMENT, HOLDF: NORMAL
DIFFERENTIAL METHOD BLD: ABNORMAL
EOSINOPHIL # BLD: 0.6 K/UL (ref 0–0.4)
EOSINOPHIL NFR BLD: 7 % (ref 0–7)
ERYTHROCYTE [DISTWIDTH] IN BLOOD BY AUTOMATED COUNT: 13.1 % (ref 11.5–14.5)
GRAM STN SPEC: NORMAL
GRAM STN SPEC: NORMAL
HCT VFR BLD AUTO: 36.7 % (ref 36.6–50.3)
HGB BLD-MCNC: 11.2 G/DL (ref 12.1–17)
IMM GRANULOCYTES # BLD AUTO: 0.1 K/UL (ref 0–0.04)
IMM GRANULOCYTES NFR BLD AUTO: 1 % (ref 0–0.5)
KAPPA LC FREE SER-MCNC: 31.8 MG/L (ref 3.3–19.4)
KAPPA LC FREE/LAMBDA FREE SER: 1.61 {RATIO} (ref 0.26–1.65)
LAMBDA LC FREE SERPL-MCNC: 19.8 MG/L (ref 5.7–26.3)
LYMPHOCYTES # BLD: 0.8 K/UL (ref 0.8–3.5)
LYMPHOCYTES NFR BLD: 9 % (ref 12–49)
MCH RBC QN AUTO: 29.1 PG (ref 26–34)
MCHC RBC AUTO-ENTMCNC: 30.5 G/DL (ref 30–36.5)
MCV RBC AUTO: 95.3 FL (ref 80–99)
MONOCYTES # BLD: 1 K/UL (ref 0–1)
MONOCYTES NFR BLD: 10 % (ref 5–13)
NEUTS SEG # BLD: 6.8 K/UL (ref 1.8–8)
NEUTS SEG NFR BLD: 72 % (ref 32–75)
NRBC # BLD: 0 K/UL (ref 0–0.01)
NRBC BLD-RTO: 0 PER 100 WBC
PLATELET # BLD AUTO: 452 K/UL (ref 150–400)
PMV BLD AUTO: 10.1 FL (ref 8.9–12.9)
RBC # BLD AUTO: 3.85 M/UL (ref 4.1–5.7)
SAMPLES BEING HELD,HOLD: NORMAL
SERVICE CMNT-IMP: NORMAL
THERAPEUTIC RANGE,PTTT: ABNORMAL SECS (ref 58–77)
THERAPEUTIC RANGE,PTTT: ABNORMAL SECS (ref 58–77)
WBC # BLD AUTO: 9.4 K/UL (ref 4.1–11.1)

## 2022-02-28 PROCEDURE — 88342 IMHCHEM/IMCYTCHM 1ST ANTB: CPT

## 2022-02-28 PROCEDURE — 99153 MOD SED SAME PHYS/QHP EA: CPT

## 2022-02-28 PROCEDURE — 77030028872 HC BN BIOP NDL ON CNTRL TY TELE -C

## 2022-02-28 PROCEDURE — 88264 CHROMOSOME ANALYSIS 20-25: CPT

## 2022-02-28 PROCEDURE — 74011250637 HC RX REV CODE- 250/637: Performed by: HOSPITALIST

## 2022-02-28 PROCEDURE — 88341 IMHCHEM/IMCYTCHM EA ADD ANTB: CPT

## 2022-02-28 PROCEDURE — 88184 FLOWCYTOMETRY/ TC 1 MARKER: CPT

## 2022-02-28 PROCEDURE — 74011250636 HC RX REV CODE- 250/636: Performed by: NURSE PRACTITIONER

## 2022-02-28 PROCEDURE — 74011250636 HC RX REV CODE- 250/636: Performed by: HOSPITALIST

## 2022-02-28 PROCEDURE — 99152 MOD SED SAME PHYS/QHP 5/>YRS: CPT

## 2022-02-28 PROCEDURE — 88311 DECALCIFY TISSUE: CPT

## 2022-02-28 PROCEDURE — 88237 TISSUE CULTURE BONE MARROW: CPT

## 2022-02-28 PROCEDURE — 88185 FLOWCYTOMETRY/TC ADD-ON: CPT

## 2022-02-28 PROCEDURE — 85730 THROMBOPLASTIN TIME PARTIAL: CPT

## 2022-02-28 PROCEDURE — 74011000250 HC RX REV CODE- 250: Performed by: HOSPITALIST

## 2022-02-28 PROCEDURE — 77012 CT SCAN FOR NEEDLE BIOPSY: CPT

## 2022-02-28 PROCEDURE — 85025 COMPLETE CBC W/AUTO DIFF WBC: CPT

## 2022-02-28 PROCEDURE — 94640 AIRWAY INHALATION TREATMENT: CPT

## 2022-02-28 PROCEDURE — 88305 TISSUE EXAM BY PATHOLOGIST: CPT

## 2022-02-28 PROCEDURE — 65270000029 HC RM PRIVATE

## 2022-02-28 PROCEDURE — 88313 SPECIAL STAINS GROUP 2: CPT

## 2022-02-28 PROCEDURE — 36415 COLL VENOUS BLD VENIPUNCTURE: CPT

## 2022-02-28 RX ORDER — MIDAZOLAM HYDROCHLORIDE 1 MG/ML
5 INJECTION, SOLUTION INTRAMUSCULAR; INTRAVENOUS
Status: DISCONTINUED | OUTPATIENT
Start: 2022-02-28 | End: 2022-02-28 | Stop reason: ALTCHOICE

## 2022-02-28 RX ORDER — FENTANYL CITRATE 50 UG/ML
25-200 INJECTION, SOLUTION INTRAMUSCULAR; INTRAVENOUS
Status: DISCONTINUED | OUTPATIENT
Start: 2022-02-28 | End: 2022-02-28 | Stop reason: ALTCHOICE

## 2022-02-28 RX ORDER — LIDOCAINE HCL/PF 100 MG/5ML
SYRINGE (ML) INTRAVENOUS
Status: DISCONTINUED
Start: 2022-02-28 | End: 2022-02-28 | Stop reason: WASHOUT

## 2022-02-28 RX ADMIN — FENTANYL CITRATE 50 MCG: 50 INJECTION, SOLUTION INTRAMUSCULAR; INTRAVENOUS at 16:02

## 2022-02-28 RX ADMIN — ARFORMOTEROL TARTRATE: 15 SOLUTION RESPIRATORY (INHALATION) at 21:48

## 2022-02-28 RX ADMIN — MIDAZOLAM 1 MG: 1 INJECTION INTRAMUSCULAR; INTRAVENOUS at 16:02

## 2022-02-28 RX ADMIN — FENTANYL CITRATE 50 MCG: 50 INJECTION, SOLUTION INTRAMUSCULAR; INTRAVENOUS at 16:14

## 2022-02-28 RX ADMIN — HYDROMORPHONE HYDROCHLORIDE 1 MG: 1 INJECTION, SOLUTION INTRAMUSCULAR; INTRAVENOUS; SUBCUTANEOUS at 13:01

## 2022-02-28 RX ADMIN — MIDAZOLAM 1 MG: 1 INJECTION INTRAMUSCULAR; INTRAVENOUS at 15:55

## 2022-02-28 RX ADMIN — FENTANYL CITRATE 50 MCG: 50 INJECTION, SOLUTION INTRAMUSCULAR; INTRAVENOUS at 15:55

## 2022-02-28 RX ADMIN — Medication 36 UNITS/KG/HR: at 17:40

## 2022-02-28 RX ADMIN — FENTANYL CITRATE 25 MCG: 50 INJECTION, SOLUTION INTRAMUSCULAR; INTRAVENOUS at 16:19

## 2022-02-28 RX ADMIN — OXYCODONE AND ACETAMINOPHEN 1 TABLET: 5; 325 TABLET ORAL at 17:49

## 2022-02-28 RX ADMIN — SODIUM CHLORIDE, PRESERVATIVE FREE 10 ML: 5 INJECTION INTRAVENOUS at 22:28

## 2022-02-28 RX ADMIN — HYDROMORPHONE HYDROCHLORIDE 1 MG: 1 INJECTION, SOLUTION INTRAMUSCULAR; INTRAVENOUS; SUBCUTANEOUS at 06:16

## 2022-02-28 RX ADMIN — ARFORMOTEROL TARTRATE: 15 SOLUTION RESPIRATORY (INHALATION) at 07:44

## 2022-02-28 RX ADMIN — HYDROMORPHONE HYDROCHLORIDE 1 MG: 1 INJECTION, SOLUTION INTRAMUSCULAR; INTRAVENOUS; SUBCUTANEOUS at 20:39

## 2022-02-28 RX ADMIN — Medication 38 UNITS/KG/HR: at 05:06

## 2022-02-28 RX ADMIN — MIDAZOLAM 1 MG: 1 INJECTION INTRAMUSCULAR; INTRAVENOUS at 16:14

## 2022-02-28 NOTE — PROGRESS NOTES
TRANSFER - IN REPORT:    Verbal report received from Angelita(name) on Miesha Newell  being received from (unit) for routine progression of care      Report consisted of patients Situation, Background, Assessment and   Recommendations(SBAR). Information from the following report(s) SBAR, MAR, Recent Results and Med Rec Status was reviewed with the receiving nurse. Opportunity for questions and clarification was provided. Assessment completed upon patients arrival to unit and care assumed.

## 2022-02-28 NOTE — PROGRESS NOTES
Hematology-Oncology Progress Note      Toribio Slight  1990  [id number]  2/28/2022      Subjective:     No new complaints. NPO for BM biopsy today. Path from needle biopsy pending. Allergies: Patient has no known allergies.   Current Facility-Administered Medications   Medication Dose Route Frequency Provider Last Rate Last Admin    sodium chloride (NS) flush 5-40 mL  5-40 mL IntraVENous Q8H MARCO A TsaiOHJASMYN LOZA MD   10 mL at 02/27/22 1525    sodium chloride (NS) flush 5-40 mL  5-40 mL IntraVENous PRN Bhavna Tsai MD        acetaminophen (TYLENOL) tablet 650 mg  650 mg Oral Q6H PRN UMAIR Tsai MD        Or    acetaminophen (TYLENOL) suppository 650 mg  650 mg Rectal Q6H PRN MAVIS Tsai MD        polyethylene glycol (MIRALAX) packet 17 g  17 g Oral DAILY PRN ARTEMIO Tsai MD        ondansetron (ZOFRAN ODT) tablet 4 mg  4 mg Oral Q8H PRN MARLENA Tsai MD        Or    ondansetron (ZOFRAN) injection 4 mg  4 mg IntraVENous Q6H PRN KIARA Tsai MD        oxyCODONE-acetaminophen (PERCOCET) 5-325 mg per tablet 1 Tablet  1 Tablet Oral Q4H PRN CRUZ Tsai MD   1 Tablet at 02/27/22 2102    HYDROmorphone (DILAUDID) injection 1 mg  1 mg IntraVENous Q6H PRN JULISSA Tsai MD   1 mg at 02/28/22 0616    albuterol-ipratropium (DUO-NEB) 2.5 MG-0.5 MG/3 ML  3 mL Nebulization Q6H PRN Bhavna Tsai MD        heparin 25,000 units in  ml infusion  17-36 Units/kg/hr (Adjusted) IntraVENous TITRATE ZORAN Tsai MD 33 mL/hr at 02/28/22 0506 38 Units/kg/hr at 02/28/22 0506    arformoterol 15 mcg/budesonide 0.5 mg neb solution   Nebulization BID RT Yg Hopper MD   Given at 02/27/22 2220     Objective:     Patient Vitals for the past 24 hrs:   BP Temp Pulse Resp SpO2   02/28/22 0547 -- -- 81 -- --   02/28/22 0346 -- -- 87 -- --   02/28/22 0146 -- -- 82 -- --   02/27/22 2351 133/80 98.6 °F (37 °C) 88 18 98 %   02/27/22 2220 -- -- -- -- 98 %   02/27/22 2219 -- -- 96 -- --   02/27/22 2132 134/81 99.4 °F (37.4 °C) 100 18 98 %   02/27/22 2006 -- -- 94 -- --   02/27/22 1759 -- -- 96 -- --   02/27/22 1552 130/82 98.8 °F (37.1 °C) 87 16 98 %   02/27/22 1407 -- -- 94 -- --   02/27/22 1400 -- -- 97 -- --   02/27/22 1200 -- -- (!) 103 -- --   02/27/22 1000 -- -- 100 -- --   02/27/22 0805 133/82 98.3 °F (36.8 °C) (!) 103 18 98 %       Gen: NAD  HEENT: PERRL, Sclerae anicteric  Cv: RRR without m/r/g  Pulm: CTA bilaterally  Abd: NABS, NTND, No HSM  Ext: continue swelling at right leg. Available labs reviewed:  Labs:    Recent Results (from the past 24 hour(s))   PTT    Collection Time: 02/27/22  2:40 PM   Result Value Ref Range    aPTT 89.0 (HH) 22.1 - 31.0 sec    aPTT, therapeutic range     58.0 - 77.0 SECS   PTT    Collection Time: 02/27/22 11:11 PM   Result Value Ref Range    aPTT 80.3 (H) 22.1 - 31.0 sec    aPTT, therapeutic range     58.0 - 77.0 SECS   SAMPLES BEING HELD    Collection Time: 02/28/22  6:06 AM   Result Value Ref Range    SAMPLES BEING HELD 1LAV     COMMENT        Add-on orders for these samples will be processed based on acceptable specimen integrity and analyte stability, which may vary by analyte. Assessment and Plan     28 y/o man with new right pelvic LAD causing right leg swelling/PE. S/p needle biopy. 1. Suspected lymphoma: Needle biopsy path pending. For BM biopsy today. Can hopefully start treatment tomorrow once path back. Increased LDH noted. Start allopurinol. Hepatitis B negative. 2. Unmeasured protein/elevated globulin with low albumin: suspect M-spike from lymphoma. 3. Pulmonary embolism: on heparin gtt. Thank you for allowing us to participate in the care of this very pleasant patient.     Liz Burgess MD  Hematology/Oncology  Phone [phone]

## 2022-02-28 NOTE — PROGRESS NOTES
0820: Heparin dripped stopped per angio for bone marrow biopsy. The RN was told patient would be sent for at 1020.    1100: This RN called angio to get an update on when patient would be going to get bone marrow biopsy. [facility]io staff said they weren't sure and would call me back. This RN did not receive phone call.    1300: This RN called angio to get update. Angio staff stated they are aware the patient's heparin drip has been off since 0820 and patient is on the priority list. This RN updated patient. Patient anxious and upset. This RN reassured patient.

## 2022-02-28 NOTE — PROGRESS NOTES
TRANSFER - OUT REPORT:    Verbal report given to Sanjuana RN(name) on Silas Anderson  being transferred to [facility](unit) for change in patient condition(Chemo therapy )       Report consisted of patients Situation, Background, Assessment and   Recommendations(SBAR). Information from the following report(s) SBAR, Kardex, Procedure Summary, Intake/Output, MAR and Recent Results was reviewed with the receiving nurse. Lines:   Peripheral IV 02/22/22 Right Antecubital (Active)   Site Assessment Clean, dry, & intact 02/28/22 0940   Phlebitis Assessment 0 02/28/22 0940   Infiltration Assessment 0 02/28/22 0940   Dressing Status Clean, dry, & intact 02/28/22 0940   Dressing Type Transparent 02/28/22 0940   Hub Color/Line Status Pink;Flushed;Capped 02/28/22 0940   Action Taken Open ports on tubing capped 02/28/22 0940   Alcohol Cap Used Yes 02/28/22 0940       Peripheral IV 02/27/22 Anterior;Distal;Left Forearm (Active)   Site Assessment Clean, dry, & intact 02/28/22 1430   Phlebitis Assessment 0 02/28/22 1430   Infiltration Assessment 0 02/28/22 1430   Dressing Status Intact 02/28/22 1430   Dressing Type Transparent 02/28/22 1430   Hub Color/Line Status Pink;Flushed;Capped 02/28/22 1430   Action Taken Dressing reinforced 02/28/22 1430   Alcohol Cap Used Yes 02/28/22 1430        Opportunity for questions and clarification was provided.

## 2022-02-28 NOTE — PROGRESS NOTES
[address] [facility] Adult  Hospitalist Group                                                                                          Hospitalist Progress Note  [address] [facility],   Answering service: [phone] from in house phone        Date of Service:  2022  NAME:  Toribio Chilel  :  1990  MRN:  [id number]      Admission Summary:   27-year-old gentleman presented to [facility] on  with right leg swelling and shortness of breath. On work-up he was found to have PE, hemodynamically stable and right groin and pelvic mass. Patient was to be transferred to [facility] surgical oncology services however [facility] was on diversion so he was transferred to [facility]. I saw him in room 515/2, his girlfriend present. He said the right thigh pain started about 2 months ago. From what he describes he was initially treated with diuretics. The pain and swelling progressed to the point where he could not walk so he presented to the ED yesterday and work-up revealed the above listed problems. As I seen the patient, the surgical team came in to see patient. Dr. Kimberlee Amin recommended ultrasound guided biopsy by radiology to determine further management course.          Interval history / Subjective: Follow up PE and right pelvic/groin upper thigh mass. Patient seen and examined. No acute complaints. Has persistent right lower extremity edema. Plans for bone marrow biopsy today. Pathology still pending. Assessment & Plan:     Right pelvic and groin upper thigh mass  -CT angiogram of the abdomen on  showed large right pelvic sidewall and right groin/upper thigh mass most consistent with underlying malignancy it is indicated that this mass was visualized in the duplex ultrasound in 2022. There is also subcutaneous edema involving the right lower extremity with suspicion of possible occlusion of the right external iliac vein.   There were also lower abdominal periaortic lymphadenopathy, moderate left groin lymphadenopathy. -s/p FNA biopsy 2/24. Pathology pending   -Hematology/oncology consulted. Plans for bone marrow biopsy 2/28  -Pain management  -will need chemotherapy/treatment prior to discharge    Acute bilateral pulmonary embolism, hemodynamically stable, not hypoxic:  -CT with bilateral PE- right lower lobe, left upper lobe/lingula, left lower lobe. No evidence of right heart strain.    -Suspect he has right lower extremity DVT due to obstructive mass that resulted in PE  -Doppler ultrasound on 1/26/2022 was negative for DVT but this was about a month ago. -Continue heparin drip. Will need to transition to oral anticoagulation once procedures completed  -Monitor on remote telemetry       History of asthma without bronchospasm: Inhaled steroids, as needed bronchodilators             Code status: full   Prophylaxis: heparin drip   Care Plan discussed with: patient, nurse  Anticipated Disposition: >48 hours     Hospital Problems  Date Reviewed: 2/17/2022          Codes Class Noted POA    Pelvic mass ICD-10-CM: R19.00  ICD-9-CM: 789.30  2/23/2022 Unknown        Bilateral pulmonary embolism ([facility] [address] ICD-10-CM: I26.99  ICD-9-CM: 415.19  2/23/2022 Unknown        Right groin mass ICD-10-CM: R19.09  ICD-9-CM: 789.39  2/11/2022 Yes                Review of Systems:   Negative unless stated above     Vital Signs:    Last 24hrs VS reviewed since prior progress note.  Most recent are:  Visit Vitals  /78   Pulse 100   Temp 99.2 °F (37.3 °C)   Resp 18   Wt 114.7 kg (252 lb 14.4 oz)   SpO2 98%   BMI 39.61 kg/m²         Intake/Output Summary (Last 24 hours) at 2/28/2022 1134  Last data filed at 2/27/2022 1200  Gross per 24 hour   Intake --   Output 350 ml   Net -350 ml        Physical Examination:     I had a face to face encounter with this patient and independently examined them on 2/28/2022 as outlined below:          Constitutional:  No acute distress, cooperative, pleasant    ENT:  Oral mucosa moist, oropharynx benign. Resp:  CTA bilaterally. No wheezing/rhonchi/rales. No accessory muscle use. CV:  Regular rhythm, normal rate, no murmurs, gallops, rubs    GI:  Soft, non distended, non tender. normoactive bowel sounds, no hepatosplenomegaly     Musculoskeletal:  right lower extremity edema +++, non-pitting. Right apparent groin mass     Neurologic:  Moves all extremities            Data Review:    Review and/or order of clinical lab test  Review and/or order of tests in the radiology section of CPT  Review and/or order of tests in the medicine section of CPT      Labs:     Recent Labs     02/27/22  0530   WBC 10.0   HGB 12.0*   HCT 37.6   *     Recent Labs     02/27/22  0530   URICA 6.6     No results for input(s): ALT, AP, TBIL, TBILI, TP, ALB, GLOB, GGT, AML, LPSE in the last 72 hours. No lab exists for component: SGOT, GPT, AMYP, HLPSE  Recent Labs     02/28/22  0606 02/27/22  2311 02/27/22  1440   APTT 84.0* 80.3* 89.0*      No results for input(s): FE, TIBC, PSAT, FERR in the last 72 hours. No results found for: FOL, RBCF   No results for input(s): PH, PCO2, PO2 in the last 72 hours. No results for input(s): CPK, CKNDX, TROIQ in the last 72 hours.     No lab exists for component: CPKMB  No results found for: CHOL, CHOLX, CHLST, CHOLV, HDL, HDLP, LDL, LDLC, DLDLP, TGLX, TRIGL, TRIGP, CHHD, CHHDX  No results found for: GLUCPOC  No results found for: COLOR, APPRN, SPGRU, REFSG, JONO, PROTU, GLUCU, KETU, BILU, UROU, MATI, LEUKU, GLUKE, EPSU, BACTU, WBCU, RBCU, CASTS, UCRY      Medications Reviewed:     Current Facility-Administered Medications   Medication Dose Route Frequency    sodium chloride (NS) flush 5-40 mL  5-40 mL IntraVENous Q8H    sodium chloride (NS) flush 5-40 mL  5-40 mL IntraVENous PRN    acetaminophen (TYLENOL) tablet 650 mg  650 mg Oral Q6H PRN    Or    acetaminophen (TYLENOL) suppository 650 mg  650 mg Rectal Q6H PRN    polyethylene glycol (MIRALAX) packet 17 g  17 g Oral DAILY PRN    ondansetron (ZOFRAN ODT) tablet 4 mg  4 mg Oral Q8H PRN    Or    ondansetron (ZOFRAN) injection 4 mg  4 mg IntraVENous Q6H PRN    oxyCODONE-acetaminophen (PERCOCET) 5-325 mg per tablet 1 Tablet  1 Tablet Oral Q4H PRN    HYDROmorphone (DILAUDID) injection 1 mg  1 mg IntraVENous Q6H PRN    albuterol-ipratropium (DUO-NEB) 2.5 MG-0.5 MG/3 ML  3 mL Nebulization Q6H PRN    heparin 25,000 units in  ml infusion  17-36 Units/kg/hr (Adjusted) IntraVENous TITRATE    arformoterol 15 mcg/budesonide 0.5 mg neb solution   Nebulization BID RT     ______________________________________________________________________  EXPECTED LENGTH OF STAY: - - -  ACTUAL LENGTH OF STAY:          [address], DO

## 2022-02-28 NOTE — PROGRESS NOTES
RUR: 7% Low    ERIN: Home. Patient's mother to provide transport home once medically stable. Follow-up with PCP/specialist.     Primary Contact: Mother, Romi West, [phone]    12:10PM - CM reviewed chart. Per review and ID rounds, plan for bone marrow biopsy today, 2/28. Discharge expected to be greater than 48 hours. Discharge pending medical progress and final recommendations. CM to continue to follow as needed.     ANGIE Barillas   [phone]

## 2022-02-28 NOTE — PROGRESS NOTES
TRANSFER - OUT REPORT:    Verbal [facility]  [address]  After  ordered procedure       Report consisted of patients Situation, Background, Assessment and   Recommendations(SBAR). Information from the following report(s) Procedure Summary was reviewed with the receiving nurse. Opportunity for questions and clarification was provided.       Patient transported with hospital transport:

## 2022-02-28 NOTE — H&P
INTERVENTIONAL RADIOLOGY  Preoperative History and Physical      Patient:  Pako Gaspar  :  1990  Age:  27 y.o. MRN:  [id number]  Today's Date:  2022      CC / HPI   Pako Gaspar is a 27 y.o. male with a history of pelvic lymphadenopathy who presents for CT guided bone marrow biopsy. PAST MEDICAL HISTORY  Past Medical History:   Diagnosis Date    Asthma     Hypertension        PAST SURGICAL HISTORY  No past surgical history on file. SOCIAL HISTORY  Social History     Socioeconomic History    Marital status: SINGLE     Spouse name: Not on file    Number of children: Not on file    Years of education: Not on file    Highest education level: Not on file   Occupational History    Not on file   Tobacco Use    Smoking status: Former Smoker     Packs/day: 0.50     Years: 2.00     Pack years: 1.00    Smokeless tobacco: Never Used   Vaping Use    Vaping Use: Never used   Substance and Sexual Activity    Alcohol use: Yes    Drug use: Not Currently     Types: Marijuana    Sexual activity: Not on file   Other Topics Concern    Not on file   Social History Narrative    Not on file     Social Determinants of Health     Financial Resource Strain:     Difficulty of Paying Living Expenses: Not on file   Food Insecurity:     Worried About Running Out of Food in the Last Year: Not on file    Noreen of Food in the Last Year: Not on file   Transportation Needs:     Lack of Transportation (Medical): Not on file    Lack of Transportation (Non-Medical):  Not on file   Physical Activity:     Days of Exercise per Week: Not on file    Minutes of Exercise per Session: Not on file   Stress:     Feeling of Stress : Not on file   Social Connections:     Frequency of Communication with Friends and Family: Not on file    Frequency of Social Gatherings with Friends and Family: Not on file    Attends Bahai Services: Not on file    Active Member of Clubs or Organizations: Not on file   Courtenay Spurling Attends Club or Organization Meetings: Not on file    Marital Status: Not on file   Intimate Partner Violence:     Fear of Current or Ex-Partner: Not on file    Emotionally Abused: Not on file    Physically Abused: Not on file    Sexually Abused: Not on file   Housing Stability:     Unable to Pay for Housing in the Last Year: Not on file    Number of [place] in the Last Year: Not on file    Unstable Housing in the Last Year: Not on file       FAMILY HISTORY  Family History   Problem Relation Age of Onset    Diabetes Mother     Hypertension Mother     Myasthenia Gravis Maternal Grandmother     Cancer Maternal Grandfather        CURRENT MEDICATIONS  Current Facility-Administered Medications   Medication Dose Route Frequency Provider Last Rate Last Admin    sodium chloride (NS) flush 5-40 mL  5-40 mL IntraVENous Q8H MABEL Tsai MD   10 mL at 02/27/22 1525    sodium chloride (NS) flush 5-40 mL  5-40 mL IntraVENous PRN Bhavna Tsai MD        acetaminophen (TYLENOL) tablet 650 mg  650 mg Oral Q6H PRN YOSELIN Tsai MD        Or    acetaminophen (TYLENOL) suppository 650 mg  650 mg Rectal Q6H PRN MILI Tsai MD        polyethylene glycol (MIRALAX) packet 17 g  17 g Oral DAILY PRN ELIUD Tsai MD        ondansetron (ZOFRAN ODT) tablet 4 mg  4 mg Oral Q8H PRN ANGIE Tsai MD        Or    ondansetron (ZOFRAN) injection 4 mg  4 mg IntraVENous Q6H PRN LORENA Tsai MD        oxyCODONE-acetaminophen (PERCOCET) 5-325 mg per tablet 1 Tablet  1 Tablet Oral Q4H PRN Devin Portillo MD   1 Tablet at 02/27/22 2102    HYDROmorphone (DILAUDID) injection 1 mg  1 mg IntraVENous Q6H PRN RENZO Tsai MD   1 mg at 02/28/22 1301    albuterol-ipratropium (DUO-NEB) 2.5 MG-0.5 MG/3 ML  3 mL Nebulization Q6H PRN Eulalio NNMPTHAIS LOZA MD        heparin 25,000 units in  ml infusion  17-36 Units/kg/hr (Adjusted) IntraVENous TITRATE CHICA Tsai MD 33 mL/hr at 02/28/22 0815 38 Units/kg/hr at 02/28/22 0815    arformoterol 15 mcg/budesonide 0.5 mg neb solution   Nebulization BID RT Yaneth Jimenez MD   Given at 02/28/22 0744       ALLERGIES  No Known Allergies    DIAGNOSTIC STUDIES   IMAGING STUDIES  I personally reviewed the following:  CT Results (most recent):  Results from Hospital Encounter encounter on 02/22/22    CTA CHEST W OR W WO CONT    Narrative  CTA chest with intravenous contrast, 100 cc Isovue-370, 3-D MIP images    Dose reduction: All CT scans at this facility are performed using dose reduction  optimization techniques as appropriate to a performed exam including the  following: Automated exposure control, adjustments of the mA and/or kV according  to patient size, or use of iterative reconstruction technique. INDICATION: Chest pain, elevated d-dimer, evaluate for pulmonary embolism    FINDINGS:    There are bilateral pulmonary emboli including right lower lobe, left upper  lobe/lingula, left lower lobe and possibly right upper lobe and right middle  lobe branches. There is thrombus in the distal right main pulmonary artery. No  significant enlargement of right ventricle to suggest right heart strain. No  pleural or pericardial effusions. No mediastinal adenopathy. Apparent  low-density liver may be due to phase of injection of fatty infiltration. No airspace disease in the lungs. No pneumothorax. No acute fracture in the  visualized bony structures. Impression  Bilateral pulmonary emboli. No CT evidence of right heart strain. The results  were called and verbally communicated to Dr. Destiny Farah on 2/22/2022 at 6:25 AM.  Please see full report. This SmartLink has not been configured with any valid records.        LABS  Lab Results   Component Value Date/Time    WBC 9.4 02/28/2022 06:06 AM    HGB 11.2 (L) 02/28/2022 06:06 AM    HCT 36.7 02/28/2022 06:06 AM    PLATELET 054 (H) 69/87/5458 06:06 AM    MCV 95.3 02/28/2022 06:06 AM     Lab Results   Component Value Date/Time    Sodium 134 (L) 02/24/2022 05:45 AM    Potassium 4.2 02/24/2022 05:45 AM    Chloride 104 02/24/2022 05:45 AM    CO2 22 02/24/2022 05:45 AM    Anion gap 8 02/24/2022 05:45 AM    Glucose 99 02/24/2022 05:45 AM    BUN 16 02/24/2022 05:45 AM    Creatinine 0.93 02/24/2022 05:45 AM    BUN/Creatinine ratio 17 02/24/2022 05:45 AM    GFR est AA >60 02/24/2022 05:45 AM    GFR est non-AA >60 02/24/2022 05:45 AM    Calcium 9.8 02/24/2022 05:45 AM     No results found for: INR, PTMR, PTP, PT1, PT2, INREXT    PHYSICAL EXAM   BP (!) 150/92 (BP 1 Location: Right upper arm, BP Patient Position: Prone)   Pulse (!) 101   Temp 99.3 °F (37.4 °C)   Resp 20   Ht 5' 6\" (1.676 m)   Wt 117.9 kg (260 lb)   SpO2 100%   BMI 41.97 kg/m²   General:  NAD  Heart:  RRR  Lungs:  NWOB  Neurological:  AAOX3    PLAN   Procedure to be performed:  CT guided bone marrow biopsy  Plan for sedation:  moderate  Post procedure plan:  observation per protocol  Informed consent:  risks, benefits, and alternatives reviewed with the patient / family who agree to proceed  Code status:  Full Code      Yaron, [facility] [facility].

## 2022-03-01 PROBLEM — C85.90 NON HODGKIN'S LYMPHOMA (HCC): Status: ACTIVE | Noted: 2022-03-01

## 2022-03-01 LAB
ALBUMIN SERPL ELPH-MCNC: 2.9 G/DL (ref 2.9–4.4)
ALBUMIN SERPL-MCNC: 3.2 G/DL (ref 3.5–5)
ALBUMIN/GLOB SERPL: 0.7 {RATIO} (ref 0.7–1.7)
ALBUMIN/GLOB SERPL: 0.8 {RATIO} (ref 1.1–2.2)
ALP SERPL-CCNC: 89 U/L (ref 45–117)
ALPHA1 GLOB SERPL ELPH-MCNC: 0.4 G/DL (ref 0–0.4)
ALPHA2 GLOB SERPL ELPH-MCNC: 1.4 G/DL (ref 0.4–1)
ALT SERPL-CCNC: 76 U/L (ref 12–78)
ANION GAP SERPL CALC-SCNC: 6 MMOL/L (ref 5–15)
APTT PPP: 35.9 SEC (ref 22.1–31)
APTT PPP: 57.6 SEC (ref 22.1–31)
APTT PPP: 75.4 SEC (ref 22.1–31)
AST SERPL-CCNC: 47 U/L (ref 15–37)
B-GLOBULIN SERPL ELPH-MCNC: 1.3 G/DL (ref 0.7–1.3)
BASOPHILS # BLD: 0.1 K/UL (ref 0–0.1)
BASOPHILS NFR BLD: 1 % (ref 0–1)
BILIRUB SERPL-MCNC: 0.4 MG/DL (ref 0.2–1)
BUN SERPL-MCNC: 18 MG/DL (ref 6–20)
BUN/CREAT SERPL: 18 (ref 12–20)
CALCIUM SERPL-MCNC: 9.4 MG/DL (ref 8.5–10.1)
CHLORIDE SERPL-SCNC: 103 MMOL/L (ref 97–108)
CO2 SERPL-SCNC: 24 MMOL/L (ref 21–32)
CREAT SERPL-MCNC: 0.99 MG/DL (ref 0.7–1.3)
DIFFERENTIAL METHOD BLD: ABNORMAL
EOSINOPHIL # BLD: 0.5 K/UL (ref 0–0.4)
EOSINOPHIL NFR BLD: 5 % (ref 0–7)
ERYTHROCYTE [DISTWIDTH] IN BLOOD BY AUTOMATED COUNT: 12.9 % (ref 11.5–14.5)
GAMMA GLOB SERPL ELPH-MCNC: 0.8 G/DL (ref 0.4–1.8)
GLOBULIN SER CALC-MCNC: 4 G/DL (ref 2.2–3.9)
GLOBULIN SER CALC-MCNC: 4.1 G/DL (ref 2–4)
GLUCOSE SERPL-MCNC: 101 MG/DL (ref 65–100)
HCT VFR BLD AUTO: 36.5 % (ref 36.6–50.3)
HGB BLD-MCNC: 11.7 G/DL (ref 12.1–17)
IMM GRANULOCYTES # BLD AUTO: 0.1 K/UL (ref 0–0.04)
IMM GRANULOCYTES NFR BLD AUTO: 1 % (ref 0–0.5)
LDH SERPL L TO P-CCNC: 475 U/L (ref 85–241)
LYMPHOCYTES # BLD: 1 K/UL (ref 0.8–3.5)
LYMPHOCYTES NFR BLD: 10 % (ref 12–49)
M PROTEIN SERPL ELPH-MCNC: 0.4 G/DL
MCH RBC QN AUTO: 29.8 PG (ref 26–34)
MCHC RBC AUTO-ENTMCNC: 32.1 G/DL (ref 30–36.5)
MCV RBC AUTO: 92.9 FL (ref 80–99)
MONOCYTES # BLD: 0.9 K/UL (ref 0–1)
MONOCYTES NFR BLD: 9 % (ref 5–13)
NEUTS SEG # BLD: 7.2 K/UL (ref 1.8–8)
NEUTS SEG NFR BLD: 74 % (ref 32–75)
NRBC # BLD: 0 K/UL (ref 0–0.01)
NRBC BLD-RTO: 0 PER 100 WBC
PLATELET # BLD AUTO: 438 K/UL (ref 150–400)
PMV BLD AUTO: 9.1 FL (ref 8.9–12.9)
POTASSIUM SERPL-SCNC: 4.6 MMOL/L (ref 3.5–5.1)
PROT SERPL-MCNC: 6.9 G/DL (ref 6–8.5)
PROT SERPL-MCNC: 7.3 G/DL (ref 6.4–8.2)
RBC # BLD AUTO: 3.93 M/UL (ref 4.1–5.7)
SODIUM SERPL-SCNC: 133 MMOL/L (ref 136–145)
THERAPEUTIC RANGE,PTTT: ABNORMAL SECS (ref 58–77)
WBC # BLD AUTO: 9.7 K/UL (ref 4.1–11.1)

## 2022-03-01 PROCEDURE — 94640 AIRWAY INHALATION TREATMENT: CPT

## 2022-03-01 PROCEDURE — 85730 THROMBOPLASTIN TIME PARTIAL: CPT

## 2022-03-01 PROCEDURE — 85025 COMPLETE CBC W/AUTO DIFF WBC: CPT

## 2022-03-01 PROCEDURE — 80053 COMPREHEN METABOLIC PANEL: CPT

## 2022-03-01 PROCEDURE — 36415 COLL VENOUS BLD VENIPUNCTURE: CPT

## 2022-03-01 PROCEDURE — 74011250637 HC RX REV CODE- 250/637: Performed by: HOSPITALIST

## 2022-03-01 PROCEDURE — 74011000250 HC RX REV CODE- 250: Performed by: HOSPITALIST

## 2022-03-01 PROCEDURE — 74011250637 HC RX REV CODE- 250/637: Performed by: INTERNAL MEDICINE

## 2022-03-01 PROCEDURE — 07DR0ZX EXTRACTION OF ILIAC BONE MARROW, OPEN APPROACH, DIAGNOSTIC: ICD-10-PCS

## 2022-03-01 PROCEDURE — 74011250636 HC RX REV CODE- 250/636: Performed by: INTERNAL MEDICINE

## 2022-03-01 PROCEDURE — 83615 LACTATE (LD) (LDH) ENZYME: CPT

## 2022-03-01 PROCEDURE — 74011250636 HC RX REV CODE- 250/636: Performed by: HOSPITALIST

## 2022-03-01 PROCEDURE — 65270000029 HC RM PRIVATE

## 2022-03-01 RX ORDER — EPINEPHRINE 1 MG/ML
0.3 INJECTION, SOLUTION, CONCENTRATE INTRAVENOUS AS NEEDED
Status: CANCELLED | OUTPATIENT
Start: 2022-03-02

## 2022-03-01 RX ORDER — DIPHENHYDRAMINE HYDROCHLORIDE 50 MG/ML
50 INJECTION, SOLUTION INTRAMUSCULAR; INTRAVENOUS AS NEEDED
Status: CANCELLED
Start: 2022-03-02

## 2022-03-01 RX ORDER — SODIUM CHLORIDE 9 MG/ML
10 INJECTION INTRAMUSCULAR; INTRAVENOUS; SUBCUTANEOUS AS NEEDED
Status: CANCELLED | OUTPATIENT
Start: 2022-03-02

## 2022-03-01 RX ORDER — HEPARIN 100 UNIT/ML
300-500 SYRINGE INTRAVENOUS AS NEEDED
Status: CANCELLED
Start: 2022-03-02

## 2022-03-01 RX ORDER — DIPHENHYDRAMINE HYDROCHLORIDE 50 MG/ML
25 INJECTION, SOLUTION INTRAMUSCULAR; INTRAVENOUS AS NEEDED
Status: CANCELLED
Start: 2022-03-02

## 2022-03-01 RX ORDER — SODIUM CHLORIDE 0.9 % (FLUSH) 0.9 %
10 SYRINGE (ML) INJECTION AS NEEDED
Status: CANCELLED | OUTPATIENT
Start: 2022-03-02

## 2022-03-01 RX ORDER — HYDROMORPHONE HYDROCHLORIDE 1 MG/ML
1 INJECTION, SOLUTION INTRAMUSCULAR; INTRAVENOUS; SUBCUTANEOUS
Status: DISCONTINUED | OUTPATIENT
Start: 2022-03-01 | End: 2022-03-04 | Stop reason: HOSPADM

## 2022-03-01 RX ORDER — HEPARIN SODIUM 1000 [USP'U]/ML
80 INJECTION, SOLUTION INTRAVENOUS; SUBCUTANEOUS ONCE
Status: DISCONTINUED | OUTPATIENT
Start: 2022-03-01 | End: 2022-03-01

## 2022-03-01 RX ORDER — ALBUTEROL SULFATE 0.83 MG/ML
2.5 SOLUTION RESPIRATORY (INHALATION) AS NEEDED
Status: CANCELLED
Start: 2022-03-02

## 2022-03-01 RX ORDER — ONDANSETRON 2 MG/ML
8 INJECTION INTRAMUSCULAR; INTRAVENOUS AS NEEDED
Status: CANCELLED | OUTPATIENT
Start: 2022-03-02

## 2022-03-01 RX ORDER — ACETAMINOPHEN 325 MG/1
650 TABLET ORAL AS NEEDED
Status: CANCELLED
Start: 2022-03-02

## 2022-03-01 RX ORDER — HYDROCORTISONE SODIUM SUCCINATE 100 MG/2ML
100 INJECTION, POWDER, FOR SOLUTION INTRAMUSCULAR; INTRAVENOUS AS NEEDED
Status: CANCELLED | OUTPATIENT
Start: 2022-03-02

## 2022-03-01 RX ORDER — HEPARIN SODIUM 1000 [USP'U]/ML
80 INJECTION, SOLUTION INTRAVENOUS; SUBCUTANEOUS ONCE
Status: COMPLETED | OUTPATIENT
Start: 2022-03-01 | End: 2022-03-01

## 2022-03-01 RX ADMIN — SODIUM CHLORIDE, PRESERVATIVE FREE 10 ML: 5 INJECTION INTRAVENOUS at 16:17

## 2022-03-01 RX ADMIN — Medication 40 UNITS/KG/HR: at 12:52

## 2022-03-01 RX ADMIN — RIVAROXABAN 15 MG: 15 TABLET, FILM COATED ORAL at 18:37

## 2022-03-01 RX ADMIN — OXYCODONE AND ACETAMINOPHEN 1 TABLET: 5; 325 TABLET ORAL at 18:36

## 2022-03-01 RX ADMIN — Medication 40 UNITS/KG/HR: at 03:45

## 2022-03-01 RX ADMIN — ARFORMOTEROL TARTRATE: 15 SOLUTION RESPIRATORY (INHALATION) at 07:16

## 2022-03-01 RX ADMIN — ARFORMOTEROL TARTRATE: 15 SOLUTION RESPIRATORY (INHALATION) at 20:07

## 2022-03-01 RX ADMIN — OXYCODONE AND ACETAMINOPHEN 1 TABLET: 5; 325 TABLET ORAL at 01:08

## 2022-03-01 RX ADMIN — HYDROMORPHONE HYDROCHLORIDE 1 MG: 1 INJECTION, SOLUTION INTRAMUSCULAR; INTRAVENOUS; SUBCUTANEOUS at 21:39

## 2022-03-01 RX ADMIN — SODIUM CHLORIDE, PRESERVATIVE FREE 10 ML: 5 INJECTION INTRAVENOUS at 06:55

## 2022-03-01 RX ADMIN — HYDROMORPHONE HYDROCHLORIDE 1 MG: 1 INJECTION, SOLUTION INTRAMUSCULAR; INTRAVENOUS; SUBCUTANEOUS at 06:54

## 2022-03-01 RX ADMIN — HYDROMORPHONE HYDROCHLORIDE 1 MG: 1 INJECTION, SOLUTION INTRAMUSCULAR; INTRAVENOUS; SUBCUTANEOUS at 12:51

## 2022-03-01 RX ADMIN — OXYCODONE AND ACETAMINOPHEN 1 TABLET: 5; 325 TABLET ORAL at 10:05

## 2022-03-01 RX ADMIN — HEPARIN SODIUM 6940 UNITS: 1000 INJECTION INTRAVENOUS; SUBCUTANEOUS at 03:37

## 2022-03-01 RX ADMIN — HYDROMORPHONE HYDROCHLORIDE 1 MG: 1 INJECTION, SOLUTION INTRAMUSCULAR; INTRAVENOUS; SUBCUTANEOUS at 16:16

## 2022-03-01 RX ADMIN — SODIUM CHLORIDE, PRESERVATIVE FREE 10 ML: 5 INJECTION INTRAVENOUS at 21:39

## 2022-03-01 NOTE — PROGRESS NOTES
[facility] Adult  Hospitalist Group                                                                                          Hospitalist Progress Note  Daniel Andres DO  Answering service: [phone] or [phone] from in house phone        Date of Service:  3/1/2022  NAME:  Katherin Kemp  :  1990  MRN:  [id number]      Admission Summary:   26-year-old gentleman presented to [facility] on  with right leg swelling and shortness of breath. On work-up he was found to have PE, hemodynamically stable and right groin and pelvic mass. Patient was to be transferred to [facility] surgical oncology services however [facility] was on diversion so he was transferred to [facility]. I saw him in room 515/2, his girlfriend present. He said the right thigh pain started about 2 months ago. From what he describes he was initially treated with diuretics. The pain and swelling progressed to the point where he could not walk so he presented to the ED yesterday and work-up revealed the above listed problems. As I seen the patient, the surgical team came in to see patient. Dr. Jolanta Osborn recommended ultrasound guided biopsy by radiology to determine further management course.          Interval history / Subjective: Follow up PE and right pelvic/groin upper thigh mass. Patient seen and examined. Has pain, unrelieved by current regimen. Dilaudid IV has been working but not lasting the current q6 hours. Pathology still pending. Assessment & Plan:     Right pelvic and groin upper thigh mass  -CT angiogram of the abdomen on  showed large right pelvic sidewall and right groin/upper thigh mass most consistent with underlying malignancy it is indicated that this mass was visualized in the duplex ultrasound in 2022. There is also subcutaneous edema involving the right lower extremity with suspicion of possible occlusion of the right external iliac vein.   There were also lower abdominal periaortic lymphadenopathy, moderate left groin lymphadenopathy. -s/p FNA biopsy 2/24. Pathology pending   -Hematology/oncology consulted. Bone marrow biopsy completed 2/28  -Pain management  -Chemotherapy/treatment prior to discharge    Acute bilateral pulmonary embolism, hemodynamically stable, not hypoxic:  -CT with bilateral PE- right lower lobe, left upper lobe/lingula, left lower lobe. No evidence of right heart strain.    -Suspect he has right lower extremity DVT due to obstructive mass that resulted in PE  -Doppler ultrasound on 1/26/2022 was negative for DVT but this was about a month ago. -Continue heparin drip. Will need to transition to oral anticoagulation once procedures completed  -Monitor on remote telemetry    History of asthma without bronchospasm: Inhaled steroids, as needed bronchodilators    Code status: full   Prophylaxis: heparin drip   Care Plan discussed with: patient, nurse  Anticipated Disposition: 1-2 days     Hospital Problems  Date Reviewed: 2/17/2022          Codes Class Noted POA    Pelvic mass ICD-10-CM: R19.00  ICD-9-CM: 789.30  2/23/2022 Unknown        Bilateral pulmonary embolism ([facility] [address] ICD-10-CM: I26.99  ICD-9-CM: 415.19  2/23/2022 Unknown        Right groin mass ICD-10-CM: R19.09  ICD-9-CM: 789.39  2/11/2022 Yes                Review of Systems:   Negative unless stated above     Vital Signs:    Last 24hrs VS reviewed since prior progress note.  Most recent are:  Visit Vitals  /75 (BP 1 Location: Left arm, BP Patient Position: At rest)   Pulse (!) 103   Temp 97.9 °F (36.6 °C)   Resp 18   Ht 5' 6\" (1.676 m)   Wt 114.4 kg (252 lb 1.6 oz)   SpO2 97%   BMI 40.69 kg/m²         Intake/Output Summary (Last 24 hours) at 3/1/2022 1651  Last data filed at 2/28/2022 1738  Gross per 24 hour   Intake 240 ml   Output --   Net 240 ml        Physical Examination:     I had a face to face encounter with this patient and independently examined them on 3/1/2022 as outlined below:          Constitutional:  No acute distress, cooperative, pleasant    ENT:  Oral mucosa moist, oropharynx benign. Resp:  CTA bilaterally. No wheezing/rhonchi/rales. No accessory muscle use. CV:  Regular rhythm, normal rate, no murmurs, gallops, rubs    GI:  Soft, non distended, non tender. normoactive bowel sounds, no hepatosplenomegaly     Musculoskeletal:  right lower extremity edema +++, non-pitting. Right apparent groin mass     Neurologic:  Moves all extremities            Data Review:    Review and/or order of clinical lab test  Review and/or order of tests in the radiology section of CPT  Review and/or order of tests in the medicine section of [facility]      Labs:     Recent Labs     03/01/22 0107 02/28/22  0606   WBC 9.7 9.4   HGB 11.7* 11.2*   HCT 36.5* 36.7   * 452*     Recent Labs     03/01/22 0107 02/27/22  0530   *  --    K 4.6  --      --    CO2 24  --    BUN 18  --    CREA 0.99  --    *  --    CA 9.4  --    URICA  --  6.6     Recent Labs     03/01/22 0107 02/27/22  0530   ALT 76  --    AP 89  --    TBILI 0.4  --    TP 7.3 6.9   ALB 3.2*  --    GLOB 4.1*  --      Recent Labs     03/01/22  0705 03/01/22 0107 02/28/22  0606   APTT 75.4* 35.9* 84.0*      No results for input(s): FE, TIBC, PSAT, FERR in the last 72 hours. No results found for: FOL, RBCF   No results for input(s): PH, PCO2, PO2 in the last 72 hours. No results for input(s): CPK, CKNDX, TROIQ in the last 72 hours.     No lab exists for component: CPKMB  No results found for: CHOL, CHOLX, CHLST, CHOLV, HDL, HDLP, LDL, LDLC, DLDLP, TGLX, TRIGL, TRIGP, CHHD, CHHDX  No results found for: GLUCPOC  No results found for: COLOR, APPRN, SPGRU, REFSG, JONO, PROTU, GLUCU, KETU, BILU, UROU, MATI, LEUKU, GLUKE, EPSU, BACTU, WBCU, RBCU, CASTS, UCRY      Medications Reviewed:     Current Facility-Administered Medications   Medication Dose Route Frequency    HYDROmorphone (DILAUDID) injection 1 mg  1 mg IntraVENous Q4H PRN    sodium chloride (NS) flush 5-40 mL  5-40 mL IntraVENous Q8H    sodium chloride (NS) flush 5-40 mL  5-40 mL IntraVENous PRN    acetaminophen (TYLENOL) tablet 650 mg  650 mg Oral Q6H PRN    Or    acetaminophen (TYLENOL) suppository 650 mg  650 mg Rectal Q6H PRN    polyethylene glycol (MIRALAX) packet 17 g  17 g Oral DAILY PRN    ondansetron (ZOFRAN ODT) tablet 4 mg  4 mg Oral Q8H PRN    Or    ondansetron (ZOFRAN) injection 4 mg  4 mg IntraVENous Q6H PRN    oxyCODONE-acetaminophen (PERCOCET) 5-325 mg per tablet 1 Tablet  1 Tablet Oral Q4H PRN    albuterol-ipratropium (DUO-NEB) 2.5 MG-0.5 MG/3 ML  3 mL Nebulization Q6H PRN    heparin 25,000 units in  ml infusion  17-36 Units/kg/hr (Adjusted) IntraVENous TITRATE    arformoterol 15 mcg/budesonide 0.5 mg neb solution   Nebulization BID RT     ______________________________________________________________________  EXPECTED LENGTH OF STAY: 4d 7h  ACTUAL LENGTH OF STAY:          [facility]

## 2022-03-01 NOTE — PROGRESS NOTES
Pathology from 2/25/22 still pending. I have a call out to Pathology who is not immediately available.  If B-cell lymphoma, would prefer to start rituximab +/- prednisone today.   Smith Nunez

## 2022-03-01 NOTE — PROGRESS NOTES
Patient's heparin drip was re-started at 5:45 pm with aPtt draw planned for 2 am. Night shift nurse was made aware.

## 2022-03-01 NOTE — PROGRESS NOTES
ERIN:  RUR: 8%    Disposition:  Home (with family/mother) once medically stable    Chart reviewed. Patient admitted 2/23/22 following transfer from [facility] with leg pain and swelling x 1 month (severe r  RLE edema). .  Primary Decision Maker: Adenike Mcmullen - Mother - [phone]    The patient is single and has a past medical hx of Asthma and Hypertension. Hematology, Surgical Onc, Cardiology, Vascular Surgery following. Adm dx: Dyspnea      Patient transferred from [address] [place] [address] to [address] [place] [address] on 2/28/22. Patient to start chemotherapy. Pathology still pending per Onc note. Patient to start  rituximab if B-Cell Lymphoma. CM continuing to follow.   NATALEE Lynn, CRM

## 2022-03-01 NOTE — PROGRESS NOTES
Hematology-Oncology Progress Note      Be Roles  1990  [id number]  3/1/2022      Subjective:     Case d/w Dr. Kamila Kellogg, covering for Dr. Angie Rueda who is on vacation from   Pathology. Biopsy c/w a B-cell lymphoma, she is doing additional testing which will likely be back tomorrow to further clarify subtype. Patient doing well, no new complaints, interested in when he will be able to go home. Allergies: Patient has no known allergies.   Current Facility-Administered Medications   Medication Dose Route Frequency Provider Last Rate Last Admin    HYDROmorphone (DILAUDID) injection 1 mg  1 mg IntraVENous Q4H PRN Peola Cagey M, DO   1 mg at 03/01/22 1616    sodium chloride (NS) flush 5-40 mL  5-40 mL IntraVENous Q8H CAMERON Tsai MD   10 mL at 03/01/22 1617    sodium chloride (NS) flush 5-40 mL  5-40 mL IntraVENous PRN Bhavna Tsai MD        acetaminophen (TYLENOL) tablet 650 mg  650 mg Oral Q6H PRN RISHAHB Tsai MD        Or    acetaminophen (TYLENOL) suppository 650 mg  650 mg Rectal Q6H PRN NATASHA Tsai MD        polyethylene glycol (MIRALAX) packet 17 g  17 g Oral DAILY PRN DAVID Tsai MD        ondansetron (ZOFRAN ODT) tablet 4 mg  4 mg Oral Q8H PRN SINCERE Tsai MD        Or    ondansetron (ZOFRAN) injection 4 mg  4 mg IntraVENous Q6H PRN SUKI Tsai MD        oxyCODONE-acetaminophen (PERCOCET) 5-325 mg per tablet 1 Tablet  1 Tablet Oral Q4H PRN Hari Tsai MD   1 Tablet at 03/01/22 1005    albuterol-ipratropium (DUO-NEB) 2.5 MG-0.5 MG/3 ML  3 mL Nebulization Q6H PRN PRESTON Tsai MD        heparin 25,000 units in  ml infusion  17-36 Units/kg/hr (Adjusted) IntraVENous TITRATE ELIZABETH Tsai MD 34.7 mL/hr at 03/01/22 1252 40 Units/kg/hr at 03/01/22 1252    arformoterol 15 mcg/budesonide 0.5 mg neb solution   Nebulization BID RT Alana Chavarria MD   Given at 03/01/22 0716     Objective:     Patient Vitals for the past 24 hrs:   BP Temp Pulse Resp SpO2 Weight   03/01/22 1522 125/75 97.9 °F (36.6 °C) (!) 103 18 97 % --   03/01/22 0909 (!) 145/69 97.6 °F (36.4 °C) (!) 107 18 96 % --   03/01/22 0240 -- -- -- -- -- 114.4 kg (252 lb 1.6 oz)   03/01/22 0220 128/73 99.2 °F (37.3 °C) 97 18 96 % --   02/28/22 1920 126/74 98.3 °F (36.8 °C) (!) 109 17 97 % --   02/28/22 1724 130/77 98.5 °F (36.9 °C) 97 17 98 % --       Gen: NAD  HEENT: PERRL, Sclerae anicteric  Cv: RRR without m/r/g  Pulm: CTA bilaterally  Abd: NABS, NTND, No HSM  Ext: unchanged mass/swelling left leg    Available labs reviewed:  Labs:    Recent Results (from the past 24 hour(s))   PTT    Collection Time: 03/01/22  1:07 AM   Result Value Ref Range    aPTT 35.9 (H) 22.1 - 31.0 sec    aPTT, therapeutic range     58.0 - 77.0 SECS   CBC WITH AUTOMATED DIFF    Collection Time: 03/01/22  1:07 AM   Result Value Ref Range    WBC 9.7 4.1 - 11.1 K/uL    RBC 3.93 (L) 4.10 - 5.70 M/uL    HGB 11.7 (L) 12.1 - 17.0 g/dL    HCT 36.5 (L) 36.6 - 50.3 %    MCV 92.9 80.0 - 99.0 FL    MCH 29.8 26.0 - 34.0 PG    MCHC 32.1 30.0 - 36.5 g/dL    RDW 12.9 11.5 - 14.5 %    PLATELET 576 (H) 317 - 400 K/uL    MPV 9.1 8.9 - 12.9 FL    NRBC 0.0 0  WBC    ABSOLUTE NRBC 0.00 0.00 - 0.01 K/uL    NEUTROPHILS 74 32 - 75 %    LYMPHOCYTES 10 (L) 12 - 49 %    MONOCYTES 9 5 - 13 %    EOSINOPHILS 5 0 - 7 %    BASOPHILS 1 0 - 1 %    IMMATURE GRANULOCYTES 1 (H) 0.0 - 0.5 %    ABS. NEUTROPHILS 7.2 1.8 - 8.0 K/UL    ABS. LYMPHOCYTES 1.0 0.8 - 3.5 K/UL    ABS. MONOCYTES 0.9 0.0 - 1.0 K/UL    ABS. EOSINOPHILS 0.5 (H) 0.0 - 0.4 K/UL    ABS. BASOPHILS 0.1 0.0 - 0.1 K/UL    ABS. IMM.  GRANS. 0.1 (H) 0.00 - 0.04 K/UL    DF AUTOMATED     LD    Collection Time: 03/01/22  1:07 AM   Result Value Ref Range     (H) 85 - 410 U/L   METABOLIC PANEL, COMPREHENSIVE    Collection Time: 03/01/22  1:07 AM   Result Value Ref Range    Sodium 133 (L) 136 - 145 mmol/L    Potassium 4.6 3.5 - 5.1 mmol/L    Chloride 103 97 - 108 mmol/L CO2 24 21 - 32 mmol/L    Anion gap 6 5 - 15 mmol/L    Glucose 101 (H) 65 - 100 mg/dL    BUN 18 6 - 20 MG/DL    Creatinine 0.99 0.70 - 1.30 MG/DL    BUN/Creatinine ratio 18 12 - 20      GFR est AA >60 >60 ml/min/1.73m2    GFR est non-AA >60 >60 ml/min/1.73m2    Calcium 9.4 8.5 - 10.1 MG/DL    Bilirubin, total 0.4 0.2 - 1.0 MG/DL    ALT (SGPT) 76 12 - 78 U/L    AST (SGOT) 47 (H) 15 - 37 U/L    Alk. phosphatase 89 45 - 117 U/L    Protein, total 7.3 6.4 - 8.2 g/dL    Albumin 3.2 (L) 3.5 - 5.0 g/dL    Globulin 4.1 (H) 2.0 - 4.0 g/dL    A-G Ratio 0.8 (L) 1.1 - 2.2     PTT    Collection Time: 03/01/22  7:05 AM   Result Value Ref Range    aPTT 75.4 (H) 22.1 - 31.0 sec    aPTT, therapeutic range     58.0 - 77.0 SECS         Assessment and Plan     26 y/o man with new right pelvic LAD causing right leg swelling/PE. S/p needle biopy.      1. Suspected lymphoma: B-cell lymphoma on path, final subtype pending. Will start prednisone and rituximab tomorrow, chemotherapy likely the next day.      2. Unmeasured protein/elevated globulin with low albumin: suspect M-spike from lymphoma.      3. Pulmonary embolism: I have taken the liberty of stopping his heparin and will start rivaroxaban load. Thank you for allowing us to participate in the care of this very pleasant patient.     Evelyne Mccain MD  Hematology/Oncology  Phone [phone]

## 2022-03-02 ENCOUNTER — APPOINTMENT (OUTPATIENT)
Dept: NON INVASIVE DIAGNOSTICS | Age: 32
DRG: 681 | End: 2022-03-02
Attending: INTERNAL MEDICINE
Payer: MEDICAID

## 2022-03-02 LAB
HBV SURFACE AB SER QL: REACTIVE
HBV SURFACE AB SER-ACNC: 14.93 MIU/ML

## 2022-03-02 PROCEDURE — 74011000250 HC RX REV CODE- 250: Performed by: HOSPITALIST

## 2022-03-02 PROCEDURE — 86706 HEP B SURFACE ANTIBODY: CPT

## 2022-03-02 PROCEDURE — 77010033678 HC OXYGEN DAILY

## 2022-03-02 PROCEDURE — 36415 COLL VENOUS BLD VENIPUNCTURE: CPT

## 2022-03-02 PROCEDURE — 74011250636 HC RX REV CODE- 250/636: Performed by: INTERNAL MEDICINE

## 2022-03-02 PROCEDURE — 74011250637 HC RX REV CODE- 250/637: Performed by: HOSPITALIST

## 2022-03-02 PROCEDURE — 3E0W30M INTRODUCTION OF MONOCLONAL ANTIBODY INTO LYMPHATICS, PERCUTANEOUS APPROACH: ICD-10-PCS | Performed by: INTERNAL MEDICINE

## 2022-03-02 PROCEDURE — 65270000029 HC RM PRIVATE

## 2022-03-02 PROCEDURE — 94664 DEMO&/EVAL PT USE INHALER: CPT

## 2022-03-02 PROCEDURE — 74011250637 HC RX REV CODE- 250/637: Performed by: INTERNAL MEDICINE

## 2022-03-02 PROCEDURE — 94640 AIRWAY INHALATION TREATMENT: CPT

## 2022-03-02 PROCEDURE — 74011636637 HC RX REV CODE- 636/637: Performed by: INTERNAL MEDICINE

## 2022-03-02 RX ORDER — SODIUM CHLORIDE 9 MG/ML
25 INJECTION, SOLUTION INTRAVENOUS CONTINUOUS
Status: DISPENSED | OUTPATIENT
Start: 2022-03-02 | End: 2022-03-02

## 2022-03-02 RX ORDER — ALLOPURINOL 300 MG/1
300 TABLET ORAL DAILY
Status: DISCONTINUED | OUTPATIENT
Start: 2022-03-02 | End: 2022-03-04 | Stop reason: HOSPADM

## 2022-03-02 RX ORDER — ACETAMINOPHEN 325 MG/1
650 TABLET ORAL EVERY 6 HOURS
Status: COMPLETED | OUTPATIENT
Start: 2022-03-02 | End: 2022-03-03

## 2022-03-02 RX ORDER — FAMOTIDINE 20 MG/1
40 TABLET, FILM COATED ORAL EVERY 6 HOURS
Status: COMPLETED | OUTPATIENT
Start: 2022-03-02 | End: 2022-03-03

## 2022-03-02 RX ORDER — DIPHENHYDRAMINE HYDROCHLORIDE 50 MG/ML
50 INJECTION, SOLUTION INTRAMUSCULAR; INTRAVENOUS ONCE
Status: COMPLETED | OUTPATIENT
Start: 2022-03-02 | End: 2022-03-02

## 2022-03-02 RX ORDER — ACETAMINOPHEN 325 MG/1
650 TABLET ORAL ONCE
Status: COMPLETED | OUTPATIENT
Start: 2022-03-02 | End: 2022-03-02

## 2022-03-02 RX ORDER — DIPHENHYDRAMINE HCL 25 MG
50 CAPSULE ORAL EVERY 6 HOURS
Status: COMPLETED | OUTPATIENT
Start: 2022-03-02 | End: 2022-03-03

## 2022-03-02 RX ORDER — IPRATROPIUM BROMIDE AND ALBUTEROL SULFATE 2.5; .5 MG/3ML; MG/3ML
3 SOLUTION RESPIRATORY (INHALATION)
Status: DISCONTINUED | OUTPATIENT
Start: 2022-03-02 | End: 2022-03-04 | Stop reason: HOSPADM

## 2022-03-02 RX ADMIN — HYDROMORPHONE HYDROCHLORIDE 1 MG: 1 INJECTION, SOLUTION INTRAMUSCULAR; INTRAVENOUS; SUBCUTANEOUS at 01:40

## 2022-03-02 RX ADMIN — RIVAROXABAN 15 MG: 15 TABLET, FILM COATED ORAL at 18:01

## 2022-03-02 RX ADMIN — ACETAMINOPHEN 650 MG: 325 TABLET ORAL at 12:26

## 2022-03-02 RX ADMIN — DIPHENHYDRAMINE HYDROCHLORIDE 50 MG: 25 CAPSULE ORAL at 19:26

## 2022-03-02 RX ADMIN — SODIUM CHLORIDE, PRESERVATIVE FREE 10 ML: 5 INJECTION INTRAVENOUS at 05:43

## 2022-03-02 RX ADMIN — FAMOTIDINE 40 MG: 20 TABLET ORAL at 19:26

## 2022-03-02 RX ADMIN — ALLOPURINOL 300 MG: 300 TABLET ORAL at 12:26

## 2022-03-02 RX ADMIN — ARFORMOTEROL TARTRATE: 15 SOLUTION RESPIRATORY (INHALATION) at 21:58

## 2022-03-02 RX ADMIN — PREDNISONE 80 MG: 20 TABLET ORAL at 12:27

## 2022-03-02 RX ADMIN — SODIUM CHLORIDE, PRESERVATIVE FREE 10 ML: 5 INJECTION INTRAVENOUS at 12:23

## 2022-03-02 RX ADMIN — MEPERIDINE HYDROCHLORIDE 25 MG: 25 INJECTION INTRAMUSCULAR; INTRAVENOUS; SUBCUTANEOUS at 14:34

## 2022-03-02 RX ADMIN — IPRATROPIUM BROMIDE AND ALBUTEROL SULFATE 3 ML: .5; 3 SOLUTION RESPIRATORY (INHALATION) at 17:42

## 2022-03-02 RX ADMIN — DIPHENHYDRAMINE HYDROCHLORIDE 50 MG: 50 INJECTION INTRAMUSCULAR; INTRAVENOUS at 12:28

## 2022-03-02 RX ADMIN — OXYCODONE AND ACETAMINOPHEN 1 TABLET: 5; 325 TABLET ORAL at 13:33

## 2022-03-02 RX ADMIN — SODIUM CHLORIDE 25 ML/HR: 9 INJECTION, SOLUTION INTRAVENOUS at 12:23

## 2022-03-02 RX ADMIN — ACETAMINOPHEN 650 MG: 325 TABLET ORAL at 19:26

## 2022-03-02 RX ADMIN — OXYCODONE AND ACETAMINOPHEN 1 TABLET: 5; 325 TABLET ORAL at 07:43

## 2022-03-02 RX ADMIN — SODIUM CHLORIDE, PRESERVATIVE FREE 10 ML: 5 INJECTION INTRAVENOUS at 15:19

## 2022-03-02 RX ADMIN — RIVAROXABAN 15 MG: 15 TABLET, FILM COATED ORAL at 08:00

## 2022-03-02 RX ADMIN — HYDROMORPHONE HYDROCHLORIDE 1 MG: 1 INJECTION, SOLUTION INTRAMUSCULAR; INTRAVENOUS; SUBCUTANEOUS at 10:15

## 2022-03-02 RX ADMIN — SODIUM CHLORIDE, PRESERVATIVE FREE 10 ML: 5 INJECTION INTRAVENOUS at 22:35

## 2022-03-02 RX ADMIN — SODIUM CHLORIDE, PRESERVATIVE FREE 10 ML: 5 INJECTION INTRAVENOUS at 10:15

## 2022-03-02 RX ADMIN — SODIUM CHLORIDE, PRESERVATIVE FREE 10 ML: 5 INJECTION INTRAVENOUS at 01:40

## 2022-03-02 RX ADMIN — SODIUM CHLORIDE 866 MG: 9 INJECTION, SOLUTION INTRAVENOUS at 13:14

## 2022-03-02 NOTE — PROGRESS NOTES
Final path noted, myc/bcl-2/bcl-6 pending. Doubt Burkitt's with Ki-67 < 100%If molecular inconclusive will send [place] [id number] liquid biopsy for mutational analysis. Will start with CHOP tomorrow and see in clinic.  If myc+ will attempt to put on ECOG RCHOP +/- venetoclax trial. If double hit or t(8;14) will likely give R-DA-EPOCH as inpatient.     Imelda Godfrey

## 2022-03-02 NOTE — PROGRESS NOTES
Hematology-Oncology Progress Note      Jim Jules  1990  [id number]  3/2/2022      Subjective:     No new complaint. Final path pending. Allergies: Patient has no known allergies.   Current Facility-Administered Medications   Medication Dose Route Frequency Provider Last Rate Last Admin    HYDROmorphone (DILAUDID) injection 1 mg  1 mg IntraVENous Q4H PRN BRANDIE [facility] [employer] M, DO   1 mg at 03/02/22 0140    rivaroxaban (XARELTO) tablet 15 mg  15 mg Oral BID WITH MEALS Karyna Piedra MD   15 mg at 03/02/22 0800    sodium chloride (NS) flush 5-40 mL  5-40 mL IntraVENous Q8H Bhavna Tsai MD   10 mL at 03/02/22 0543    sodium chloride (NS) flush 5-40 mL  5-40 mL IntraVENous PRN Bhavna Tsai MD   10 mL at 03/02/22 0140    acetaminophen (TYLENOL) tablet 650 mg  650 mg Oral Q6H PRN Bhavna Tsai MD        Or    acetaminophen (TYLENOL) suppository 650 mg  650 mg Rectal Q6H PRN Eulalio [id number] MD DENIA        polyethylene glycol (MIRALAX) packet 17 g  17 g Oral DAILY PRN VINOD Tsai MD        ondansetron (ZOFRAN ODT) tablet 4 mg  4 mg Oral Q8H PRN SANDEEP TsaiXRAMESH LOZA MD        Or    ondansetron (ZOFRAN) injection 4 mg  4 mg IntraVENous Q6H PRN Eulalio [id number] MD DENIA        oxyCODONE-acetaminophen (PERCOCET) 5-325 mg per tablet 1 Tablet  1 Tablet Oral Q4H PRN Eulalio [id number] MD DENIA   1 Tablet at 03/02/22 0743    albuterol-ipratropium (DUO-NEB) 2.5 MG-0.5 MG/3 ML  3 mL Nebulization Q6H PRN Bhavna Tsai MD        arformoterol 15 mcg/budesonide 0.5 mg neb solution   Nebulization BID RT Aria Ashley MD   Given at 03/01/22 2007     Objective:     Patient Vitals for the past 24 hrs:   BP Temp Pulse Resp SpO2   03/02/22 0216 108/72 99.4 °F (37.4 °C) 95 18 98 %   03/01/22 2016 (!) 146/85 99.1 °F (37.3 °C) 95 18 98 %   03/01/22 2010 -- -- -- -- 95 %   03/01/22 1522 125/75 97.9 °F (36.6 °C) (!) 103 18 97 %   03/01/22 0909 (!) 145/69 97.6 °F (36.4 °C) (!) 107 18 96 %       Gen: NAD  HEENT: PERRL, Sclerae anicteric  Cv: RRR without m/r/g  Pulm: CTA bilaterally  Abd: NABS, NTND, No HSM  Ext: No c/c/e    Available labs reviewed:  Labs:    Recent Results (from the past 24 hour(s))   PTT    Collection Time: 03/01/22  4:29 PM   Result Value Ref Range    aPTT 57.6 (H) 22.1 - 31.0 sec    aPTT, therapeutic range     58.0 - 77.0 SECS         Assessment and Plan     26 y/o man with newly diagnosed b-cell lymphoma (subtype pending), stage II pending BM biopsy results, presenting with right leg swelling and PE. 1. B-cell lymphoma: prednisone and rituximab today. Chemotherapy tomorrow, specific chemotherapy dependent on subtype of NHL. 2. PE: on rivaroxaban load, last dose of 15 mg will be on 3/22, then will transition to 20 mg po once daily. Thank you for allowing us to participate in the care of this very pleasant patient.     Adenike New MD  Hematology/Oncology  Phone [phone]

## 2022-03-02 NOTE — PROGRESS NOTES
03/02/22 1423   Vital Signs   Temp 99.2 °F (37.3 °C)   Temp Source Oral   Pulse (Heart Rate) (!) 118   Resp Rate 20   O2 Sat (%) 97 %   Level of Consciousness Alert (0)   BP (!) 140/83   MAP (Calculated) 102   MEWS Score 3     MEWS 3, Rituximab paused, will continue to monitor.

## 2022-03-02 NOTE — PROGRESS NOTES
Problem: Pressure Injury - Risk of  Goal: *Prevention of pressure injury  Description: Document Valentino Scale and appropriate interventions in the flowsheet. Outcome: Progressing Towards Goal  Note: Pressure Injury Interventions:  Sensory Interventions: Float heels         Activity Interventions: Pressure redistribution bed/mattress(bed type)    Mobility Interventions: HOB 30 degrees or less,Pressure redistribution bed/mattress (bed type)    Nutrition Interventions: Offer support with meals,snacks and hydration                     Problem: Patient Education: Go to Patient Education Activity  Goal: Patient/Family Education  Outcome: Progressing Towards Goal     Problem: Falls - Risk of  Goal: *Absence of Falls  Description: Document Liu Fall Risk and appropriate interventions in the flowsheet.   Outcome: Progressing Towards Goal  Note: Fall Risk Interventions:            Medication Interventions: Patient to call before getting OOB                   Problem: Patient Education: Go to Patient Education Activity  Goal: Patient/Family Education  Outcome: Progressing Towards Goal

## 2022-03-02 NOTE — PROGRESS NOTES
1031 Reviewed Rituximab with pt. Provided literature on drug as well. Also, discussed chemo precautions for potential chemo tomorrow. Pt denies questions at this time. [address] to [facility] about labs. Pt will need hep B surface area drawn. Also, received orders for CBC with diff and CMP in AM.  Asked if pt should be started on Allopurinol for TLS prophylaxis. 1044 Received call back from [facility] at [employer]. Pt to be started on Allopurinol 300 mg daily. 26 Notified Dr. Yefri Noel that pt has Percocet and dilaudid ordered Q4Hprn. He received dilaudid at 1015. His pain is a \"4\" now. Asked if pt may receive percocet dose now and may dilaudid order be adjusted to be given for breakthrough pain. Orders received to change dilaudid order for breakthrough pain and pt may receive percocet now. [phone] to medical services about pt needing an echo. Requested it be done at bedside. Donny Pedraza. with [facility] at [facility] about pt needing a central line for chemo tomorrow. Received order for port placement. 1350 Spoke to Angio about port placement. 2 doses of xarelto will have to be held prior to port being placed. Pt will not be able to be taken down for port around lunch time tomorrow. Requested that port be left accessed for chemo tomorrow. 1420 Pt called out. He states he feels \"crazy\" and his leg is shaking. Vitals obtained and pt assessed. Pt tachycardic. Pt notes he feels \"shaky\" on the inside. Kassi Notified [facility] that pt states he feels \"shaky\" on the inside. Temp is now 99.4,pulse 118. Rituximab stopped. Also, Angio states 2 doses of xarelto will have to be held prior to port placement. 1435 Received call back from [facility]. Pt to receive demerol 25mg IV x1. Rituximab to be restarted at half the rate. Pt to have PICC placed instead of Port, xarelto cannot be held per MD.      0362 Spoke to Vascular team about PICC insertion.   Xarelto does not need to be held for insertion. PICC to be inserted in AM.     1530 Reviewed cyclophosphamide, doxorubicin and vincristine and potential side effects with pt. Literature provided on drugs, as well as \"Chemotherapy and You\" book. Pt denies questions at this time. 36 Pt c/o wheezing, has a hx of asthma. Inspiratory wheezing noted to upper lungs. Pt did not received scheduled neb this AM.  Will call respiratory for prn duo-neb. [address] at [address] about pt wheezing. Dr. Marc Iqbal to round at the hospital this evening. 1813 Paged [facility] regarding increasing Rituximab infusion rate. Awaiting call back. 1904 Received call back from Dr. Lori Arias. Rituximab to continue to run at 50mL/hr. Received orders to change duo-nebs to Q4H prn, pt should receive a neb prior to going to bed. Also, received orders for benadryl 50 mg PO q6H x 2, tylenol 650 mg Q6h x 2 and famotidine 40 mg Q6H x 2.

## 2022-03-02 NOTE — PROGRESS NOTES
[address] [facility]  Hospitalist Group                                                                                          Hospitalist Progress Note  Carline Ruffin DO  Answering service: [phone] from in house phone        Date of Service:  3/2/2022  NAME:  Daryle Duke  :  1990  MRN:  [id number]      Admission Summary:   24-year-old gentleman presented to [facility] on  with right leg swelling and shortness of breath. On work-up he was found to have PE, hemodynamically stable and right groin and pelvic mass. Patient was to be transferred to [address] [place] [address] surgical oncology services however [facility] was on diversion so he was transferred to [facility]. I saw him in room 515/2, his girlfriend present. He said the right thigh pain started about 2 months ago. From what he describes he was initially treated with diuretics. The pain and swelling progressed to the point where he could not walk so he presented to the ED yesterday and work-up revealed the above listed problems. As I seen the patient, the surgical team came in to see patient. Dr. Sukh Naranjo recommended ultrasound guided biopsy by radiology to determine further management course.          Interval history / Subjective: Follow up PE and right pelvic/groin upper thigh mass. Patient seen and examined earlier this afternoon. Receiving rituximab. Reported chills. Assessment & Plan:     Right pelvic and groin upper thigh mass  -CT angiogram of the abdomen on  showed large right pelvic sidewall and right groin/upper thigh mass most consistent with underlying malignancy it is indicated that this mass was visualized in the duplex ultrasound in 2022. There is also subcutaneous edema involving the right lower extremity with suspicion of possible occlusion of the right external iliac vein. There were also lower abdominal periaortic lymphadenopathy, moderate left groin lymphadenopathy.   -s/p FNA biopsy , Bone marrow biopsy completed 2/28  -Hematology/oncology consulted and following  -Rituximab, prednisone 3/2  -Plans for CHOP 3/3  -Pain management    Acute bilateral pulmonary embolism, hemodynamically stable, not hypoxic:  -CT with bilateral PE- right lower lobe, left upper lobe/lingula, left lower lobe. No evidence of right heart strain.    -Suspect he has right lower extremity DVT due to obstructive mass that resulted in PE  -Doppler ultrasound on 1/26/2022 was negative for DVT but this was about a month ago.  -s/p Heparin drip. Now on Xarelto  -Monitor on remote telemetry    History of asthma without bronchospasm: Inhaled steroids, as needed bronchodilators    PICC line placement 3/3    Code status: full   Prophylaxis: heparin drip   Care Plan discussed with: patient, nurse  Anticipated Disposition: >48 hours     Hospital Problems  Date Reviewed: 2/17/2022          Codes Class Noted POA    Pelvic mass ICD-10-CM: R19.00  ICD-9-CM: 789.30  2/23/2022 Unknown        Bilateral pulmonary embolism ([facility] [address] ICD-10-CM: I26.99  ICD-9-CM: 415.19  2/23/2022 Unknown        Right groin mass ICD-10-CM: R19.09  ICD-9-CM: 789.39  2/11/2022 Yes                Review of Systems:   Negative unless stated above     Vital Signs:    Last 24hrs VS reviewed since prior progress note. Most recent are:  Visit Vitals  /87   Pulse 97   Temp 99.5 °F (37.5 °C)   Resp 18   Ht 5' 6\" (1.676 m)   Wt 114.6 kg (252 lb 11.2 oz)   SpO2 98%   BMI 40.79 kg/m²         Intake/Output Summary (Last 24 hours) at 3/2/2022 1732  Last data filed at 3/2/2022 1402  Gross per 24 hour   Intake 600 ml   Output 1100 ml   Net -500 ml        Physical Examination:     I had a face to face encounter with this patient and independently examined them on 3/2/2022 as outlined below:          Constitutional:  No acute distress, cooperative, pleasant    ENT:  Oral mucosa moist, oropharynx benign. Resp:  CTA bilaterally. No wheezing/rhonchi/rales. No accessory muscle use.     CV: Regular rhythm, tachycardic, no murmurs, gallops, rubs    GI:  Soft, non distended, non tender. normoactive bowel sounds, no hepatosplenomegaly     Musculoskeletal:  right lower extremity edema +++, non-pitting. Right apparent groin mass     Neurologic:  Moves all extremities            Data Review:    Review and/or order of clinical lab test  Review and/or order of tests in the radiology section of CPT  Review and/or order of tests in the medicine section of CPT      Labs:     Recent Labs     03/01/22  0107 02/28/22  0606   WBC 9.7 9.4   HGB 11.7* 11.2*   HCT 36.5* 36.7   * 452*     Recent Labs     03/01/22 0107   *   K 4.6      CO2 24   BUN 18   CREA 0.99   *   CA 9.4     Recent Labs     03/01/22 0107   ALT 76   AP 89   TBILI 0.4   TP 7.3   ALB 3.2*   GLOB 4.1*     Recent Labs     03/01/22  1629 03/01/22  0705 03/01/22 0107   APTT 57.6* 75.4* 35.9*      No results for input(s): FE, TIBC, PSAT, FERR in the last 72 hours. No results found for: FOL, RBCF   No results for input(s): PH, PCO2, PO2 in the last 72 hours. No results for input(s): CPK, CKNDX, TROIQ in the last 72 hours.     No lab exists for component: CPKMB  No results found for: CHOL, CHOLX, CHLST, CHOLV, HDL, HDLP, LDL, LDLC, DLDLP, TGLX, TRIGL, TRIGP, CHHD, CHHDX  No results found for: GLUCPOC  No results found for: COLOR, APPRN, SPGRU, REFSG, JONO, PROTU, GLUCU, KETU, BILU, UROU, MATI, LEUKU, GLUKE, EPSU, BACTU, WBCU, RBCU, CASTS, UCRY      Medications Reviewed:     Current Facility-Administered Medications   Medication Dose Route Frequency    0.9% sodium chloride infusion  25 mL/hr IntraVENous CONTINUOUS    riTUXimab-pvvr (RUXIENCE) 866 mg in 0.9% sodium chloride 866 mL infusion  375 mg/m2 (Treatment Plan Recorded) IntraVENous ONCE TITR    meperidine (DEMEROL) injection 25 mg  25 mg IntraVENous PRN    allopurinoL (ZYLOPRIM) tablet 300 mg  300 mg Oral DAILY    HYDROmorphone (DILAUDID) injection 1 mg  1 mg IntraVENous Q4H PRN    rivaroxaban (XARELTO) tablet 15 mg  15 mg Oral BID WITH MEALS    sodium chloride (NS) flush 5-40 mL  5-40 mL IntraVENous Q8H    sodium chloride (NS) flush 5-40 mL  5-40 mL IntraVENous PRN    acetaminophen (TYLENOL) tablet 650 mg  650 mg Oral Q6H PRN    Or    acetaminophen (TYLENOL) suppository 650 mg  650 mg Rectal Q6H PRN    polyethylene glycol (MIRALAX) packet 17 g  17 g Oral DAILY PRN    ondansetron (ZOFRAN ODT) tablet 4 mg  4 mg Oral Q8H PRN    Or    ondansetron (ZOFRAN) injection 4 mg  4 mg IntraVENous Q6H PRN    oxyCODONE-acetaminophen (PERCOCET) 5-325 mg per tablet 1 Tablet  1 Tablet Oral Q4H PRN    albuterol-ipratropium (DUO-NEB) 2.5 MG-0.5 MG/3 ML  3 mL Nebulization Q6H PRN    arformoterol 15 mcg/budesonide 0.5 mg neb solution   Nebulization BID RT     ______________________________________________________________________  EXPECTED LENGTH OF STAY: 4d 7h  ACTUAL LENGTH OF STAY:          [address] SHAKA De La Vega DO

## 2022-03-03 ENCOUNTER — APPOINTMENT (OUTPATIENT)
Dept: NON INVASIVE DIAGNOSTICS | Age: 32
DRG: 681 | End: 2022-03-03
Attending: INTERNAL MEDICINE
Payer: MEDICAID

## 2022-03-03 ENCOUNTER — APPOINTMENT (OUTPATIENT)
Dept: INTERVENTIONAL RADIOLOGY/VASCULAR | Age: 32
DRG: 681 | End: 2022-03-03
Attending: HOSPITALIST
Payer: MEDICAID

## 2022-03-03 LAB
ALBUMIN SERPL-MCNC: 2.9 G/DL (ref 3.5–5)
ALBUMIN/GLOB SERPL: 0.6 {RATIO} (ref 1.1–2.2)
ALP SERPL-CCNC: 92 U/L (ref 45–117)
ALT SERPL-CCNC: 76 U/L (ref 12–78)
ANION GAP SERPL CALC-SCNC: 5 MMOL/L (ref 5–15)
AST SERPL-CCNC: 44 U/L (ref 15–37)
BASOPHILS # BLD: 0 K/UL (ref 0–0.1)
BASOPHILS NFR BLD: 0 % (ref 0–1)
BILIRUB SERPL-MCNC: 0.2 MG/DL (ref 0.2–1)
BUN SERPL-MCNC: 18 MG/DL (ref 6–20)
BUN/CREAT SERPL: 17 (ref 12–20)
CALCIUM SERPL-MCNC: 9.8 MG/DL (ref 8.5–10.1)
CHLORIDE SERPL-SCNC: 101 MMOL/L (ref 97–108)
CO2 SERPL-SCNC: 25 MMOL/L (ref 21–32)
COMMENT, HOLDF: NORMAL
CREAT SERPL-MCNC: 1.03 MG/DL (ref 0.7–1.3)
DIFFERENTIAL METHOD BLD: ABNORMAL
ECHO AO ROOT DIAM: 3.6 CM
ECHO AO ROOT INDEX: 1.63 CM/M2
ECHO AV AREA PEAK VELOCITY: 3.1 CM2
ECHO AV AREA PEAK VELOCITY: 3.1 CM2
ECHO AV PEAK GRADIENT: 9 MMHG
ECHO AV PEAK VELOCITY: 1.5 M/S
ECHO AV VELOCITY RATIO: 0.87
ECHO EST RA PRESSURE: 3 MMHG
ECHO LA DIAMETER INDEX: 1.45 CM/M2
ECHO LA DIAMETER: 3.2 CM
ECHO LA TO AORTIC ROOT RATIO: 0.89
ECHO LV E' LATERAL VELOCITY: 13 CM/S
ECHO LV E' SEPTAL VELOCITY: 9 CM/S
ECHO LV FRACTIONAL SHORTENING: 30 % (ref 28–44)
ECHO LV GLOBAL LONGITUDINAL STRAIN (GLS): -14.9 %
ECHO LV INTERNAL DIMENSION DIASTOLE INDEX: 2.13 CM/M2
ECHO LV INTERNAL DIMENSION DIASTOLIC: 4.7 CM (ref 4.2–5.9)
ECHO LV INTERNAL DIMENSION SYSTOLIC INDEX: 1.49 CM/M2
ECHO LV INTERNAL DIMENSION SYSTOLIC: 3.3 CM
ECHO LV IVSD: 0.8 CM (ref 0.6–1)
ECHO LV MASS 2D: 132.3 G (ref 88–224)
ECHO LV MASS INDEX 2D: 59.9 G/M2 (ref 49–115)
ECHO LV POSTERIOR WALL DIASTOLIC: 0.9 CM (ref 0.6–1)
ECHO LV RELATIVE WALL THICKNESS RATIO: 0.38
ECHO LVOT AREA: 3.5 CM2
ECHO LVOT DIAM: 2.1 CM
ECHO LVOT PEAK GRADIENT: 7 MMHG
ECHO LVOT PEAK VELOCITY: 1.3 M/S
ECHO MV A VELOCITY: 0.75 M/S
ECHO MV AREA PHT: 7 CM2
ECHO MV E DECELERATION TIME (DT): 109 MS
ECHO MV E VELOCITY: 0.84 M/S
ECHO MV E/A RATIO: 1.12
ECHO MV E/E' LATERAL: 6.46
ECHO MV E/E' RATIO (AVERAGED): 7.9
ECHO MV E/E' SEPTAL: 9.33
ECHO MV PRESSURE HALF TIME (PHT): 31.6 MS
ECHO PV MAX VELOCITY: 0.9 M/S
ECHO PV PEAK GRADIENT: 3 MMHG
ECHO RV INTERNAL DIMENSION: 3.2 CM
EOSINOPHIL # BLD: 0 K/UL (ref 0–0.4)
EOSINOPHIL NFR BLD: 0 % (ref 0–7)
ERYTHROCYTE [DISTWIDTH] IN BLOOD BY AUTOMATED COUNT: 12.8 % (ref 11.5–14.5)
GLOBULIN SER CALC-MCNC: 4.8 G/DL (ref 2–4)
GLUCOSE SERPL-MCNC: 139 MG/DL (ref 65–100)
HCT VFR BLD AUTO: 34.5 % (ref 36.6–50.3)
HGB BLD-MCNC: 11.2 G/DL (ref 12.1–17)
IMM GRANULOCYTES # BLD AUTO: 0.1 K/UL (ref 0–0.04)
IMM GRANULOCYTES NFR BLD AUTO: 1 % (ref 0–0.5)
LYMPHOCYTES # BLD: 0.3 K/UL (ref 0.8–3.5)
LYMPHOCYTES NFR BLD: 3 % (ref 12–49)
MCH RBC QN AUTO: 29.6 PG (ref 26–34)
MCHC RBC AUTO-ENTMCNC: 32.5 G/DL (ref 30–36.5)
MCV RBC AUTO: 91.3 FL (ref 80–99)
MONOCYTES # BLD: 0.6 K/UL (ref 0–1)
MONOCYTES NFR BLD: 5 % (ref 5–13)
NEUTS SEG # BLD: 10.5 K/UL (ref 1.8–8)
NEUTS SEG NFR BLD: 91 % (ref 32–75)
NRBC # BLD: 0 K/UL (ref 0–0.01)
NRBC BLD-RTO: 0 PER 100 WBC
PLATELET # BLD AUTO: 397 K/UL (ref 150–400)
PMV BLD AUTO: 9.3 FL (ref 8.9–12.9)
POTASSIUM SERPL-SCNC: 4.5 MMOL/L (ref 3.5–5.1)
PROT SERPL-MCNC: 7.7 G/DL (ref 6.4–8.2)
RBC # BLD AUTO: 3.78 M/UL (ref 4.1–5.7)
RBC MORPH BLD: ABNORMAL
SAMPLES BEING HELD,HOLD: NORMAL
SODIUM SERPL-SCNC: 131 MMOL/L (ref 136–145)
WBC # BLD AUTO: 11.5 K/UL (ref 4.1–11.1)

## 2022-03-03 PROCEDURE — 93306 TTE W/DOPPLER COMPLETE: CPT

## 2022-03-03 PROCEDURE — 74011000258 HC RX REV CODE- 258: Performed by: INTERNAL MEDICINE

## 2022-03-03 PROCEDURE — C1751 CATH, INF, PER/CENT/MIDLINE: HCPCS

## 2022-03-03 PROCEDURE — 74011636637 HC RX REV CODE- 636/637: Performed by: INTERNAL MEDICINE

## 2022-03-03 PROCEDURE — 94640 AIRWAY INHALATION TREATMENT: CPT

## 2022-03-03 PROCEDURE — 74011250637 HC RX REV CODE- 250/637: Performed by: INTERNAL MEDICINE

## 2022-03-03 PROCEDURE — 77030020365 HC SOL INJ SOD CL 0.9% 50ML

## 2022-03-03 PROCEDURE — 76937 US GUIDE VASCULAR ACCESS: CPT

## 2022-03-03 PROCEDURE — 94760 N-INVAS EAR/PLS OXIMETRY 1: CPT

## 2022-03-03 PROCEDURE — 85025 COMPLETE CBC W/AUTO DIFF WBC: CPT

## 2022-03-03 PROCEDURE — 36415 COLL VENOUS BLD VENIPUNCTURE: CPT

## 2022-03-03 PROCEDURE — 74011000250 HC RX REV CODE- 250: Performed by: INTERNAL MEDICINE

## 2022-03-03 PROCEDURE — 80053 COMPREHEN METABOLIC PANEL: CPT

## 2022-03-03 PROCEDURE — 74011250636 HC RX REV CODE- 250/636: Performed by: INTERNAL MEDICINE

## 2022-03-03 PROCEDURE — 36573 INSJ PICC RS&I 5 YR+: CPT | Performed by: INTERNAL MEDICINE

## 2022-03-03 PROCEDURE — 65270000029 HC RM PRIVATE

## 2022-03-03 PROCEDURE — 74011000250 HC RX REV CODE- 250: Performed by: HOSPITALIST

## 2022-03-03 RX ORDER — PALONOSETRON 0.05 MG/ML
0.25 INJECTION, SOLUTION INTRAVENOUS ONCE
Status: COMPLETED | OUTPATIENT
Start: 2022-03-03 | End: 2022-03-03

## 2022-03-03 RX ORDER — HEPARIN 100 UNIT/ML
300-500 SYRINGE INTRAVENOUS AS NEEDED
Status: ACTIVE | OUTPATIENT
Start: 2022-03-03 | End: 2022-03-04

## 2022-03-03 RX ORDER — DOXORUBICIN HYDROCHLORIDE 2 MG/ML
25 INJECTION, SOLUTION INTRAVENOUS ONCE
Status: COMPLETED | OUTPATIENT
Start: 2022-03-03 | End: 2022-03-03

## 2022-03-03 RX ORDER — SODIUM CHLORIDE 9 MG/ML
10 INJECTION INTRAMUSCULAR; INTRAVENOUS; SUBCUTANEOUS AS NEEDED
Status: ACTIVE | OUTPATIENT
Start: 2022-03-03 | End: 2022-03-04

## 2022-03-03 RX ORDER — SODIUM CHLORIDE 0.9 % (FLUSH) 0.9 %
10 SYRINGE (ML) INJECTION AS NEEDED
Status: DISPENSED | OUTPATIENT
Start: 2022-03-03 | End: 2022-03-04

## 2022-03-03 RX ORDER — SODIUM CHLORIDE 9 MG/ML
100 INJECTION, SOLUTION INTRAVENOUS CONTINUOUS
Status: DISPENSED | OUTPATIENT
Start: 2022-03-03 | End: 2022-03-04

## 2022-03-03 RX ORDER — SODIUM CHLORIDE 9 MG/ML
25 INJECTION, SOLUTION INTRAVENOUS CONTINUOUS
Status: DISPENSED | OUTPATIENT
Start: 2022-03-03 | End: 2022-03-04

## 2022-03-03 RX ADMIN — ALLOPURINOL 300 MG: 300 TABLET ORAL at 09:59

## 2022-03-03 RX ADMIN — ARFORMOTEROL TARTRATE: 15 SOLUTION RESPIRATORY (INHALATION) at 21:45

## 2022-03-03 RX ADMIN — IPRATROPIUM BROMIDE AND ALBUTEROL SULFATE 3 ML: .5; 3 SOLUTION RESPIRATORY (INHALATION) at 19:21

## 2022-03-03 RX ADMIN — DIPHENHYDRAMINE HYDROCHLORIDE 50 MG: 25 CAPSULE ORAL at 00:50

## 2022-03-03 RX ADMIN — SODIUM CHLORIDE 100 ML/HR: 9 INJECTION, SOLUTION INTRAVENOUS at 15:03

## 2022-03-03 RX ADMIN — FAMOTIDINE 40 MG: 20 TABLET ORAL at 00:50

## 2022-03-03 RX ADMIN — SODIUM CHLORIDE, PRESERVATIVE FREE 10 ML: 5 INJECTION INTRAVENOUS at 17:07

## 2022-03-03 RX ADMIN — DOXORUBICIN HYDROCHLORIDE 57.8 MG: 2 INJECTION, SOLUTION INTRAVENOUS at 17:10

## 2022-03-03 RX ADMIN — PALONOSETRON 0.25 MG: 0.05 INJECTION, SOLUTION INTRAVENOUS at 15:07

## 2022-03-03 RX ADMIN — RIVAROXABAN 15 MG: 15 TABLET, FILM COATED ORAL at 18:21

## 2022-03-03 RX ADMIN — CYCLOPHOSPHAMIDE 1733 MG: 1 INJECTION, POWDER, FOR SOLUTION INTRAVENOUS; ORAL at 16:11

## 2022-03-03 RX ADMIN — SODIUM CHLORIDE 500 ML: 9 INJECTION, SOLUTION INTRAVENOUS at 12:58

## 2022-03-03 RX ADMIN — PREDNISONE 80 MG: 20 TABLET ORAL at 15:03

## 2022-03-03 RX ADMIN — SODIUM CHLORIDE, PRESERVATIVE FREE 10 ML: 5 INJECTION INTRAVENOUS at 15:04

## 2022-03-03 RX ADMIN — SODIUM CHLORIDE 150 MG: 9 INJECTION, SOLUTION INTRAVENOUS at 15:06

## 2022-03-03 RX ADMIN — SODIUM CHLORIDE, PRESERVATIVE FREE 10 ML: 5 INJECTION INTRAVENOUS at 22:44

## 2022-03-03 RX ADMIN — RIVAROXABAN 15 MG: 15 TABLET, FILM COATED ORAL at 07:18

## 2022-03-03 RX ADMIN — DOXORUBICIN HYDROCHLORIDE 57.8 MG: 2 INJECTION, SOLUTION INTRAVENOUS at 17:09

## 2022-03-03 RX ADMIN — VINCRISTINE SULFATE 2 MG: 1 INJECTION, SOLUTION INTRAVENOUS at 17:47

## 2022-03-03 RX ADMIN — SODIUM CHLORIDE 25 ML/HR: 9 INJECTION, SOLUTION INTRAVENOUS at 15:06

## 2022-03-03 RX ADMIN — ACETAMINOPHEN 650 MG: 325 TABLET ORAL at 00:51

## 2022-03-03 NOTE — PROGRESS NOTES
[address] [facility] Adult  Hospitalist Group                                                                                          Hospitalist Progress Note  Chen Tang MD  Answering service: [phone] from in house phone        Date of Service:  3/3/2022  NAME:  Silas Anderson  :  1990  MRN:  [id number]      Admission Summary:   57-year-old gentleman presented to [facility] on  with right leg swelling and shortness of breath. On work-up he was found to have PE, hemodynamically stable and right groin and pelvic mass. Patient was to be transferred to [facility] surgical oncology services however [facility] was on diversion so he was transferred to [facility]. I saw him in room 515/2, his girlfriend present. He said the right thigh pain started about 2 months ago. From what he describes he was initially treated with diuretics. The pain and swelling progressed to the point where he could not walk so he presented to the ED yesterday and work-up revealed the above listed problems. As I seen the patient, the surgical team came in to see patient. Dr. Saw Chakraborty recommended ultrasound guided biopsy by radiology to determine further management course.          Interval history / Subjective:   Reports feeling tired otherwise denies any issues at this time. To undergo [facility] today     Assessment & Plan:     Right pelvic and groin upper thigh mass  -CT angiogram of the abdomen on  showed large right pelvic sidewall and right groin/upper thigh mass most consistent with underlying malignancy it is indicated that this mass was visualized in the duplex ultrasound in 2022. There is also subcutaneous edema involving the right lower extremity with suspicion of possible occlusion of the right external iliac vein. There were also lower abdominal periaortic lymphadenopathy, moderate left groin lymphadenopathy.   -s/p FNA biopsy , Bone marrow biopsy completed   -Hematology/oncology consulted and following   -Rituximab, prednisone 3/2  -s/p CHOP 3/3, PICC placed 3/3  -Adriamycin cycle #1 on 3/3. To f/u Onc Dr Elizabeth Marquez 2 wks with plan for Adriamycin cycle #2 in 3 wks  -Pain management    Acute bilateral pulmonary embolism, hemodynamically stable, not hypoxic:  -CT with bilateral PE- right lower lobe, left upper lobe/lingula, left lower lobe. No evidence of right heart strain.    -Suspect he has right lower extremity DVT due to obstructive mass that resulted in PE  -Doppler ultrasound on 1/26/2022 was negative for DVT but this was about a month ago.  -s/p Heparin drip. Now on Xarelto  -Monitor on remote telemetry    History of asthma without bronchospasm:  Inhaled steroids, as needed bronchodilators      Code status: full   Prophylaxis: [address] discussed with: patient, nurse  Anticipated Disposition:24-48hrs when ok with [facility] Problems  Date Reviewed: 2/17/2022          Codes Class Noted POA    Pelvic mass ICD-10-CM: R19.00  ICD-9-CM: 789.30  2/23/2022 Unknown        Bilateral pulmonary embolism ([facility] [address] ICD-10-CM: I26.99  ICD-9-CM: 415.19  2/23/2022 Unknown        Right groin mass ICD-10-CM: R19.09  ICD-9-CM: 789.39  2/11/2022 Yes                Review of Systems:   Negative unless stated above     Vital Signs:    Last 24hrs VS reviewed since prior progress note. Most recent are:  Visit Vitals  /83 (BP 1 Location: Left arm, BP Patient Position: At rest)   Pulse 96   Temp 98.3 °F (36.8 °C)   Resp 18   Ht 5' 6\" (1.676 m)   Wt 114.3 kg (252 lb)   SpO2 96%   BMI 40.67 kg/m²         Intake/Output Summary (Last 24 hours) at 3/3/2022 1752  Last data filed at 3/3/2022 1508  Gross per 24 hour   Intake 500 ml   Output 1100 ml   Net -600 ml        Physical Examination:     I had a face to face encounter with this patient and independently examined them on 3/3/2022 as outlined below:          Constitutional:  No acute distress, cooperative, pleasant    ENT:  Oral mucosa moist, oropharynx benign. Resp: CTA bilaterally. No wheezing/rhonchi/rales. No accessory muscle use. CV:  Regular rhythm, tachycardic, no murmurs, gallops, rubs    GI:  Soft, non distended, non tender. normoactive bowel sounds, no hepatosplenomegaly     Musculoskeletal:  right lower extremity edema +++, non-pitting. Right apparent groin mass     Neurologic:  Moves all extremities            Data Review:    Review and/or order of clinical lab test  Review and/or order of tests in the radiology section of CPT  Review and/or order of tests in the medicine section of [facility]      Labs:     Recent Labs     03/03/22  0116 03/01/22  0107   WBC 11.5* 9.7   HGB 11.2* 11.7*   HCT 34.5* 36.5*    438*     Recent Labs     03/03/22  0116 03/01/22  0107   * 133*   K 4.5 4.6    103   CO2 25 24   BUN 18 18   CREA 1.03 0.99   * 101*   CA 9.8 9.4     Recent Labs     03/03/22  0116 03/01/22  0107   ALT 76 76   AP 92 89   TBILI 0.2 0.4   TP 7.7 7.3   ALB 2.9* 3.2*   GLOB 4.8* 4.1*     Recent Labs     03/01/22  1629 03/01/22  0705 03/01/22  0107   APTT 57.6* 75.4* 35.9*      No results for input(s): FE, TIBC, PSAT, FERR in the last 72 hours. No results found for: FOL, RBCF   No results for input(s): PH, PCO2, PO2 in the last 72 hours. No results for input(s): CPK, CKNDX, TROIQ in the last 72 hours.     No lab exists for component: CPKMB  No results found for: CHOL, CHOLX, CHLST, CHOLV, HDL, HDLP, LDL, LDLC, DLDLP, TGLX, TRIGL, TRIGP, CHHD, CHHDX  No results found for: GLUCPOC  No results found for: COLOR, APPRN, SPGRU, REFSG, JONO, PROTU, GLUCU, KETU, BILU, UROU, MATI, LEUKU, GLUKE, EPSU, BACTU, WBCU, RBCU, CASTS, UCRY      Medications Reviewed:     Current Facility-Administered Medications   Medication Dose Route Frequency    0.9% sodium chloride infusion  25 mL/hr IntraVENous CONTINUOUS    sodium chloride 0.9 % bolus infusion 500 mL  500 mL IntraVENous ONCE    0.9% sodium chloride infusion  100 mL/hr IntraVENous CONTINUOUS    vinCRIStine (VINCASAR PFS) 2 mg in 0.9% sodium chloride 50 mL, overfill volume 5 mL chemo IVPB  2 mg IntraVENous ONCE    sodium chloride (NS) flush 10 mL  10 mL IntraVENous PRN    0.9% sodium chloride injection 10 mL  10 mL IntraVENous PRN    heparin (porcine) pf 300-500 Units  300-500 Units InterCATHeter PRN    alteplase (CATHFLO) 1 mg in sterile water (preservative free) 1 mL injection  1 mg InterCATHeter PRN    [START ON 3/4/2022] predniSONE (DELTASONE) tablet 80 mg  80 mg Oral DAILY WITH BREAKFAST    allopurinoL (ZYLOPRIM) tablet 300 mg  300 mg Oral DAILY    albuterol-ipratropium (DUO-NEB) 2.5 MG-0.5 MG/3 ML  3 mL Nebulization Q4H PRN    HYDROmorphone (DILAUDID) injection 1 mg  1 mg IntraVENous Q4H PRN    rivaroxaban (XARELTO) tablet 15 mg  15 mg Oral BID WITH MEALS    sodium chloride (NS) flush 5-40 mL  5-40 mL IntraVENous Q8H    sodium chloride (NS) flush 5-40 mL  5-40 mL IntraVENous PRN    acetaminophen (TYLENOL) tablet 650 mg  650 mg Oral Q6H PRN    Or    acetaminophen (TYLENOL) suppository 650 mg  650 mg Rectal Q6H PRN    polyethylene glycol (MIRALAX) packet 17 g  17 g Oral DAILY PRN    ondansetron (ZOFRAN ODT) tablet 4 mg  4 mg Oral Q8H PRN    Or    ondansetron (ZOFRAN) injection 4 mg  4 mg IntraVENous Q6H PRN    oxyCODONE-acetaminophen (PERCOCET) 5-325 mg per tablet 1 Tablet  1 Tablet Oral Q4H PRN    arformoterol 15 mcg/budesonide 0.5 mg neb solution   Nebulization BID RT     ______________________________________________________________________  EXPECTED LENGTH OF STAY: 4d 7h  ACTUAL LENGTH OF STAY:          8                 Josette Later, MD

## 2022-03-03 NOTE — PROGRESS NOTES
36 - This RN witnessed Dr. Laura Elliott obtain consent for all components of R-CHOP treatment on 3/1/2022. Consent only has Rituxumab written on it. Per Dr. Laura Elliott, okay to add \"CHOP\" to the consent & have the patient initial the correction. Order was verified by Kelby Bautista RN. Dr. Laura Elliott is okay to proceed without ECHO & have the pt obtain an ECHO outpatient. 1040 - Pertinent labs verified with Kelby Bautista RN. Pt just received a PICC line. Okay to proceed with scheduled CHOP today. 1435 - Chemo arrived on unit. Bolus already given. Additional pre-meds (prednisone, emend, aloxi, zofran) will be given now. Per Dr. Jackeline Baer, pt is to receive 80mg prednisone for Day 1-5 of chemo cycle. 1510 - Pre-meds given. (prednisone, 100mL NS, Emend, Aloxi). Blood return verified. 1610 - Begin treatment. Of note, pt reports that he has never had a hx of numbness and/or tingling in his extremites. Pt instructed to tell RN if this develops as we may need to decrease dose. 1757 - CHOP completed. No adverse reactions since the Rituxumab this AM. Pt verbalized understanding regarding strixct I&O's and chemo precautions. Will continue to monitor.

## 2022-03-03 NOTE — PROGRESS NOTES
PICC Placement Note    PRE-PROCEDURE VERIFICATION  Correct Procedure: yes  Correct Site:  yes  Temperature: Temp: 98.5 °F (36.9 °C), Temperature Source: Temp Source: Oral  Recent Labs     03/03/22  0116   BUN 18   CREA 1.03      WBC 11.5*     Allergies: Patient has no known allergies. Education materials, including PICC Booklet, for PICC Care given to patient: yes. See Patient Education activity for further details. PROCEDURE DETAIL  A double lumen PICC line was started for chemo therapy. The following documentation is in addition to the PICC properties in the lines/airways flowsheet :  Lot #: [id number]  Was xylocaine 1% used intradermally:  yes  Catheter Length: 48 (cm)  Vein Selection for PICC:right brachial  Central Line Bundle followed yes  Complication Related to Insertion: inability to access vein    The placement was verified by ECG/[employer] technology: The  tip location is on the right side and the tip is in the superior vena cava. See ECG results for PICC tip placement. Report given to Kristofer Stevens RN. Line is okay to use.     Elnor Schwab, RN

## 2022-03-03 NOTE — PROGRESS NOTES
Chemo education provided to patient, family, and staff. Education included:    -Strict I&O's  -Pt should attempt to void Q2H & drink plenty fluids.  -Urine may turn a reddish color.  -Constipation may occur, drink plenty of fluids & alert medical staff if pt has not had a BM in 2 days.  -Chemotherapy precautions (use chux when flushing, sit to urinate, do not share toilets for 72hrs)  -Common side effects of CHOP treatment    Written handout given to patient to reinforce education.

## 2022-03-03 NOTE — PROGRESS NOTES
Problem: Pressure Injury - Risk of  Goal: *Prevention of pressure injury  Description: Document Valentino Scale and appropriate interventions in the flowsheet. Outcome: Progressing Towards Goal  Note: Pressure Injury Interventions:  Sensory Interventions: Float heels         Activity Interventions: Increase time out of bed,Pressure redistribution bed/mattress(bed type)    Mobility Interventions: Float heels    Nutrition Interventions: Document food/fluid/supplement intake,Offer support with meals,snacks and hydration    Friction and Shear Interventions: Apply protective barrier, creams and emollients                Problem: Patient Education: Go to Patient Education Activity  Goal: Patient/Family Education  Outcome: Progressing Towards Goal     Problem: Falls - Risk of  Goal: *Absence of Falls  Description: Document Liu Fall Risk and appropriate interventions in the flowsheet.   Outcome: Progressing Towards Goal  Note: Fall Risk Interventions:            Medication Interventions: Evaluate medications/consider consulting pharmacy,Patient to call before getting OOB                   Problem: Patient Education: Go to Patient Education Activity  Goal: Patient/Family Education  Outcome: Progressing Towards Goal

## 2022-03-03 NOTE — PROGRESS NOTES
Problem: Pressure Injury - Risk of  Goal: *Prevention of pressure injury  Description: Document Valentino Scale and appropriate interventions in the flowsheet. Outcome: Progressing Towards Goal  Note: Pressure Injury Interventions:  Sensory Interventions: Float heels    Moisture Interventions: Absorbent underpads    Activity Interventions: Increase time out of bed    Mobility Interventions: Float heels,HOB 30 degrees or less,Pressure redistribution bed/mattress (bed type)    Nutrition Interventions: Document food/fluid/supplement intake    Friction and Shear Interventions: Apply protective barrier, creams and emollients       Problem: Patient Education: Go to Patient Education Activity  Goal: Patient/Family Education  Outcome: Progressing Towards Goal     Problem: Falls - Risk of  Goal: *Absence of Falls  Description: Document Liu Fall Risk and appropriate interventions in the flowsheet.   Outcome: Progressing Towards Goal  Note: Fall Risk Interventions:        Medication Interventions: Evaluate medications/consider consulting pharmacy,Assess postural VS orthostatic hypotension,Patient to call before getting OOB,Teach patient to arise slowly       Problem: Patient Education: Go to Patient Education Activity  Goal: Patient/Family Education  Outcome: Progressing Towards Goal

## 2022-03-03 NOTE — PROGRESS NOTES
Hematology-Oncology Progress Note      Clarence Meals  1990  [id number]  3/3/2022      Subjective:     Mild rigors with chemotherapy. Right leg swelling better. Echo not done. Allergies: Patient has no known allergies.   Current Facility-Administered Medications   Medication Dose Route Frequency Provider Last Rate Last Admin    allopurinoL (ZYLOPRIM) tablet 300 mg  300 mg Oral DAILY Joey Bond MD   300 mg at 03/02/22 1226    albuterol-ipratropium (DUO-NEB) 2.5 MG-0.5 MG/3 ML  3 mL Nebulization Q4H PRN Andrew Valenzuela MD        HYDROmorphone (DILAUDID) injection 1 mg  1 mg IntraVENous Q4H PRN Paul MOLINA DO   1 mg at 03/02/22 1015    rivaroxaban (XARELTO) tablet 15 mg  15 mg Oral BID WITH MEALS Joey Bond MD   15 mg at 03/03/22 0718    sodium chloride (NS) flush 5-40 mL  5-40 mL IntraVENous Q8H Bhavna Tsai MD   10 mL at 03/02/22 2235    sodium chloride (NS) flush 5-40 mL  5-40 mL IntraVENous PRN Bhavna Tsai MD   10 mL at 03/02/22 1015    acetaminophen (TYLENOL) tablet 650 mg  650 mg Oral Q6H PRN Bhavna Tsai MD        Or    acetaminophen (TYLENOL) suppository 650 mg  650 mg Rectal Q6H PRN CAROLA Tsai MD        polyethylene glycol (MIRALAX) packet 17 g  17 g Oral DAILY PRN ANALY Tsai MD        ondansetron (ZOFRAN ODT) tablet 4 mg  4 mg Oral Q8H PRN KATARINA Tsai MD        Or    ondansetron (ZOFRAN) injection 4 mg  4 mg IntraVENous Q6H PRN Eulalio TQYBLEEANNE LOZA MD        oxyCODONE-acetaminophen (PERCOCET) 5-325 mg per tablet 1 Tablet  1 Tablet Oral Q4H PRN ALISSA Tsai MD   1 Tablet at 03/02/22 1333    arformoterol 15 mcg/budesonide 0.5 mg neb solution   Nebulization BID RT Kirsty Mccullough MD   Given at 03/02/22 2158     Objective:     Patient Vitals for the past 24 hrs:   BP Temp Pulse Resp SpO2 Height Weight   03/03/22 0351 117/81 -- 76 14 99 % -- --   03/03/22 0030 110/75 98.3 °F (36.8 °C) 85 16 98 % -- --   03/02/22 2307 129/83 97.9 °F (36.6 °C) 97 15 95 % -- --   03/02/22 2158 (!) 123/91 97.8 °F (36.6 °C) 99 18 98 % -- --   03/02/22 2131 128/85 98.2 °F (36.8 °C) 97 18 96 % -- --   03/02/22 2041 (!) 148/81 98.7 °F (37.1 °C) 96 16 96 % -- --   03/02/22 1931 123/78 98.4 °F (36.9 °C) 97 18 95 % -- --   03/02/22 1852 (!) 158/87 98.6 °F (37 °C) 98 18 96 % -- --   03/02/22 1801 125/80 97.9 °F (36.6 °C) 95 18 95 % -- --   03/02/22 1742 -- -- -- -- 98 % -- --   03/02/22 1715 -- -- 97 18 98 % -- --   03/02/22 1639 134/87 99.5 °F (37.5 °C) (!) 101 20 97 % -- --   03/02/22 1604 (!) 148/83 99.6 °F (37.6 °C) (!) 109 20 95 % -- --   03/02/22 1535 (!) 148/93 99.8 °F (37.7 °C) (!) 113 20 97 % -- --   03/02/22 1530 -- -- (!) 112 20 96 % -- --   03/02/22 1517 128/80 99.8 °F (37.7 °C) (!) 113 20 95 % -- --   03/02/22 1427 -- 99.4 °F (37.4 °C) -- -- -- -- --   03/02/22 1423 (!) 140/83 99.2 °F (37.3 °C) (!) 118 20 97 % -- --   03/02/22 1359 119/76 98.9 °F (37.2 °C) (!) 103 18 98 % -- --   03/02/22 1334 (!) 140/85 98.2 °F (36.8 °C) 100 18 99 % -- --   03/02/22 1320 130/82 98.5 °F (36.9 °C) 99 18 98 % -- --   03/02/22 1025 -- -- -- -- -- 5' 6\" (1.676 m) 114.6 kg (252 lb 11.2 oz)   03/02/22 0912 124/87 98.5 °F (36.9 °C) 99 18 98 % -- --       Gen: NAD  HEENT: PERRL, Sclerae anicteric  Cv: RRR without m/r/g  Pulm: CTA bilaterally  Abd: NABS, NTND, No HSM  Ext: unchanged RLE edema    Available labs reviewed:  Labs:    Recent Results (from the past 24 hour(s))   HEP B SURFACE AB    Collection Time: 03/02/22 11:01 AM   Result Value Ref Range    Hepatitis B surface Ab 14.93 mIU/mL    Hep B surface Ab Interp.  REACTIVE (A) NR     CBC WITH AUTOMATED DIFF    Collection Time: 03/03/22  1:16 AM   Result Value Ref Range    WBC 11.5 (H) 4.1 - 11.1 K/uL    RBC 3.78 (L) 4.10 - 5.70 M/uL    HGB 11.2 (L) 12.1 - 17.0 g/dL    HCT 34.5 (L) 36.6 - 50.3 %    MCV 91.3 80.0 - 99.0 FL    MCH 29.6 26.0 - 34.0 PG    MCHC 32.5 30.0 - 36.5 g/dL    RDW 12.8 11.5 - 14.5 %    PLATELET 020 018 - 242 K/uL    MPV 9.3 8.9 - 12.9 FL    NRBC 0.0 0  WBC    ABSOLUTE NRBC 0.00 0.00 - 0.01 K/uL    NEUTROPHILS 91 (H) 32 - 75 %    LYMPHOCYTES 3 (L) 12 - 49 %    MONOCYTES 5 5 - 13 %    EOSINOPHILS 0 0 - 7 %    BASOPHILS 0 0 - 1 %    IMMATURE GRANULOCYTES 1 (H) 0.0 - 0.5 %    ABS. NEUTROPHILS 10.5 (H) 1.8 - 8.0 K/UL    ABS. LYMPHOCYTES 0.3 (L) 0.8 - 3.5 K/UL    ABS. MONOCYTES 0.6 0.0 - 1.0 K/UL    ABS. EOSINOPHILS 0.0 0.0 - 0.4 K/UL    ABS. BASOPHILS 0.0 0.0 - 0.1 K/UL    ABS. IMM. GRANS. 0.1 (H) 0.00 - 0.04 K/UL    DF SMEAR SCANNED      RBC COMMENTS NORMOCYTIC, NORMOCHROMIC     METABOLIC PANEL, COMPREHENSIVE    Collection Time: 03/03/22  1:16 AM   Result Value Ref Range    Sodium 131 (L) 136 - 145 mmol/L    Potassium 4.5 3.5 - 5.1 mmol/L    Chloride 101 97 - 108 mmol/L    CO2 25 21 - 32 mmol/L    Anion gap 5 5 - 15 mmol/L    Glucose 139 (H) 65 - 100 mg/dL    BUN 18 6 - 20 MG/DL    Creatinine 1.03 0.70 - 1.30 MG/DL    BUN/Creatinine ratio 17 12 - 20      GFR est AA >60 >60 ml/min/1.73m2    GFR est non-AA >60 >60 ml/min/1.73m2    Calcium 9.8 8.5 - 10.1 MG/DL    Bilirubin, total 0.2 0.2 - 1.0 MG/DL    ALT (SGPT) 76 12 - 78 U/L    AST (SGOT) 44 (H) 15 - 37 U/L    Alk. phosphatase 92 45 - 117 U/L    Protein, total 7.7 6.4 - 8.2 g/dL    Albumin 2.9 (L) 3.5 - 5.0 g/dL    Globulin 4.8 (H) 2.0 - 4.0 g/dL    A-G Ratio 0.6 (L) 1.1 - 2.2     SAMPLES BEING HELD    Collection Time: 03/03/22  1:16 AM   Result Value Ref Range    SAMPLES BEING HELD 1SST     COMMENT        Add-on orders for these samples will be processed based on acceptable specimen integrity and analyte stability, which may vary by analyte. Assessment and Plan     26 y/o man with newly diagnosed DLBCL stage II (myc, bcl2, bcl6, cytogenetic/FISH pending), BM biopsy negative, nearly complete with rituximab, for CHOP today. 1. DLBCL: I spoke with him at length about why we evaluate LVEF prior to anthracyclines.  Given his young age, excellent fitness, normal CT chest, we mutually agreed that the benefits of adriamyin exceeded the risk today and will proceed with cycle #1 without the benefit of an echo. Will place PICC line as it would be imprudent to interrupt his anticoagulation for port given recent echo. He will see me in 2 weeks to plan for cycle #2 as an outpatient in 3 weeks. 2. PE: as above    Thank you for allowing us to participate in the care of this very pleasant patient.     Taylor Rawls MD  Hematology/Oncology  Phone [phone]

## 2022-03-04 VITALS
WEIGHT: 252 LBS | DIASTOLIC BLOOD PRESSURE: 68 MMHG | HEIGHT: 66 IN | RESPIRATION RATE: 16 BRPM | BODY MASS INDEX: 40.5 KG/M2 | OXYGEN SATURATION: 99 % | TEMPERATURE: 97.7 F | SYSTOLIC BLOOD PRESSURE: 123 MMHG | HEART RATE: 80 BPM

## 2022-03-04 PROCEDURE — 94640 AIRWAY INHALATION TREATMENT: CPT

## 2022-03-04 PROCEDURE — 74011000250 HC RX REV CODE- 250: Performed by: HOSPITALIST

## 2022-03-04 PROCEDURE — 74011636637 HC RX REV CODE- 636/637: Performed by: INTERNAL MEDICINE

## 2022-03-04 PROCEDURE — 74011250637 HC RX REV CODE- 250/637: Performed by: INTERNAL MEDICINE

## 2022-03-04 PROCEDURE — 94760 N-INVAS EAR/PLS OXIMETRY 1: CPT

## 2022-03-04 PROCEDURE — 36592 COLLECT BLOOD FROM PICC: CPT

## 2022-03-04 RX ORDER — PREDNISONE 20 MG/1
80 TABLET ORAL
Qty: 30 TABLET | Refills: 0 | Status: SHIPPED
Start: 2022-03-05 | End: 2022-03-08

## 2022-03-04 RX ORDER — ALLOPURINOL 300 MG/1
300 TABLET ORAL DAILY
Qty: 30 TABLET | Refills: 0 | Status: ON HOLD
Start: 2022-03-05 | End: 2022-08-17

## 2022-03-04 RX ORDER — OXYCODONE AND ACETAMINOPHEN 5; 325 MG/1; MG/1
1 TABLET ORAL
Qty: 30 TABLET | Refills: 0 | Status: SHIPPED | OUTPATIENT
Start: 2022-03-04 | End: 2022-03-16

## 2022-03-04 RX ADMIN — RIVAROXABAN 15 MG: 15 TABLET, FILM COATED ORAL at 07:16

## 2022-03-04 RX ADMIN — ALLOPURINOL 300 MG: 300 TABLET ORAL at 09:05

## 2022-03-04 RX ADMIN — ARFORMOTEROL TARTRATE: 15 SOLUTION RESPIRATORY (INHALATION) at 08:15

## 2022-03-04 RX ADMIN — PREDNISONE 80 MG: 20 TABLET ORAL at 07:16

## 2022-03-04 NOTE — DISCHARGE INSTRUCTIONS
Discharge Instructions for Chemotherapy/Biotherapy    Chemotherapy has the potential to cause many side effects. The following are general precautions that chemo patients should take:   1. Practice good hand washing:    Use soap and water for at least 15 seconds, covering all areas of hands.  Always wash hands before eating.  Wash hands after contact with public surfaces such as door knobs and handles, shopping carts, telephones and elevator buttons. 2. Get plenty of rest:    You will likely experience fatigue three to five days following your treatment. It may last as long as seven days. 3. Drink plenty of fluids. Water is best.   4. Eat a well balanced diet:    Small frequent meals may help if you are having trouble with nausea or your appetite. Some people also do well with nutritional supplements. 5. Pace yourself with daily activities:    Take frequent breaks and ask for help if you need it. 6. Exercise is very important:    It will increase circulation and will help the fatigue. Do what you can each day. 7. If your regimen results in hair loss:    You will likely notice effects between two and three weeks following your first treatment. Some lose all hair while others only experience thinning. 8. Practice good oral hygiene:   Leonie pena M.D. immediately if any mouth sores or discomfort develop. 9. Protect yourself from the sun. Signs/Symptoms of an allergic reaction and/or some side effects may require immediate medical attention. Notify your physician if you develop one or more of the following:   Temperature of 100.4 degrees or greater;   Skin redness, itching, swelling, blistering, weeping, crusting, rash, or hives;    Wheezing, chest tightness, cough, or shortness of breath;   Swelling of the face, eyelids, lips, tongue, or throat;   Severe, persistent headache;   Stuffy nose, runny nose, sneezing;   Red (bloodshot), itchy, swollen, or watery eyes;   Stomach pain, nausea, vomiting, diarrhea, or bloody stools;   Mouth sores  Your physician should also be aware of the following symptoms:   Persistent and unresolved nausea and/or vomiting;   Persistent and unresolved diarrhea or constipation;   Numbness/tingling/burning of the extremities, including the fingers and toes; Bleeding or unexplained bruising; Unexplained redness/swelling/pain in the arms or legs; Shortness of breath or fatigue that worsens;   Pain with urination or blood in the urine; Chills;   Cough, especially a productive cough;   Mouth sores or a white coating of the tongue; Redness, swelling, pain or drainage at the port-a-cath or IV site; Increased feeling of bloating or water retention; Excessive weight loss or gain;   Ringing in the ears; Difficulty swallowing;   Dizziness, vertigo, lightheadedness, or fainting. Chemotherapy can cause birth defects. It is very important not to become pregnant or impregnate someone while undergoing chemotherapy. During chemotherapy and for 72 hours after chemotherapy, the drugs may still be in your urine and other body fluids. You should use the following precautions to reduce   exposure to others:  * Both men and women should sit on the toilet to cut down on splashing. Flush toilets twice with the lid down after use. Always wash your hands well with soap and water after using the toilet. You may want to use a separate toilet if possible. If this is not possible, wear gloves to clean the toilet seat after each use. * Caregivers should wear two pairs of disposable gloves to handle body wastes. Dispose of gloves after each use and wash hands with soap and warm water. *If you vomit into the toilet, clean off splashes and flush twice. If you vomit into a bucket or basin, carefully empty it into the toilet and flush twice. Wash out the bucket/basin with hot, soapy water, rinse it, and empty the wash and rinse water into the toilet and flush.   * Any sheets or clothes soiled with bodily fluids should be machine washed separately from other laundry. Wash twice with regular laundry detergent in hot water. If they cannot be washed right away they can be stored in a sealed plastic  bag.    * If disposable adult diapers, underwear, or sanitary pads are used, they can be sealed in 2 plastic bags, and thrown away with regular trash. Discharge Instructions       PATIENT ID: Bia Cochran  MRN: [id number]   YOB: 1990    DATE OF ADMISSION: 2/23/2022  2:42 PM    DATE OF DISCHARGE: 3/4/2022    PRIMARY CARE PROVIDER: Xochilt Barger MD     ATTENDING PHYSICIAN: Liss Newby MD  DISCHARGING PROVIDER: Christina Araya MD    To contact this individual call [phone] and ask the  to page. If unavailable ask to be transferred the Adult Hospitalist Department. DISCHARGE DIAGNOSES   Right pelvic/groin mass  Acute pulmonary embolism    CONSULTATIONS: IP CONSULT TO VASCULAR SURGERY  IP CONSULT TO HEMATOLOGY    PROCEDURES/SURGERIES: * No surgery found *    PENDING TEST RESULTS:   At the time of discharge the following test results are still pending:none    FOLLOW UP APPOINTMENTS:   Follow-up Information     Follow up With Specialties Details Why Contact Info    Xochilt Barger MD Internal Medicine   [address] [place] [address]      Saira Jackson MD Hematology and Oncology In 10 days  Reina Ortiz [place] [address]  [phone]             ADDITIONAL CARE RECOMMENDATIONS:  Oncology Dr Martin Flynn has called in rivaroxaban, prednisone, allopurinol to [facility] on [facility]  Please call Oncology Dr Olaf Nation office to see him in clinic in 10 days to discuss ancillary testing and make plans for cycle #2 as an outpatient. . After cycle #2, may refer to partners in [place] to make things more convenient for you in terms of location     Will need at least 3 months of anticoagulation  Continue with PICC until after cycle #2, then to discuss port placement thereafter    DIET: Regular Diet    ACTIVITY: Activity as tolerated    WOUND CARE: none    EQUIPMENT needed: none      DISCHARGE MEDICATIONS:   See Medication Reconciliation Form    · It is important that you take the medication exactly as they are prescribed. · Keep your medication in the bottles provided by the pharmacist and keep a list of the medication names, dosages, and times to be taken in your wallet. · Do not take other medications without consulting your doctor. NOTIFY YOUR PHYSICIAN FOR ANY OF THE FOLLOWING:   Fever over 101 degrees for 24 hours. Chest pain, shortness of breath, fever, chills, nausea, vomiting, diarrhea, change in mentation, falling, weakness, bleeding. Severe pain or pain not relieved by medications. Or, any other signs or symptoms that you may have questions about. DISPOSITION:  x  Home With:   OT  PT  HH  RN       SNF/Inpatient Rehab/LTAC    Independent/assisted living    Hospice    Other:     CDMP Checked:   Yes x     PROBLEM LIST Updated:  Yes x       Signed:   Manuel Gan MD  3/4/2022  11:14 AM            [address] March 4, 2022       RE: James George      To Whom It May Concern,    This is to certify that James George was hospitalized 2/23/2022-3/4/2022. He may may return to work on 3/14/2022 or sooner if cleared by his physician. Patient's significant other Karlie Pena was present at the hospital daily during the above time frame. Please excuse her from work for the above time period. Please feel free to contact the hospital if you have any questions or concerns. Thank you for your assistance in this matter.       Sincerely,  Manuel Gan MD

## 2022-03-04 NOTE — PROGRESS NOTES
Only one [facility] agency responded favorably to this referral for [facility] picc line care. 13 agencies declined referral due to being either non-par with his insurance or agencies did not service the patient's geographic area. Valentine Walters ([phone]) responded with questions re who would provide flushes? Was patient taught line care and how to administer flushes. Agency [facility] does not have flushes and will follow-up with patient on Monday. Pam, RN assigned RN assisted CM with conversation held with [facility].     Mindy Canavan, [address], [address]

## 2022-03-04 NOTE — PROGRESS NOTES
Patient ready to go home, RN advise the patient home health needs to be setup prior to discharge. Patient wants  to reach out to him through the phone to make arrangements for home health.  made aware of discharge before setting home health.   Kimberly Toscano RN

## 2022-03-04 NOTE — PROGRESS NOTES
ERIN:    RUR: 9%    Disposition:  Home with HH (SN)    CM was asked by Julio Cesar Holder RN to set up home health as patient is being d/;c'd today and needs [address] for PICC line Care. The patient's mom is here and he needs to leave now as she has to take him home [facility]) and has to go to work. CM awaiting HH orders. Patient has insurance [employer] [place] [employer]. Patient asked TONY Orozco to request that CM call home at home once services are set up. CM advised that home health will not visit daily for picc line care and flushes. Emily Jerze, [address], [address]    12:41p  Patient d/c'd.  CM awaiting orders. [address] denied as [facility] is out of service area. 2:13pm  Referrals sent to: At [address]), [facility], [facility], [facility], [address], [facility], [facility], [address] [facility] and [employer]. CM awaiting responses.     Emily Jerez, [address], [address]

## 2022-03-04 NOTE — PROGRESS NOTES
Hematology-Oncology Progress Note      Bony Ashraf  1990  [id number]  3/4/2022      Subjective:     Feeling well, tolerated CHOP yesterday. AF X 24. Has not used pain med in 3 days. Interested in going home. Allergies: Patient has no known allergies.   Current Facility-Administered Medications   Medication Dose Route Frequency Provider Last Rate Last Admin    alteplase (CATHFLO) 1 mg in sterile water (preservative free) 1 mL injection  1 mg InterCATHeter PRN Praveen Travis MD        predniSONE (DELTASONE) tablet 80 mg  80 mg Oral DAILY WITH BREAKFAST Thu Salamanca MD   80 mg at 03/04/22 0716    allopurinoL (ZYLOPRIM) tablet 300 mg  300 mg Oral DAILY Thu Salamanca MD   300 mg at 03/03/22 0959    albuterol-ipratropium (DUO-NEB) 2.5 MG-0.5 MG/3 ML  3 mL Nebulization Q4H PRN Andrew Valenzuela MD   3 mL at 03/03/22 1921    HYDROmorphone (DILAUDID) injection 1 mg  1 mg IntraVENous Q4H PRN Abdirahman MOLINA DO   1 mg at 03/02/22 1015    rivaroxaban (XARELTO) tablet 15 mg  15 mg Oral BID WITH MEALS Thu Salamanca MD   15 mg at 03/04/22 0716    sodium chloride (NS) flush 5-40 mL  5-40 mL IntraVENous Q8H Bhavna Tsai MD   10 mL at 03/03/22 2244    sodium chloride (NS) flush 5-40 mL  5-40 mL IntraVENous PRN Bhavna Tsai MD   10 mL at 03/02/22 1015    acetaminophen (TYLENOL) tablet 650 mg  650 mg Oral Q6H PRN Bhavna Tsai MD        Or    acetaminophen (TYLENOL) suppository 650 mg  650 mg Rectal Q6H PRN Eulalio XGEM LOZA MD        polyethylene glycol (MIRALAX) packet 17 g  17 g Oral DAILY PRN Eulalio [id number] MD DENIA        ondansetron (ZOFRAN ODT) tablet 4 mg  4 mg Oral Q8H PRN Eulalio [id number] LOZA MD        Or    ondansetron (ZOFRAN) injection 4 mg  4 mg IntraVENous Q6H PRN KASI Tsai MD        oxyCODONE-acetaminophen (PERCOCET) 5-325 mg per tablet 1 Tablet  1 Tablet Oral Q4H PRN Bryce Tsai MD   1 Tablet at 03/02/22 1333    arformoterol 15 mcg/budesonide 0.5 mg neb solution   Nebulization BID RT Dorys Moore MD   Given at 03/03/22 2145     Objective:     Patient Vitals for the past 24 hrs:   BP Temp Pulse Resp SpO2 Height Weight   03/04/22 0734 123/68 97.7 °F (36.5 °C) 80 16 99 % -- --   03/04/22 0203 125/73 98.5 °F (36.9 °C) 84 18 98 % -- --   03/03/22 2145 -- -- -- -- 98 % -- --   03/03/22 1931 123/72 98.3 °F (36.8 °C) 88 18 99 % -- --   03/03/22 1923 -- -- -- -- 98 % -- --   03/03/22 1508 135/83 98.3 °F (36.8 °C) 96 18 96 % -- --   03/03/22 1054 131/74 -- -- -- -- 5' 6\" (1.676 m) 114.3 kg (252 lb)   03/03/22 0839 131/74 98.5 °F (36.9 °C) 94 16 96 % -- --       Gen: NAD  HEENT: PERRL, Sclerae anicteric  Cv: RRR without m/r/g  Pulm: CTA bilaterally  Abd: NABS, NTND, No HSM  Ext: right leg edema no worse.      Available labs reviewed:  Labs:    Recent Results (from the past 24 hour(s))   ECHO ADULT COMPLETE    Collection Time: 03/03/22 11:09 AM   Result Value Ref Range    IVSd 0.8 0.6 - 1.0 cm    LVIDd 4.7 4.2 - 5.9 cm    LVIDs 3.3 cm    LVOT Diameter 2.1 cm    LVPWd 0.9 0.6 - 1.0 cm    Global Longitudinal Strain -14.9 %    LVOT Peak Gradient 7 mmHg    LVOT Peak Velocity 1.3 m/s    RVIDd 3.2 cm    LA Diameter 3.2 cm    Est. RA Pressure 3 mmHg    AV Area by Peak Velocity 3.1 cm2    AV Area by Peak Velocity 3.1 cm2    AV Peak Gradient 9 mmHg    AV Peak Velocity 1.5 m/s    MV A Velocity 0.75 m/s    MV E Wave Deceleration Time 109.0 ms    MV E Velocity 0.84 m/s    LV E' Lateral Velocity 13 cm/s    LV E' Septal Velocity 9 cm/s    MV PHT 31.6 ms    MV Area by PHT 7.0 cm2    PV Peak Gradient 3 mmHg    PV Max Velocity 0.9 m/s    Aortic Root 3.6 cm    Fractional Shortening 2D 30 28 - 44 %    LVIDd Index 2.13 cm/m2    LVIDs Index 1.49 cm/m2    LV RWT Ratio 0.38     LV Mass 2D 132.3 88 - 224 g    LV Mass 2D Index 59.9 49 - 115 g/m2    MV E/A 1.12     E/E' Ratio (Averaged) 7.90     E/E' Lateral 6.46     E/E' Septal 9.33     LVOT Area 3.5 cm2    LA Size Index 1.45 cm/m2    LA/AO Root Ratio 0.89     Ao Root Index 1.63 cm/m2    AV Velocity Ratio 0.87          Assessment and Plan     26 y/o man with newly diagnosed DLBCL stage II (myc, bcl2, bcl6, cytogenetic/FISH pending), BM biopsy negative, nearly complete with rituximab, for CHOP today.      1. DLBCL: LVEF 65% by echo. I have called in rivaroxaban, prednisone, allopurinol to [facility] and have asked him to call that pharmacy before he leaves to discuss how much it would cost him out of pocket. If not acceptable, I've asked him to speak with social work before leaving. I have asked him to call me office to see me in clinic in 10 days to discuss ancillary testing and make plans for cycle #2 as an outpatient with me. After cycle #2, will offer all additional treatments with one of my partners in [place] to make things more convenient to him.      2. PE: as above. Will need at least 3 months of anticoagulation. If he has a CR, can stop. If he has primary refractory disease, will need to continue. Continue with PICC until after cycle #2, then discuss port placement. Reluctant to interrupt anticoag so soon after PE. Case d/w wife at bedside, questions answered. Thank you for allowing us to participate in the care of this very pleasant patient.     Luis Robles MD  Hematology/Oncology  Phone [phone]

## 2022-03-04 NOTE — PROGRESS NOTES
Problem: Pressure Injury - Risk of  Goal: *Prevention of pressure injury  Description: Document Valentino Scale and appropriate interventions in the flowsheet. 3/3/2022 2336 by Erica Fernandez RN  Outcome: Progressing Towards Goal  Note: Pressure Injury Interventions:  Sensory Interventions: Float heels,Assess changes in LOC    Moisture Interventions: Absorbent underpads    Activity Interventions: Increase time out of bed    Mobility Interventions: Float heels,HOB 30 degrees or less    Nutrition Interventions: Document food/fluid/supplement intake,Offer support with meals,snacks and hydration    Friction and Shear Interventions: Apply protective barrier, creams and emollients             3/3/2022 2336 by Erica Fernandez RN  Outcome: Progressing Towards Goal  Note: Pressure Injury Interventions:  Sensory Interventions: Float heels,Assess changes in LOC    Moisture Interventions: Absorbent underpads    Activity Interventions: Increase time out of bed    Mobility Interventions: Float heels,HOB 30 degrees or less    Nutrition Interventions: Document food/fluid/supplement intake,Offer support with meals,snacks and hydration    Friction and Shear Interventions: Apply protective barrier, creams and emollients                Problem: Patient Education: Go to Patient Education Activity  Goal: Patient/Family Education  3/3/2022 2336 by Erica Fernandez RN  Outcome: Progressing Towards Goal  3/3/2022 2336 by Erica Fernandez RN  Outcome: Progressing Towards Goal     Problem: Falls - Risk of  Goal: *Absence of Falls  Description: Document Liu Fall Risk and appropriate interventions in the flowsheet.   3/3/2022 2336 by Erica Fernandez RN  Outcome: Progressing Towards Goal  Note: Fall Risk Interventions:            Medication Interventions: Evaluate medications/consider consulting pharmacy,Assess postural VS orthostatic hypotension,Patient to call before getting OOB,Teach patient to arise slowly                3/3/2022 2336 by Merna Pepe Lawrence Keller RN  Outcome: Progressing Towards Goal  Note: Fall Risk Interventions:            Medication Interventions: Evaluate medications/consider consulting pharmacy,Assess postural VS orthostatic hypotension,Patient to call before getting OOB,Teach patient to arise slowly                   Problem: Patient Education: Go to Patient Education Activity  Goal: Patient/Family Education  3/3/2022 2336 by Beth Duvall, RN  Outcome: Progressing Towards Goal  3/3/2022 2336 by Beth Duvall, RN  Outcome: Progressing Towards Goal

## 2022-03-04 NOTE — DISCHARGE SUMMARY
Discharge Summary       PATIENT ID: Belén Campos  MRN: [id number]   YOB: 1990    DATE OF ADMISSION: 2/23/2022  2:42 PM    DATE OF DISCHARGE: 3/4/2022  PRIMARY CARE PROVIDER: Chloe Sandoval MD     ATTENDING PHYSICIAN: Joyce Yin MD  DISCHARGING PROVIDER: Joyce Yin MD    To contact this individual call [phone] and ask the  to page. If unavailable ask to be transferred the Adult Hospitalist Department. CONSULTATIONS: IP CONSULT TO VASCULAR SURGERY  IP CONSULT TO HEMATOLOGY    PROCEDURES/SURGERIES: * No surgery found *    ADMITTING DIAGNOSES & HOSPITAL COURSE:   HPI: This 65-year-old gentleman presented to [facility] on 2/22 with right leg swelling and shortness of breath. On work-up he was found to have PE, hemodynamically stable and right groin and pelvic mass. Patient was to be transferred to [facility] surgical oncology services however [facility] was on diversion so he was transferred to [facility]. I saw him in room 515/2, his girlfriend present. He said the right thigh pain started about 2 months ago. From what he describes he was initially treated with diuretics. The pain and swelling progressed to the point where he could not walk so he presented to the ED yesterday and work-up revealed the above listed problems. DISCHARGE DIAGNOSES / PLAN:      Right pelvic and groin upper thigh mass  -CT angiogram of the abdomen on 2/11 showed large right pelvic sidewall and right groin/upper thigh mass most consistent with underlying malignancy it is indicated that this mass was visualized in the duplex ultrasound in January 26, 2022.  There is also subcutaneous edema involving the right lower extremity with suspicion of possible occlusion of the right external iliac vein.  There were also lower abdominal periaortic lymphadenopathy, moderate left groin lymphadenopathy.   -s/p FNA biopsy 2/24, Bone marrow biopsy completed 2/28  -Hematology/oncology consulted and following   -Rituximab, prednisone 3/2  -s/p CHOP 3/3, PICC placed 3/3  -Adriamycin cycle #1 on 3/3. To f/u Onc Dr Elizabeth Aguilar 2 wks with plan for Adriamycin cycle #2 in 3 wks  -Pain management     Acute bilateral pulmonary embolism, hemodynamically stable, not hypoxic:  -CT with bilateral PE- right lower lobe, left upper lobe/lingula, left lower lobe. No evidence of right heart strain.    -Suspect he has right lower extremity DVT due to obstructive mass that resulted in PE  -Doppler ultrasound on 1/26/2022 was negative for DVT but this was about a month ago.  -s/p Heparin drip. Now on Xarelto  -Monitor on remote telemetry     History of asthma without bronchospasm:  Inhaled steroids, as needed bronchodilators       PENDING TEST RESULTS:   At the time of discharge the following test results are still pending: none    FOLLOW UP APPOINTMENTS:    Follow-up Information     Follow up With Specialties Details Why Contact Info    Samson Sanches MD Internal Medicine   [address] [place] [address]      Clemente Sharif MD Hematology and Oncology In 10 days  [address]  [place] [id number] [phone]             ADDITIONAL CARE RECOMMENDATIONS:  Oncology Dr Elizabeth Aguilar has called in rivaroxaban, prednisone, allopurinol to [facility] on [facility]  Please call Oncology Dr Ambrocio Aleman office to see him in clinic in 10 days to discuss ancillary testing and make plans for cycle #2 as an outpatient. . After cycle #2, may refer to [facility] in [place] to make things more convenient for you in terms of location     Will need at least 3 months of anticoagulation  Continue with PICC until after cycle #2, then to discuss port placement thereafter    DIET: Regular Diet    ACTIVITY: Activity as tolerated    WOUND CARE: none    EQUIPMENT needed: none          DISCHARGE MEDICATIONS:  Current Discharge Medication List      START taking these medications    Details   allopurinoL (ZYLOPRIM) 300 mg tablet Take 1 Tablet by mouth daily. Qty: 30 Tablet, Refills: 0  Start date: 3/5/2022      predniSONE (DELTASONE) 20 mg tablet Take 80 mg by mouth daily (with breakfast) for 3 doses. Indications: diffuse large B-cell lymphoma, Days 1-5 of Cycle 1 (R-CHOP)  Qty: 30 Tablet, Refills: 0  Start date: 3/5/2022, End date: 3/8/2022      rivaroxaban (XARELTO) 15 mg tab tablet Take 1 Tablet by mouth two (2) times daily (with meals) for 36 doses. Qty: 36 Tablet, Refills: 0  Start date: 3/4/2022, End date: 3/22/2022         CONTINUE these medications which have CHANGED    Details   oxyCODONE-acetaminophen (PERCOCET) 5-325 mg per tablet Take 1 Tablet by mouth every eight (8) hours as needed for Pain for up to 12 days. Max Daily Amount: 3 Tablets. Qty: 30 Tablet, Refills: 0  Start date: 3/4/2022, End date: 3/16/2022    Associated Diagnoses: Right inguinal pain         CONTINUE these medications which have NOT CHANGED    Details   albuterol (ACCUNEB) 0.63 mg/3 mL nebulizer solution 3 mL by Nebulization route as needed. ProAir HFA 90 mcg/actuation inhaler Take 2 Puffs by inhalation every six (6) hours as needed. torsemide (DEMADEX) 10 mg tablet Take 1 Tablet by mouth daily. Symbicort 160-4.5 mcg/actuation HFAA Take 2 Puffs by inhalation daily. STOP taking these medications       potassium chloride SA (MICRO-K) 10 mEq capsule Comments:   Reason for Stopping:                 NOTIFY YOUR PHYSICIAN FOR ANY OF THE FOLLOWING:   Fever over 101 degrees for 24 hours. Chest pain, shortness of breath, fever, chills, nausea, vomiting, diarrhea, change in mentation, falling, weakness, bleeding. Severe pain or pain not relieved by medications. Or, any other signs or symptoms that you may have questions about.     DISPOSITION:  x  Home With:   OT  PT x HH  RN       Long term SNF/Inpatient Rehab    Independent/assisted living    Hospice    Other:       PATIENT CONDITION AT DISCHARGE:     Functional status    Poor Deconditioned    x Independent      Cognition   x  Lucid     Forgetful     Dementia      Catheters/lines (plus indication)    [place]     PICC     PEG    x None      Code status   x  Full code     DNR      PHYSICAL EXAMINATION AT DISCHARGE:     Constitutional:  No acute distress, cooperative, pleasant    ENT:  Oral mucosa moist, oropharynx benign. Resp:  CTA bilaterally. No wheezing/rhonchi/rales. No accessory muscle use. CV:  Regular rhythm, tachycardic, no murmurs, gallops, rubs    GI:  Soft, non distended, non tender. normoactive bowel sounds, no hepatosplenomegaly     Musculoskeletal:  right lower extremity edema +++, non-pitting. Right apparent groin mass     Neurologic:  Moves all extremities           CHRONIC MEDICAL DIAGNOSES:  Problem List as of 3/4/2022 Date Reviewed: 2/17/2022          Codes Class Noted - Resolved    Non Hodgkin's lymphoma ([facility] ICD-10-CM: C85.90  ICD-9-CM: 202.80  3/1/2022 - Present        Pelvic mass ICD-10-CM: R19.00  ICD-9-CM: 789.30  2/23/2022 - Present        Bilateral pulmonary embolism ([facility] ICD-10-CM: I26.99  ICD-9-CM: 415.19  2/23/2022 - Present        Pulmonary embolism ([facility] ICD-10-CM: I26.99  ICD-9-CM: 415.19  2/22/2022 - Present        Right inguinal pain ICD-10-CM: R10.31  ICD-9-CM: 789.03  2/17/2022 - Present        Right groin mass ICD-10-CM: R19.09  ICD-9-CM: 789.39  2/11/2022 - Present        Fall ICD-10-CM: [place]. Torey Servando  ICD-9-CM: C437.5  2/11/2022 - Present              Greater than 35 minutes were spent with the patient on counseling and coordination of care    Signed:   Chastity Sosa MD  3/4/2022  11:23 AM

## 2022-03-18 PROBLEM — R10.31 RIGHT INGUINAL PAIN: Status: ACTIVE | Noted: 2022-02-17
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
## 2022-03-19 ENCOUNTER — APPOINTMENT (OUTPATIENT)
Dept: CT IMAGING | Age: 32
End: 2022-03-19
Attending: STUDENT IN AN ORGANIZED HEALTH CARE EDUCATION/TRAINING PROGRAM
Payer: MEDICAID

## 2022-03-19 ENCOUNTER — HOSPITAL ENCOUNTER (EMERGENCY)
Age: 32
Discharge: HOME | End: 2022-03-19
Attending: STUDENT IN AN ORGANIZED HEALTH CARE EDUCATION/TRAINING PROGRAM
Payer: MEDICAID

## 2022-03-19 VITALS
HEART RATE: 116 BPM | BODY MASS INDEX: 40.18 KG/M2 | WEIGHT: 250 LBS | SYSTOLIC BLOOD PRESSURE: 148 MMHG | OXYGEN SATURATION: 99 % | HEIGHT: 66 IN | TEMPERATURE: 100.3 F | DIASTOLIC BLOOD PRESSURE: 77 MMHG | RESPIRATION RATE: 18 BRPM

## 2022-03-19 DIAGNOSIS — L03.113 CELLULITIS OF RIGHT UPPER EXTREMITY: Primary | ICD-10-CM

## 2022-03-19 PROBLEM — R19.09 RIGHT GROIN MASS: Status: ACTIVE | Noted: 2022-02-11

## 2022-03-19 PROBLEM — I26.99 BILATERAL PULMONARY EMBOLISM (HCC): Status: ACTIVE | Noted: 2022-02-23

## 2022-03-19 PROBLEM — I26.99 PULMONARY EMBOLISM (HCC): Status: ACTIVE | Noted: 2022-02-22

## 2022-03-19 PROBLEM — R19.00 PELVIC MASS: Status: ACTIVE | Noted: 2022-02-23

## 2022-03-19 LAB
ALBUMIN SERPL-MCNC: 2.8 G/DL (ref 3.5–5)
ALBUMIN/GLOB SERPL: 0.6 {RATIO} (ref 1.1–2.2)
ALP SERPL-CCNC: 262 U/L (ref 45–117)
ALT SERPL-CCNC: 130 U/L (ref 12–78)
ANION GAP SERPL CALC-SCNC: 2 MMOL/L (ref 5–15)
APPEARANCE UR: CLEAR
AST SERPL W P-5'-P-CCNC: ABNORMAL U/L (ref 15–37)
BACTERIA URNS QL MICRO: NEGATIVE /HPF
BACTERIA URNS QL MICRO: NEGATIVE /HPF
BASOPHILS # BLD: 0.1 K/UL (ref 0–0.1)
BASOPHILS NFR BLD: 1 % (ref 0–1)
BILIRUB SERPL-MCNC: 1.3 MG/DL (ref 0.2–1)
BILIRUB UR QL: NEGATIVE
BUN SERPL-MCNC: 8 MG/DL (ref 6–20)
BUN/CREAT SERPL: 8 (ref 12–20)
CA-I BLD-MCNC: 8.6 MG/DL (ref 8.5–10.1)
CHLORIDE SERPL-SCNC: 104 MMOL/L (ref 97–108)
CO2 SERPL-SCNC: 25 MMOL/L (ref 21–32)
COLOR UR: ABNORMAL
CREAT SERPL-MCNC: 1.01 MG/DL (ref 0.7–1.3)
DIFFERENTIAL METHOD BLD: ABNORMAL
EOSINOPHIL # BLD: 0 K/UL (ref 0–0.4)
EOSINOPHIL NFR BLD: 0 % (ref 0–7)
ERYTHROCYTE [DISTWIDTH] IN BLOOD BY AUTOMATED COUNT: 14.2 % (ref 11.5–14.5)
GLOBULIN SER CALC-MCNC: 4.4 G/DL (ref 2–4)
GLUCOSE SERPL-MCNC: 128 MG/DL (ref 65–100)
GLUCOSE UR STRIP.AUTO-MCNC: NEGATIVE MG/DL
HCT VFR BLD AUTO: 31.1 % (ref 36.6–50.3)
HGB BLD-MCNC: 10.2 G/DL (ref 12.1–17)
HGB UR QL STRIP: NEGATIVE
IMM GRANULOCYTES # BLD AUTO: 0 K/UL
IMM GRANULOCYTES NFR BLD AUTO: 0 %
KETONES UR QL STRIP.AUTO: 20 MG/DL
LACTATE SERPL-SCNC: 0.6 MMOL/L (ref 0.4–2)
LEUKOCYTE ESTERASE UR QL STRIP.AUTO: NEGATIVE
LYMPHOCYTES # BLD: 0.6 K/UL (ref 0.8–3.5)
LYMPHOCYTES NFR BLD: 12 % (ref 12–49)
MCH RBC QN AUTO: 29.5 PG (ref 26–34)
MCHC RBC AUTO-ENTMCNC: 32.8 G/DL (ref 30–36.5)
MCV RBC AUTO: 89.9 FL (ref 80–99)
METAMYELOCYTES NFR BLD MANUAL: 2 %
MONOCYTES # BLD: 1 K/UL (ref 0–1)
MONOCYTES NFR BLD: 18 % (ref 5–13)
MUCOUS THREADS URNS QL MICRO: ABNORMAL /LPF
MUCOUS THREADS URNS QL MICRO: ABNORMAL /LPF
MYELOCYTES NFR BLD MANUAL: 2 %
NEUTS BAND NFR BLD MANUAL: 10 % (ref 0–6)
NEUTS SEG # BLD: 3.4 K/UL (ref 1.8–8)
NEUTS SEG NFR BLD: 55 % (ref 32–75)
NITRITE UR QL STRIP.AUTO: NEGATIVE
NRBC # BLD: 0.03 K/UL (ref 0–0.01)
NRBC BLD-RTO: 0.6 PER 100 WBC
PH UR STRIP: 6 [PH] (ref 5–8)
PLATELET # BLD AUTO: 290 K/UL (ref 150–400)
PMV BLD AUTO: 10 FL (ref 8.9–12.9)
POTASSIUM SERPL-SCNC: ABNORMAL MMOL/L (ref 3.5–5.1)
PROT SERPL-MCNC: 7.2 G/DL (ref 6.4–8.2)
PROT UR STRIP-MCNC: 30 MG/DL
RBC # BLD AUTO: 3.46 M/UL (ref 4.1–5.7)
RBC #/AREA URNS HPF: ABNORMAL /HPF (ref 0–5)
RBC #/AREA URNS HPF: ABNORMAL /HPF (ref 0–5)
RBC MORPH BLD: ABNORMAL
SODIUM SERPL-SCNC: 131 MMOL/L (ref 136–145)
SP GR UR REFRACTOMETRY: >1.03 (ref 1–1.03)
UROBILINOGEN UR QL STRIP.AUTO: 4 EU/DL (ref 0.1–1)
WBC # BLD AUTO: 5.3 K/UL (ref 4.1–11.1)
WBC URNS QL MICRO: ABNORMAL /HPF (ref 0–4)
WBC URNS QL MICRO: ABNORMAL /HPF (ref 0–4)

## 2022-03-19 PROCEDURE — 73201 CT UPPER EXTREMITY W/DYE: CPT

## 2022-03-19 PROCEDURE — 80053 COMPREHEN METABOLIC PANEL: CPT

## 2022-03-19 PROCEDURE — 96374 THER/PROPH/DIAG INJ IV PUSH: CPT

## 2022-03-19 PROCEDURE — 74011000258 HC RX REV CODE- 258: Performed by: STUDENT IN AN ORGANIZED HEALTH CARE EDUCATION/TRAINING PROGRAM

## 2022-03-19 PROCEDURE — 83605 ASSAY OF LACTIC ACID: CPT

## 2022-03-19 PROCEDURE — 74011000636 HC RX REV CODE- 636: Performed by: STUDENT IN AN ORGANIZED HEALTH CARE EDUCATION/TRAINING PROGRAM

## 2022-03-19 PROCEDURE — 96361 HYDRATE IV INFUSION ADD-ON: CPT

## 2022-03-19 PROCEDURE — 74011250637 HC RX REV CODE- 250/637: Performed by: EMERGENCY MEDICINE

## 2022-03-19 PROCEDURE — 81001 URINALYSIS AUTO W/SCOPE: CPT

## 2022-03-19 PROCEDURE — 99285 EMERGENCY DEPT VISIT HI MDM: CPT

## 2022-03-19 PROCEDURE — 87186 SC STD MICRODIL/AGAR DIL: CPT

## 2022-03-19 PROCEDURE — 87077 CULTURE AEROBIC IDENTIFY: CPT

## 2022-03-19 PROCEDURE — 74011250636 HC RX REV CODE- 250/636: Performed by: STUDENT IN AN ORGANIZED HEALTH CARE EDUCATION/TRAINING PROGRAM

## 2022-03-19 PROCEDURE — 85025 COMPLETE CBC W/AUTO DIFF WBC: CPT

## 2022-03-19 PROCEDURE — 87040 BLOOD CULTURE FOR BACTERIA: CPT

## 2022-03-19 PROCEDURE — 87205 SMEAR GRAM STAIN: CPT

## 2022-03-19 PROCEDURE — 87147 CULTURE TYPE IMMUNOLOGIC: CPT

## 2022-03-19 PROCEDURE — 36415 COLL VENOUS BLD VENIPUNCTURE: CPT

## 2022-03-19 PROCEDURE — 74011250637 HC RX REV CODE- 250/637: Performed by: STUDENT IN AN ORGANIZED HEALTH CARE EDUCATION/TRAINING PROGRAM

## 2022-03-19 RX ORDER — IBUPROFEN 800 MG/1
800 TABLET ORAL
Status: COMPLETED | OUTPATIENT
Start: 2022-03-19 | End: 2022-03-19

## 2022-03-19 RX ORDER — CEPHALEXIN 500 MG/1
500 CAPSULE ORAL
Status: DISCONTINUED | OUTPATIENT
Start: 2022-03-19 | End: 2022-03-19

## 2022-03-19 RX ORDER — ACETAMINOPHEN 500 MG
1000 TABLET ORAL
Status: COMPLETED | OUTPATIENT
Start: 2022-03-19 | End: 2022-03-19

## 2022-03-19 RX ORDER — DOXYCYCLINE HYCLATE 100 MG
100 TABLET ORAL 2 TIMES DAILY
Qty: 14 TABLET | Refills: 0 | Status: SHIPPED | OUTPATIENT
Start: 2022-03-19 | End: 2022-03-19

## 2022-03-19 RX ORDER — DOXYCYCLINE 100 MG/1
100 CAPSULE ORAL
Status: DISCONTINUED | OUTPATIENT
Start: 2022-03-19 | End: 2022-03-19

## 2022-03-19 RX ORDER — DOXYCYCLINE 100 MG/1
100 TABLET ORAL 2 TIMES DAILY
Qty: 14 TABLET | Refills: 0 | Status: SHIPPED | OUTPATIENT
Start: 2022-03-19 | End: 2022-03-26

## 2022-03-19 RX ADMIN — IOPAMIDOL 100 ML: 755 INJECTION, SOLUTION INTRAVENOUS at 07:11

## 2022-03-19 RX ADMIN — SODIUM CHLORIDE 1000 ML: 9 INJECTION, SOLUTION INTRAVENOUS at 05:39

## 2022-03-19 RX ADMIN — IBUPROFEN 800 MG: 800 TABLET, FILM COATED ORAL at 12:58

## 2022-03-19 RX ADMIN — ACETAMINOPHEN 1000 MG: 500 TABLET ORAL at 11:18

## 2022-03-19 RX ADMIN — DOXYCYCLINE 100 MG: 100 INJECTION, POWDER, LYOPHILIZED, FOR SOLUTION INTRAVENOUS at 09:29

## 2022-03-19 RX ADMIN — SODIUM CHLORIDE 500 ML: 9 INJECTION, SOLUTION INTRAVENOUS at 09:29

## 2022-03-19 RX ADMIN — ACETAMINOPHEN 1000 MG: 500 TABLET ORAL at 05:40

## 2022-03-19 NOTE — ED PROVIDER NOTES
EMERGENCY DEPARTMENT HISTORY AND PHYSICAL EXAM      Date: 3/19/2022  Patient Name: Roberto Stoddard      History of Presenting Illness     Chief Complaint   Patient presents with    Fever    Peripheral Edema       History Provided By: Patient    HPI: Roberto Stoddard, 27 y.o. male with H of B-cell lymphoma, HTN and asthma presents to the ED with 1 day history of swelling in the right upper extremity. He reports that he had a PICC line placed for chemotherapy. He did receive 1 cycle of chemotherapy at this point. He had some mild fever earlier today and has discomfort and swelling in the right upper extremity. Patient is denying any other symptoms at this point. There are no other complaints, changes, or physical findings at this time. PCP: Dot Church MD    Current Facility-Administered Medications   Medication Dose Route Frequency Provider Last Rate Last Admin    sodium chloride 0.9 % bolus infusion 500 mL  500 mL IntraVENous ONCE Wan Paniagua MD        doxycycline (VIBRAMYCIN) capsule 100 mg  100 mg Oral NOW Wan Paniagua MD         Current Outpatient Medications   Medication Sig Dispense Refill    doxycycline (VIBRA-TABS) 100 mg tablet Take 1 Tablet by mouth two (2) times a day for 7 days. 14 Tablet 0    allopurinoL (ZYLOPRIM) 300 mg tablet Take 1 Tablet by mouth daily. 30 Tablet 0    rivaroxaban (XARELTO) 15 mg tab tablet Take 1 Tablet by mouth two (2) times daily (with meals) for 36 doses. 36 Tablet 0    albuterol (ACCUNEB) 0.63 mg/3 mL nebulizer solution 3 mL by Nebulization route as needed.  ProAir HFA 90 mcg/actuation inhaler Take 2 Puffs by inhalation every six (6) hours as needed.  torsemide (DEMADEX) 10 mg tablet Take 1 Tablet by mouth daily. (Patient not taking: Reported on 2/22/2022)      Symbicort 160-4.5 mcg/actuation HFAA Take 2 Puffs by inhalation daily.          Past History     Past Medical History:  Past Medical History:   Diagnosis Date    Asthma  Hypertension     Lymphoma ([facility]        Past Surgical History:  No past surgical history on file. Family History:  Family History   Problem Relation Age of Onset    Diabetes Mother     Hypertension Mother     Myasthenia Gravis Maternal Grandmother     Cancer Maternal Grandfather        Social History:  Social History     Tobacco Use    Smoking status: Former Smoker     Packs/day: 0.50     Years: 2.00     Pack years: 1.00    Smokeless tobacco: Never Used   Vaping Use    Vaping Use: Never used   Substance Use Topics    Alcohol use: Yes    Drug use: Not Currently     Types: Marijuana       Allergies:  No Known Allergies      Review of Systems     Review of Systems    A 10 point review of system was performed and was negative except as noted above in HPI    Physical Exam     Physical Exam  Vitals and nursing note reviewed. Constitutional:       General: He is not in acute distress. Appearance: He is not ill-appearing, toxic-appearing or diaphoretic. HENT:      Head: Normocephalic and atraumatic. Cardiovascular:      Rate and Rhythm: Normal rate and regular rhythm. Heart sounds: Normal heart sounds. Pulmonary:      Effort: Pulmonary effort is normal.      Breath sounds: Normal breath sounds. Abdominal:      Palpations: Abdomen is soft. Tenderness: There is no abdominal tenderness. Musculoskeletal:      Cervical back: Normal range of motion and neck supple. Right lower leg: No tenderness. Left lower leg: No tenderness. Skin:     General: Skin is warm and dry. Comments: Soft tissue swelling of the upper extremity on the right with mild tenderness to palpation without fluctuance or significant erythema noted. PICC line site was examined and there is no drainage. Neurological:      Mental Status: He is alert and oriented to person, place, and time.          Lab and Diagnostic Study Results     Labs -     Recent Results (from the past 12 hour(s))   CBC WITH AUTOMATED DIFF    Collection Time: 03/19/22  5:20 AM   Result Value Ref Range    WBC 5.3 4.1 - 11.1 K/uL    RBC 3.46 (L) 4.10 - 5.70 M/uL    HGB 10.2 (L) 12.1 - 17.0 g/dL    HCT 31.1 (L) 36.6 - 50.3 %    MCV 89.9 80.0 - 99.0 FL    MCH 29.5 26.0 - 34.0 PG    MCHC 32.8 30.0 - 36.5 g/dL    RDW 14.2 11.5 - 14.5 %    PLATELET 364 982 - 680 K/uL    MPV 10.0 8.9 - 12.9 FL    NRBC 0.6 (H) 0.0  WBC    ABSOLUTE NRBC 0.03 (H) 0.00 - 0.01 K/uL    NEUTROPHILS 55 32 - 75 %    BAND NEUTROPHILS 10 (H) 0 - 6 %    LYMPHOCYTES 12 12 - 49 %    MONOCYTES 18 (H) 5 - 13 %    EOSINOPHILS 0 0 - 7 %    BASOPHILS 1 0 - 1 %    METAMYELOCYTES 2 (H) 0 %    MYELOCYTES 2 (H) 0 %    IMMATURE GRANULOCYTES 0 %    ABS. NEUTROPHILS 3.4 1.8 - 8.0 K/UL    ABS. LYMPHOCYTES 0.6 (L) 0.8 - 3.5 K/UL    ABS. MONOCYTES 1.0 0.0 - 1.0 K/UL    ABS. EOSINOPHILS 0.0 0.0 - 0.4 K/UL    ABS. BASOPHILS 0.1 0.0 - 0.1 K/UL    ABS. IMM. GRANS. 0.0 K/UL    DF Manual      RBC COMMENTS Normocytic, Normochromic     METABOLIC PANEL, COMPREHENSIVE    Collection Time: 03/19/22  5:20 AM   Result Value Ref Range    Sodium 131 (L) 136 - 145 mmol/L    Potassium Hemolyzed, recollect requested 3.5 - 5.1 mmol/L    Chloride 104 97 - 108 mmol/L    CO2 25 21 - 32 mmol/L    Anion gap 2 (L) 5 - 15 mmol/L    Glucose 128 (H) 65 - 100 mg/dL    BUN 8 6 - 20 mg/dL    Creatinine 1.01 0.70 - 1.30 mg/dL    BUN/Creatinine ratio 8 (L) 12 - 20      GFR est AA >60 >60 ml/min/1.73m2    GFR est non-AA >60 >60 ml/min/1.73m2    Calcium 8.6 8.5 - 10.1 mg/dL    Bilirubin, total 1.3 (H) 0.2 - 1.0 mg/dL    AST (SGOT) Hemolyzed, recollect requested 15 - 37 U/L    ALT (SGPT) 130 (H) 12 - 78 U/L    Alk.  phosphatase 262 (H) 45 - 117 U/L    Protein, total 7.2 6.4 - 8.2 g/dL    Albumin 2.8 (L) 3.5 - 5.0 g/dL    Globulin 4.4 (H) 2.0 - 4.0 g/dL    A-G Ratio 0.6 (L) 1.1 - 2.2     LACTIC ACID    Collection Time: 03/19/22  5:20 AM   Result Value Ref Range    Lactic acid 0.6 0.4 - 2.0 mmol/L       Radiologic Studies -   [unfilled]  CT Results  (Last 48 hours)               03/19/22 0710  CT UP EXT RT W CONT Final result    Impression:  Right upper extremity cellulitis without evidence of surgically drainable fluid   collection. Narrative:  Technique: Axial images were obtained through the right upper extremity after   the administration of intravenous contrast. Coronal and sagittal reformatted   images were generated. Comment on dose reduction: All CT scans at this facility are performed using   dose reduction optimization technique as appropriate to perform the exam   including the following; automated exposure control, adjustments of the mA   and/or kV according to patient size, or use of iterative reconstructed   technique. FINDINGS:    image demonstrates a right PICC line which terminates near the cavoatrial   junction. Stranding noted in the subcutaneous fat throughout the medial aspect distal   third arm as well as rather diffusely throughout the forearm. Imaging features   most consistent with a cellulitis. Examination negative for surgically drainable   fluid collection. The included skeleton is intact. Portion of patient's right lower abdomen is captured on this examination and   demonstrates there are known lymphadenopathy and fluid collection in the right   inguinal soft tissues. CXR Results  (Last 48 hours)    None          Medical Decision Making and ED Course   - I am the first and primary provider for this patient AND AM THE PRIMARY PROVIDER OF RECORD. - I reviewed the vital signs, available nursing notes, past medical history, past surgical history, family history and social history. - Initial assessment performed. The patients presenting problems have been discussed, and the staff are in agreement with the care plan formulated and outlined with them.   I have encouraged them to ask questions as they arise throughout their visit.    Vital Signs-Reviewed the patient's vital signs. Patient Vitals for the past 24 hrs:   Temp Pulse Resp BP SpO2   03/19/22 0744 98.9 °F (37.2 °C) 90 20 120/60 99 %   03/19/22 0644 98.3 °F (36.8 °C) (!) 102 20 122/63 98 %   03/19/22 0424 (!) 100.7 °F (38.2 °C) (!) 114 22 128/81 98 %       Records Reviewed: Nursing Notes    Provider Notes (Medical Decision Making):     Patient presents to the ED with pain and swelling of the right upper extremity. Symptoms appear to be consistent with cellulitis. Concern for deep tissue infection given that patient is has a PICC line. Parents obtain CBC, chemistry, cultures, lactate, and will intravenously rehydrate the patient and give antipyretics. ED Course:     Work-up in the ED revealed normal CBC. No evidence of acute kidney injury. Lactate is also within normal.  Blood culture still in process. CT showed cellulitis without evidence of deep tissue infection. Thus, I will discharge the patient with doxycycline and Keflex. I did culture the PICC line and sent another set of blood cultures and the patient will be called with the results grow any organisms. In the meanwhile, I think he is getting appropriate coverage with doxycycline. Additionally, we did note that the PICC line dressing has not been changed. The PICC line dressing was removed and examined. There is small amount of drainage. The soft tissue around the PICC line showed significant tenderness. Thus, patient will be given a dose of IV antibiotics and will be discharged with oral antibiotics to follow-up as an outpatient. He is instructed to come back to the ED if symptoms worsen or develop new concerns. Patient and significant other verbalized understanding of their come back to the if there are any concerns. Disposition     Disposition: Condition stable and improved  DC- Adult Discharges: All of the diagnostic tests were reviewed and questions answered.  Diagnosis, care plan and treatment options were discussed. The patient understands the instructions and will follow up as directed. The patients results have been reviewed with them. They have been counseled regarding their diagnosis. The patient verbally convey understanding and agreement of the signs, symptoms, diagnosis, treatment and prognosis and additionally agrees to follow up as recommended with their PCP in 24 - 48 hours. They also agree with the care-plan and convey that all of their questions have been answered. I have also put together some discharge instructions for them that include: 1) educational information regarding their diagnosis, 2) how to care for their diagnosis at home, as well a 3) list of reasons why they would want to return to the ED prior to their follow-up appointment, should their condition change. Discharged      DISCHARGE PLAN:  1. Current Discharge Medication List      CONTINUE these medications which have NOT CHANGED    Details   allopurinoL (ZYLOPRIM) 300 mg tablet Take 1 Tablet by mouth daily. Qty: 30 Tablet, Refills: 0      rivaroxaban (XARELTO) 15 mg tab tablet Take 1 Tablet by mouth two (2) times daily (with meals) for 36 doses. Qty: 36 Tablet, Refills: 0      albuterol (ACCUNEB) 0.63 mg/3 mL nebulizer solution 3 mL by Nebulization route as needed. ProAir HFA 90 mcg/actuation inhaler Take 2 Puffs by inhalation every six (6) hours as needed. torsemide (DEMADEX) 10 mg tablet Take 1 Tablet by mouth daily. Symbicort 160-4.5 mcg/actuation HFAA Take 2 Puffs by inhalation daily.            2.   Follow-up Information     Follow up With Specialties Details Why [address] EMERGENCY DEPT Emergency Medicine Go to  As needed, If symptoms worsen [address] [facility] Devin Hammond MD Internal Medicine Schedule an appointment as soon as possible for a visit in 3 days For reevaluation, Discuss your visit to the ER [address] [place] [address] [address] [facility] [address]  [phone]          3. Return to ED if worse   4. Current Discharge Medication List      START taking these medications    Details   doxycycline (VIBRA-TABS) 100 mg tablet Take 1 Tablet by mouth two (2) times a day for 7 days. Qty: 14 Tablet, Refills: 0  Start date: 3/19/2022, End date: 3/26/2022             Diagnosis     Clinical Impression:   1. Cellulitis of right upper extremity        Attestations: Luis Miguel Samson MD    Please note that this dictation was completed with [employer], the "[employer] voice recognition software. Quite often unanticipated grammatical, syntax, homophones, and other interpretive errors are inadvertently transcribed by the computer software. Please disregard these errors. Please excuse any errors that have escaped final proofreading. Thank you.

## 2022-03-19 NOTE — DISCHARGE INSTRUCTIONS
Thank you! Thank you for allowing me to care for you in the emergency department. I sincerely hope that you are satisfied with your visit today. It is my goal to provide you with excellent care. Below you will find a list of your labs and imaging from your visit today. Should you have any questions regarding these results please do not hesitate to call the emergency department. Labs -     Recent Results (from the past 12 hour(s))   CBC WITH AUTOMATED DIFF    Collection Time: 03/19/22  5:20 AM   Result Value Ref Range    WBC 5.3 4.1 - 11.1 K/uL    RBC 3.46 (L) 4.10 - 5.70 M/uL    HGB 10.2 (L) 12.1 - 17.0 g/dL    HCT 31.1 (L) 36.6 - 50.3 %    MCV 89.9 80.0 - 99.0 FL    MCH 29.5 26.0 - 34.0 PG    MCHC 32.8 30.0 - 36.5 g/dL    RDW 14.2 11.5 - 14.5 %    PLATELET 756 353 - 037 K/uL    MPV 10.0 8.9 - 12.9 FL    NRBC 0.6 (H) 0.0  WBC    ABSOLUTE NRBC 0.03 (H) 0.00 - 0.01 K/uL    NEUTROPHILS 55 32 - 75 %    BAND NEUTROPHILS 10 (H) 0 - 6 %    LYMPHOCYTES 12 12 - 49 %    MONOCYTES 18 (H) 5 - 13 %    EOSINOPHILS 0 0 - 7 %    BASOPHILS 1 0 - 1 %    METAMYELOCYTES 2 (H) 0 %    MYELOCYTES 2 (H) 0 %    IMMATURE GRANULOCYTES 0 %    ABS. NEUTROPHILS 3.4 1.8 - 8.0 K/UL    ABS. LYMPHOCYTES 0.6 (L) 0.8 - 3.5 K/UL    ABS. MONOCYTES 1.0 0.0 - 1.0 K/UL    ABS. EOSINOPHILS 0.0 0.0 - 0.4 K/UL    ABS. BASOPHILS 0.1 0.0 - 0.1 K/UL    ABS. IMM.  GRANS. 0.0 K/UL    DF Manual      RBC COMMENTS Normocytic, Normochromic     METABOLIC PANEL, COMPREHENSIVE    Collection Time: 03/19/22  5:20 AM   Result Value Ref Range    Sodium 131 (L) 136 - 145 mmol/L    Potassium Hemolyzed, recollect requested 3.5 - 5.1 mmol/L    Chloride 104 97 - 108 mmol/L    CO2 25 21 - 32 mmol/L    Anion gap 2 (L) 5 - 15 mmol/L    Glucose 128 (H) 65 - 100 mg/dL    BUN 8 6 - 20 mg/dL    Creatinine 1.01 0.70 - 1.30 mg/dL    BUN/Creatinine ratio 8 (L) 12 - 20      GFR est AA >60 >60 ml/min/1.73m2    GFR est non-AA >60 >60 ml/min/1.73m2    Calcium 8.6 8.5 - 10.1 mg/dL    Bilirubin, total 1.3 (H) 0.2 - 1.0 mg/dL    AST (SGOT) Hemolyzed, recollect requested 15 - 37 U/L    ALT (SGPT) 130 (H) 12 - 78 U/L    Alk. phosphatase 262 (H) 45 - 117 U/L    Protein, total 7.2 6.4 - 8.2 g/dL    Albumin 2.8 (L) 3.5 - 5.0 g/dL    Globulin 4.4 (H) 2.0 - 4.0 g/dL    A-G Ratio 0.6 (L) 1.1 - 2.2     LACTIC ACID    Collection Time: 03/19/22  5:20 AM   Result Value Ref Range    Lactic acid 0.6 0.4 - 2.0 mmol/L       Radiologic Studies -   CT UP EXT RT W CONT   Final Result   Right upper extremity cellulitis without evidence of surgically drainable fluid   collection. CT Results  (Last 48 hours)                 03/19/22 0710  CT UP EXT RT W CONT Final result    Impression:  Right upper extremity cellulitis without evidence of surgically drainable fluid   collection. Narrative:  Technique: Axial images were obtained through the right upper extremity after   the administration of intravenous contrast. Coronal and sagittal reformatted   images were generated. Comment on dose reduction: All CT scans at this facility are performed using   dose reduction optimization technique as appropriate to perform the exam   including the following; automated exposure control, adjustments of the mA   and/or kV according to patient size, or use of iterative reconstructed   technique. FINDINGS:    image demonstrates a right PICC line which terminates near the cavoatrial   junction. Stranding noted in the subcutaneous fat throughout the medial aspect distal   third arm as well as rather diffusely throughout the forearm. Imaging features   most consistent with a cellulitis. Examination negative for surgically drainable   fluid collection. The included skeleton is intact.        Portion of patient's right lower abdomen is captured on this examination and   demonstrates there are known lymphadenopathy and fluid collection in the right   inguinal soft tissues. CXR Results  (Last 48 hours)      None               If you feel that you have not received excellent quality care or timely care, please ask to speak to the nurse manager. Please choose us in the future for your continued health care needs. ------------------------------------------------------------------------------------------------------------  The exam and treatment you received in the Emergency Department were for an urgent problem and are not intended as complete care. It is important that you follow-up with a doctor, nurse practitioner, or physician assistant to:  (1) confirm your diagnosis,  (2) re-evaluation of changes in your illness and treatment, and  (3) for ongoing care. If your symptoms become worse or you do not improve as expected and you are unable to reach your usual health care provider, you should return to the Emergency Department. We are available 24 hours a day. Please take your discharge instructions with you when you go to your follow-up appointment. If you have any problem arranging a follow-up appointment, contact the Emergency Department immediately. If a prescription has been provided, please have it filled as soon as possible to prevent a delay in treatment. Read the entire medication instruction sheet provided to you by the pharmacy. If you have any questions or reservations about taking the medication due to side effects or interactions with other medications, please call your primary care physician or contact the ER to speak with the charge nurse. Make an appointment with your family doctor or the physician you were referred to for follow-up of this visit as instructed on your discharge paperwork, as this is a mandatory follow-up. Return to the ER if you are unable to be seen or if you are unable to be seen in a timely manner. If you have any problem arranging the follow-up visit, contact the Emergency Department immediately.

## 2022-03-19 NOTE — ED TRIAGE NOTES
Started yesterday with swelling to right arm where PIC line is placed. Fever started tonight. Pt with lymphoma.

## 2022-03-20 PROBLEM — C85.90 NON HODGKIN'S LYMPHOMA (HCC): Status: ACTIVE | Noted: 2022-03-01

## 2022-03-21 LAB
BACTERIA SPEC CULT: ABNORMAL
GRAM STN SPEC: ABNORMAL
SPECIAL REQUESTS,SREQ: ABNORMAL

## 2022-03-22 LAB
BACTERIA SPEC CULT: ABNORMAL
SPECIAL REQUESTS,SREQ: ABNORMAL
SPECIAL REQUESTS,SREQ: ABNORMAL

## 2022-03-30 ENCOUNTER — HOSPITAL ENCOUNTER (OUTPATIENT)
Dept: INTERVENTIONAL RADIOLOGY/VASCULAR | Age: 32
Discharge: HOME | End: 2022-03-30
Attending: INTERNAL MEDICINE
Payer: MEDICAID

## 2022-03-30 VITALS
DIASTOLIC BLOOD PRESSURE: 67 MMHG | HEART RATE: 91 BPM | SYSTOLIC BLOOD PRESSURE: 124 MMHG | TEMPERATURE: 97.3 F | OXYGEN SATURATION: 99 % | WEIGHT: 240 LBS | BODY MASS INDEX: 38.57 KG/M2 | RESPIRATION RATE: 18 BRPM | HEIGHT: 66 IN

## 2022-03-30 DIAGNOSIS — C83.36 DIFFUSE LARGE B-CELL LYMPHOMA OF INTRAPELVIC LYMPH NODES (HCC): ICD-10-CM

## 2022-03-30 LAB
ANION GAP SERPL CALC-SCNC: 6 MMOL/L (ref 5–15)
BASOPHILS # BLD: 0 K/UL (ref 0–0.1)
BASOPHILS NFR BLD: 0 % (ref 0–1)
BUN SERPL-MCNC: 12 MG/DL (ref 6–20)
BUN/CREAT SERPL: 13 (ref 12–20)
CA-I BLD-MCNC: 9 MG/DL (ref 8.5–10.1)
CHLORIDE SERPL-SCNC: 109 MMOL/L (ref 97–108)
CO2 SERPL-SCNC: 24 MMOL/L (ref 21–32)
CREAT SERPL-MCNC: 0.89 MG/DL (ref 0.7–1.3)
DIFFERENTIAL METHOD BLD: ABNORMAL
EOSINOPHIL # BLD: 0 K/UL (ref 0–0.4)
EOSINOPHIL NFR BLD: 0 % (ref 0–7)
ERYTHROCYTE [DISTWIDTH] IN BLOOD BY AUTOMATED COUNT: 15.6 % (ref 11.5–14.5)
GLUCOSE SERPL-MCNC: 100 MG/DL (ref 65–100)
HCT VFR BLD AUTO: 34.2 % (ref 36.6–50.3)
HGB BLD-MCNC: 10.4 G/DL (ref 12.1–17)
IMM GRANULOCYTES # BLD AUTO: 0 K/UL
IMM GRANULOCYTES NFR BLD AUTO: 0 %
INR PPP: 1.1 (ref 0.9–1.1)
LYMPHOCYTES # BLD: 1.3 K/UL (ref 0.8–3.5)
LYMPHOCYTES NFR BLD: 13 % (ref 12–49)
MCH RBC QN AUTO: 28.2 PG (ref 26–34)
MCHC RBC AUTO-ENTMCNC: 30.4 G/DL (ref 30–36.5)
MCV RBC AUTO: 92.7 FL (ref 80–99)
METAMYELOCYTES NFR BLD MANUAL: 1 %
MONOCYTES # BLD: 0.6 K/UL (ref 0–1)
MONOCYTES NFR BLD: 6 % (ref 5–13)
MYELOCYTES NFR BLD MANUAL: 3 %
NEUTS BAND NFR BLD MANUAL: 1 % (ref 0–6)
NEUTS SEG # BLD: 7.6 K/UL (ref 1.8–8)
NEUTS SEG NFR BLD: 75 % (ref 32–75)
NRBC # BLD: 0.09 K/UL (ref 0–0.01)
NRBC BLD-RTO: 0.9 PER 100 WBC
PLATELET # BLD AUTO: 478 K/UL (ref 150–400)
PMV BLD AUTO: 9 FL (ref 8.9–12.9)
POTASSIUM SERPL-SCNC: 4.5 MMOL/L (ref 3.5–5.1)
PROMYELOCYTES NFR BLD MANUAL: 1 %
PROTHROMBIN TIME: 13.8 SEC (ref 11.9–14.6)
RBC # BLD AUTO: 3.69 M/UL (ref 4.1–5.7)
RBC MORPH BLD: ABNORMAL
SODIUM SERPL-SCNC: 139 MMOL/L (ref 136–145)
WBC # BLD AUTO: 10 K/UL (ref 4.1–11.1)

## 2022-03-30 PROCEDURE — 77001 FLUOROGUIDE FOR VEIN DEVICE: CPT

## 2022-03-30 PROCEDURE — 74011250636 HC RX REV CODE- 250/636: Performed by: RADIOLOGY

## 2022-03-30 PROCEDURE — 74011250636 HC RX REV CODE- 250/636: Performed by: INTERNAL MEDICINE

## 2022-03-30 PROCEDURE — 99152 MOD SED SAME PHYS/QHP 5/>YRS: CPT

## 2022-03-30 PROCEDURE — 99153 MOD SED SAME PHYS/QHP EA: CPT

## 2022-03-30 PROCEDURE — 85610 PROTHROMBIN TIME: CPT

## 2022-03-30 PROCEDURE — 36415 COLL VENOUS BLD VENIPUNCTURE: CPT

## 2022-03-30 PROCEDURE — C1788 PORT, INDWELLING, IMP: HCPCS

## 2022-03-30 PROCEDURE — 85025 COMPLETE CBC W/AUTO DIFF WBC: CPT

## 2022-03-30 PROCEDURE — 76937 US GUIDE VASCULAR ACCESS: CPT

## 2022-03-30 PROCEDURE — 36561 INSERT TUNNELED CV CATH: CPT

## 2022-03-30 PROCEDURE — 80048 BASIC METABOLIC PNL TOTAL CA: CPT

## 2022-03-30 RX ORDER — FENTANYL CITRATE 50 UG/ML
25-100 INJECTION, SOLUTION INTRAMUSCULAR; INTRAVENOUS
Status: DISCONTINUED | OUTPATIENT
Start: 2022-03-30 | End: 2022-04-08 | Stop reason: HOSPADM

## 2022-03-30 RX ORDER — CEFAZOLIN SODIUM 1 G/3ML
2 INJECTION, POWDER, FOR SOLUTION INTRAMUSCULAR; INTRAVENOUS ONCE
Status: COMPLETED | OUTPATIENT
Start: 2022-03-30 | End: 2022-03-30

## 2022-03-30 RX ORDER — MIDAZOLAM HYDROCHLORIDE 1 MG/ML
.5-2 INJECTION, SOLUTION INTRAMUSCULAR; INTRAVENOUS
Status: DISCONTINUED | OUTPATIENT
Start: 2022-03-30 | End: 2022-04-08 | Stop reason: HOSPADM

## 2022-03-30 RX ADMIN — MIDAZOLAM 1 MG: 1 INJECTION INTRAMUSCULAR; INTRAVENOUS at 09:36

## 2022-03-30 RX ADMIN — MIDAZOLAM 1 MG: 1 INJECTION INTRAMUSCULAR; INTRAVENOUS at 09:30

## 2022-03-30 RX ADMIN — FENTANYL CITRATE 50 MCG: 50 INJECTION, SOLUTION INTRAMUSCULAR; INTRAVENOUS at 09:30

## 2022-03-30 RX ADMIN — CEFAZOLIN SODIUM 2 G: 1 INJECTION, POWDER, FOR SOLUTION INTRAMUSCULAR; INTRAVENOUS at 09:20

## 2022-03-30 RX ADMIN — FENTANYL CITRATE 50 MCG: 50 INJECTION, SOLUTION INTRAMUSCULAR; INTRAVENOUS at 09:36

## 2022-03-30 NOTE — PROGRESS NOTES
Assumed care of patient from IR RN. Patient denies pain. Incision x2 clean, dry, and intact. Snack and drink offered. No questions or concerns. Will continue to monitor.

## 2022-03-30 NOTE — PROGRESS NOTES
D/c instructions reviewed with patient and mother. States understood. Incision x2 clean, dry, and intact. Patient informed that he can restart his xarelto tomorrow per IR. States understood. Patient denies pain. No questions or concerns at this time.

## 2022-05-07 ENCOUNTER — APPOINTMENT (OUTPATIENT)
Dept: GENERAL RADIOLOGY | Age: 32
End: 2022-05-07
Attending: INTERNAL MEDICINE
Payer: MEDICAID

## 2022-05-07 ENCOUNTER — APPOINTMENT (OUTPATIENT)
Dept: CT IMAGING | Age: 32
End: 2022-05-07
Attending: EMERGENCY MEDICINE
Payer: MEDICAID

## 2022-05-07 ENCOUNTER — HOSPITAL ENCOUNTER (INPATIENT)
Age: 32
LOS: 3 days | Discharge: HOME | DRG: 133 | End: 2022-05-10
Attending: EMERGENCY MEDICINE | Admitting: INTERNAL MEDICINE
Payer: MEDICAID

## 2022-05-07 ENCOUNTER — APPOINTMENT (OUTPATIENT)
Dept: GENERAL RADIOLOGY | Age: 32
End: 2022-05-07
Attending: EMERGENCY MEDICINE
Payer: MEDICAID

## 2022-05-07 ENCOUNTER — HOSPITAL ENCOUNTER (EMERGENCY)
Age: 32
Discharge: HOME | End: 2022-05-07
Attending: EMERGENCY MEDICINE
Payer: MEDICAID

## 2022-05-07 VITALS
DIASTOLIC BLOOD PRESSURE: 94 MMHG | RESPIRATION RATE: 20 BRPM | TEMPERATURE: 98.3 F | HEART RATE: 123 BPM | BODY MASS INDEX: 40.82 KG/M2 | WEIGHT: 254 LBS | SYSTOLIC BLOOD PRESSURE: 148 MMHG | HEIGHT: 66 IN | OXYGEN SATURATION: 99 %

## 2022-05-07 DIAGNOSIS — J45.909 MILD ASTHMA WITHOUT COMPLICATION, UNSPECIFIED WHETHER PERSISTENT: Primary | ICD-10-CM

## 2022-05-07 DIAGNOSIS — J96.01 ACUTE RESPIRATORY FAILURE WITH HYPOXIA (HCC): Primary | ICD-10-CM

## 2022-05-07 LAB
ALBUMIN SERPL-MCNC: 4.3 G/DL (ref 3.5–5)
ALBUMIN SERPL-MCNC: 4.4 G/DL (ref 3.5–5)
ALBUMIN/GLOB SERPL: 0.9 {RATIO} (ref 1.1–2.2)
ALBUMIN/GLOB SERPL: 1.1 {RATIO} (ref 1.1–2.2)
ALP SERPL-CCNC: 109 U/L (ref 45–117)
ALP SERPL-CCNC: 115 U/L (ref 45–117)
ALT SERPL-CCNC: 30 U/L (ref 12–78)
ALT SERPL-CCNC: 32 U/L (ref 12–78)
ANION GAP SERPL CALC-SCNC: 6 MMOL/L (ref 5–15)
ANION GAP SERPL CALC-SCNC: 9 MMOL/L (ref 5–15)
ARTERIAL PATENCY WRIST A: ABNORMAL
ARTERIAL PATENCY WRIST A: ABNORMAL
AST SERPL W P-5'-P-CCNC: 14 U/L (ref 15–37)
AST SERPL W P-5'-P-CCNC: 18 U/L (ref 15–37)
BASE DEFICIT BLDA-SCNC: 6.1 MMOL/L (ref 0–2)
BASE DEFICIT BLDA-SCNC: 6.8 MMOL/L (ref 0–2)
BASOPHILS # BLD: 0 K/UL (ref 0–0.1)
BASOPHILS # BLD: 0 K/UL (ref 0–0.1)
BASOPHILS NFR BLD: 0 % (ref 0–1)
BASOPHILS NFR BLD: 0 % (ref 0–1)
BDY SITE: ABNORMAL
BDY SITE: ABNORMAL
BILIRUB SERPL-MCNC: 0.4 MG/DL (ref 0.2–1)
BILIRUB SERPL-MCNC: 0.4 MG/DL (ref 0.2–1)
BNP SERPL-MCNC: 10 PG/ML
BUN SERPL-MCNC: 10 MG/DL (ref 6–20)
BUN SERPL-MCNC: 11 MG/DL (ref 6–20)
BUN/CREAT SERPL: 11 (ref 12–20)
BUN/CREAT SERPL: 7 (ref 12–20)
CA-I BLD-MCNC: 9.3 MG/DL (ref 8.5–10.1)
CA-I BLD-MCNC: 9.4 MG/DL (ref 8.5–10.1)
CHLORIDE SERPL-SCNC: 101 MMOL/L (ref 97–108)
CHLORIDE SERPL-SCNC: 105 MMOL/L (ref 97–108)
CO2 SERPL-SCNC: 23 MMOL/L (ref 21–32)
CO2 SERPL-SCNC: 25 MMOL/L (ref 21–32)
CREAT SERPL-MCNC: 1.04 MG/DL (ref 0.7–1.3)
CREAT SERPL-MCNC: 1.35 MG/DL (ref 0.7–1.3)
D DIMER PPP FEU-MCNC: 0.45 UG/ML(FEU)
DIFFERENTIAL METHOD BLD: ABNORMAL
DIFFERENTIAL METHOD BLD: ABNORMAL
EOSINOPHIL # BLD: 0 K/UL (ref 0–0.4)
EOSINOPHIL # BLD: 0 K/UL (ref 0–0.4)
EOSINOPHIL NFR BLD: 0 % (ref 0–7)
EOSINOPHIL NFR BLD: 0 % (ref 0–7)
EPAP/CPAP/PEEP, PAPEEP: 10
EPAP/CPAP/PEEP, PAPEEP: 5
ERYTHROCYTE [DISTWIDTH] IN BLOOD BY AUTOMATED COUNT: 15.5 % (ref 11.5–14.5)
ERYTHROCYTE [DISTWIDTH] IN BLOOD BY AUTOMATED COUNT: 15.6 % (ref 11.5–14.5)
FIO2 ON VENT: 100 %
FIO2 ON VENT: 100 %
GAS FLOW.O2 SETTING OXYMISER: 24 L/MIN
GAS FLOW.O2 SETTING OXYMISER: 28 L/MIN
GLOBULIN SER CALC-MCNC: 4 G/DL (ref 2–4)
GLOBULIN SER CALC-MCNC: 4.7 G/DL (ref 2–4)
GLUCOSE SERPL-MCNC: 171 MG/DL (ref 65–100)
GLUCOSE SERPL-MCNC: 264 MG/DL (ref 65–100)
HCO3 BLDA-SCNC: 19 MMOL/L (ref 22–26)
HCO3 BLDA-SCNC: 23 MMOL/L (ref 22–26)
HCT VFR BLD AUTO: 37.2 % (ref 36.6–50.3)
HCT VFR BLD AUTO: 37.9 % (ref 36.6–50.3)
HGB BLD-MCNC: 11.9 G/DL (ref 12.1–17)
HGB BLD-MCNC: 12.2 G/DL (ref 12.1–17)
IMM GRANULOCYTES # BLD AUTO: 0.2 K/UL (ref 0–0.04)
IMM GRANULOCYTES # BLD AUTO: 0.2 K/UL (ref 0–0.04)
IMM GRANULOCYTES NFR BLD AUTO: 1 % (ref 0–0.5)
IMM GRANULOCYTES NFR BLD AUTO: 2 % (ref 0–0.5)
IPAP/PIP, IPAPIP: 30
IPAP/PIP, IPAPIP: 32
LYMPHOCYTES # BLD: 0.3 K/UL (ref 0.8–3.5)
LYMPHOCYTES # BLD: 0.5 K/UL (ref 0.8–3.5)
LYMPHOCYTES NFR BLD: 2 % (ref 12–49)
LYMPHOCYTES NFR BLD: 4 % (ref 12–49)
MCH RBC QN AUTO: 28.7 PG (ref 26–34)
MCH RBC QN AUTO: 28.9 PG (ref 26–34)
MCHC RBC AUTO-ENTMCNC: 32 G/DL (ref 30–36.5)
MCHC RBC AUTO-ENTMCNC: 32.2 G/DL (ref 30–36.5)
MCV RBC AUTO: 89.6 FL (ref 80–99)
MCV RBC AUTO: 89.8 FL (ref 80–99)
MONOCYTES # BLD: 0.2 K/UL (ref 0–1)
MONOCYTES # BLD: 0.2 K/UL (ref 0–1)
MONOCYTES NFR BLD: 2 % (ref 5–13)
MONOCYTES NFR BLD: 2 % (ref 5–13)
NEUTS SEG # BLD: 12.6 K/UL (ref 1.8–8)
NEUTS SEG # BLD: 12.7 K/UL (ref 1.8–8)
NEUTS SEG NFR BLD: 93 % (ref 32–75)
NEUTS SEG NFR BLD: 94 % (ref 32–75)
NRBC # BLD: 0 K/UL (ref 0–0.01)
NRBC # BLD: 0 K/UL (ref 0–0.01)
NRBC BLD-RTO: 0 PER 100 WBC
NRBC BLD-RTO: 0 PER 100 WBC
PCO2 BLDA: 65 MMHG (ref 35–45)
PCO2 BLDA: 74 MMHG (ref 35–45)
PH BLDA: 7.12 [PH] (ref 7.35–7.45)
PH BLDA: 7.16 [PH] (ref 7.35–7.45)
PLATELET # BLD AUTO: 364 K/UL (ref 150–400)
PLATELET # BLD AUTO: 396 K/UL (ref 150–400)
PMV BLD AUTO: 9.5 FL (ref 8.9–12.9)
PMV BLD AUTO: 9.7 FL (ref 8.9–12.9)
PO2 BLDA: 371 MMHG (ref 75–100)
PO2 BLDA: 454 MMHG (ref 75–100)
POTASSIUM SERPL-SCNC: 4.4 MMOL/L (ref 3.5–5.1)
POTASSIUM SERPL-SCNC: 4.7 MMOL/L (ref 3.5–5.1)
PROCALCITONIN SERPL-MCNC: 0.23 NG/ML
PROT SERPL-MCNC: 8.3 G/DL (ref 6.4–8.2)
PROT SERPL-MCNC: 9.1 G/DL (ref 6.4–8.2)
RBC # BLD AUTO: 4.15 M/UL (ref 4.1–5.7)
RBC # BLD AUTO: 4.22 M/UL (ref 4.1–5.7)
SAO2 % BLD: 100 %
SAO2 % BLD: >100 %
SAO2% DEVICE SAO2% SENSOR NAME: ABNORMAL
SAO2% DEVICE SAO2% SENSOR NAME: ABNORMAL
SERVICE CMNT-IMP: ABNORMAL
SERVICE CMNT-IMP: ABNORMAL
SODIUM SERPL-SCNC: 133 MMOL/L (ref 136–145)
SODIUM SERPL-SCNC: 136 MMOL/L (ref 136–145)
SPECIMEN SITE: ABNORMAL
SPECIMEN SITE: ABNORMAL
TROPONIN-HIGH SENSITIVITY: 15 NG/L (ref 0–76)
TROPONIN-HIGH SENSITIVITY: 5 NG/L (ref 0–76)
TROPONIN-HIGH SENSITIVITY: 7 NG/L (ref 0–76)
VENTILATION MODE VENT: ABNORMAL
VENTILATION MODE VENT: ABNORMAL
WBC # BLD AUTO: 13.4 K/UL (ref 4.1–11.1)
WBC # BLD AUTO: 13.5 K/UL (ref 4.1–11.1)

## 2022-05-07 PROCEDURE — 71045 X-RAY EXAM CHEST 1 VIEW: CPT

## 2022-05-07 PROCEDURE — 96365 THER/PROPH/DIAG IV INF INIT: CPT

## 2022-05-07 PROCEDURE — 36415 COLL VENOUS BLD VENIPUNCTURE: CPT

## 2022-05-07 PROCEDURE — 99285 EMERGENCY DEPT VISIT HI MDM: CPT

## 2022-05-07 PROCEDURE — 74011000250 HC RX REV CODE- 250

## 2022-05-07 PROCEDURE — 31500 INSERT EMERGENCY AIRWAY: CPT

## 2022-05-07 PROCEDURE — 74011000250 HC RX REV CODE- 250: Performed by: INTERNAL MEDICINE

## 2022-05-07 PROCEDURE — 74011000250 HC RX REV CODE- 250: Performed by: EMERGENCY MEDICINE

## 2022-05-07 PROCEDURE — 83880 ASSAY OF NATRIURETIC PEPTIDE: CPT

## 2022-05-07 PROCEDURE — 80053 COMPREHEN METABOLIC PANEL: CPT

## 2022-05-07 PROCEDURE — 94640 AIRWAY INHALATION TREATMENT: CPT

## 2022-05-07 PROCEDURE — 74011250636 HC RX REV CODE- 250/636: Performed by: EMERGENCY MEDICINE

## 2022-05-07 PROCEDURE — 85379 FIBRIN DEGRADATION QUANT: CPT

## 2022-05-07 PROCEDURE — 85025 COMPLETE CBC W/AUTO DIFF WBC: CPT

## 2022-05-07 PROCEDURE — 82803 BLOOD GASES ANY COMBINATION: CPT

## 2022-05-07 PROCEDURE — 36600 WITHDRAWAL OF ARTERIAL BLOOD: CPT

## 2022-05-07 PROCEDURE — 74011000258 HC RX REV CODE- 258: Performed by: EMERGENCY MEDICINE

## 2022-05-07 PROCEDURE — 65610000006 HC RM INTENSIVE CARE

## 2022-05-07 PROCEDURE — 94002 VENT MGMT INPAT INIT DAY: CPT

## 2022-05-07 PROCEDURE — 74011000636 HC RX REV CODE- 636: Performed by: EMERGENCY MEDICINE

## 2022-05-07 PROCEDURE — 84145 PROCALCITONIN (PCT): CPT

## 2022-05-07 PROCEDURE — 74011250636 HC RX REV CODE- 250/636: Performed by: INTERNAL MEDICINE

## 2022-05-07 PROCEDURE — 71275 CT ANGIOGRAPHY CHEST: CPT

## 2022-05-07 PROCEDURE — 84484 ASSAY OF TROPONIN QUANT: CPT

## 2022-05-07 PROCEDURE — 93005 ELECTROCARDIOGRAM TRACING: CPT

## 2022-05-07 PROCEDURE — 0BH17EZ INSERTION OF ENDOTRACHEAL AIRWAY INTO TRACHEA, VIA NATURAL OR ARTIFICIAL OPENING: ICD-10-PCS | Performed by: INTERNAL MEDICINE

## 2022-05-07 PROCEDURE — 5A1935Z RESPIRATORY VENTILATION, LESS THAN 24 CONSECUTIVE HOURS: ICD-10-PCS | Performed by: INTERNAL MEDICINE

## 2022-05-07 RX ORDER — ETOMIDATE 2 MG/ML
20 INJECTION INTRAVENOUS
Status: COMPLETED | OUTPATIENT
Start: 2022-05-07 | End: 2022-05-07

## 2022-05-07 RX ORDER — PROPOFOL 10 MG/ML
0-50 VIAL (ML) INTRAVENOUS
Status: DISCONTINUED | OUTPATIENT
Start: 2022-05-07 | End: 2022-05-10 | Stop reason: HOSPADM

## 2022-05-07 RX ORDER — ALBUTEROL SULFATE 2.5 MG/.5ML
SOLUTION RESPIRATORY (INHALATION)
Status: COMPLETED
Start: 2022-05-07 | End: 2022-05-07

## 2022-05-07 RX ORDER — ACETAMINOPHEN 650 MG/1
650 SUPPOSITORY RECTAL
Status: DISCONTINUED | OUTPATIENT
Start: 2022-05-07 | End: 2022-05-10 | Stop reason: HOSPADM

## 2022-05-07 RX ORDER — ALBUTEROL SULFATE 2.5 MG/.5ML
10 SOLUTION RESPIRATORY (INHALATION)
Status: COMPLETED | OUTPATIENT
Start: 2022-05-07 | End: 2022-05-07

## 2022-05-07 RX ORDER — VECURONIUM BROMIDE FOR INJECTION 1 MG/ML
INJECTION, POWDER, LYOPHILIZED, FOR SOLUTION INTRAVENOUS
Status: COMPLETED
Start: 2022-05-07 | End: 2022-05-07

## 2022-05-07 RX ORDER — SODIUM CHLORIDE 0.9 % (FLUSH) 0.9 %
5-40 SYRINGE (ML) INJECTION AS NEEDED
Status: DISCONTINUED | OUTPATIENT
Start: 2022-05-07 | End: 2022-05-10 | Stop reason: HOSPADM

## 2022-05-07 RX ORDER — VECURONIUM BROMIDE FOR INJECTION 1 MG/ML
10 INJECTION, POWDER, LYOPHILIZED, FOR SOLUTION INTRAVENOUS
Status: COMPLETED | OUTPATIENT
Start: 2022-05-07 | End: 2022-05-07

## 2022-05-07 RX ORDER — RIVAROXABAN 20 MG/1
20 TABLET, FILM COATED ORAL DAILY
COMMUNITY
Start: 2022-04-24

## 2022-05-07 RX ORDER — MAGNESIUM SULFATE 1 G/100ML
1 INJECTION INTRAVENOUS
Status: DISPENSED | OUTPATIENT
Start: 2022-05-07 | End: 2022-05-08

## 2022-05-07 RX ORDER — ROCURONIUM BROMIDE 10 MG/ML
0.6 INJECTION, SOLUTION INTRAVENOUS
Status: COMPLETED | OUTPATIENT
Start: 2022-05-07 | End: 2022-05-07

## 2022-05-07 RX ORDER — TORSEMIDE 20 MG/1
TABLET ORAL
Status: ON HOLD | COMMUNITY
Start: 2022-04-24 | End: 2022-08-17

## 2022-05-07 RX ORDER — ALLOPURINOL 300 MG/1
300 TABLET ORAL DAILY
Status: DISCONTINUED | OUTPATIENT
Start: 2022-05-08 | End: 2022-05-10 | Stop reason: HOSPADM

## 2022-05-07 RX ORDER — ONDANSETRON 4 MG/1
4 TABLET, ORALLY DISINTEGRATING ORAL
Status: DISCONTINUED | OUTPATIENT
Start: 2022-05-07 | End: 2022-05-10 | Stop reason: HOSPADM

## 2022-05-07 RX ORDER — DEXMEDETOMIDINE HYDROCHLORIDE 4 UG/ML
.1-1.5 INJECTION, SOLUTION INTRAVENOUS
Status: DISCONTINUED | OUTPATIENT
Start: 2022-05-08 | End: 2022-05-10 | Stop reason: HOSPADM

## 2022-05-07 RX ORDER — IPRATROPIUM BROMIDE AND ALBUTEROL SULFATE 2.5; .5 MG/3ML; MG/3ML
3 SOLUTION RESPIRATORY (INHALATION)
Status: DISCONTINUED | OUTPATIENT
Start: 2022-05-07 | End: 2022-05-10 | Stop reason: HOSPADM

## 2022-05-07 RX ORDER — PREDNISONE 20 MG/1
60 TABLET ORAL DAILY
Qty: 15 TABLET | Refills: 0 | Status: SHIPPED | OUTPATIENT
Start: 2022-05-07 | End: 2022-05-10

## 2022-05-07 RX ORDER — MAGNESIUM SULFATE 1 G/100ML
1 INJECTION INTRAVENOUS
Status: COMPLETED | OUTPATIENT
Start: 2022-05-07 | End: 2022-05-07

## 2022-05-07 RX ORDER — SODIUM CHLORIDE 0.9 % (FLUSH) 0.9 %
5-40 SYRINGE (ML) INJECTION EVERY 8 HOURS
Status: DISCONTINUED | OUTPATIENT
Start: 2022-05-07 | End: 2022-05-10 | Stop reason: HOSPADM

## 2022-05-07 RX ORDER — POLYETHYLENE GLYCOL 3350 17 G/17G
17 POWDER, FOR SOLUTION ORAL DAILY PRN
Status: DISCONTINUED | OUTPATIENT
Start: 2022-05-07 | End: 2022-05-10 | Stop reason: HOSPADM

## 2022-05-07 RX ORDER — SUCCINYLCHOLINE CHLORIDE 20 MG/ML INJECTION SOLUTION
100 SOLUTION
Status: COMPLETED | OUTPATIENT
Start: 2022-05-07 | End: 2022-05-07

## 2022-05-07 RX ORDER — ONDANSETRON 2 MG/ML
4 INJECTION INTRAMUSCULAR; INTRAVENOUS
Status: DISCONTINUED | OUTPATIENT
Start: 2022-05-07 | End: 2022-05-10 | Stop reason: HOSPADM

## 2022-05-07 RX ORDER — ROCURONIUM BROMIDE 10 MG/ML
INJECTION, SOLUTION INTRAVENOUS
Status: COMPLETED
Start: 2022-05-07 | End: 2022-05-07

## 2022-05-07 RX ORDER — ACETAMINOPHEN 325 MG/1
650 TABLET ORAL
Status: DISCONTINUED | OUTPATIENT
Start: 2022-05-07 | End: 2022-05-10 | Stop reason: HOSPADM

## 2022-05-07 RX ORDER — BUDESONIDE 0.5 MG/2ML
1000 INHALANT ORAL
Status: DISCONTINUED | OUTPATIENT
Start: 2022-05-07 | End: 2022-05-10 | Stop reason: HOSPADM

## 2022-05-07 RX ADMIN — DEXMEDETOMIDINE HYDROCHLORIDE 1 MCG/KG/HR: 4 INJECTION, SOLUTION INTRAVENOUS at 23:14

## 2022-05-07 RX ADMIN — VECURONIUM BROMIDE 10 MG: 1 INJECTION, POWDER, LYOPHILIZED, FOR SOLUTION INTRAVENOUS at 18:55

## 2022-05-07 RX ADMIN — Medication 100 MG: at 17:29

## 2022-05-07 RX ADMIN — ROCURONIUM BROMIDE 10 MG: 50 INJECTION, SOLUTION INTRAVENOUS at 22:55

## 2022-05-07 RX ADMIN — ALBUTEROL SULFATE 10 MG: 2.5 SOLUTION RESPIRATORY (INHALATION) at 22:53

## 2022-05-07 RX ADMIN — VECURONIUM BROMIDE FOR INJECTION 10 MG: 1 INJECTION, POWDER, LYOPHILIZED, FOR SOLUTION INTRAVENOUS at 20:33

## 2022-05-07 RX ADMIN — VECURONIUM BROMIDE FOR INJECTION 10 MG: 1 INJECTION, POWDER, LYOPHILIZED, FOR SOLUTION INTRAVENOUS at 18:55

## 2022-05-07 RX ADMIN — DEXMEDETOMIDINE HYDROCHLORIDE 1.5 MCG/KG/HR: 100 INJECTION, SOLUTION, CONCENTRATE INTRAVENOUS at 20:31

## 2022-05-07 RX ADMIN — ETOMIDATE 20 MG: 2 INJECTION INTRAVENOUS at 17:29

## 2022-05-07 RX ADMIN — ROCURONIUM BROMIDE 10 MG: 10 INJECTION, SOLUTION INTRAVENOUS at 22:55

## 2022-05-07 RX ADMIN — VECURONIUM BROMIDE 10 MG: 1 INJECTION, POWDER, LYOPHILIZED, FOR SOLUTION INTRAVENOUS at 17:44

## 2022-05-07 RX ADMIN — MAGNESIUM SULFATE HEPTAHYDRATE 1 G: 1 INJECTION, SOLUTION INTRAVENOUS at 15:51

## 2022-05-07 RX ADMIN — VECURONIUM BROMIDE FOR INJECTION 10 MG: 1 INJECTION, POWDER, LYOPHILIZED, FOR SOLUTION INTRAVENOUS at 17:44

## 2022-05-07 RX ADMIN — VECURONIUM BROMIDE 10 MG: 1 INJECTION, POWDER, LYOPHILIZED, FOR SOLUTION INTRAVENOUS at 20:33

## 2022-05-07 RX ADMIN — ALBUTEROL SULFATE 10 MG: 2.5 SOLUTION RESPIRATORY (INHALATION) at 20:37

## 2022-05-07 RX ADMIN — PROPOFOL 20 MCG/KG/MIN: 10 INJECTION, EMULSION INTRAVENOUS at 17:45

## 2022-05-07 RX ADMIN — DEXMEDETOMIDINE HYDROCHLORIDE 0.4 MCG/KG/HR: 100 INJECTION, SOLUTION, CONCENTRATE INTRAVENOUS at 20:04

## 2022-05-07 RX ADMIN — SODIUM CHLORIDE, PRESERVATIVE FREE 10 ML: 5 INJECTION INTRAVENOUS at 22:52

## 2022-05-07 RX ADMIN — METHYLPREDNISOLONE SODIUM SUCCINATE 40 MG: 40 INJECTION, POWDER, FOR SOLUTION INTRAMUSCULAR; INTRAVENOUS at 22:51

## 2022-05-07 RX ADMIN — IOPAMIDOL 100 ML: 755 INJECTION, SOLUTION INTRAVENOUS at 18:54

## 2022-05-07 RX ADMIN — DEXMEDETOMIDINE HYDROCHLORIDE 0.8 MCG/KG/HR: 100 INJECTION, SOLUTION, CONCENTRATE INTRAVENOUS at 20:14

## 2022-05-07 RX ADMIN — SODIUM CHLORIDE 1000 ML: 9 INJECTION, SOLUTION INTRAVENOUS at 21:12

## 2022-05-07 RX ADMIN — BUDESONIDE 1000 MCG: 0.5 INHALANT RESPIRATORY (INHALATION) at 22:04

## 2022-05-07 RX ADMIN — PROPOFOL 50 MCG/KG/MIN: 10 INJECTION, EMULSION INTRAVENOUS at 20:50

## 2022-05-07 RX ADMIN — IPRATROPIUM BROMIDE AND ALBUTEROL SULFATE 3 ML: .5; 3 SOLUTION RESPIRATORY (INHALATION) at 22:03

## 2022-05-07 NOTE — ED TRIAGE NOTES
SoB since AM, Hx of Astma with home Neb treatment. EMS Duoneb given with improvement. Trouble with home [facility].

## 2022-05-07 NOTE — ED NOTES
Assumed care of pt at 1900. Pt returned from [address] [facility], noted to be agitated, medicated per [facility]. Vitals as charted. ETT advanced 1 cm per MD Jose Angel Hope s/p XR read- now 24 @ teeth.  Family remains at bedside

## 2022-05-07 NOTE — ED PROVIDER NOTES
EMERGENCY DEPARTMENT HISTORY AND PHYSICAL EXAM      Date: 5/7/2022  Patient Name: Caryn Graff      History of Presenting Illness     Chief Complaint   Patient presents with    Shortness of Breath       History Provided By: Patient    HPI: Caryn Graff, 27 y.o. male with a past medical history significant B-cell lymphoma and asthma presents to the ED with cc of shortness of breath that began this morning. He treated with albuterol and Atrovent at home without complete relief of his symptoms. Symptoms are mild nonprogressive. The been no other treatments prior to arrival.    There are no other complaints, changes, or physical findings at this time. PCP: Enrique Horner MD    Current Facility-Administered Medications   Medication Dose Route Frequency Provider Last Rate Last Admin    magnesium sulfate 1 g/100 ml IVPB (premix or compounded)  1 g IntraVENous NOW True MD Lobito         Current Outpatient Medications   Medication Sig Dispense Refill    predniSONE (DELTASONE) 20 mg tablet Take 60 mg by mouth daily for 5 days. 15 Tablet 0    Xarelto 20 mg tab tablet Take 20 mg by mouth daily.  torsemide (DEMADEX) 20 mg tablet TAKE 1 TABLET BY MOUTH ONCE DAILY IN THE MORNING AS NEEDED FOR LEG EDEMA      allopurinoL (ZYLOPRIM) 300 mg tablet Take 1 Tablet by mouth daily. 30 Tablet 0    albuterol (ACCUNEB) 0.63 mg/3 mL nebulizer solution 3 mL by Nebulization route as needed.  ProAir HFA 90 mcg/actuation inhaler Take 2 Puffs by inhalation every six (6) hours as needed.  Symbicort 160-4.5 mcg/actuation HFAA Take 2 Puffs by inhalation daily. Past History     Past Medical History:  Past Medical History:   Diagnosis Date    Asthma     Hypertension     Lymphoma ([address]        Past Surgical History:  Past Surgical History:   Procedure Laterality Date    HX OTHER SURGICAL Right 03/02/2022    PICC line placed 3/2 and removed a week later.      IR FLUORO GUIDE PLC CVAD 3/30/2022    IR INSERT HEIDE CVC W PORT OVER 5 YEARS  3/30/2022       Family History:  Family History   Problem Relation Age of Onset    Diabetes Mother     Hypertension Mother     Myasthenia Gravis Maternal Grandmother     Cancer Maternal Grandfather        Social History:  Social History     Tobacco Use    Smoking status: Former Smoker     Packs/day: 0.50     Years: 2.00     Pack years: 1.00    Smokeless tobacco: Never Used   Vaping Use    Vaping Use: Never used   Substance Use Topics    Alcohol use: Not Currently    Drug use: Not Currently     Types: Marijuana       Allergies:  No Known Allergies      Review of Systems     Review of Systems   Constitutional: Negative. Negative for appetite change, chills, fatigue and fever. HENT: Negative. Negative for congestion and sinus pain. Eyes: Negative. Negative for pain and visual disturbance. Respiratory: Positive for shortness of breath. Negative for chest tightness. Cardiovascular: Negative. Negative for chest pain. Gastrointestinal: Negative. Negative for abdominal pain, diarrhea, nausea and vomiting. Genitourinary: Negative. Negative for difficulty urinating. No discharge   Musculoskeletal: Negative. Negative for arthralgias. Skin: Negative. Negative for rash. Neurological: Negative. Negative for weakness and headaches. Hematological: Negative. Psychiatric/Behavioral: Negative. Negative for agitation. The patient is not nervous/anxious. All other systems reviewed and are negative. Physical Exam     Physical Exam  Vitals and nursing note reviewed. Constitutional:       General: He is not in acute distress. Appearance: He is well-developed. HENT:      Head: Normocephalic and atraumatic. Nose: Nose normal.      Mouth/Throat:      Mouth: Mucous membranes are moist.      Pharynx: Oropharynx is clear. No oropharyngeal exudate. Eyes:      General:         Right eye: No discharge.          Left eye: No discharge. Conjunctiva/sclera: Conjunctivae normal.      Pupils: Pupils are equal, round, and reactive to light. Cardiovascular:      Rate and Rhythm: Regular rhythm. Tachycardia present. Chest Wall: PMI is not displaced. No thrill. Heart sounds: Normal heart sounds. No murmur heard. No friction rub. No gallop. Pulmonary:      Effort: Pulmonary effort is normal. No respiratory distress. Breath sounds: Wheezing present. No rales. Chest:      Chest wall: No tenderness. Abdominal:      General: Bowel sounds are normal. There is no distension. Palpations: Abdomen is soft. There is no mass. Tenderness: There is no abdominal tenderness. There is no guarding or rebound. Musculoskeletal:         General: Normal range of motion. Cervical back: Normal range of motion and neck supple. Lymphadenopathy:      Cervical: No cervical adenopathy. Skin:     General: Skin is warm and dry. Capillary Refill: Capillary refill takes less than 2 seconds. Findings: No erythema or rash. Neurological:      Mental Status: He is alert and oriented to person, place, and time. Cranial Nerves: No cranial nerve deficit.       Coordination: Coordination normal.   Psychiatric:         Mood and Affect: Mood normal.         Behavior: Behavior normal.         Lab and Diagnostic Study Results     Labs -     Recent Results (from the past 12 hour(s))   CBC WITH AUTOMATED DIFF    Collection Time: 05/07/22  1:45 PM   Result Value Ref Range    WBC 13.5 (H) 4.1 - 11.1 K/uL    RBC 4.22 4.10 - 5.70 M/uL    HGB 12.2 12.1 - 17.0 g/dL    HCT 37.9 36.6 - 50.3 %    MCV 89.8 80.0 - 99.0 FL    MCH 28.9 26.0 - 34.0 PG    MCHC 32.2 30.0 - 36.5 g/dL    RDW 15.5 (H) 11.5 - 14.5 %    PLATELET 686 459 - 932 K/uL    MPV 9.7 8.9 - 12.9 FL    NRBC 0.0 0.0  WBC    ABSOLUTE NRBC 0.00 0.00 - 0.01 K/uL    NEUTROPHILS 93 (H) 32 - 75 %    LYMPHOCYTES 4 (L) 12 - 49 %    MONOCYTES 2 (L) 5 - 13 %    EOSINOPHILS 0 0 - 7 %    BASOPHILS 0 0 - 1 %    IMMATURE GRANULOCYTES 1 (H) 0 - 0.5 %    ABS. NEUTROPHILS 12.6 (H) 1.8 - 8.0 K/UL    ABS. LYMPHOCYTES 0.5 (L) 0.8 - 3.5 K/UL    ABS. MONOCYTES 0.2 0.0 - 1.0 K/UL    ABS. EOSINOPHILS 0.0 0.0 - 0.4 K/UL    ABS. BASOPHILS 0.0 0.0 - 0.1 K/UL    ABS. IMM. GRANS. 0.2 (H) 0.00 - 0.04 K/UL    DF AUTOMATED     METABOLIC PANEL, COMPREHENSIVE    Collection Time: 05/07/22  1:45 PM   Result Value Ref Range    Sodium 136 136 - 145 mmol/L    Potassium 4.4 3.5 - 5.1 mmol/L    Chloride 105 97 - 108 mmol/L    CO2 25 21 - 32 mmol/L    Anion gap 6 5 - 15 mmol/L    Glucose 171 (H) 65 - 100 mg/dL    BUN 11 6 - 20 mg/dL    Creatinine 1.04 0.70 - 1.30 mg/dL    BUN/Creatinine ratio 11 (L) 12 - 20      GFR est AA >60 >60 ml/min/1.73m2    GFR est non-AA >60 >60 ml/min/1.73m2    Calcium 9.3 8.5 - 10.1 mg/dL    Bilirubin, total 0.4 0.2 - 1.0 mg/dL    AST (SGOT) 14 (L) 15 - 37 U/L    ALT (SGPT) 30 12 - 78 U/L    Alk.  phosphatase 109 45 - 117 U/L    Protein, total 8.3 (H) 6.4 - 8.2 g/dL    Albumin 4.3 3.5 - 5.0 g/dL    Globulin 4.0 2.0 - 4.0 g/dL    A-G Ratio 1.1 1.1 - 2.2     TROPONIN-HIGH SENSITIVITY    Collection Time: 05/07/22  1:45 PM   Result Value Ref Range    Troponin-High Sensitivity 5 0 - 76 ng/L   NT-PRO BNP    Collection Time: 05/07/22  1:52 PM   Result Value Ref Range    NT pro-BNP 10 <125 pg/mL   CBC WITH AUTOMATED DIFF    Collection Time: 05/07/22  2:55 PM   Result Value Ref Range    WBC 13.4 (H) 4.1 - 11.1 K/uL    RBC 4.15 4.10 - 5.70 M/uL    HGB 11.9 (L) 12.1 - 17.0 g/dL    HCT 37.2 36.6 - 50.3 %    MCV 89.6 80.0 - 99.0 FL    MCH 28.7 26.0 - 34.0 PG    MCHC 32.0 30.0 - 36.5 g/dL    RDW 15.6 (H) 11.5 - 14.5 %    PLATELET 879 617 - 034 K/uL    MPV 9.5 8.9 - 12.9 FL    NRBC 0.0 0.0  WBC    ABSOLUTE NRBC 0.00 0.00 - 0.01 K/uL    NEUTROPHILS 94 (H) 32 - 75 %    LYMPHOCYTES 2 (L) 12 - 49 %    MONOCYTES 2 (L) 5 - 13 %    EOSINOPHILS 0 0 - 7 %    BASOPHILS 0 0 - 1 %    IMMATURE GRANULOCYTES 2 (H) 0 - 0.5 %    ABS. NEUTROPHILS 12.7 (H) 1.8 - 8.0 K/UL    ABS. LYMPHOCYTES 0.3 (L) 0.8 - 3.5 K/UL    ABS. MONOCYTES 0.2 0.0 - 1.0 K/UL    ABS. EOSINOPHILS 0.0 0.0 - 0.4 K/UL    ABS. BASOPHILS 0.0 0.0 - 0.1 K/UL    ABS. IMM. GRANS. 0.2 (H) 0.00 - 0.04 K/UL    DF AUTOMATED     TROPONIN-HIGH SENSITIVITY    Collection Time: 05/07/22  2:55 PM   Result Value Ref Range    Troponin-High Sensitivity 7 0 - 76 ng/L   D DIMER    Collection Time: 05/07/22  2:55 PM   Result Value Ref Range    D DIMER 0.45 <0.50 ug/ml(FEU)       Radiologic Studies -   [unfilled]  CT Results  (Last 48 hours)    None        CXR Results  (Last 48 hours)               05/07/22 1402  XR CHEST PORT Final result    Impression:  No acute findings. Narrative:  Chest one view. Comparison 2/22/2022       One frontal portable view at 1401 dated 5/7/2022. The central line tip is in the   superior vena cava. The heart size and pulmonary blood flow are normal.   Mediastinal contours are normal. The lungs are clear. There is no pleural or   pericardial fluid. There is no acute skeletal abnormality. Medical Decision Making and ED Course   - I am the first and primary provider for this patient AND AM THE PRIMARY PROVIDER OF RECORD. - I reviewed the vital signs, available nursing notes, past medical history, past surgical history, family history and social history. - Initial assessment performed. The patients presenting problems have been discussed, and the staff are in agreement with the care plan formulated and outlined with them. I have encouraged them to ask questions as they arise throughout their visit. Vital Signs-Reviewed the patient's vital signs.     Patient Vitals for the past 12 hrs:   Temp Pulse Resp BP SpO2   05/07/22 1337 98.3 °F (36.8 °C) (!) 123 20 (!) 148/94 99 %       EKG interpretation: (Preliminary): Performed at 1339, and read at 1343  Ventricular rate 121 bpm,  ms, QRS duration 60 ms, QTC 4 and 31 ms.  Interpretation: Sinus tach otherwise normal EKG. Records Reviewed: Nursing Notes and Old Medical Records    The patient presents with shortness of breath with a differential diagnosis of asthma, pneumonia, PE. Will assess with basic cardiac labs chest x-ray and D-dimer. Will provide magnesium and albuterol Atrovent treatments    ED Course:              Provider Notes (Medical Decision Making):   39-year-old male with asthma exacerbation. Negative D-dimer and chest x-ray is clear otherwise. MDM           Consultations:       Consultations: - NONE        Procedures and Critical Care       Performed by: Etelvina Menon MD  PROCEDURES:  Procedures           Disposition     Disposition: Condition stable    Discharged    Remove if not discharged  DISCHARGE PLAN:  1. Current Discharge Medication List      CONTINUE these medications which have NOT CHANGED    Details   Xarelto 20 mg tab tablet Take 20 mg by mouth daily. torsemide (DEMADEX) 20 mg tablet TAKE 1 TABLET BY MOUTH ONCE DAILY IN THE MORNING AS NEEDED FOR LEG EDEMA      allopurinoL (ZYLOPRIM) 300 mg tablet Take 1 Tablet by mouth daily. Qty: 30 Tablet, Refills: 0      albuterol (ACCUNEB) 0.63 mg/3 mL nebulizer solution 3 mL by Nebulization route as needed. ProAir HFA 90 mcg/actuation inhaler Take 2 Puffs by inhalation every six (6) hours as needed. Symbicort 160-4.5 mcg/actuation HFAA Take 2 Puffs by inhalation daily. 2.   Follow-up Information     Follow up With Specialties Details Why Contact Info    Alexei Yanez MD Internal Medicine Physician Call in 2 days  [address] [address] [phone]          3. Return to ED if worse   4. Current Discharge Medication List      START taking these medications    Details   predniSONE (DELTASONE) 20 mg tablet Take 60 mg by mouth daily for 5 days.   Qty: 15 Tablet, Refills: 0  Start date: 5/7/2022, End date: 5/12/2022             Diagnosis Clinical Impression:   1. Mild asthma without complication, unspecified whether persistent        Attestations:    Nico Everett MD    Please note that this dictation was completed with [employer], the computer voice recognition software. Quite often unanticipated grammatical, syntax, homophones, and other interpretive errors are inadvertently transcribed by the computer software. Please disregard these errors. Please excuse any errors that have escaped final proofreading. Thank you.

## 2022-05-07 NOTE — ED TRIAGE NOTES
Recently discharge Pt with Treatment for SoB, walked out, arrived via EMS for unresponsive BVM bagged. EMS reported episode of SoB prior to unresponsive event. Shelli Emanuel MD bedside, GCS 7, Prep for intubation. Chest Port accessed for urgent meds.

## 2022-05-07 NOTE — ED PROVIDER NOTES
EMERGENCY DEPARTMENT HISTORY AND PHYSICAL EXAM      Date: 5/7/2022  Patient Name: Christine Current      History of Presenting Illness     Chief Complaint   Patient presents with    Unresponsive       History Provided By: EMS    HPI: Christine Current, 27 y.o. male with a past medical history significant B-cell lymphoma and asthma presents to the ED with cc of increasing shortness of breath after discharge from the hospital.  Patient was seen and evaluated and symptoms resolved from his asthma exacerbation. Subsequently discharged and developed worsening symptoms outside of the hospital.  Patient presents via EMS with BVM in place with oxygen saturations at 100%. Patient was treated with epinephrine prior to arrival.    There are no other complaints, changes, or physical findings at this time. PCP: Curley Angelucci, MD    Current Facility-Administered Medications   Medication Dose Route Frequency Provider Last Rate Last Admin    propofol (DIPRIVAN) 10 mg/mL infusion  0-50 mcg/kg/min IntraVENous TITRATE Scarlet Perez MD 13.8 mL/hr at 05/07/22 1745 20 mcg/kg/min at 05/07/22 1745     Current Outpatient Medications   Medication Sig Dispense Refill    Xarelto 20 mg tab tablet Take 20 mg by mouth daily.  torsemide (DEMADEX) 20 mg tablet TAKE 1 TABLET BY MOUTH ONCE DAILY IN THE MORNING AS NEEDED FOR LEG EDEMA      predniSONE (DELTASONE) 20 mg tablet Take 60 mg by mouth daily for 5 days. 15 Tablet 0    allopurinoL (ZYLOPRIM) 300 mg tablet Take 1 Tablet by mouth daily. 30 Tablet 0    albuterol (ACCUNEB) 0.63 mg/3 mL nebulizer solution 3 mL by Nebulization route as needed.  ProAir HFA 90 mcg/actuation inhaler Take 2 Puffs by inhalation every six (6) hours as needed.  Symbicort 160-4.5 mcg/actuation HFAA Take 2 Puffs by inhalation daily.          Past History     Past Medical History:  Past Medical History:   Diagnosis Date    Asthma     Hypertension     Lymphoma [facility])        Past Surgical History:  Past Surgical History:   Procedure Laterality Date    HX OTHER SURGICAL Right 03/02/2022    PICC line placed 3/2 and removed a week later.  IR FLUORO GUIDE PLC CVAD  3/30/2022    IR INSERT TUNL CVC W PORT OVER 5 YEARS  3/30/2022       Family History:  Family History   Problem Relation Age of Onset    Diabetes Mother     Hypertension Mother     Myasthenia Gravis Maternal Grandmother     Cancer Maternal Grandfather        Social History:  Social History     Tobacco Use    Smoking status: Former Smoker     Packs/day: 0.50     Years: 2.00     Pack years: 1.00    Smokeless tobacco: Never Used   Vaping Use    Vaping Use: Never used   Substance Use Topics    Alcohol use: Not Currently    Drug use: Not Currently     Types: Marijuana       Allergies:  No Known Allergies      Review of Systems     Review of Systems   Unable to perform ROS: Acuity of condition       Physical Exam     Physical Exam  Vitals and nursing note reviewed. Constitutional:       General: He is in acute distress. Appearance: He is well-developed. He is diaphoretic. HENT:      Head: Normocephalic and atraumatic. Nose: Nose normal.      Mouth/Throat:      Mouth: Mucous membranes are moist.      Pharynx: Oropharynx is clear. No oropharyngeal exudate. Eyes:      General:         Right eye: No discharge. Left eye: No discharge. Conjunctiva/sclera: Conjunctivae normal.      Pupils: Pupils are equal, round, and reactive to light. Cardiovascular:      Rate and Rhythm: Normal rate and regular rhythm. Chest Wall: PMI is not displaced. No thrill. Heart sounds: Normal heart sounds. No murmur heard. No friction rub. No gallop. Pulmonary:      Effort: Respiratory distress present. Breath sounds: Normal breath sounds. No wheezing or rales. Chest:      Chest wall: No tenderness. Abdominal:      General: Bowel sounds are normal. There is no distension. Palpations: Abdomen is soft.  There is no mass. Tenderness: There is no abdominal tenderness. There is no guarding or rebound. Musculoskeletal:         General: Normal range of motion. Cervical back: Normal range of motion and neck supple. Comments: Spontaneous movement of all extremities   Lymphadenopathy:      Cervical: No cervical adenopathy. Skin:     General: Skin is warm. Capillary Refill: Capillary refill takes less than 2 seconds. Findings: No erythema or rash. Neurological:      Mental Status: He is oriented to person, place, and time. Cranial Nerves: No cranial nerve deficit. Coordination: Coordination normal.      Comments: Unable to assess   Psychiatric:         Mood and Affect: Mood normal.         Behavior: Behavior normal.      Comments: Unable to assess         Lab and Diagnostic Study Results     Labs -     Recent Results (from the past 12 hour(s))   CBC WITH AUTOMATED DIFF    Collection Time: 05/07/22  1:45 PM   Result Value Ref Range    WBC 13.5 (H) 4.1 - 11.1 K/uL    RBC 4.22 4.10 - 5.70 M/uL    HGB 12.2 12.1 - 17.0 g/dL    HCT 37.9 36.6 - 50.3 %    MCV 89.8 80.0 - 99.0 FL    MCH 28.9 26.0 - 34.0 PG    MCHC 32.2 30.0 - 36.5 g/dL    RDW 15.5 (H) 11.5 - 14.5 %    PLATELET 287 875 - 587 K/uL    MPV 9.7 8.9 - 12.9 FL    NRBC 0.0 0.0  WBC    ABSOLUTE NRBC 0.00 0.00 - 0.01 K/uL    NEUTROPHILS 93 (H) 32 - 75 %    LYMPHOCYTES 4 (L) 12 - 49 %    MONOCYTES 2 (L) 5 - 13 %    EOSINOPHILS 0 0 - 7 %    BASOPHILS 0 0 - 1 %    IMMATURE GRANULOCYTES 1 (H) 0 - 0.5 %    ABS. NEUTROPHILS 12.6 (H) 1.8 - 8.0 K/UL    ABS. LYMPHOCYTES 0.5 (L) 0.8 - 3.5 K/UL    ABS. MONOCYTES 0.2 0.0 - 1.0 K/UL    ABS. EOSINOPHILS 0.0 0.0 - 0.4 K/UL    ABS. BASOPHILS 0.0 0.0 - 0.1 K/UL    ABS. IMM.  GRANS. 0.2 (H) 0.00 - 0.04 K/UL    DF AUTOMATED     METABOLIC PANEL, COMPREHENSIVE    Collection Time: 05/07/22  1:45 PM   Result Value Ref Range    Sodium 136 136 - 145 mmol/L    Potassium 4.4 3.5 - 5.1 mmol/L    Chloride 105 97 - 108 mmol/L    CO2 25 21 - 32 mmol/L    Anion gap 6 5 - 15 mmol/L    Glucose 171 (H) 65 - 100 mg/dL    BUN 11 6 - 20 mg/dL    Creatinine 1.04 0.70 - 1.30 mg/dL    BUN/Creatinine ratio 11 (L) 12 - 20      GFR est AA >60 >60 ml/min/1.73m2    GFR est non-AA >60 >60 ml/min/1.73m2    Calcium 9.3 8.5 - 10.1 mg/dL    Bilirubin, total 0.4 0.2 - 1.0 mg/dL    AST (SGOT) 14 (L) 15 - 37 U/L    ALT (SGPT) 30 12 - 78 U/L    Alk. phosphatase 109 45 - 117 U/L    Protein, total 8.3 (H) 6.4 - 8.2 g/dL    Albumin 4.3 3.5 - 5.0 g/dL    Globulin 4.0 2.0 - 4.0 g/dL    A-G Ratio 1.1 1.1 - 2.2     TROPONIN-HIGH SENSITIVITY    Collection Time: 05/07/22  1:45 PM   Result Value Ref Range    Troponin-High Sensitivity 5 0 - 76 ng/L   NT-PRO BNP    Collection Time: 05/07/22  1:52 PM   Result Value Ref Range    NT pro-BNP 10 <125 pg/mL   CBC WITH AUTOMATED DIFF    Collection Time: 05/07/22  2:55 PM   Result Value Ref Range    WBC 13.4 (H) 4.1 - 11.1 K/uL    RBC 4.15 4.10 - 5.70 M/uL    HGB 11.9 (L) 12.1 - 17.0 g/dL    HCT 37.2 36.6 - 50.3 %    MCV 89.6 80.0 - 99.0 FL    MCH 28.7 26.0 - 34.0 PG    MCHC 32.0 30.0 - 36.5 g/dL    RDW 15.6 (H) 11.5 - 14.5 %    PLATELET 983 276 - 580 K/uL    MPV 9.5 8.9 - 12.9 FL    NRBC 0.0 0.0  WBC    ABSOLUTE NRBC 0.00 0.00 - 0.01 K/uL    NEUTROPHILS 94 (H) 32 - 75 %    LYMPHOCYTES 2 (L) 12 - 49 %    MONOCYTES 2 (L) 5 - 13 %    EOSINOPHILS 0 0 - 7 %    BASOPHILS 0 0 - 1 %    IMMATURE GRANULOCYTES 2 (H) 0 - 0.5 %    ABS. NEUTROPHILS 12.7 (H) 1.8 - 8.0 K/UL    ABS. LYMPHOCYTES 0.3 (L) 0.8 - 3.5 K/UL    ABS. MONOCYTES 0.2 0.0 - 1.0 K/UL    ABS. EOSINOPHILS 0.0 0.0 - 0.4 K/UL    ABS. BASOPHILS 0.0 0.0 - 0.1 K/UL    ABS. IMM.  GRANS. 0.2 (H) 0.00 - 0.04 K/UL    DF AUTOMATED     TROPONIN-HIGH SENSITIVITY    Collection Time: 05/07/22  2:55 PM   Result Value Ref Range    Troponin-High Sensitivity 7 0 - 76 ng/L   D DIMER    Collection Time: 05/07/22  2:55 PM   Result Value Ref Range    D DIMER 0.45 <0.50 ug/ml(FEU) Radiologic Studies -   [unfilled]  CT Results  (Last 48 hours)    None        CXR Results  (Last 48 hours)               05/07/22 1402  XR CHEST PORT Final result    Impression:  No acute findings. Narrative:  Chest one view. Comparison 2/22/2022       One frontal portable view at 1401 dated 5/7/2022. The central line tip is in the   superior vena cava. The heart size and pulmonary blood flow are normal.   Mediastinal contours are normal. The lungs are clear. There is no pleural or   pericardial fluid. There is no acute skeletal abnormality. Medical Decision Making and ED Course   - I am the first and primary provider for this patient AND AM THE PRIMARY PROVIDER OF RECORD. - I reviewed the vital signs, available nursing notes, past medical history, past surgical history, family history and social history. - Initial assessment performed. The patients presenting problems have been discussed, and the staff are in agreement with the care plan formulated and outlined with them. I have encouraged them to ask questions as they arise throughout their visit. Vital Signs-Reviewed the patient's vital signs. Patient Vitals for the past 12 hrs:   Temp Pulse Resp BP SpO2   05/07/22 1759 -- 82 20 (!) 179/100 96 %   05/07/22 1752 97.8 °F (36.6 °C) -- -- -- --   05/07/22 1750 -- 82 20 (!) 185/104 100 %   05/07/22 1737 -- 74 12 -- 100 %   05/07/22 1729 -- (!) 122 -- -- 100 %       EKG interpretation: (Preliminary): Performed at 1738, and read at 1742  Ventricular rate 67 bpm,  ms, QRS duration 80 ms,  ms. Interpretation: Normal sinus rhythm with sinus arrhythmia. Possible left atrial enlargement. Nonspecific ST wave abnormality. Abnormal EKG. Records Reviewed: Nursing Notes and Old Medical Records    The patient presents with acute respiratory failure with a differential diagnosis of asthma exacerbation, pleural effusion, anaphylactic reaction.   Patient subsequently intubated upon arrival in the emergency room    ED Course:   6:29 PM  Family is at the bedside and we have discussed the care. They have relayed the events when leaving the hospital and are unclear as to what could have made him decompensate. Reexamination of the patient he is hemodynamically stable at this point time and appropriately sedated. ED Course as of 05/07/22 2018   Sat May 07, 2022   2006 Respiratory is good to make adjustments in the vent settings based on the most recent ABG and will recheck in 1 hour. [CS]      ED Course User Index  [CS] Ifrah Call MD         Provider Notes (Medical Decision Making):   25-year-old male Quoc Cordova presents to the emergency room after sustaining discharge for an asthma exacerbation. Family says the people in the car on the way home smoke but no longer smoking and he said that he began to feel short of breath. EMS arrived with the patient on a nonrebreather and he was subsequently intubated. CT scan did not reveal any acute pathology and we will continue to treat asthma with magnesium and albuterol despite intubation. Will be admitting the patient to the hospital for further observation and treatment  MDM           Consultations:       Consultations: -  Hospitalist Consultant: Dr. Willam Sarabia: We have asked for emergent assistance with regard to this patient. We have discussed the patients HPI, ROS, PE and results this far. They will come and evaluate the patient for admission.         Procedures and Critical Care       Performed by: Sanket Maddox MD  PROCEDURES:  Intubation    Date/Time: 5/7/2022 5:59 PM  Performed by: Ifrah Call MD  Authorized by: Ifrah Call MD     Consent:     Consent obtained:  Emergent situation    Consent given by:  Healthcare agent    Alternatives discussed:  No treatment and alternative treatment  Pre-procedure details:     Patient status:  Unresponsive    Mallampati score:  II    Pretreatment meds: Etomidate. Paralytics:  Succinylcholine  Procedure details:     Preoxygenation:  Bag valve mask    CPR in progress: no      Intubation method:  Oral    Oral intubation technique:  Video-assisted    Laryngoscope blade: Mac 3    Tube size (mm):  7.5    Tube type:  Cuffed    Number of attempts:  1    Ventilation between attempts: no      Cricoid pressure: no      Tube visualized through cords: yes    Placement assessment:     ETT to lip:  24    ETT to teeth:  23    Tube secured with:  ETT arias    Breath sounds:  Equal and absent over the epigastrium    Placement verification: chest rise, condensation, CXR verification, direct visualization, equal breath sounds, esophageal detector, ETCO2 detector and tube exhalation      CXR findings:  ETT in proper place  Post-procedure details:     Patient tolerance of procedure: Tolerated well, no immediate complications           CRITICAL CARE NOTE :  5:36 PM  Amount of Critical Care Time: 45(minutes)    IMPENDING DETERIORATION -Respiratory  ASSOCIATED RISK FACTORS - Hypoxia  MANAGEMENT- Bedside Assessment and Supervision of Care  INTERPRETATION -  Xrays, CT Scan, Blood Gases, ECG, Blood Pressure and Cardiac Output Measures   INTERVENTIONS - Metobolic interventions  CASE REVIEW - Hospitalist/Intensivist  TREATMENT RESPONSE -Stable  PERFORMED BY - Self    NOTES   :  I have spent critical care time involved in lab review, consultations with specialist, family decision- making, bedside attention and documentation. This time excludes time spent in any separate billed procedures. During this entire length of time I was immediately available to the patient . Rand Mosqueda MD        Disposition     Disposition: Condition stable        Diagnosis     Clinical Impression:   1. Acute respiratory failure with hypoxia (HCC)        Attestations:    Rand Mosqueda MD    Please note that this dictation was completed with Medypal, the computer voice recognition software.   Quite often unanticipated grammatical, syntax, homophones, and other interpretive errors are inadvertently transcribed by the computer software. Please disregard these errors. Please excuse any errors that have escaped final proofreading. Thank you.

## 2022-05-08 LAB
ANION GAP SERPL CALC-SCNC: 7 MMOL/L (ref 5–15)
APPEARANCE UR: CLEAR
ARTERIAL PATENCY WRIST A: ABNORMAL
ATRIAL RATE: 121 BPM
ATRIAL RATE: 67 BPM
BACTERIA URNS QL MICRO: NEGATIVE /HPF
BASE DEFICIT BLDA-SCNC: 6.8 MMOL/L (ref 0–2)
BASOPHILS # BLD: 0 K/UL (ref 0–0.1)
BASOPHILS NFR BLD: 0 % (ref 0–1)
BDY SITE: ABNORMAL
BILIRUB UR QL: NEGATIVE
BUN SERPL-MCNC: 17 MG/DL (ref 6–20)
BUN/CREAT SERPL: 9 (ref 12–20)
CA-I BLD-MCNC: 9 MG/DL (ref 8.5–10.1)
CALCULATED P AXIS, ECG09: 70 DEGREES
CALCULATED P AXIS, ECG09: 74 DEGREES
CALCULATED R AXIS, ECG10: 70 DEGREES
CALCULATED R AXIS, ECG10: 71 DEGREES
CALCULATED T AXIS, ECG11: 72 DEGREES
CALCULATED T AXIS, ECG11: 76 DEGREES
CHLORIDE SERPL-SCNC: 105 MMOL/L (ref 97–108)
CO2 SERPL-SCNC: 22 MMOL/L (ref 21–32)
COLOR UR: ABNORMAL
CREAT SERPL-MCNC: 1.89 MG/DL (ref 0.7–1.3)
DIAGNOSIS, 93000: NORMAL
DIAGNOSIS, 93000: NORMAL
DIFFERENTIAL METHOD BLD: ABNORMAL
EOSINOPHIL # BLD: 0 K/UL (ref 0–0.4)
EOSINOPHIL NFR BLD: 0 % (ref 0–7)
EPAP/CPAP/PEEP, PAPEEP: 7
ERYTHROCYTE [DISTWIDTH] IN BLOOD BY AUTOMATED COUNT: 15.7 % (ref 11.5–14.5)
EST. AVERAGE GLUCOSE BLD GHB EST-MCNC: 126 MG/DL
FIO2 ON VENT: 35 %
GAS FLOW.O2 SETTING OXYMISER: 28 L/MIN
GLUCOSE BLD STRIP.AUTO-MCNC: 135 MG/DL (ref 65–117)
GLUCOSE BLD STRIP.AUTO-MCNC: 170 MG/DL (ref 65–117)
GLUCOSE BLD STRIP.AUTO-MCNC: 181 MG/DL (ref 65–117)
GLUCOSE SERPL-MCNC: 170 MG/DL (ref 65–100)
GLUCOSE UR STRIP.AUTO-MCNC: >300 MG/DL
HBA1C MFR BLD: 6 % (ref 4–5.6)
HCO3 BLDA-SCNC: 19 MMOL/L (ref 22–26)
HCT VFR BLD AUTO: 35.1 % (ref 36.6–50.3)
HGB BLD-MCNC: 11.2 G/DL (ref 12.1–17)
HGB UR QL STRIP: NEGATIVE
HYALINE CASTS URNS QL MICRO: ABNORMAL /LPF (ref 0–5)
IMM GRANULOCYTES # BLD AUTO: 0.2 K/UL (ref 0–0.04)
IMM GRANULOCYTES NFR BLD AUTO: 1 % (ref 0–0.5)
IPAP/PIP, IPAPIP: 27
KETONES UR QL STRIP.AUTO: NEGATIVE MG/DL
LEUKOCYTE ESTERASE UR QL STRIP.AUTO: NEGATIVE
LYMPHOCYTES # BLD: 0.3 K/UL (ref 0.8–3.5)
LYMPHOCYTES NFR BLD: 3 % (ref 12–49)
MAGNESIUM SERPL-MCNC: 2.8 MG/DL (ref 1.6–2.4)
MCH RBC QN AUTO: 29.1 PG (ref 26–34)
MCHC RBC AUTO-ENTMCNC: 31.9 G/DL (ref 30–36.5)
MCV RBC AUTO: 91.2 FL (ref 80–99)
MONOCYTES # BLD: 0.3 K/UL (ref 0–1)
MONOCYTES NFR BLD: 3 % (ref 5–13)
MRSA DNA SPEC QL NAA+PROBE: NOT DETECTED
MUCOUS THREADS URNS QL MICRO: ABNORMAL /LPF
NEUTS SEG # BLD: 12.2 K/UL (ref 1.8–8)
NEUTS SEG NFR BLD: 93 % (ref 32–75)
NITRITE UR QL STRIP.AUTO: NEGATIVE
NRBC # BLD: 0 K/UL (ref 0–0.01)
NRBC BLD-RTO: 0 PER 100 WBC
P-R INTERVAL, ECG05: 140 MS
P-R INTERVAL, ECG05: 156 MS
PCO2 BLDA: 31 MMHG (ref 35–45)
PERFORMED BY, TECHID: ABNORMAL
PH BLDA: 7.36 [PH] (ref 7.35–7.45)
PH UR STRIP: 5 [PH] (ref 5–8)
PLATELET # BLD AUTO: 319 K/UL (ref 150–400)
PMV BLD AUTO: 9.9 FL (ref 8.9–12.9)
PO2 BLDA: 96 MMHG (ref 75–100)
POTASSIUM SERPL-SCNC: 5.2 MMOL/L (ref 3.5–5.1)
POTASSIUM SERPL-SCNC: 6.9 MMOL/L (ref 3.5–5.1)
PROT UR STRIP-MCNC: 30 MG/DL
Q-T INTERVAL, ECG07: 304 MS
Q-T INTERVAL, ECG07: 338 MS
QRS DURATION, ECG06: 68 MS
QRS DURATION, ECG06: 80 MS
QTC CALCULATION (BEZET), ECG08: 357 MS
QTC CALCULATION (BEZET), ECG08: 431 MS
RBC # BLD AUTO: 3.85 M/UL (ref 4.1–5.7)
RBC #/AREA URNS HPF: ABNORMAL /HPF (ref 0–5)
SAO2 % BLD: 99 %
SAO2% DEVICE SAO2% SENSOR NAME: ABNORMAL
SODIUM SERPL-SCNC: 134 MMOL/L (ref 136–145)
SP GR UR REFRACTOMETRY: >1.03 (ref 1–1.03)
SPECIMEN SITE: ABNORMAL
TROPONIN-HIGH SENSITIVITY: 136 NG/L (ref 0–76)
UA: UC IF INDICATED,UAUC: ABNORMAL
UROBILINOGEN UR QL STRIP.AUTO: 0.1 EU/DL (ref 0.1–1)
VENTILATION MODE VENT: ABNORMAL
VENTRICULAR RATE, ECG03: 121 BPM
VENTRICULAR RATE, ECG03: 67 BPM
WBC # BLD AUTO: 13.1 K/UL (ref 4.1–11.1)
WBC URNS QL MICRO: ABNORMAL /HPF (ref 0–4)

## 2022-05-08 PROCEDURE — 74011000250 HC RX REV CODE- 250: Performed by: EMERGENCY MEDICINE

## 2022-05-08 PROCEDURE — 36415 COLL VENOUS BLD VENIPUNCTURE: CPT

## 2022-05-08 PROCEDURE — 87641 MR-STAPH DNA AMP PROBE: CPT

## 2022-05-08 PROCEDURE — 77010033678 HC OXYGEN DAILY

## 2022-05-08 PROCEDURE — 87086 URINE CULTURE/COLONY COUNT: CPT

## 2022-05-08 PROCEDURE — 74011250637 HC RX REV CODE- 250/637: Performed by: INTERNAL MEDICINE

## 2022-05-08 PROCEDURE — 74011250636 HC RX REV CODE- 250/636: Performed by: INTERNAL MEDICINE

## 2022-05-08 PROCEDURE — 82962 GLUCOSE BLOOD TEST: CPT

## 2022-05-08 PROCEDURE — 84484 ASSAY OF TROPONIN QUANT: CPT

## 2022-05-08 PROCEDURE — 94640 AIRWAY INHALATION TREATMENT: CPT

## 2022-05-08 PROCEDURE — 74011000250 HC RX REV CODE- 250: Performed by: INTERNAL MEDICINE

## 2022-05-08 PROCEDURE — 83036 HEMOGLOBIN GLYCOSYLATED A1C: CPT

## 2022-05-08 PROCEDURE — 85025 COMPLETE CBC W/AUTO DIFF WBC: CPT

## 2022-05-08 PROCEDURE — 65610000006 HC RM INTENSIVE CARE

## 2022-05-08 PROCEDURE — 36600 WITHDRAWAL OF ARTERIAL BLOOD: CPT

## 2022-05-08 PROCEDURE — 83735 ASSAY OF MAGNESIUM: CPT

## 2022-05-08 PROCEDURE — 81001 URINALYSIS AUTO W/SCOPE: CPT

## 2022-05-08 PROCEDURE — 82803 BLOOD GASES ANY COMBINATION: CPT

## 2022-05-08 PROCEDURE — 94003 VENT MGMT INPAT SUBQ DAY: CPT

## 2022-05-08 PROCEDURE — 84132 ASSAY OF SERUM POTASSIUM: CPT

## 2022-05-08 PROCEDURE — 74011636637 HC RX REV CODE- 636/637: Performed by: INTERNAL MEDICINE

## 2022-05-08 RX ORDER — INSULIN LISPRO 100 [IU]/ML
INJECTION, SOLUTION INTRAVENOUS; SUBCUTANEOUS EVERY 6 HOURS
Status: DISCONTINUED | OUTPATIENT
Start: 2022-05-08 | End: 2022-05-10 | Stop reason: HOSPADM

## 2022-05-08 RX ORDER — SODIUM POLYSTYRENE SULFONATE 15 G/60ML
15 SUSPENSION ORAL; RECTAL
Status: COMPLETED | OUTPATIENT
Start: 2022-05-08 | End: 2022-05-08

## 2022-05-08 RX ORDER — DEXTROSE MONOHYDRATE 100 MG/ML
250 INJECTION, SOLUTION INTRAVENOUS ONCE
Status: COMPLETED | OUTPATIENT
Start: 2022-05-08 | End: 2022-05-08

## 2022-05-08 RX ORDER — MAGNESIUM SULFATE 100 %
4 CRYSTALS MISCELLANEOUS AS NEEDED
Status: DISCONTINUED | OUTPATIENT
Start: 2022-05-08 | End: 2022-05-10 | Stop reason: HOSPADM

## 2022-05-08 RX ORDER — DEXTROSE MONOHYDRATE 100 MG/ML
0-250 INJECTION, SOLUTION INTRAVENOUS AS NEEDED
Status: DISCONTINUED | OUTPATIENT
Start: 2022-05-08 | End: 2022-05-10 | Stop reason: HOSPADM

## 2022-05-08 RX ORDER — CALCIUM GLUCONATE 20 MG/ML
1 INJECTION, SOLUTION INTRAVENOUS ONCE
Status: COMPLETED | OUTPATIENT
Start: 2022-05-08 | End: 2022-05-08

## 2022-05-08 RX ADMIN — INSULIN LISPRO 2 UNITS: 100 INJECTION, SOLUTION INTRAVENOUS; SUBCUTANEOUS at 17:32

## 2022-05-08 RX ADMIN — SODIUM POLYSTYRENE SULFONATE 15 G: 15 SUSPENSION ORAL; RECTAL at 06:16

## 2022-05-08 RX ADMIN — PROPOFOL 45 MCG/KG/MIN: 10 INJECTION, EMULSION INTRAVENOUS at 17:27

## 2022-05-08 RX ADMIN — METHYLPREDNISOLONE SODIUM SUCCINATE 60 MG: 40 INJECTION, POWDER, FOR SOLUTION INTRAMUSCULAR; INTRAVENOUS at 17:26

## 2022-05-08 RX ADMIN — IPRATROPIUM BROMIDE AND ALBUTEROL SULFATE 3 ML: .5; 3 SOLUTION RESPIRATORY (INHALATION) at 09:01

## 2022-05-08 RX ADMIN — ALLOPURINOL 300 MG: 300 TABLET ORAL at 10:13

## 2022-05-08 RX ADMIN — WATER 1 G: 1 INJECTION INTRAMUSCULAR; INTRAVENOUS; SUBCUTANEOUS at 11:33

## 2022-05-08 RX ADMIN — IPRATROPIUM BROMIDE AND ALBUTEROL SULFATE 3 ML: .5; 3 SOLUTION RESPIRATORY (INHALATION) at 02:36

## 2022-05-08 RX ADMIN — DEXMEDETOMIDINE HYDROCHLORIDE 0.7 MCG/KG/HR: 4 INJECTION, SOLUTION INTRAVENOUS at 02:44

## 2022-05-08 RX ADMIN — DEXTROSE MONOHYDRATE 250 ML: 100 INJECTION, SOLUTION INTRAVENOUS at 06:09

## 2022-05-08 RX ADMIN — PROPOFOL 40 MCG/KG/MIN: 10 INJECTION, EMULSION INTRAVENOUS at 08:30

## 2022-05-08 RX ADMIN — DEXMEDETOMIDINE HYDROCHLORIDE 0.5 MCG/KG/HR: 4 INJECTION, SOLUTION INTRAVENOUS at 08:30

## 2022-05-08 RX ADMIN — SODIUM CHLORIDE, PRESERVATIVE FREE 10 ML: 5 INJECTION INTRAVENOUS at 22:15

## 2022-05-08 RX ADMIN — SODIUM CHLORIDE, PRESERVATIVE FREE 10 ML: 5 INJECTION INTRAVENOUS at 06:00

## 2022-05-08 RX ADMIN — PROPOFOL 40 MCG/KG/MIN: 10 INJECTION, EMULSION INTRAVENOUS at 00:06

## 2022-05-08 RX ADMIN — INSULIN HUMAN 10 UNITS: 100 INJECTION, SOLUTION PARENTERAL at 06:08

## 2022-05-08 RX ADMIN — PROPOFOL 45 MCG/KG/MIN: 10 INJECTION, EMULSION INTRAVENOUS at 19:46

## 2022-05-08 RX ADMIN — PROPOFOL 50 MCG/KG/MIN: 10 INJECTION, EMULSION INTRAVENOUS at 22:14

## 2022-05-08 RX ADMIN — CALCIUM GLUCONATE 1000 MG: 20 INJECTION, SOLUTION INTRAVENOUS at 06:00

## 2022-05-08 RX ADMIN — DEXTROSE MONOHYDRATE 250 ML: 100 INJECTION, SOLUTION INTRAVENOUS at 05:00

## 2022-05-08 RX ADMIN — DEXMEDETOMIDINE HYDROCHLORIDE 0.5 MCG/KG/HR: 4 INJECTION, SOLUTION INTRAVENOUS at 22:20

## 2022-05-08 RX ADMIN — PROPOFOL 40 MCG/KG/MIN: 10 INJECTION, EMULSION INTRAVENOUS at 10:13

## 2022-05-08 RX ADMIN — IPRATROPIUM BROMIDE AND ALBUTEROL SULFATE 3 ML: .5; 3 SOLUTION RESPIRATORY (INHALATION) at 14:08

## 2022-05-08 RX ADMIN — ONDANSETRON 4 MG: 2 INJECTION INTRAMUSCULAR; INTRAVENOUS at 05:34

## 2022-05-08 RX ADMIN — BUDESONIDE 1000 MCG: 0.5 INHALANT RESPIRATORY (INHALATION) at 20:06

## 2022-05-08 RX ADMIN — RIVAROXABAN 20 MG: 20 TABLET, FILM COATED ORAL at 10:13

## 2022-05-08 RX ADMIN — SODIUM CHLORIDE, PRESERVATIVE FREE 10 ML: 5 INJECTION INTRAVENOUS at 17:32

## 2022-05-08 RX ADMIN — DEXMEDETOMIDINE HYDROCHLORIDE 0.5 MCG/KG/HR: 4 INJECTION, SOLUTION INTRAVENOUS at 15:07

## 2022-05-08 RX ADMIN — IPRATROPIUM BROMIDE AND ALBUTEROL SULFATE 3 ML: .5; 3 SOLUTION RESPIRATORY (INHALATION) at 20:06

## 2022-05-08 RX ADMIN — PROPOFOL 35 MCG/KG/MIN: 10 INJECTION, EMULSION INTRAVENOUS at 04:21

## 2022-05-08 RX ADMIN — METHYLPREDNISOLONE SODIUM SUCCINATE 40 MG: 40 INJECTION, POWDER, FOR SOLUTION INTRAMUSCULAR; INTRAVENOUS at 04:30

## 2022-05-08 RX ADMIN — BUDESONIDE 1000 MCG: 0.5 INHALANT RESPIRATORY (INHALATION) at 09:01

## 2022-05-08 RX ADMIN — SODIUM CHLORIDE 500 ML: 9 INJECTION, SOLUTION INTRAVENOUS at 13:00

## 2022-05-08 RX ADMIN — METHYLPREDNISOLONE SODIUM SUCCINATE 60 MG: 40 INJECTION, POWDER, FOR SOLUTION INTRAMUSCULAR; INTRAVENOUS at 11:34

## 2022-05-08 RX ADMIN — PROPOFOL 45 MCG/KG/MIN: 10 INJECTION, EMULSION INTRAVENOUS at 15:08

## 2022-05-08 RX ADMIN — ACETAMINOPHEN 650 MG: 325 TABLET ORAL at 11:06

## 2022-05-08 NOTE — CONSULTS
Pulmonary/ CC Consult    Subjective:   Date of Consultation:  May 8, 2022  Referring Physician: Isra Tinoco MD     Sury Brennan is a 27 y.o. male with PMH of asthma, B-cell lymphoma and hypertension. Patient is admitted in hospital because of increasing symptoms of shortness of breath, chest tightness and coughing. After initial treatment in the ER he was stabilized and was discharged but outside the emergency room he developed severe bouts of coughing and chest tightness and was brought back after he suddenly collapsed. He then required intubation in the ER. A CT scan of chest was done which was unremarkable for any pulmonary embolism. No lung consolidation was noted. He had severe wheezing. His cardiac enzymes were also negative. Initial blood gas in the ER showed acute hypercapnic respiratory failure with respiratory acidosis. Currently is intubated and sedated. He has an NG tube in. Patient Active Problem List   Diagnosis Code    Right groin mass R19.09    Fall W19. XXXA    Right inguinal pain R10.31    Pulmonary embolism (HCC) I26.99    Pelvic mass R19.00    Bilateral pulmonary embolism (HCC) I26.99    Non Hodgkin's lymphoma (HCC) C85.90    Asthma J45.909    Acute respiratory failure with hypoxia (HCC) J96.01     Past Medical History:   Diagnosis Date    Asthma     Hypertension     Lymphoma ([facility] [address]       Family History   Problem Relation Age of Onset    Diabetes Mother     Hypertension Mother     Myasthenia Gravis Maternal Grandmother     Cancer Maternal Grandfather       Social History     Tobacco Use    Smoking status: Former Smoker     Packs/day: 0.50     Years: 2.00     Pack years: 1.00    Smokeless tobacco: Never Used   Substance Use Topics    Alcohol use: Not Currently     Past Surgical History:   Procedure Laterality Date    HX OTHER SURGICAL Right 03/02/2022    PICC line placed 3/2 and removed a week later.      IR FLUORO GUIDE PLC CVAD  3/30/2022    IR INSERT KAYE CVC W PORT OVER 5 YEARS  3/30/2022      Prior to Admission medications    Medication Sig Start Date End Date Taking? Authorizing Provider   Xarelto 20 mg tab tablet Take 20 mg by mouth daily. 22   Coy Puga MD   torsemide (DEMADEX) 20 mg tablet TAKE 1 TABLET BY MOUTH ONCE DAILY IN THE MORNING AS NEEDED FOR LEG EDEMA 22   Coy Puga MD   predniSONE (DELTASONE) 20 mg tablet Take 60 mg by mouth daily for 5 days. 22  Macarena El MD   allopurinoL (ZYLOPRIM) 300 mg tablet Take 1 Tablet by mouth daily. 3/5/22   Juan Diego Addison MD   albuterol (ACCUNEB) 0.63 mg/3 mL nebulizer solution 3 mL by Nebulization route as needed. 21   Provider, Historical   ProAir HFA 90 mcg/actuation inhaler Take 2 Puffs by inhalation every six (6) hours as needed. 22   Provider, Historical   Symbicort 160-4.5 mcg/actuation HFAA Take 2 Puffs by inhalation daily. 22   Provider, Historical     No Known Allergies     Review of Systems: Could not be done because of patient's status    Objective:   Blood pressure (!) 98/59, pulse 93, temperature 99.9 °F (37.7 °C), resp. rate 28, height 5' 6\" (1.676 m), weight 109.2 kg (240 lb 11.9 oz), SpO2 96 %. Temp (24hrs), Av.8 °F (36.6 °C), Min:96.4 °F (35.8 °C), Max:99.9 °F (37.7 °C)    XR CHEST SNGL V   Final Result   Findings/IMPRESSION:      Endotracheal tube terminates 4 cm superior to the cleo. A gastric tube courses   inferiorly out of the field of view. Unchanged positioning of a right internal   jugular Port-A-Cath. Overlying monitoring leads. The cardiac silhouette is normal and unchanged in size. Lung volumes are maintained. No focal consolidation, large pleural effusion, or   discernible pneumothorax. CTA CHEST W OR W WO CONT   Final Result   Limited by breathing motion. 1. No large central pulmonary embolus. 2. Lines, tubes as above.       XR CHEST PORT   Final Result   FINDINGS: IMPRESSION: Single frontal view of the chest.      ETT distal tip 6.9 cm above cleo, at level of clavicle heads. Right port distal tip SVC/RA junction. Normal heart size. No vascular congestion or pulmonary edema. Right pulmonary hypoinflation. No focal infiltrate, pleural effusion,   pneumothorax. No free air under the diaphragm. Bony structures grossly intact.          Data Review:   Current Facility-Administered Medications   Medication Dose Route Frequency    glucose chewable tablet 16 g  4 Tablet Oral PRN    dextrose 10% infusion 0-250 mL  0-250 mL IntraVENous PRN    glucagon (GLUCAGEN) injection 1 mg  1 mg IntraMUSCular PRN    insulin lispro (HUMALOG) injection   SubCUTAneous Q6H    cefTRIAXone (ROCEPHIN) 1 g in sterile water (preservative free) 10 mL IV syringe  1 g IntraVENous Q24H    methylPREDNISolone (PF) (SOLU-MEDROL) injection 60 mg  60 mg IntraVENous Q6H    propofol (DIPRIVAN) 10 mg/mL infusion  0-50 mcg/kg/min IntraVENous TITRATE    albuterol-ipratropium (DUO-NEB) 2.5 MG-0.5 MG/3 ML  3 mL Nebulization Q6H RT    budesonide (PULMICORT) 500 mcg/2 ml nebulizer suspension  1,000 mcg Nebulization BID RT    rivaroxaban (XARELTO) tablet 20 mg  20 mg Per NG tube DAILY    allopurinoL (ZYLOPRIM) tablet 300 mg  300 mg Per NG tube DAILY    sodium chloride (NS) flush 5-40 mL  5-40 mL IntraVENous Q8H    sodium chloride (NS) flush 5-40 mL  5-40 mL IntraVENous PRN    acetaminophen (TYLENOL) tablet 650 mg  650 mg Oral Q6H PRN    Or    acetaminophen (TYLENOL) suppository 650 mg  650 mg Rectal Q6H PRN    polyethylene glycol (MIRALAX) packet 17 g  17 g Oral DAILY PRN    ondansetron (ZOFRAN ODT) tablet 4 mg  4 mg Oral Q8H PRN    Or    ondansetron (ZOFRAN) injection 4 mg  4 mg IntraVENous Q6H PRN    fentaNYL (PF) 1,500 mcg/30 mL (50 mcg/mL) infusion  0-200 mcg/hr IntraVENous TITRATE    dexmedeTOMidine in 0.9 % NaCl (PRECEDEX) 400 mcg/100 mL (4 mcg/mL) infusion soln  0.1-1.5 mcg/kg/hr IntraVENous TITRATE        Exam: This is a young well-built male who is currently orally intubated and sedated. Neck is supple. No cervical lymphadenopathy. JVD is absent. Head normocephalic and atraumatic. Chest: Good bilateral air entry. Few rhonchi audible. Heart: S1-S2 normal.  Abdomen: Soft, nontender, no visceromegaly  Extremities: No edema, cyanosis or clubbing  Neuro: No focal motor deficit. Withdrawing to noxious stimuli. Psychiatric evaluation could not be done as patient is sedated and intubated. Recent Results (from the past 24 hour(s))   TROPONIN-HIGH SENSITIVITY    Collection Time: 05/07/22  5:40 PM   Result Value Ref Range    Troponin-High Sensitivity 15 0 - 76 ng/L   METABOLIC PANEL, COMPREHENSIVE    Collection Time: 05/07/22  5:40 PM   Result Value Ref Range    Sodium 133 (L) 136 - 145 mmol/L    Potassium 4.7 3.5 - 5.1 mmol/L    Chloride 101 97 - 108 mmol/L    CO2 23 21 - 32 mmol/L    Anion gap 9 5 - 15 mmol/L    Glucose 264 (H) 65 - 100 mg/dL    BUN 10 6 - 20 mg/dL    Creatinine 1.35 (H) 0.70 - 1.30 mg/dL    BUN/Creatinine ratio 7 (L) 12 - 20      GFR est AA >60 >60 ml/min/1.73m2    GFR est non-AA >60 >60 ml/min/1.73m2    Calcium 9.4 8.5 - 10.1 mg/dL    Bilirubin, total 0.4 0.2 - 1.0 mg/dL    AST (SGOT) 18 15 - 37 U/L    ALT (SGPT) 32 12 - 78 U/L    Alk.  phosphatase 115 45 - 117 U/L    Protein, total 9.1 (H) 6.4 - 8.2 g/dL    Albumin 4.4 3.5 - 5.0 g/dL    Globulin 4.7 (H) 2.0 - 4.0 g/dL    A-G Ratio 0.9 (L) 1.1 - 2.2     PROCALCITONIN    Collection Time: 05/07/22  5:40 PM   Result Value Ref Range    Procalcitonin 0.23 (H) 0 ng/mL   BLOOD GAS, ARTERIAL    Collection Time: 05/07/22  7:23 PM   Result Value Ref Range    pH 7.12 (LL) 7.35 - 7.45      PCO2 74 (H) 35 - 45 mmHg    PO2 454 (H) 75 - 100 mmHg    O2  >95 %    BICARBONATE 19 (L) 22 - 26 mmol/L    BASE DEFICIT 6.8 (H) 0 - 2 mmol/L    O2 METHOD VENT      FIO2 100.0 %    MODE AssistControl/Pressure Control      SET RATE 24      IPAP/PIP 30 EPAP/CPAP/PEEP 5      Sample source Arterial      SITE Right Radial      DONA'S TEST PASS      Critical value read back LUDWIG PUENTE RN    BLOOD GAS, ARTERIAL    Collection Time: 05/07/22  9:29 PM   Result Value Ref Range    pH 7.16 (LL) 7.35 - 7.45      PCO2 65 (H) 35 - 45 mmHg    PO2 371 (H) 75 - 100 mmHg    O2 SAT >100 >95 %    BICARBONATE 23 22 - 26 mmol/L    BASE DEFICIT 6.1 (H) 0 - 2 mmol/L    O2 METHOD VENT      FIO2 100.0 %    MODE AssistControl/Pressure Control      SET RATE 28      IPAP/PIP 32      EPAP/CPAP/PEEP 10.0      Sample source Arterial      SITE Right Radial      DONA'S TEST PASS      Critical value read back LUDWIG PUENTE RN    URINALYSIS W/ REFLEX CULTURE    Collection Time: 05/08/22  1:50 AM    Specimen: Urine   Result Value Ref Range    Color Yellow/Straw      Appearance Clear Clear      Specific gravity >1.030 (H) 1.003 - 1.030    pH (UA) 5.0 5.0 - 8.0      Protein 30 (A) Negative mg/dL    Glucose >300 (A) Negative mg/dL    Ketone Negative Negative mg/dL    Bilirubin Negative Negative      Blood Negative Negative      Urobilinogen 0.1 0.1 - 1.0 EU/dL    Nitrites Negative Negative      Leukocyte Esterase Negative Negative      WBC 10-20 0 - 4 /hpf    RBC 0-5 0 - 5 /hpf    Bacteria Negative Negative /hpf    UA:UC IF INDICATED Urine Culture Ordered (A) Culture not indicated by UA result      Mucus 2+ (A) Negative /lpf    Hyaline cast 5-10 0 - 5 /lpf   MRSA SCREEN - PCR (NASAL)    Collection Time: 05/08/22  1:50 AM   Result Value Ref Range    MRSA by PCR, Nasal Not Detected Not Detected     METABOLIC PANEL, BASIC    Collection Time: 05/08/22  3:42 AM   Result Value Ref Range    Sodium 134 (L) 136 - 145 mmol/L    Potassium 6.9 (HH) 3.5 - 5.1 mmol/L    Chloride 105 97 - 108 mmol/L    CO2 22 21 - 32 mmol/L    Anion gap 7 5 - 15 mmol/L    Glucose 170 (H) 65 - 100 mg/dL    BUN 17 6 - 20 mg/dL    Creatinine 1.89 (H) 0.70 - 1.30 mg/dL    BUN/Creatinine ratio 9 (L) 12 - 20      GFR est AA 50 (L) >60 ml/min/1.73m2    GFR est non-AA 42 (L) >60 ml/min/1.73m2    Calcium 9.0 8.5 - 10.1 mg/dL   CBC WITH AUTOMATED DIFF    Collection Time: 05/08/22  3:42 AM   Result Value Ref Range    WBC 13.1 (H) 4.1 - 11.1 K/uL    RBC 3.85 (L) 4.10 - 5.70 M/uL    HGB 11.2 (L) 12.1 - 17.0 g/dL    HCT 35.1 (L) 36.6 - 50.3 %    MCV 91.2 80.0 - 99.0 FL    MCH 29.1 26.0 - 34.0 PG    MCHC 31.9 30.0 - 36.5 g/dL    RDW 15.7 (H) 11.5 - 14.5 %    PLATELET 666 449 - 887 K/uL    MPV 9.9 8.9 - 12.9 FL    NRBC 0.0 0.0  WBC    ABSOLUTE NRBC 0.00 0.00 - 0.01 K/uL    NEUTROPHILS 93 (H) 32 - 75 %    LYMPHOCYTES 3 (L) 12 - 49 %    MONOCYTES 3 (L) 5 - 13 %    EOSINOPHILS 0 0 - 7 %    BASOPHILS 0 0 - 1 %    IMMATURE GRANULOCYTES 1 (H) 0 - 0.5 %    ABS. NEUTROPHILS 12.2 (H) 1.8 - 8.0 K/UL    ABS. LYMPHOCYTES 0.3 (L) 0.8 - 3.5 K/UL    ABS. MONOCYTES 0.3 0.0 - 1.0 K/UL    ABS. EOSINOPHILS 0.0 0.0 - 0.4 K/UL    ABS. BASOPHILS 0.0 0.0 - 0.1 K/UL    ABS. IMM.  GRANS. 0.2 (H) 0.00 - 0.04 K/UL    DF AUTOMATED     TROPONIN-HIGH SENSITIVITY    Collection Time: 05/08/22  3:42 AM   Result Value Ref Range    Troponin-High Sensitivity 136 (HH) 0 - 76 ng/L   MAGNESIUM    Collection Time: 05/08/22  3:42 AM   Result Value Ref Range    Magnesium 2.8 (H) 1.6 - 2.4 mg/dL   BLOOD GAS, ARTERIAL    Collection Time: 05/08/22  4:21 AM   Result Value Ref Range    pH 7.36 7.35 - 7.45      PCO2 31 (L) 35 - 45 mmHg    PO2 96 75 - 100 mmHg    O2 SAT 99 >95 %    BICARBONATE 19 (L) 22 - 26 mmol/L    BASE DEFICIT 6.8 (H) 0 - 2 mmol/L    O2 METHOD VENT      FIO2 35.0 %    MODE AssistControl/Pressure Control      SET RATE 28      IPAP/PIP 27      EPAP/CPAP/PEEP 7.0      Sample source Arterial      SITE Right Brachial      DONA'S TEST PASS     GLUCOSE, POC    Collection Time: 05/08/22  5:42 AM   Result Value Ref Range    Glucose (POC) 181 (H) 65 - 117 mg/dL    Performed by Tera Correa    POTASSIUM    Collection Time: 05/08/22  8:24 AM   Result Value Ref Range    Potassium 5.2 (H) 3.5 - 5.1 mmol/L   GLUCOSE, POC    Collection Time: 05/08/22 11:31 AM   Result Value Ref Range    Glucose (POC) 135 (H) 65 - 117 mg/dL    Performed by Mairum PIZANO        Impression:   Sury Brennan is a 27 y.o. male with PMH of asthma, B-cell lymphoma and hypertension. Is admitted in hospital because of acute exacerbation of his asthma with chest tightness and wheezing. It did not improve with treatment in the ER. Because of his respiratory distress he was intubated. Plan:   1. Acute respiratory failure:  Patient was intubated in the ER because of his severe respiratory distress. Initial blood gas on BiPAP showed 16/65/371/23/100  He was intubated  Repeat blood gas after 6 hours short  7.3 6/31/96/24 on 35% FiO2  Vent settings were reviewed. Peak pressures are 27  Continue vent support tonight. Weaning trial in the morning and possible extubation if successfully weaning trial.  Repeat blood gas in the morning  2. Elevated WBC count: This is secondary to stress. No lung consolidation on chest x-ray  3. Elevated troponin iron level:  Secondary to demand ischemia. Will trend levels. 4.  Nutrition: We will start him on Jevity at 45 cc/h. Discussed with covering nurse. Is on DVT and stress gastritis prophylaxis. And will follow with me in 10 days.   Total of 55 minutes were spent in patient's evaluation management  Thank you for involving me in the management of your patient            Jeronimo Rios MD  Pulmonary/CC

## 2022-05-08 NOTE — PROGRESS NOTES
Hospitalist Progress Note         Rafa Akers MD          Daily Progress Note: 5/8/2022      Subjective: The patient is seen for follow  up.  26-year-old gentleman with history of asthma needed for acute asthma exacerbation. Intubated due to respiratory distress and hypercapnia.,    Patient seen in follow-up. Remains intubated and sedated. Blood gases look fair. No fevers overnight    Problem List:  Problem List as of 5/8/2022 Date Reviewed: 2/17/2022          Codes Class Noted - Resolved    Asthma ICD-10-CM: J45.909  ICD-9-CM: 493.90  5/7/2022 - Present        Acute respiratory failure with hypoxia [facility]) ICD-10-CM: J96.01  ICD-9-CM: 518.81  5/7/2022 - Present        Non Hodgkin's lymphoma ([facility] ICD-10-CM: C85.90  ICD-9-CM: 202.80  3/1/2022 - Present        Pelvic mass ICD-10-CM: R19.00  ICD-9-CM: 789.30  2/23/2022 - Present        Bilateral pulmonary embolism ([facility] ICD-10-CM: I26.99  ICD-9-CM: 415.19  2/23/2022 - Present        Pulmonary embolism ([facility] ICD-10-CM: I26.99  ICD-9-CM: 415.19  2/22/2022 - Present        Right inguinal pain ICD-10-CM: R10.31  ICD-9-CM: 789.03  2/17/2022 - Present        Right groin mass ICD-10-CM: R19.09  ICD-9-CM: 789.39  2/11/2022 - Present        Fall ICD-10-CM: Kamille Andre. Lani Thurman  ICD-9-CM: E888.9  2/11/2022 - Present              Medications reviewed  Current Facility-Administered Medications   Medication Dose Route Frequency    glucose chewable tablet 16 g  4 Tablet Oral PRN    dextrose 10% infusion 0-250 mL  0-250 mL IntraVENous PRN    glucagon (GLUCAGEN) injection 1 mg  1 mg IntraMUSCular PRN    insulin lispro (HUMALOG) injection   SubCUTAneous Q6H    propofol (DIPRIVAN) 10 mg/mL infusion  0-50 mcg/kg/min IntraVENous TITRATE    albuterol-ipratropium (DUO-NEB) 2.5 MG-0.5 MG/3 ML  3 mL Nebulization Q6H RT    budesonide (PULMICORT) 500 mcg/2 ml nebulizer suspension  1,000 mcg Nebulization BID RT    methylPREDNISolone (PF) (SOLU-MEDROL) injection 40 mg  40 mg IntraVENous Q8H    rivaroxaban (XARELTO) tablet 20 mg  20 mg Per NG tube DAILY    allopurinoL (ZYLOPRIM) tablet 300 mg  300 mg Per NG tube DAILY    sodium chloride (NS) flush 5-40 mL  5-40 mL IntraVENous Q8H    sodium chloride (NS) flush 5-40 mL  5-40 mL IntraVENous PRN    acetaminophen (TYLENOL) tablet 650 mg  650 mg Oral Q6H PRN    Or    acetaminophen (TYLENOL) suppository 650 mg  650 mg Rectal Q6H PRN    polyethylene glycol (MIRALAX) packet 17 g  17 g Oral DAILY PRN    ondansetron (ZOFRAN ODT) tablet 4 mg  4 mg Oral Q8H PRN    Or    ondansetron (ZOFRAN) injection 4 mg  4 mg IntraVENous Q6H PRN    fentaNYL (PF) 1,500 mcg/30 mL (50 mcg/mL) infusion  0-200 mcg/hr IntraVENous TITRATE    dexmedeTOMidine in 0.9 % NaCl (PRECEDEX) 400 mcg/100 mL (4 mcg/mL) infusion soln  0.1-1.5 mcg/kg/hr IntraVENous TITRATE       Review of Systems:   A comprehensive review of systems was negative except for that written in the HPI. Objective:   Physical Exam:     Visit Vitals  BP (!) 85/57   Pulse 100   Temp 99.5 °F (37.5 °C)   Resp 28   Ht 5' 6\" (1.676 m)   Wt 109.2 kg (240 lb 11.9 oz)   SpO2 97%   BMI 38.86 kg/m²      O2 Device: Ventilator    Temp (24hrs), Av.7 °F (36.5 °C), Min:96.4 °F (35.8 °C), Max:99.5 °F (37.5 °C)    No intake/output data recorded.  1901 -  0700  In: 520.5 [I.V.:520.5]  Out: 500 [Urine:350]    General:   Intubated and sedated   Lungs:   Clear to auscultation bilaterally. Chest wall:  No tenderness or deformity. Heart:  Regular rate and rhythm, S1, S2 normal, no murmur, click, rub or gallop. Abdomen:   Soft, non-tender. Bowel sounds normal. No masses,  No organomegaly. Extremities: Extremities normal, atraumatic, no cyanosis or edema. Pulses: 2+ and symmetric all extremities. Skin: Skin color, texture, turgor normal. No rashes or lesions   Neurologic: CNII-XII intact.  No gross sensory or motor deficits     Data Review:       Recent Days:  Recent Labs 05/08/22  0342 05/07/22  1455 05/07/22  1345   WBC 13.1* 13.4* 13.5*   HGB 11.2* 11.9* 12.2   HCT 35.1* 37.2 37.9    364 396     Recent Labs     05/08/22  0824 05/08/22  0342 05/07/22  1740 05/07/22  1345 05/07/22  1345   NA  --  134* 133*  --  136   K 5.2* 6.9* 4.7   < > 4.4   CL  --  105 101  --  105   CO2  --  22 23  --  25   GLU  --  170* 264*  --  171*   BUN  --  17 10  --  11   CREA  --  1.89* 1.35*  --  1.04   CA  --  9.0 9.4  --  9.3   MG  --  2.8*  --   --   --    ALB  --   --  4.4  --  4.3   TBILI  --   --  0.4  --  0.4   ALT  --   --  32  --  30    < > = values in this interval not displayed. Recent Labs     05/08/22  0421 05/07/22  2129 05/07/22  1923   PH 7.36 7.16* 7.12*   PCO2 31* 65* 74*   PO2 96 371* 454*   HCO3 19* 23 19*   FIO2 35.0 100.0 100.0       24 Hour Results:  Recent Results (from the past 24 hour(s))   CBC WITH AUTOMATED DIFF    Collection Time: 05/07/22  1:45 PM   Result Value Ref Range    WBC 13.5 (H) 4.1 - 11.1 K/uL    RBC 4.22 4.10 - 5.70 M/uL    HGB 12.2 12.1 - 17.0 g/dL    HCT 37.9 36.6 - 50.3 %    MCV 89.8 80.0 - 99.0 FL    MCH 28.9 26.0 - 34.0 PG    MCHC 32.2 30.0 - 36.5 g/dL    RDW 15.5 (H) 11.5 - 14.5 %    PLATELET 869 044 - 101 K/uL    MPV 9.7 8.9 - 12.9 FL    NRBC 0.0 0.0  WBC    ABSOLUTE NRBC 0.00 0.00 - 0.01 K/uL    NEUTROPHILS 93 (H) 32 - 75 %    LYMPHOCYTES 4 (L) 12 - 49 %    MONOCYTES 2 (L) 5 - 13 %    EOSINOPHILS 0 0 - 7 %    BASOPHILS 0 0 - 1 %    IMMATURE GRANULOCYTES 1 (H) 0 - 0.5 %    ABS. NEUTROPHILS 12.6 (H) 1.8 - 8.0 K/UL    ABS. LYMPHOCYTES 0.5 (L) 0.8 - 3.5 K/UL    ABS. MONOCYTES 0.2 0.0 - 1.0 K/UL    ABS. EOSINOPHILS 0.0 0.0 - 0.4 K/UL    ABS. BASOPHILS 0.0 0.0 - 0.1 K/UL    ABS. IMM.  GRANS. 0.2 (H) 0.00 - 0.04 K/UL    DF AUTOMATED     METABOLIC PANEL, COMPREHENSIVE    Collection Time: 05/07/22  1:45 PM   Result Value Ref Range    Sodium 136 136 - 145 mmol/L    Potassium 4.4 3.5 - 5.1 mmol/L    Chloride 105 97 - 108 mmol/L    CO2 25 21 - 32 mmol/L    Anion gap 6 5 - 15 mmol/L    Glucose 171 (H) 65 - 100 mg/dL    BUN 11 6 - 20 mg/dL    Creatinine 1.04 0.70 - 1.30 mg/dL    BUN/Creatinine ratio 11 (L) 12 - 20      GFR est AA >60 >60 ml/min/1.73m2    GFR est non-AA >60 >60 ml/min/1.73m2    Calcium 9.3 8.5 - 10.1 mg/dL    Bilirubin, total 0.4 0.2 - 1.0 mg/dL    AST (SGOT) 14 (L) 15 - 37 U/L    ALT (SGPT) 30 12 - 78 U/L    Alk. phosphatase 109 45 - 117 U/L    Protein, total 8.3 (H) 6.4 - 8.2 g/dL    Albumin 4.3 3.5 - 5.0 g/dL    Globulin 4.0 2.0 - 4.0 g/dL    A-G Ratio 1.1 1.1 - 2.2     TROPONIN-HIGH SENSITIVITY    Collection Time: 05/07/22  1:45 PM   Result Value Ref Range    Troponin-High Sensitivity 5 0 - 76 ng/L   NT-PRO BNP    Collection Time: 05/07/22  1:52 PM   Result Value Ref Range    NT pro-BNP 10 <125 pg/mL   CBC WITH AUTOMATED DIFF    Collection Time: 05/07/22  2:55 PM   Result Value Ref Range    WBC 13.4 (H) 4.1 - 11.1 K/uL    RBC 4.15 4.10 - 5.70 M/uL    HGB 11.9 (L) 12.1 - 17.0 g/dL    HCT 37.2 36.6 - 50.3 %    MCV 89.6 80.0 - 99.0 FL    MCH 28.7 26.0 - 34.0 PG    MCHC 32.0 30.0 - 36.5 g/dL    RDW 15.6 (H) 11.5 - 14.5 %    PLATELET 675 545 - 438 K/uL    MPV 9.5 8.9 - 12.9 FL    NRBC 0.0 0.0  WBC    ABSOLUTE NRBC 0.00 0.00 - 0.01 K/uL    NEUTROPHILS 94 (H) 32 - 75 %    LYMPHOCYTES 2 (L) 12 - 49 %    MONOCYTES 2 (L) 5 - 13 %    EOSINOPHILS 0 0 - 7 %    BASOPHILS 0 0 - 1 %    IMMATURE GRANULOCYTES 2 (H) 0 - 0.5 %    ABS. NEUTROPHILS 12.7 (H) 1.8 - 8.0 K/UL    ABS. LYMPHOCYTES 0.3 (L) 0.8 - 3.5 K/UL    ABS. MONOCYTES 0.2 0.0 - 1.0 K/UL    ABS. EOSINOPHILS 0.0 0.0 - 0.4 K/UL    ABS. BASOPHILS 0.0 0.0 - 0.1 K/UL    ABS. IMM.  GRANS. 0.2 (H) 0.00 - 0.04 K/UL    DF AUTOMATED     TROPONIN-HIGH SENSITIVITY    Collection Time: 05/07/22  2:55 PM   Result Value Ref Range    Troponin-High Sensitivity 7 0 - 76 ng/L   D DIMER    Collection Time: 05/07/22  2:55 PM   Result Value Ref Range    D DIMER 0.45 <0.50 ug/ml(FEU)   TROPONIN-HIGH SENSITIVITY Collection Time: 05/07/22  5:40 PM   Result Value Ref Range    Troponin-High Sensitivity 15 0 - 76 ng/L   METABOLIC PANEL, COMPREHENSIVE    Collection Time: 05/07/22  5:40 PM   Result Value Ref Range    Sodium 133 (L) 136 - 145 mmol/L    Potassium 4.7 3.5 - 5.1 mmol/L    Chloride 101 97 - 108 mmol/L    CO2 23 21 - 32 mmol/L    Anion gap 9 5 - 15 mmol/L    Glucose 264 (H) 65 - 100 mg/dL    BUN 10 6 - 20 mg/dL    Creatinine 1.35 (H) 0.70 - 1.30 mg/dL    BUN/Creatinine ratio 7 (L) 12 - 20      GFR est AA >60 >60 ml/min/1.73m2    GFR est non-AA >60 >60 ml/min/1.73m2    Calcium 9.4 8.5 - 10.1 mg/dL    Bilirubin, total 0.4 0.2 - 1.0 mg/dL    AST (SGOT) 18 15 - 37 U/L    ALT (SGPT) 32 12 - 78 U/L    Alk.  phosphatase 115 45 - 117 U/L    Protein, total 9.1 (H) 6.4 - 8.2 g/dL    Albumin 4.4 3.5 - 5.0 g/dL    Globulin 4.7 (H) 2.0 - 4.0 g/dL    A-G Ratio 0.9 (L) 1.1 - 2.2     PROCALCITONIN    Collection Time: 05/07/22  5:40 PM   Result Value Ref Range    Procalcitonin 0.23 (H) 0 ng/mL   BLOOD GAS, ARTERIAL    Collection Time: 05/07/22  7:23 PM   Result Value Ref Range    pH 7.12 (LL) 7.35 - 7.45      PCO2 74 (H) 35 - 45 mmHg    PO2 454 (H) 75 - 100 mmHg    O2  >95 %    BICARBONATE 19 (L) 22 - 26 mmol/L    BASE DEFICIT 6.8 (H) 0 - 2 mmol/L    O2 METHOD VENT      FIO2 100.0 %    MODE AssistControl/Pressure Control      SET RATE 24      IPAP/PIP 30      EPAP/CPAP/PEEP 5      Sample source Arterial      SITE Right Radial      DONA'S TEST PASS      Critical value read back LUDWIG PUENTE RN    BLOOD GAS, ARTERIAL    Collection Time: 05/07/22  9:29 PM   Result Value Ref Range    pH 7.16 (LL) 7.35 - 7.45      PCO2 65 (H) 35 - 45 mmHg    PO2 371 (H) 75 - 100 mmHg    O2 SAT >100 >95 %    BICARBONATE 23 22 - 26 mmol/L    BASE DEFICIT 6.1 (H) 0 - 2 mmol/L    O2 METHOD VENT      FIO2 100.0 %    MODE AssistControl/Pressure Control      SET RATE 28      IPAP/PIP 32      EPAP/CPAP/PEEP 10.0      Sample source Arterial      SITE Right Radial      DONA'S TEST PASS      Critical value read back LUDWIG PUENTE RN    URINALYSIS W/ REFLEX CULTURE    Collection Time: 05/08/22  1:50 AM    Specimen: Urine   Result Value Ref Range    Color Yellow/Straw      Appearance Clear Clear      Specific gravity >1.030 (H) 1.003 - 1.030    pH (UA) 5.0 5.0 - 8.0      Protein 30 (A) Negative mg/dL    Glucose >300 (A) Negative mg/dL    Ketone Negative Negative mg/dL    Bilirubin Negative Negative      Blood Negative Negative      Urobilinogen 0.1 0.1 - 1.0 EU/dL    Nitrites Negative Negative      Leukocyte Esterase Negative Negative      WBC 10-20 0 - 4 /hpf    RBC 0-5 0 - 5 /hpf    Bacteria Negative Negative /hpf    UA:UC IF INDICATED Urine Culture Ordered (A) Culture not indicated by UA result      Mucus 2+ (A) Negative /lpf    Hyaline cast 5-10 0 - 5 /lpf   MRSA SCREEN - PCR (NASAL)    Collection Time: 05/08/22  1:50 AM   Result Value Ref Range    MRSA by PCR, Nasal Not Detected Not Detected     METABOLIC PANEL, BASIC    Collection Time: 05/08/22  3:42 AM   Result Value Ref Range    Sodium 134 (L) 136 - 145 mmol/L    Potassium 6.9 (HH) 3.5 - 5.1 mmol/L    Chloride 105 97 - 108 mmol/L    CO2 22 21 - 32 mmol/L    Anion gap 7 5 - 15 mmol/L    Glucose 170 (H) 65 - 100 mg/dL    BUN 17 6 - 20 mg/dL    Creatinine 1.89 (H) 0.70 - 1.30 mg/dL    BUN/Creatinine ratio 9 (L) 12 - 20      GFR est AA 50 (L) >60 ml/min/1.73m2    GFR est non-AA 42 (L) >60 ml/min/1.73m2    Calcium 9.0 8.5 - 10.1 mg/dL   CBC WITH AUTOMATED DIFF    Collection Time: 05/08/22  3:42 AM   Result Value Ref Range    WBC 13.1 (H) 4.1 - 11.1 K/uL    RBC 3.85 (L) 4.10 - 5.70 M/uL    HGB 11.2 (L) 12.1 - 17.0 g/dL    HCT 35.1 (L) 36.6 - 50.3 %    MCV 91.2 80.0 - 99.0 FL    MCH 29.1 26.0 - 34.0 PG    MCHC 31.9 30.0 - 36.5 g/dL    RDW 15.7 (H) 11.5 - 14.5 %    PLATELET 021 800 - 863 K/uL    MPV 9.9 8.9 - 12.9 FL    NRBC 0.0 0.0  WBC    ABSOLUTE NRBC 0.00 0.00 - 0.01 K/uL    NEUTROPHILS 93 (H) 32 - 75 % LYMPHOCYTES 3 (L) 12 - 49 %    MONOCYTES 3 (L) 5 - 13 %    EOSINOPHILS 0 0 - 7 %    BASOPHILS 0 0 - 1 %    IMMATURE GRANULOCYTES 1 (H) 0 - 0.5 %    ABS. NEUTROPHILS 12.2 (H) 1.8 - 8.0 K/UL    ABS. LYMPHOCYTES 0.3 (L) 0.8 - 3.5 K/UL    ABS. MONOCYTES 0.3 0.0 - 1.0 K/UL    ABS. EOSINOPHILS 0.0 0.0 - 0.4 K/UL    ABS. BASOPHILS 0.0 0.0 - 0.1 K/UL    ABS. IMM. GRANS. 0.2 (H) 0.00 - 0.04 K/UL    DF AUTOMATED     TROPONIN-HIGH SENSITIVITY    Collection Time: 05/08/22  3:42 AM   Result Value Ref Range    Troponin-High Sensitivity 136 (HH) 0 - 76 ng/L   MAGNESIUM    Collection Time: 05/08/22  3:42 AM   Result Value Ref Range    Magnesium 2.8 (H) 1.6 - 2.4 mg/dL   BLOOD GAS, ARTERIAL    Collection Time: 05/08/22  4:21 AM   Result Value Ref Range    pH 7.36 7.35 - 7.45      PCO2 31 (L) 35 - 45 mmHg    PO2 96 75 - 100 mmHg    O2 SAT 99 >95 %    BICARBONATE 19 (L) 22 - 26 mmol/L    BASE DEFICIT 6.8 (H) 0 - 2 mmol/L    O2 METHOD VENT      FIO2 35.0 %    MODE AssistControl/Pressure Control      SET RATE 28      IPAP/PIP 27      EPAP/CPAP/PEEP 7.0      Sample source Arterial      SITE Right Brachial      DONA'S TEST PASS     GLUCOSE, POC    Collection Time: 05/08/22  5:42 AM   Result Value Ref Range    Glucose (POC) 181 (H) 65 - 117 mg/dL    Performed by Homa Correa    POTASSIUM    Collection Time: 05/08/22  8:24 AM   Result Value Ref Range    Potassium 5.2 (H) 3.5 - 5.1 mmol/L           Assessment/     Acute hypercapnic respiratory failure  Acute asthma exacerbation  Probable UTI  Hypokalemia. Resolving    Plan:  Continue supportive care including IV steroids on mechanical ventilator  Wean of mechanical ventilator as tolerated  Add Rocephin 1 g IV every 24 hours  Monitor routine electrolyte      Care Plan discussed with: Nurse    Total time spent with patient: 30 minutes.     Vickie Flores MD

## 2022-05-08 NOTE — ED NOTES
Pt very restless, vitals as charted.  Per MD Vahe Valdez okay to titrate precedex more rapidly than is indicated by [facility]

## 2022-05-08 NOTE — PROGRESS NOTES
Reason for Admission:                    RUR Score:  22%         PCP: First and Last name:  Edie Blood MD     Name of Practice:   Are you a current patient: Yes/No:   Approximate date of last visit:    Can you do a virtual visit with your PCP:              Resources/supports as identified by patient/family:   Spoke with the pts Mother. See narrative                Top Challenges facing patient (as identified by patient/family and CM): Finances/Medication cost?                    Transportation? Support system or lack thereof? Living arrangements? Self-care/ADLs/Cognition? Current Advanced Directive/Advance Care Plan:  Full Code      Healthcare Decision Maker:   Click here to complete HealthCare Decision Makers including selection of the Healthcare Decision Maker Relationship (ie \"Primary\")      Primary Decision Maker: Kinza Robertson Mother - [phone]    Payor Source Payor: [employer]ICA[employer] / Plan: [place] [employer] / Product Type: Managed Care Medicaid /                             Plan for utilizing home health:not at this time                     Transition of Care Plan:   Pt is unable to participate with assessment. Spoke with his Mother/ICE Rickie Dany   Per Ms Jonathan Nolan the pt lives with her in a single family single level home. He is said to be independent with all ADLs and IADLs. He has a portacath due to ca care. Ms Jonathan Nolan denies all DME and states that Ama Shannan can get up and do just about anything he wants to. \"

## 2022-05-08 NOTE — CONSULTS
Hematology and Oncology Inpatient Consult Note     Patient: Cheri Salcido MRN: [id number]  SSN: [id number]    YOB: 1990  Age: 27 y.o. Sex: male    Chief Complaint: Patient was admitted for respiratory distress    Reason for consult: Evaluation and management of patient with non-Hodgkin's lymphoma    Subjective:      Cheri Salcido is a 27 y.o. -American male who was diagnosed with diffuse large B cell non-Hodgkin's lymphoma in February 2022. Patient was started on chemotherapy with Rituxan and CHOP (Adriamycin, vincristine, cyclophosphamide and prednisone). Patient had third cycle of chemotherapy on 29 April in our office. Patient came to ER yesterday with shortness of breath and initially he was discharged home after ER evaluation. On his way home he became very dyspneic and he was brought back to ER by EMS. Patient was intubated and now admitted in ICU. I was asked to see him for medical oncologic evaluation recommendations. Patient does open eyes to the verbal stimuli. Past Medical History:   Diagnosis Date    Asthma     Hypertension     Lymphoma [facility])    Hematology/Oncology Summary:  The patient presented to [facility] in February 2022 with pulmonary embolism. The patient had right groin mass with rightsided lower extremity swelling. He had initially seen Dr. Joey Gu from Surgery at [facility] and he had referred him to Surgical Oncology, but meanwhile he presented to hospital and later on he was transferred to [facility] and was seen by Dr. Catina Astudillo from [address] [place] [address] and the patient was found to have diffuse large Bcell lymphoma stage II and he had a first cycle of chemotherapy with Rituxan and CHOP on February 23, 2022. Later on he  transferred his oncologic care to me. The patient's echocardiogram done on March 3, 2022 shows his left ventricular ejection fraction of 65% to 70% and longitudinal strain is normal with a value of 14.9 and normal diastolic function.  The patient's CT of abdomen done on February 11 showed large right pelvic side wall and right groin/upper thigh mass most consistent with underlying malignancy. There was subcutaneous  edema involving the right lower extremity, lower abdominal periaortic lymphadenopathy, moderate left groin lymphadenopathy. The patient had a CT angiogram of chest done on February 22nd which shows bilateral pulmonary emboli. The patient had ultrasoundguided right inguinal mass biopsy done on February 24, 2022, which was core biopsy. It was positive for malignancy. Cytogenetic shows normal male karyotype and final pathology showed CD10 positive Bcell lymphoma, highgrade. According to Dr. Ammon Gonsalves's note the patient had large  diffuse Bcell lymphoma and I had discussed pathology report with pathologist at [facility] on March 15 and at that time official report did not have said diffuse Bcell lymphoma, and Dr. Lady Wagner told me that they are going to put the addendum to make confirm that it is diffuse large Bcell lymphoma. The patient had first cycle of chemotherapy with Rituxan and CHOP at [facility] as inpatient on February 23, 2022. Past Surgical History:   Procedure Laterality Date    HX OTHER SURGICAL Right 03/02/2022    PICC line placed 3/2 and removed a week later.      IR FLUORO GUIDE PLC CVAD  3/30/2022    IR INSERT TUNL CVC W PORT OVER 5 YEARS  3/30/2022      Family History   Problem Relation Age of Onset    Diabetes Mother     Hypertension Mother     Myasthenia Gravis Maternal Grandmother     Cancer Maternal Grandfather      Social History     Tobacco Use    Smoking status: Former Smoker     Packs/day: 0.50     Years: 2.00     Pack years: 1.00    Smokeless tobacco: Never Used   Substance Use Topics    Alcohol use: Not Currently      Current Facility-Administered Medications   Medication Dose Route Frequency Provider Last Rate Last Admin    glucose chewable tablet 16 g  4 Tablet Oral PRN Deshawn Jasper MD Joshua        dextrose 10% infusion 0-250 mL  0-250 mL IntraVENous PRN Apollo Alford MD        glucagon (GLUCAGEN) injection 1 mg  1 mg IntraMUSCular PRN Apollo Alford MD        insulin lispro (HUMALOG) injection   SubCUTAneous Q6H Apollo Alford MD        dextrose 10 % infusion 250 mL  250 mL IntraVENous ONCE Apollo Alford MD 50 mL/hr at 05/08/22 0500 250 mL at 05/08/22 0500    propofol (DIPRIVAN) 10 mg/mL infusion  0-50 mcg/kg/min IntraVENous TITRATE Reyes Pascal, MD 27.6 mL/hr at 05/08/22 0830 40 mcg/kg/min at 05/08/22 0830    albuterol-ipratropium (DUO-NEB) 2.5 MG-0.5 MG/3 ML  3 mL Nebulization Q6H RT Apollo Alford MD   3 mL at 05/08/22 0901    budesonide (PULMICORT) 500 mcg/2 ml nebulizer suspension  1,000 mcg Nebulization BID RT Reyes Pascal, MD   1,000 mcg at 05/08/22 0901    methylPREDNISolone (PF) (SOLU-MEDROL) injection 40 mg  40 mg IntraVENous Q8H Apollo Alford MD   40 mg at 05/08/22 0430    rivaroxaban (XARELTO) tablet 20 mg  20 mg Per NG tube DAILY Apollo Alford MD        allopurinoL (ZYLOPRIM) tablet 300 mg  300 mg Per NG tube DAILY Apollo Alford MD        sodium chloride (NS) flush 5-40 mL  5-40 mL IntraVENous Q8H Apollo Alford MD   10 mL at 05/08/22 0600    sodium chloride (NS) flush 5-40 mL  5-40 mL IntraVENous PRN Reyes Pascal, MD        acetaminophen (TYLENOL) tablet 650 mg  650 mg Oral Q6H PRN Reyes Pascal, MD        Or    acetaminophen (TYLENOL) suppository 650 mg  650 mg Rectal Q6H PRN Reyes Pascal, MD        polyethylene glycol (MIRALAX) packet 17 g  17 g Oral DAILY PRN Reyes Pascal, MD        ondansetron (ZOFRAN ODT) tablet 4 mg  4 mg Oral Q8H PRN Reyes Pascal, MD        Or    ondansetron (ZOFRAN) injection 4 mg  4 mg IntraVENous Q6H PRN Reyes Pascal, MD   4 mg at 05/08/22 0534    fentaNYL (PF) 1,500 mcg/30 mL (50 mcg/mL) infusion  0-200 mcg/hr IntraVENous TITRATE Reyes Pascal, MD   Held at 05/07/22 2300    dexmedeTOMidine in 0.9 % NaCl (PRECEDEX) 400 mcg/100 mL (4 mcg/mL) infusion soln  0.1-1.5 mcg/kg/hr IntraVENous TITRATE Chris Tejeda MD 11.5 mL/hr at 05/08/22 0830 0.4 mcg/kg/hr at 05/08/22 0830        No Known Allergies    Review of Systems:  Patient is intubated and cannot communicate other than just some eye gestures. Patient originally presented with significant right lower extremity swelling about 3 months ago and was found to have right inguinal area huge mass which was confirmed to be diffuse large B-cell lymphoma. Patient did not have any fever at home according to the available records. Objective:     Vitals:    05/08/22 0500 05/08/22 0600 05/08/22 0700 05/08/22 0857   BP: 96/66 (!) 85/57     Pulse:    100   Resp: 28 28  28   Temp:   99.5 °F (37.5 °C)    SpO2: 100% 100%  97%   Weight: 109.2 kg (240 lb 11.9 oz)      Height:            Physical Exam:  Constitutional: Young -American male now intubated and on vent. Eyes: Sclerae anicteric. Conjunctivae no pallor. ENMT: Patient is intubated. Neck: No adenopathy. Hematologic/Lymphatic: Bilateral axillary region shows no adenopathy. Patient does have right inguinal lymph node enlargement which is somewhat smaller than previous exams. No left inguinal region adenopathy. Respiratory: On vent. Cardiovascular: Normal sinus rhythm; no gallop or murmur; peripheral pulses are palpable. Abdomen: Soft, nontender, no hepatosplenomegaly. No guarding or rigidity. Bowel sounds present. Extremities: Has significant right lower extremity lymphedema  Skin: No petechiae; no skin rash. Neurologic: Responds to verbal command.     Recent Results (from the past 24 hour(s))   CBC WITH AUTOMATED DIFF    Collection Time: 05/07/22  1:45 PM   Result Value Ref Range    WBC 13.5 (H) 4.1 - 11.1 K/uL    RBC 4.22 4.10 - 5.70 M/uL    HGB 12.2 12.1 - 17.0 g/dL    HCT 37.9 36.6 - 50.3 %    MCV 89.8 80.0 - 99.0 FL    MCH 28.9 26.0 - 34.0 PG    MCHC 32.2 30.0 - 36.5 g/dL    RDW 15.5 (H) 11.5 - 14.5 %    PLATELET 396 150 - 400 K/uL    MPV 9.7 8.9 - 12.9 FL    NRBC 0.0 0.0  WBC    ABSOLUTE NRBC 0.00 0.00 - 0.01 K/uL    NEUTROPHILS 93 (H) 32 - 75 %    LYMPHOCYTES 4 (L) 12 - 49 %    MONOCYTES 2 (L) 5 - 13 %    EOSINOPHILS 0 0 - 7 %    BASOPHILS 0 0 - 1 %    IMMATURE GRANULOCYTES 1 (H) 0 - 0.5 %    ABS. NEUTROPHILS 12.6 (H) 1.8 - 8.0 K/UL    ABS. LYMPHOCYTES 0.5 (L) 0.8 - 3.5 K/UL    ABS. MONOCYTES 0.2 0.0 - 1.0 K/UL    ABS. EOSINOPHILS 0.0 0.0 - 0.4 K/UL    ABS. BASOPHILS 0.0 0.0 - 0.1 K/UL    ABS. IMM. GRANS. 0.2 (H) 0.00 - 0.04 K/UL    DF AUTOMATED     METABOLIC PANEL, COMPREHENSIVE    Collection Time: 05/07/22  1:45 PM   Result Value Ref Range    Sodium 136 136 - 145 mmol/L    Potassium 4.4 3.5 - 5.1 mmol/L    Chloride 105 97 - 108 mmol/L    CO2 25 21 - 32 mmol/L    Anion gap 6 5 - 15 mmol/L    Glucose 171 (H) 65 - 100 mg/dL    BUN 11 6 - 20 mg/dL    Creatinine 1.04 0.70 - 1.30 mg/dL    BUN/Creatinine ratio 11 (L) 12 - 20      GFR est AA >60 >60 ml/min/1.73m2    GFR est non-AA >60 >60 ml/min/1.73m2    Calcium 9.3 8.5 - 10.1 mg/dL    Bilirubin, total 0.4 0.2 - 1.0 mg/dL    AST (SGOT) 14 (L) 15 - 37 U/L    ALT (SGPT) 30 12 - 78 U/L    Alk.  phosphatase 109 45 - 117 U/L    Protein, total 8.3 (H) 6.4 - 8.2 g/dL    Albumin 4.3 3.5 - 5.0 g/dL    Globulin 4.0 2.0 - 4.0 g/dL    A-G Ratio 1.1 1.1 - 2.2     TROPONIN-HIGH SENSITIVITY    Collection Time: 05/07/22  1:45 PM   Result Value Ref Range    Troponin-High Sensitivity 5 0 - 76 ng/L   NT-PRO BNP    Collection Time: 05/07/22  1:52 PM   Result Value Ref Range    NT pro-BNP 10 <125 pg/mL   CBC WITH AUTOMATED DIFF    Collection Time: 05/07/22  2:55 PM   Result Value Ref Range    WBC 13.4 (H) 4.1 - 11.1 K/uL    RBC 4.15 4.10 - 5.70 M/uL    HGB 11.9 (L) 12.1 - 17.0 g/dL    HCT 37.2 36.6 - 50.3 %    MCV 89.6 80.0 - 99.0 FL    MCH 28.7 26.0 - 34.0 PG    MCHC 32.0 30.0 - 36.5 g/dL    RDW 15.6 (H) 11.5 - 14.5 %    PLATELET 174 988 - 710 K/uL    MPV 9.5 8.9 - 12.9 FL    NRBC 0.0 0.0  WBC    ABSOLUTE NRBC 0.00 0.00 - 0.01 K/uL    NEUTROPHILS 94 (H) 32 - 75 %    LYMPHOCYTES 2 (L) 12 - 49 %    MONOCYTES 2 (L) 5 - 13 %    EOSINOPHILS 0 0 - 7 %    BASOPHILS 0 0 - 1 %    IMMATURE GRANULOCYTES 2 (H) 0 - 0.5 %    ABS. NEUTROPHILS 12.7 (H) 1.8 - 8.0 K/UL    ABS. LYMPHOCYTES 0.3 (L) 0.8 - 3.5 K/UL    ABS. MONOCYTES 0.2 0.0 - 1.0 K/UL    ABS. EOSINOPHILS 0.0 0.0 - 0.4 K/UL    ABS. BASOPHILS 0.0 0.0 - 0.1 K/UL    ABS. IMM. GRANS. 0.2 (H) 0.00 - 0.04 K/UL    DF AUTOMATED     TROPONIN-HIGH SENSITIVITY    Collection Time: 05/07/22  2:55 PM   Result Value Ref Range    Troponin-High Sensitivity 7 0 - 76 ng/L   D DIMER    Collection Time: 05/07/22  2:55 PM   Result Value Ref Range    D DIMER 0.45 <0.50 ug/ml(FEU)   TROPONIN-HIGH SENSITIVITY    Collection Time: 05/07/22  5:40 PM   Result Value Ref Range    Troponin-High Sensitivity 15 0 - 76 ng/L   METABOLIC PANEL, COMPREHENSIVE    Collection Time: 05/07/22  5:40 PM   Result Value Ref Range    Sodium 133 (L) 136 - 145 mmol/L    Potassium 4.7 3.5 - 5.1 mmol/L    Chloride 101 97 - 108 mmol/L    CO2 23 21 - 32 mmol/L    Anion gap 9 5 - 15 mmol/L    Glucose 264 (H) 65 - 100 mg/dL    BUN 10 6 - 20 mg/dL    Creatinine 1.35 (H) 0.70 - 1.30 mg/dL    BUN/Creatinine ratio 7 (L) 12 - 20      GFR est AA >60 >60 ml/min/1.73m2    GFR est non-AA >60 >60 ml/min/1.73m2    Calcium 9.4 8.5 - 10.1 mg/dL    Bilirubin, total 0.4 0.2 - 1.0 mg/dL    AST (SGOT) 18 15 - 37 U/L    ALT (SGPT) 32 12 - 78 U/L    Alk.  phosphatase 115 45 - 117 U/L    Protein, total 9.1 (H) 6.4 - 8.2 g/dL    Albumin 4.4 3.5 - 5.0 g/dL    Globulin 4.7 (H) 2.0 - 4.0 g/dL    A-G Ratio 0.9 (L) 1.1 - 2.2     PROCALCITONIN    Collection Time: 05/07/22  5:40 PM   Result Value Ref Range    Procalcitonin 0.23 (H) 0 ng/mL   BLOOD GAS, ARTERIAL    Collection Time: 05/07/22  7:23 PM   Result Value Ref Range    pH 7.12 (LL) 7.35 - 7.45      PCO2 74 (H) 35 - 45 mmHg    PO2 454 (H) 75 - 100 mmHg    O2  >95 % BICARBONATE 19 (L) 22 - 26 mmol/L    BASE DEFICIT 6.8 (H) 0 - 2 mmol/L    O2 METHOD VENT      FIO2 100.0 %    MODE AssistControl/Pressure Control      SET RATE 24      IPAP/PIP 30      EPAP/CPAP/PEEP 5      Sample source Arterial      SITE Right Radial      DONA'S TEST PASS      Critical value read back LUDWIG PUENTE RN    BLOOD GAS, ARTERIAL    Collection Time: 05/07/22  9:29 PM   Result Value Ref Range    pH 7.16 (LL) 7.35 - 7.45      PCO2 65 (H) 35 - 45 mmHg    PO2 371 (H) 75 - 100 mmHg    O2 SAT >100 >95 %    BICARBONATE 23 22 - 26 mmol/L    BASE DEFICIT 6.1 (H) 0 - 2 mmol/L    O2 METHOD VENT      FIO2 100.0 %    MODE AssistControl/Pressure Control      SET RATE 28      IPAP/PIP 32      EPAP/CPAP/PEEP 10.0      Sample source Arterial      SITE Right Radial      DONA'S TEST PASS      Critical value read back LUDWIG PUENTE RN    URINALYSIS W/ REFLEX CULTURE    Collection Time: 05/08/22  1:50 AM    Specimen: Urine   Result Value Ref Range    Color Yellow/Straw      Appearance Clear Clear      Specific gravity >1.030 (H) 1.003 - 1.030    pH (UA) 5.0 5.0 - 8.0      Protein 30 (A) Negative mg/dL    Glucose >300 (A) Negative mg/dL    Ketone Negative Negative mg/dL    Bilirubin Negative Negative      Blood Negative Negative      Urobilinogen 0.1 0.1 - 1.0 EU/dL    Nitrites Negative Negative      Leukocyte Esterase Negative Negative      WBC 10-20 0 - 4 /hpf    RBC 0-5 0 - 5 /hpf    Bacteria Negative Negative /hpf    UA:UC IF INDICATED Urine Culture Ordered (A) Culture not indicated by UA result      Mucus 2+ (A) Negative /lpf    Hyaline cast 5-10 0 - 5 /lpf   MRSA SCREEN - PCR (NASAL)    Collection Time: 05/08/22  1:50 AM   Result Value Ref Range    MRSA by PCR, Nasal Not Detected Not Detected     METABOLIC PANEL, BASIC    Collection Time: 05/08/22  3:42 AM   Result Value Ref Range    Sodium 134 (L) 136 - 145 mmol/L    Potassium 6.9 (HH) 3.5 - 5.1 mmol/L    Chloride 105 97 - 108 mmol/L    CO2 22 21 - 32 mmol/L    Anion gap 7 5 - 15 mmol/L    Glucose 170 (H) 65 - 100 mg/dL    BUN 17 6 - 20 mg/dL    Creatinine 1.89 (H) 0.70 - 1.30 mg/dL    BUN/Creatinine ratio 9 (L) 12 - 20      GFR est AA 50 (L) >60 ml/min/1.73m2    GFR est non-AA 42 (L) >60 ml/min/1.73m2    Calcium 9.0 8.5 - 10.1 mg/dL   CBC WITH AUTOMATED DIFF    Collection Time: 05/08/22  3:42 AM   Result Value Ref Range    WBC 13.1 (H) 4.1 - 11.1 K/uL    RBC 3.85 (L) 4.10 - 5.70 M/uL    HGB 11.2 (L) 12.1 - 17.0 g/dL    HCT 35.1 (L) 36.6 - 50.3 %    MCV 91.2 80.0 - 99.0 FL    MCH 29.1 26.0 - 34.0 PG    MCHC 31.9 30.0 - 36.5 g/dL    RDW 15.7 (H) 11.5 - 14.5 %    PLATELET 633 340 - 251 K/uL    MPV 9.9 8.9 - 12.9 FL    NRBC 0.0 0.0  WBC    ABSOLUTE NRBC 0.00 0.00 - 0.01 K/uL    NEUTROPHILS 93 (H) 32 - 75 %    LYMPHOCYTES 3 (L) 12 - 49 %    MONOCYTES 3 (L) 5 - 13 %    EOSINOPHILS 0 0 - 7 %    BASOPHILS 0 0 - 1 %    IMMATURE GRANULOCYTES 1 (H) 0 - 0.5 %    ABS. NEUTROPHILS 12.2 (H) 1.8 - 8.0 K/UL    ABS. LYMPHOCYTES 0.3 (L) 0.8 - 3.5 K/UL    ABS. MONOCYTES 0.3 0.0 - 1.0 K/UL    ABS. EOSINOPHILS 0.0 0.0 - 0.4 K/UL    ABS. BASOPHILS 0.0 0.0 - 0.1 K/UL    ABS. IMM.  GRANS. 0.2 (H) 0.00 - 0.04 K/UL    DF AUTOMATED     TROPONIN-HIGH SENSITIVITY    Collection Time: 05/08/22  3:42 AM   Result Value Ref Range    Troponin-High Sensitivity 136 (HH) 0 - 76 ng/L   MAGNESIUM    Collection Time: 05/08/22  3:42 AM   Result Value Ref Range    Magnesium 2.8 (H) 1.6 - 2.4 mg/dL   BLOOD GAS, ARTERIAL    Collection Time: 05/08/22  4:21 AM   Result Value Ref Range    pH 7.36 7.35 - 7.45      PCO2 31 (L) 35 - 45 mmHg    PO2 96 75 - 100 mmHg    O2 SAT 99 >95 %    BICARBONATE 19 (L) 22 - 26 mmol/L    BASE DEFICIT 6.8 (H) 0 - 2 mmol/L    O2 METHOD VENT      FIO2 35.0 %    MODE AssistControl/Pressure Control      SET RATE 28      IPAP/PIP 27      EPAP/CPAP/PEEP 7.0      Sample source Arterial      SITE Right Brachial      DONA'S TEST PASS     GLUCOSE, POC    Collection Time: 05/08/22  5:42 AM   Result Value Ref Range    Glucose (POC) 181 (H) 65 - 117 mg/dL    Performed by Vinnie Correa    POTASSIUM    Collection Time: 05/08/22  8:24 AM   Result Value Ref Range    Potassium 5.2 (H) 3.5 - 5.1 mmol/L        XR CHEST SNGL V   Final Result   Findings/IMPRESSION:      Endotracheal tube terminates 4 cm superior to the cleo. A gastric tube courses   inferiorly out of the field of view. Unchanged positioning of a right internal   jugular Port-A-Cath. Overlying monitoring leads. The cardiac silhouette is normal and unchanged in size. Lung volumes are maintained. No focal consolidation, large pleural effusion, or   discernible pneumothorax. CTA CHEST W OR W WO CONT   Final Result   Limited by breathing motion. 1. No large central pulmonary embolus. 2. Lines, tubes as above. XR CHEST PORT   Final Result   FINDINGS: IMPRESSION: Single frontal view of the chest.      ETT distal tip 6.9 cm above cleo, at level of clavicle heads. Right port distal tip SVC/RA junction. Normal heart size. No vascular congestion or pulmonary edema. Right pulmonary hypoinflation. No focal infiltrate, pleural effusion,   pneumothorax. No free air under the diaphragm. Bony structures grossly intact. Assessment:     Hospital Problems  Date Reviewed: 2/17/2022          Codes Class Noted POA    Asthma ICD-10-CM: J45.909  ICD-9-CM: 493.90  5/7/2022 Unknown        Acute respiratory failure with hypoxia [facility]) ICD-10-CM: J96.01  ICD-9-CM: 518.81  5/7/2022 Unknown              Assessment & Plan:     59-year-old -American male with diffuse large B cell non-Hodgkin's lymphoma with huge right inguinal and pelvic area lymphadenopathy. Patient was started on chemotherapy with Rituxan and CHOP. Clinically patient has responded to current therapy and his right lower extremity lymphedema as well as right groin lymph node mass has significantly decreased.   Patient had last chemotherapy on 29 April.  Today is day 10 of his chemotherapy.  -Patient came with increasing shortness of breath and was admitted with respiratory failure and now is intubated. Patient's potassium was also high at 6.9 this morning and creatinine was also somewhat higher than his baseline of 1.89. Patient's CBC shows WBC of 13.1 thousand hemoglobin 11.2 and platelet count of 800,056. Patient is not on any G-CSF or Neulasta. .    -Patient's white cell count may go down over the next 2 to 3 days. May consider giving G-CSF if his white cell count starts going down below certain range.    -Discussed with patient's nurse. This dictation was done by dragon, computer voice recognition software. Often unanticipated grammatical, syntax, phones and other interpretive errors are inadvertently transcribed. Please excuse errors that have escaped final proofreading.      Signed By: Stefan Bravo MD     May 8, 2022

## 2022-05-08 NOTE — ED NOTES
Report called to ICU RN. Pt transported via this RN, Ed EDT, Will RRT.  Attached to portable monitor

## 2022-05-08 NOTE — PROGRESS NOTES
Problem: Ventilator Management  Goal: *Adequate oxygenation and ventilation  Outcome: Progressing Towards Goal  Goal: *Absence of infection signs and symptoms  Outcome: Progressing Towards Goal     Problem: Non-Violent Restraints  Goal: Removal from restraints as soon as assessed to be safe  Outcome: Progressing Towards Goal  Goal: No harm/injury to patient while restraints in use  Outcome: Progressing Towards Goal  Goal: Patient's dignity will be maintained  Outcome: Progressing Towards Goal  Goal: Patient Interventions  Outcome: Progressing Towards Goal     Problem: Pressure Injury - Risk of  Goal: *Prevention of pressure injury  Description: Document Valentino Scale and appropriate interventions in the flowsheet. Outcome: Progressing Towards Goal  Note: Pressure Injury Interventions:  Sensory Interventions: Assess changes in LOC,Float heels,Keep linens dry and wrinkle-free,Minimize linen layers,Monitor skin under medical devices,Turn and reposition approx. every two hours (pillows and wedges if needed)    Moisture Interventions: Absorbent underpads,Check for incontinence Q2 hours and as needed,Minimize layers,Maintain skin hydration (lotion/cream),Moisture barrier,Internal/External urinary devices    Activity Interventions: Assess need for specialty bed,Pressure redistribution bed/mattress(bed type)    Mobility Interventions: Assess need for specialty bed,HOB 30 degrees or less,Turn and reposition approx.  every two hours(pillow and wedges),Pressure redistribution bed/mattress (bed type)    Nutrition Interventions: Discuss nutritional consult with provider

## 2022-05-08 NOTE — PROGRESS NOTES
Nutrition Assessment     Type and Reason for Visit: Initial,Positive nutrition screen (new vent, TF recs)    Nutrition Recommendations/Plan:   Initiate Promote, continuous via ORGT at 30ml/hr w/ water flushes 155ml q4hrs  2 pkts of ProSource q 8hrs/ total 6pkt/day (260kcals, 90gpro)   Providinkcals (64%), 135gpro (103%), 1534ml(100%)   Propofol 40mcg/kg/min/ 728kcals (111%)      Nutrition Assessment:  Admitted w/ Acute respiratory failure with hypoxia, intubated. On Propofol and Precedex, no pressors. Will provide TF regimens. Labs: NA (134), K (6.9), glucose (170), Cre (1.89), mg (2.8), GFR (42). Meds: Humalog, Zofran. Malnutrition Assessment:  Malnutrition Status: No malnutrition     Estimated Daily Nutrient Needs:  Energy (kcal):  1528kcals  Protein (g):  130g       Fluid (ml/day):  1528ml    Nutrition Related Findings:  Observed as Nourish, Obese w/ no acute findings. no reported n/v/c/d nor edema. last bm pta.     Current Nutrition Therapies:  DIET NPO    Anthropometric Measures:  Height:  5' 6\" (167.6 cm)  Current Body Wt:  108.9 kg (240 lb)  BMI: 38.8    Nutrition Diagnosis:   · Inadequate oral intake related to impaired respiratory function as evidenced by intubation      Nutrition Interventions:   Food and/or Nutrient Delivery: Start tube feeding     Coordination of Nutrition Care: Continue to monitor while inpatient  Plan of Care discussed with: RN    Goals:     Goals: Initiate nutrition support,by next RD assessment       Nutrition Monitoring and Evaluation:   Behavioral-Environmental Outcomes: None identified  Food/Nutrient Intake Outcomes: Diet advancement/tolerance,Enteral nutrition intake/tolerance  Physical Signs/Symptoms Outcomes: Biochemical data,Hemodynamic status,Weight    Discharge Planning:    No discharge needs at this time    K[address] 45: [id number]

## 2022-05-08 NOTE — PROGRESS NOTES
1135: Repeat K was drawn and Dr. Tanika Hopkins notified of results, no new orders. 1300: Dr. Tanika Hopkins notified of Urinary output of 175ml this shift and increasing BUN/Cr. Orders received for 500ml NS bolus.

## 2022-05-08 NOTE — H&P
History and Physical    Patient: Caryl Ayala MRN: [id number]  SSN: [id number]    YOB: 1990  Age: 27 y.o. Sex: male      Subjective:      Caryl Ayala is a 27 y.o. male with PMH of asthma, B-cell lymphoma and hypertension. He initially presented to the ED earlier today with shortness of breath and was treated as asthma with symptoms relief. He was discharged however subsequently developed worsening symptoms soon, outside of the hospital.  Per family at bedside, patient was walking out from the hospital and suddenly collapsed. He was noted to have wheezing and was clammy, however was not having fever, chills or cough prior. Patient was brought back into the ED via EMS. He was subsequently intubated emergently due to elevated status. CT chest negative for PE or lung consolidation. Troponin negative x2. Repeat ABG showed respiratory acidosis with hypercapnia. Past Medical History:   Diagnosis Date    Asthma     Hypertension     Lymphoma [facility])      Past Surgical History:   Procedure Laterality Date    HX OTHER SURGICAL Right 03/02/2022    PICC line placed 3/2 and removed a week later.  IR FLUORO GUIDE PLC CVAD  3/30/2022    IR INSERT TUNL CVC W PORT OVER 5 YEARS  3/30/2022      Family History   Problem Relation Age of Onset    Diabetes Mother     Hypertension Mother     Myasthenia Gravis Maternal Grandmother     Cancer Maternal Grandfather      Social History     Tobacco Use    Smoking status: Former Smoker     Packs/day: 0.50     Years: 2.00     Pack years: 1.00    Smokeless tobacco: Never Used   Substance Use Topics    Alcohol use: Not Currently      Prior to Admission medications    Medication Sig Start Date End Date Taking? Authorizing Provider   Xarelto 20 mg tab tablet Take 20 mg by mouth daily.  4/24/22   OtherCoy MD   torsemide (DEMADEX) 20 mg tablet TAKE 1 TABLET BY MOUTH ONCE DAILY IN THE MORNING AS NEEDED FOR LEG EDEMA 4/24/22   OtherCoy MD predniSONE (DELTASONE) 20 mg tablet Take 60 mg by mouth daily for 5 days. 5/7/22 5/12/22  Fran Lentz MD   allopurinoL (ZYLOPRIM) 300 mg tablet Take 1 Tablet by mouth daily. 3/5/22   Morgan Saunders MD   albuterol (ACCUNEB) 0.63 mg/3 mL nebulizer solution 3 mL by Nebulization route as needed. 12/30/21   Provider, Historical   ProAir HFA 90 mcg/actuation inhaler Take 2 Puffs by inhalation every six (6) hours as needed. 2/8/22   Provider, Historical   Symbicort 160-4.5 mcg/actuation HFAA Take 2 Puffs by inhalation daily. 2/8/22   Provider, Historical        No Known Allergies    Review of Systems:   Omitted as patient is unable to cooperate. Intubated and sedated. Objective:     Vitals:    05/08/22 0000 05/08/22 0023 05/08/22 0030 05/08/22 0100   BP: (!) 77/53  (!) 83/57    Pulse:  98     Resp: 30 28 28 28   Temp: 96.8 °F (36 °C)   (!) 96.6 °F (35.9 °C)   SpO2: 97% 98% 97% 99%   Weight:       Height:            Physical Exam:   General: Intubated and sedated  Eye: Omitted as patient is unable to cooperate. Sluggish pupillary reflex  Throat and Neck: Omitted as patient is unable to cooperate. Lung: Generalized wheezing all auscultation bilaterally  Heart: regular rate and rhythm,   Abdomen: soft, non-tender. Bowel sounds normal. No masses,  Extremities:  Omitted as patient is unable to cooperate. Skin: Normal.  Neurologic: Omitted as patient is unable to cooperate. Psychiatric: Omitted as patient is unable to cooperate.       Recent Results (from the past 24 hour(s))   CBC WITH AUTOMATED DIFF    Collection Time: 05/07/22  1:45 PM   Result Value Ref Range    WBC 13.5 (H) 4.1 - 11.1 K/uL    RBC 4.22 4.10 - 5.70 M/uL    HGB 12.2 12.1 - 17.0 g/dL    HCT 37.9 36.6 - 50.3 %    MCV 89.8 80.0 - 99.0 FL    MCH 28.9 26.0 - 34.0 PG    MCHC 32.2 30.0 - 36.5 g/dL    RDW 15.5 (H) 11.5 - 14.5 %    PLATELET 634 018 - 604 K/uL    MPV 9.7 8.9 - 12.9 FL    NRBC 0.0 0.0  WBC    ABSOLUTE NRBC 0.00 0.00 - 0.01 K/uL    NEUTROPHILS 93 (H) 32 - 75 %    LYMPHOCYTES 4 (L) 12 - 49 %    MONOCYTES 2 (L) 5 - 13 %    EOSINOPHILS 0 0 - 7 %    BASOPHILS 0 0 - 1 %    IMMATURE GRANULOCYTES 1 (H) 0 - 0.5 %    ABS. NEUTROPHILS 12.6 (H) 1.8 - 8.0 K/UL    ABS. LYMPHOCYTES 0.5 (L) 0.8 - 3.5 K/UL    ABS. MONOCYTES 0.2 0.0 - 1.0 K/UL    ABS. EOSINOPHILS 0.0 0.0 - 0.4 K/UL    ABS. BASOPHILS 0.0 0.0 - 0.1 K/UL    ABS. IMM. GRANS. 0.2 (H) 0.00 - 0.04 K/UL    DF AUTOMATED     METABOLIC PANEL, COMPREHENSIVE    Collection Time: 05/07/22  1:45 PM   Result Value Ref Range    Sodium 136 136 - 145 mmol/L    Potassium 4.4 3.5 - 5.1 mmol/L    Chloride 105 97 - 108 mmol/L    CO2 25 21 - 32 mmol/L    Anion gap 6 5 - 15 mmol/L    Glucose 171 (H) 65 - 100 mg/dL    BUN 11 6 - 20 mg/dL    Creatinine 1.04 0.70 - 1.30 mg/dL    BUN/Creatinine ratio 11 (L) 12 - 20      GFR est AA >60 >60 ml/min/1.73m2    GFR est non-AA >60 >60 ml/min/1.73m2    Calcium 9.3 8.5 - 10.1 mg/dL    Bilirubin, total 0.4 0.2 - 1.0 mg/dL    AST (SGOT) 14 (L) 15 - 37 U/L    ALT (SGPT) 30 12 - 78 U/L    Alk.  phosphatase 109 45 - 117 U/L    Protein, total 8.3 (H) 6.4 - 8.2 g/dL    Albumin 4.3 3.5 - 5.0 g/dL    Globulin 4.0 2.0 - 4.0 g/dL    A-G Ratio 1.1 1.1 - 2.2     TROPONIN-HIGH SENSITIVITY    Collection Time: 05/07/22  1:45 PM   Result Value Ref Range    Troponin-High Sensitivity 5 0 - 76 ng/L   NT-PRO BNP    Collection Time: 05/07/22  1:52 PM   Result Value Ref Range    NT pro-BNP 10 <125 pg/mL   CBC WITH AUTOMATED DIFF    Collection Time: 05/07/22  2:55 PM   Result Value Ref Range    WBC 13.4 (H) 4.1 - 11.1 K/uL    RBC 4.15 4.10 - 5.70 M/uL    HGB 11.9 (L) 12.1 - 17.0 g/dL    HCT 37.2 36.6 - 50.3 %    MCV 89.6 80.0 - 99.0 FL    MCH 28.7 26.0 - 34.0 PG    MCHC 32.0 30.0 - 36.5 g/dL    RDW 15.6 (H) 11.5 - 14.5 %    PLATELET 177 136 - 106 K/uL    MPV 9.5 8.9 - 12.9 FL    NRBC 0.0 0.0  WBC    ABSOLUTE NRBC 0.00 0.00 - 0.01 K/uL    NEUTROPHILS 94 (H) 32 - 75 %    LYMPHOCYTES 2 (L) 12 - 49 %    MONOCYTES 2 (L) 5 - 13 %    EOSINOPHILS 0 0 - 7 %    BASOPHILS 0 0 - 1 %    IMMATURE GRANULOCYTES 2 (H) 0 - 0.5 %    ABS. NEUTROPHILS 12.7 (H) 1.8 - 8.0 K/UL    ABS. LYMPHOCYTES 0.3 (L) 0.8 - 3.5 K/UL    ABS. MONOCYTES 0.2 0.0 - 1.0 K/UL    ABS. EOSINOPHILS 0.0 0.0 - 0.4 K/UL    ABS. BASOPHILS 0.0 0.0 - 0.1 K/UL    ABS. IMM. GRANS. 0.2 (H) 0.00 - 0.04 K/UL    DF AUTOMATED     TROPONIN-HIGH SENSITIVITY    Collection Time: 05/07/22  2:55 PM   Result Value Ref Range    Troponin-High Sensitivity 7 0 - 76 ng/L   D DIMER    Collection Time: 05/07/22  2:55 PM   Result Value Ref Range    D DIMER 0.45 <0.50 ug/ml(FEU)   TROPONIN-HIGH SENSITIVITY    Collection Time: 05/07/22  5:40 PM   Result Value Ref Range    Troponin-High Sensitivity 15 0 - 76 ng/L   METABOLIC PANEL, COMPREHENSIVE    Collection Time: 05/07/22  5:40 PM   Result Value Ref Range    Sodium 133 (L) 136 - 145 mmol/L    Potassium 4.7 3.5 - 5.1 mmol/L    Chloride 101 97 - 108 mmol/L    CO2 23 21 - 32 mmol/L    Anion gap 9 5 - 15 mmol/L    Glucose 264 (H) 65 - 100 mg/dL    BUN 10 6 - 20 mg/dL    Creatinine 1.35 (H) 0.70 - 1.30 mg/dL    BUN/Creatinine ratio 7 (L) 12 - 20      GFR est AA >60 >60 ml/min/1.73m2    GFR est non-AA >60 >60 ml/min/1.73m2    Calcium 9.4 8.5 - 10.1 mg/dL    Bilirubin, total 0.4 0.2 - 1.0 mg/dL    AST (SGOT) 18 15 - 37 U/L    ALT (SGPT) 32 12 - 78 U/L    Alk.  phosphatase 115 45 - 117 U/L    Protein, total 9.1 (H) 6.4 - 8.2 g/dL    Albumin 4.4 3.5 - 5.0 g/dL    Globulin 4.7 (H) 2.0 - 4.0 g/dL    A-G Ratio 0.9 (L) 1.1 - 2.2     PROCALCITONIN    Collection Time: 05/07/22  5:40 PM   Result Value Ref Range    Procalcitonin 0.23 (H) 0 ng/mL   BLOOD GAS, ARTERIAL    Collection Time: 05/07/22  7:23 PM   Result Value Ref Range    pH 7.12 (LL) 7.35 - 7.45      PCO2 74 (H) 35 - 45 mmHg    PO2 454 (H) 75 - 100 mmHg    O2  >95 %    BICARBONATE 19 (L) 22 - 26 mmol/L    BASE DEFICIT 6.8 (H) 0 - 2 mmol/L    O2 METHOD VENT      FIO2 100.0 %    MODE AssistControl/Pressure Control      SET RATE 24      IPAP/PIP 30      EPAP/CPAP/PEEP 5      Sample source Arterial      SITE Right Radial      DONA'S TEST PASS      Critical value read back LUDWIG PUENTE RN    BLOOD GAS, ARTERIAL    Collection Time: 05/07/22  9:29 PM   Result Value Ref Range    pH 7.16 (LL) 7.35 - 7.45      PCO2 65 (H) 35 - 45 mmHg    PO2 371 (H) 75 - 100 mmHg    O2 SAT >100 >95 %    BICARBONATE 23 22 - 26 mmol/L    BASE DEFICIT 6.1 (H) 0 - 2 mmol/L    O2 METHOD VENT      FIO2 100.0 %    MODE AssistControl/Pressure Control      SET RATE 28      IPAP/PIP 32      EPAP/CPAP/PEEP 10.0      Sample source Arterial      SITE Right Radial      DONA'S TEST PASS      Critical value read back LUDWIG PUENTE RN        XR Results (maximum last 3): Results from Hospital Encounter encounter on 05/07/22    XR CHEST SNGL V    Narrative  Study: Chest radiograph(s), 1 view    Clinical Indication: OG-tube verification. Comparison: Chest radiograph dated 5/7/2022 and chest CTA performed earlier the  same day. Impression  Findings/IMPRESSION:    Endotracheal tube terminates 4 cm superior to the cleo. A gastric tube courses  inferiorly out of the field of view. Unchanged positioning of a right internal  jugular Port-A-Cath. Overlying monitoring leads. The cardiac silhouette is normal and unchanged in size. Lung volumes are maintained. No focal consolidation, large pleural effusion, or  discernible pneumothorax. XR CHEST PORT    Narrative  ET tube placement. Comparison chest x-ray 5/7/2022 at 1400 hours. Impression  FINDINGS: IMPRESSION: Single frontal view of the chest.    ETT distal tip 6.9 cm above cleo, at level of clavicle heads. Right port distal tip SVC/RA junction. Normal heart size. No vascular congestion or pulmonary edema. Right pulmonary hypoinflation. No focal infiltrate, pleural effusion,  pneumothorax. No free air under the diaphragm.     Bony structures grossly intact. XR CHEST PORT    Narrative  Chest one view. Comparison 2/22/2022    One frontal portable view at 1401 dated 5/7/2022. The central line tip is in the  superior vena cava. The heart size and pulmonary blood flow are normal.  Mediastinal contours are normal. The lungs are clear. There is no pleural or  pericardial fluid. There is no acute skeletal abnormality. Impression  No acute findings. CT Results (maximum last 3): Results from [place] encounter on 05/07/22    CTA CHEST W OR W WO CONT    Narrative  COMPARISON:Chest CT 2/22/2022. Chest x-ray 5/7/2022. HISTORY: Unresponsive. Technique: Axial imaging chest with IV contrast,  with multiplanar formatting  and MIPs. 100cc Isovue 370 administered IV. Dose reduction: All CT scans at this facility are performed using dose reduction  optimization techniques as appropriate to a performed exam including the  following: Automated exposure control, adjustments of the mA and/or kV according  to patient's size, or use of iterative reconstruction technique. FINDINGS: Limited by breathing motion. LINES: TUBES: Right port distal tip SVC/RA junction. ET tube distal tip 7 cm  above cleo. HEART: Normal heart size. Small pericardial fluid at the cardiac base. THORACIC AORTA: No aneurysm or dissection. PULMONARY ARTERIES: No large central pulmonary arterial filling defects. ADENOPATHY: No mediastinal or hilar adenopathy. THORACIC ESOPHAGUS:Nonthickened. Contains a small amount of air throughout its  course. LUNG PARENCHYMA: Clear. CENTRAL AIRWAYS: ET tube as above. Major endobronchial tree otherwise clear. Mild bronchial thickening. PLEURA: No pleural effusion. No pneumothorax. CHEST WALL: No axillary adenopathy. Included thyroid unremarkable. Right chest  wall port. UPPER ABDOMEN:  Unremarkable. BONES: Unremarkable for age. Impression  Limited by breathing motion. 1. No large central pulmonary embolus.   2. Lines, tubes as above. Results from [place] encounter on 03/19/22    CT UP EXT RT W CONT    Narrative  Technique: Axial images were obtained through the right upper extremity after  the administration of intravenous contrast. Coronal and sagittal reformatted  images were generated. Comment on dose reduction: All CT scans at this facility are performed using  dose reduction optimization technique as appropriate to perform the exam  including the following; automated exposure control, adjustments of the mA  and/or kV according to patient size, or use of iterative reconstructed  technique. FINDINGS:   image demonstrates a right PICC line which terminates near the cavoatrial  junction. Stranding noted in the subcutaneous fat throughout the medial aspect distal  third arm as well as rather diffusely throughout the forearm. Imaging features  most consistent with a cellulitis. Examination negative for surgically drainable  fluid collection. The included skeleton is intact. Portion of patient's right lower abdomen is captured on this examination and  demonstrates there are known lymphadenopathy and fluid collection in the right  inguinal soft tissues. Impression  Right upper extremity cellulitis without evidence of surgically drainable fluid  collection. Results from [place] encounter on 02/23/22    CT BX BONE MARROW DIAGNOSTIC    Narrative  PROCEDURE:  CT GUIDED BONE MARROW BIOPSY    HISTORY: Maren Virgen is a 27years old Male with pelvic lymphadenopathy  undergoing workup for lymphoma    :  Catalina Keller PA-C    ATTENDING:  Dionte Riojas MD    CONSENT:  After full discussion of the procedure, including risks, benefits and  alternatives, both verbal and written consent were obtained. TECHNIQUE: A timeout was called to verify the correct patient, procedure, site  and allergies. The patient was placed prone on the CT table.   CT dose reduction was achieved  through use of a standardized protocol tailored for this examination and  automatic exposure control for dose modulation. Initial  images were  obtained. An appropriate site for bone marrow biopsy was marked. The skin was  prepped and draped in sterile fashion. 1% lidocaine was utilized for local  anesthesia. A small dermatotomy was made. Under CT guidance, a powered core  bone biopsy needle was advanced into the left posterior iliac bone. Approximately 7 ml of marrow blood was aspirated and a 3 cm core biopsy was  obtained. These were submitted directly to the on-site cytotechnologist and  tissue sample adequacy confirmed. Pressure was applied locally at the biopsy  site. A dry sterile dressing was applied. There were no immediate  complications. The patient tolerated the procedure without difficulty. SEDATION: Moderate intravenous conscious sedation was supervised by Stella Faria MD.  The patient was independently monitored by a registered nurse  assigned to the Department of Radiology using automated blood pressure, ECG and  pulse oximetry. The detailed conscious sedation record is stored in the  hospital information system. Medication:  Versed:  3 mg  Fentanyl:  175 mcg  Intraprocedure time:  25 minutes    COMPLICATIONS:  None    ESTIMATED BLOOD LOSS:  < 5 ml    SPECIMENS:  Aspirate and core sent for pathology    DLP:  1895.23 mGy*cm    Impression  Technically successful CT guided bone marrow biopsy. Pathology results are  pending. MRI Results (maximum last 3): No results found for this or any previous visit. Nuclear Medicine Results (maximum last 3): No results found for this or any previous visit. US Results (maximum last 3):   Results from [place] encounter on 22    US GUIDE BX ABD MASS    Narrative  PATIENT NAME: Estrella Garcia  AGE, : 27 years, 1990  MRN: [id number]  DATE: 2022 4:36 PM    SUPERVISING PHYSICIAN: Beth Jones Luis Araujo MD  OPERATING PROVIDER: Ramon Rosenthal. EMBER Morrison    PROCEDURE(S): Ultrasound guided right inguinal mass biopsy    HISTORY: Large right inguinal mass    COMPARISONS: Prior CT scan reviewed    SEDATION: Versed 4mg, Fentanyl 200mcg  SEDATION TIME: 25 mins. Patient was evaluated and felt to be an appropriate candidate for conscious  sedation. Moderate intravenous conscious sedation was supervised by Evonne Severs, MD. The patient was independently monitored by a registered nurse  assigned to the Department of Radiology using automated blood pressure, EKG, and  pulse oximetry. The detail conscious sedation record is stored in the hospital  information system. TECHNIQUE:  Informed written consent was obtained from the patient following discussion of  the risks, benefits, and alternatives to the procedure. The patient was brought  to the procedure area and properly identified. A timeout was performed. The right groin was prepped and draped using maximum sterile barrier technique  which includes: cap and mask, sterile gloves, and sterile body drape. The  ultrasound transducer was prepped using sterile ultrasound technique which  includes: sterile gel and probe cover. The skin and underlying soft tissues were anesthetized with 6 cc of 1% buffered  lidocaine. Using ultrasound guidance, an 18-gauge Corvocet was placed. 5 -18-gauge cores  were obtained and submitted to pathology for further evaluation. The needle was  removed and  hemostasis was obtained with manual compression. A sterile dressing was applied. SAMPLES: 5 x 18g cores in formalin    COMPLICATIONS: None    Impression  Technically successful ultrasound guided right inguinal mass biopsy      Assessment and plan:   #Asthma exacerbation  -DuoNeb every 6 hours  -Continue home budesonide  -Systemic steroids  -Consult pulmonary service    #Acute respiratory failure with hypercapnia  - Intubated in ED. Will admit to ICU.   -Continue ventilatory support  -Appreciate pulmonary service input    #B-cell lymphoma  -Continue home medication of allopurinol and Xarelto. -Consult oncology    #Leukocytosis  -No evidence of pneumonia on imaging, suspect secondary to steroids and asthma exacerbation.  -Check procalcitonin. Follow-up antibiotics  -Appreciate oncology input    # GAUTAM  - S/p IVF bolus in ED.  -Monitor in AM    #Hypoglycemia  -Likely secondary to steroids  -POC+correcctional insulin every 6 hours    I spent 40 minutes critical care time attending to this patient, including direct patient care, coordination of care with nurses, reviewing charts, imaging and results.        Signed By: Adam Garcia MD     May 8, 2022

## 2022-05-09 LAB
ANION GAP SERPL CALC-SCNC: 8 MMOL/L (ref 5–15)
ARTERIAL PATENCY WRIST A: ABNORMAL
BACTERIA SPEC CULT: NORMAL
BASE DEFICIT BLDA-SCNC: 2.2 MMOL/L (ref 0–2)
BASOPHILS # BLD: 0 K/UL (ref 0–0.1)
BASOPHILS NFR BLD: 0 % (ref 0–1)
BDY SITE: ABNORMAL
BUN SERPL-MCNC: 23 MG/DL (ref 6–20)
BUN/CREAT SERPL: 17 (ref 12–20)
CA-I BLD-MCNC: 9.9 MG/DL (ref 8.5–10.1)
CHLORIDE SERPL-SCNC: 109 MMOL/L (ref 97–108)
CO2 SERPL-SCNC: 21 MMOL/L (ref 21–32)
CREAT SERPL-MCNC: 1.39 MG/DL (ref 0.7–1.3)
DIFFERENTIAL METHOD BLD: ABNORMAL
EOSINOPHIL # BLD: 0 K/UL (ref 0–0.4)
EOSINOPHIL NFR BLD: 0 % (ref 0–7)
EPAP/CPAP/PEEP, PAPEEP: 7
ERYTHROCYTE [DISTWIDTH] IN BLOOD BY AUTOMATED COUNT: 16.5 % (ref 11.5–14.5)
FIO2 ON VENT: 35 %
GAS FLOW.O2 SETTING OXYMISER: 28 L/MIN
GLUCOSE BLD STRIP.AUTO-MCNC: 126 MG/DL (ref 65–117)
GLUCOSE BLD STRIP.AUTO-MCNC: 169 MG/DL (ref 65–117)
GLUCOSE BLD STRIP.AUTO-MCNC: 171 MG/DL (ref 65–117)
GLUCOSE BLD STRIP.AUTO-MCNC: 181 MG/DL (ref 65–117)
GLUCOSE SERPL-MCNC: 177 MG/DL (ref 65–100)
HCO3 BLDA-SCNC: 23 MMOL/L (ref 22–26)
HCT VFR BLD AUTO: 33.3 % (ref 36.6–50.3)
HGB BLD-MCNC: 10.9 G/DL (ref 12.1–17)
IMM GRANULOCYTES # BLD AUTO: 0.1 K/UL (ref 0–0.04)
IMM GRANULOCYTES NFR BLD AUTO: 1 % (ref 0–0.5)
IPAP/PIP, IPAPIP: 23
LYMPHOCYTES # BLD: 0.3 K/UL (ref 0.8–3.5)
LYMPHOCYTES NFR BLD: 3 % (ref 12–49)
MCH RBC QN AUTO: 28.7 PG (ref 26–34)
MCHC RBC AUTO-ENTMCNC: 32.7 G/DL (ref 30–36.5)
MCV RBC AUTO: 87.6 FL (ref 80–99)
MONOCYTES # BLD: 0.8 K/UL (ref 0–1)
MONOCYTES NFR BLD: 7 % (ref 5–13)
NEUTS SEG # BLD: 9.2 K/UL (ref 1.8–8)
NEUTS SEG NFR BLD: 89 % (ref 32–75)
NRBC # BLD: 0 K/UL (ref 0–0.01)
NRBC BLD-RTO: 0 PER 100 WBC
PCO2 BLDA: 23 MMHG (ref 35–45)
PERFORMED BY, TECHID: ABNORMAL
PH BLDA: 7.53 [PH] (ref 7.35–7.45)
PLATELET # BLD AUTO: 368 K/UL (ref 150–400)
PMV BLD AUTO: 10.3 FL (ref 8.9–12.9)
PO2 BLDA: 59 MMHG (ref 75–100)
POTASSIUM SERPL-SCNC: 4.4 MMOL/L (ref 3.5–5.1)
RBC # BLD AUTO: 3.8 M/UL (ref 4.1–5.7)
SAO2 % BLD: 94 %
SAO2% DEVICE SAO2% SENSOR NAME: ABNORMAL
SODIUM SERPL-SCNC: 138 MMOL/L (ref 136–145)
SPECIAL REQUESTS,SREQ: NORMAL
VENTILATION MODE VENT: ABNORMAL
WBC # BLD AUTO: 10.4 K/UL (ref 4.1–11.1)

## 2022-05-09 PROCEDURE — 74011000250 HC RX REV CODE- 250: Performed by: INTERNAL MEDICINE

## 2022-05-09 PROCEDURE — 94003 VENT MGMT INPAT SUBQ DAY: CPT

## 2022-05-09 PROCEDURE — 74011000250 HC RX REV CODE- 250: Performed by: EMERGENCY MEDICINE

## 2022-05-09 PROCEDURE — 65610000006 HC RM INTENSIVE CARE

## 2022-05-09 PROCEDURE — 74011636637 HC RX REV CODE- 636/637: Performed by: INTERNAL MEDICINE

## 2022-05-09 PROCEDURE — 80048 BASIC METABOLIC PNL TOTAL CA: CPT

## 2022-05-09 PROCEDURE — 77010033678 HC OXYGEN DAILY

## 2022-05-09 PROCEDURE — 36415 COLL VENOUS BLD VENIPUNCTURE: CPT

## 2022-05-09 PROCEDURE — 82962 GLUCOSE BLOOD TEST: CPT

## 2022-05-09 PROCEDURE — 74011250636 HC RX REV CODE- 250/636: Performed by: INTERNAL MEDICINE

## 2022-05-09 PROCEDURE — 82803 BLOOD GASES ANY COMBINATION: CPT

## 2022-05-09 PROCEDURE — 74011250637 HC RX REV CODE- 250/637: Performed by: INTERNAL MEDICINE

## 2022-05-09 PROCEDURE — 94640 AIRWAY INHALATION TREATMENT: CPT

## 2022-05-09 PROCEDURE — 85025 COMPLETE CBC W/AUTO DIFF WBC: CPT

## 2022-05-09 PROCEDURE — 36600 WITHDRAWAL OF ARTERIAL BLOOD: CPT

## 2022-05-09 RX ORDER — HEPARIN 100 UNIT/ML
300 SYRINGE INTRAVENOUS ONCE
Status: COMPLETED | OUTPATIENT
Start: 2022-05-09 | End: 2022-05-09

## 2022-05-09 RX ADMIN — IPRATROPIUM BROMIDE AND ALBUTEROL SULFATE 3 ML: .5; 3 SOLUTION RESPIRATORY (INHALATION) at 05:43

## 2022-05-09 RX ADMIN — BUDESONIDE 1000 MCG: 0.5 INHALANT RESPIRATORY (INHALATION) at 05:43

## 2022-05-09 RX ADMIN — IPRATROPIUM BROMIDE AND ALBUTEROL SULFATE 3 ML: .5; 3 SOLUTION RESPIRATORY (INHALATION) at 13:49

## 2022-05-09 RX ADMIN — IPRATROPIUM BROMIDE AND ALBUTEROL SULFATE 3 ML: .5; 3 SOLUTION RESPIRATORY (INHALATION) at 01:47

## 2022-05-09 RX ADMIN — METHYLPREDNISOLONE SODIUM SUCCINATE 60 MG: 40 INJECTION, POWDER, FOR SOLUTION INTRAMUSCULAR; INTRAVENOUS at 23:24

## 2022-05-09 RX ADMIN — SODIUM CHLORIDE, PRESERVATIVE FREE 10 ML: 5 INJECTION INTRAVENOUS at 21:22

## 2022-05-09 RX ADMIN — SODIUM CHLORIDE, PRESERVATIVE FREE 10 ML: 5 INJECTION INTRAVENOUS at 05:17

## 2022-05-09 RX ADMIN — METHYLPREDNISOLONE SODIUM SUCCINATE 60 MG: 40 INJECTION, POWDER, FOR SOLUTION INTRAMUSCULAR; INTRAVENOUS at 05:16

## 2022-05-09 RX ADMIN — ALLOPURINOL 300 MG: 300 TABLET ORAL at 08:59

## 2022-05-09 RX ADMIN — BUDESONIDE 1000 MCG: 0.5 INHALANT RESPIRATORY (INHALATION) at 20:34

## 2022-05-09 RX ADMIN — PROPOFOL 50 MCG/KG/MIN: 10 INJECTION, EMULSION INTRAVENOUS at 05:16

## 2022-05-09 RX ADMIN — RIVAROXABAN 20 MG: 20 TABLET, FILM COATED ORAL at 08:59

## 2022-05-09 RX ADMIN — IPRATROPIUM BROMIDE AND ALBUTEROL SULFATE 3 ML: .5; 3 SOLUTION RESPIRATORY (INHALATION) at 20:34

## 2022-05-09 RX ADMIN — WATER 1 G: 1 INJECTION INTRAMUSCULAR; INTRAVENOUS; SUBCUTANEOUS at 13:22

## 2022-05-09 RX ADMIN — INSULIN LISPRO 2 UNITS: 100 INJECTION, SOLUTION INTRAVENOUS; SUBCUTANEOUS at 05:35

## 2022-05-09 RX ADMIN — DEXMEDETOMIDINE HYDROCHLORIDE 0.5 MCG/KG/HR: 4 INJECTION, SOLUTION INTRAVENOUS at 05:16

## 2022-05-09 RX ADMIN — PROPOFOL 45 MCG/KG/MIN: 10 INJECTION, EMULSION INTRAVENOUS at 01:01

## 2022-05-09 RX ADMIN — SODIUM CHLORIDE, PRESERVATIVE FREE 10 ML: 5 INJECTION INTRAVENOUS at 13:23

## 2022-05-09 RX ADMIN — PROPOFOL 45 MCG/KG/MIN: 10 INJECTION, EMULSION INTRAVENOUS at 03:25

## 2022-05-09 RX ADMIN — METHYLPREDNISOLONE SODIUM SUCCINATE 60 MG: 40 INJECTION, POWDER, FOR SOLUTION INTRAMUSCULAR; INTRAVENOUS at 01:00

## 2022-05-09 RX ADMIN — Medication 300 UNITS: at 19:18

## 2022-05-09 RX ADMIN — METHYLPREDNISOLONE SODIUM SUCCINATE 60 MG: 40 INJECTION, POWDER, FOR SOLUTION INTRAMUSCULAR; INTRAVENOUS at 13:23

## 2022-05-09 RX ADMIN — ACETAMINOPHEN 650 MG: 325 TABLET ORAL at 22:19

## 2022-05-09 RX ADMIN — METHYLPREDNISOLONE SODIUM SUCCINATE 60 MG: 40 INJECTION, POWDER, FOR SOLUTION INTRAMUSCULAR; INTRAVENOUS at 19:18

## 2022-05-09 NOTE — PROGRESS NOTES
Patient seen and examined this morning  Please see earlier note for those details    Patient at that time was intubated on mechanical ventilation  Felt to be stable for weaning  Placed on SIMV/pressure support  Sedation was reduced  Patient tolerated weaning well  Patient on trial of spontaneous ventilation with PSV 8  RSBI less than 100  Extubated to nasal cannula oxygen    Reevaluated 30 minutes and 60 minutes later  Doing well on 2 L nasal cannula oxygen  Awake and alert  Maintaining good oxygen saturations  No acute distress    Continue supportive measures for now  No need for reintubation at this time    ABG's and chest x-ray in a.m.     Time spent more than 30 minutes in direct patient care with no overlap

## 2022-05-09 NOTE — PROGRESS NOTES
Subjective   Subjective:      HPI   Patient was extubated. Patient denies pain or nausea. Shortness of breath getting better.   Objective     Current Facility-Administered Medications:     glucose chewable tablet 16 g, 4 Tablet, Oral, PRN, Oli Alford MD    dextrose 10% infusion 0-250 mL, 0-250 mL, IntraVENous, PRN, Oli Alford MD    glucagon (GLUCAGEN) injection 1 mg, 1 mg, IntraMUSCular, PRN, Oli Alford MD    insulin lispro (HUMALOG) injection, , SubCUTAneous, Q6H, Oli Alford MD, 2 Units at 05/09/22 0535    cefTRIAXone (ROCEPHIN) 1 g in sterile water (preservative free) 10 mL IV syringe, 1 g, IntraVENous, Q24H, Alexander Oliver MD, 1 g at 05/09/22 1322    methylPREDNISolone (PF) (SOLU-MEDROL) injection 60 mg, 60 mg, IntraVENous, Q6H, Alexander Oliver MD, 60 mg at 05/09/22 1323    propofol (DIPRIVAN) 10 mg/mL infusion, 0-50 mcg/kg/min, IntraVENous, TITRATE, Oli Alford MD, Paused at 05/09/22 0800    albuterol-ipratropium (DUO-NEB) 2.5 MG-0.5 MG/3 ML, 3 mL, Nebulization, Q6H RT, Artie Alford MD, 3 mL at 05/09/22 1349    budesonide (PULMICORT) 500 mcg/2 ml nebulizer suspension, 1,000 mcg, Nebulization, BID RT, Linda Cohen MD, 1,000 mcg at 05/09/22 0543    rivaroxaban (XARELTO) tablet 20 mg, 20 mg, Per NG tube, DAILY, Oli Alford MD, 20 mg at 05/09/22 0859    allopurinoL (ZYLOPRIM) tablet 300 mg, 300 mg, Per NG tube, DAILY, Oli Alford MD, 300 mg at 05/09/22 0859    sodium chloride (NS) flush 5-40 mL, 5-40 mL, IntraVENous, Q8H, Artie Alford MD, 10 mL at 05/09/22 1323    sodium chloride (NS) flush 5-40 mL, 5-40 mL, IntraVENous, PRN, Oli Alford MD    acetaminophen (TYLENOL) tablet 650 mg, 650 mg, Oral, Q6H PRN, 650 mg at 05/08/22 1106 **OR** acetaminophen (TYLENOL) suppository 650 mg, 650 mg, Rectal, Q6H PRN, Oli Alford MD    polyethylene glycol (MIRALAX) packet 17 g, 17 g, Oral, DAILY PRN, Oli Alford MD    ondansetron (ZOFRAN ODT) tablet 4 mg, 4 mg, Oral, Q8H PRN **OR** ondansetron (ZOFRAN) injection 4 mg, 4 mg, IntraVENous, Q6H PRN, Mukund Murdock MD, 4 mg at 05/08/22 0534    fentaNYL (PF) 1,500 mcg/30 mL (50 mcg/mL) infusion, 0-200 mcg/hr, IntraVENous, TITRATE, Mukund Alford MD, Held at 05/07/22 2300    dexmedeTOMidine in 0.9 % NaCl (PRECEDEX) 400 mcg/100 mL (4 mcg/mL) infusion soln, 0.1-1.5 mcg/kg/hr, IntraVENous, TITRATE, Karen Ac MD, Stopped at 05/09/22 1020    Objective:     Visit Vitals  /72 (BP 1 Location: Left upper arm, BP Patient Position: At rest)   Pulse 92   Temp 98.7 °F (37.1 °C)   Resp 14   Ht 5' 6\" (1.676 m)   Wt 109.2 kg (240 lb 11.9 oz)   SpO2 97%   BMI 38.86 kg/m²      Physical Exam   Patient alert oriented x3  HEENT: Pupils equally reactive to light and accommodation  Lungs: Clear to auscultation  Heart:RRR  Abdomen: Soft nontender bowel sounds present  Extremities: No edema  CNS: No focal deficits  @Objective    [email or web address]     Data Review:   Recent Results (from the past 24 hour(s))   GLUCOSE, POC    Collection Time: 05/08/22  5:32 PM   Result Value Ref Range    Glucose (POC) 170 (H) 65 - 117 mg/dL    Performed by MARLENI PIZANO    GLUCOSE, POC    Collection Time: 05/09/22 12:59 AM   Result Value Ref Range    Glucose (POC) 169 (H) 65 - 117 mg/dL    Performed by Zenobia Sagastume    BLOOD GAS, ARTERIAL    Collection Time: 05/09/22  4:27 AM   Result Value Ref Range    pH 7.53 (H) 7.35 - 7.45      PCO2 23 (L) 35 - 45 mmHg    PO2 59 (L) 75 - 100 mmHg    O2 SAT 94 (L) >95 %    BICARBONATE 23 22 - 26 mmol/L    BASE DEFICIT 2.2 (H) 0 - 2 mmol/L    O2 METHOD VENT      FIO2 35.0 %    MODE AssistControl/Pressure Control      SET RATE 28      IPAP/PIP 23      EPAP/CPAP/PEEP 7.0      SITE Right Radial      DONA'S TEST PASS     METABOLIC PANEL, BASIC    Collection Time: 05/09/22  4:30 AM   Result Value Ref Range    Sodium 138 136 - 145 mmol/L    Potassium 4.4 3.5 - 5.1 mmol/L    Chloride 109 (H) 97 - 108 mmol/L    CO2 21 21 - 32 mmol/L Anion gap 8 5 - 15 mmol/L    Glucose 177 (H) 65 - 100 mg/dL    BUN 23 (H) 6 - 20 mg/dL    Creatinine 1.39 (H) 0.70 - 1.30 mg/dL    BUN/Creatinine ratio 17 12 - 20      GFR est AA >60 >60 ml/min/1.73m2    GFR est non-AA 59 (L) >60 ml/min/1.73m2    Calcium 9.9 8.5 - 10.1 mg/dL   CBC WITH AUTOMATED DIFF    Collection Time: 05/09/22  4:30 AM   Result Value Ref Range    WBC 10.4 4.1 - 11.1 K/uL    RBC 3.80 (L) 4.10 - 5.70 M/uL    HGB 10.9 (L) 12.1 - 17.0 g/dL    HCT 33.3 (L) 36.6 - 50.3 %    MCV 87.6 80.0 - 99.0 FL    MCH 28.7 26.0 - 34.0 PG    MCHC 32.7 30.0 - 36.5 g/dL    RDW 16.5 (H) 11.5 - 14.5 %    PLATELET 858 839 - 162 K/uL    MPV 10.3 8.9 - 12.9 FL    NRBC 0.0 0.0  WBC    ABSOLUTE NRBC 0.00 0.00 - 0.01 K/uL    NEUTROPHILS 89 (H) 32 - 75 %    LYMPHOCYTES 3 (L) 12 - 49 %    MONOCYTES 7 5 - 13 %    EOSINOPHILS 0 0 - 7 %    BASOPHILS 0 0 - 1 %    IMMATURE GRANULOCYTES 1 (H) 0 - 0.5 %    ABS. NEUTROPHILS 9.2 (H) 1.8 - 8.0 K/UL    ABS. LYMPHOCYTES 0.3 (L) 0.8 - 3.5 K/UL    ABS. MONOCYTES 0.8 0.0 - 1.0 K/UL    ABS. EOSINOPHILS 0.0 0.0 - 0.4 K/UL    ABS. BASOPHILS 0.0 0.0 - 0.1 K/UL    ABS. IMM. GRANS. 0.1 (H) 0.00 - 0.04 K/UL    DF AUTOMATED     GLUCOSE, POC    Collection Time: 05/09/22  5:23 AM   Result Value Ref Range    Glucose (POC) 181 (H) 65 - 117 mg/dL    Performed by Zenia Ferreira    GLUCOSE, POC    Collection Time: 05/09/22  1:20 PM   Result Value Ref Range    Glucose (POC) 126 (H) 65 - 117 mg/dL    Performed by Tobi Caba        Assessment   Assessment/Plan:     Diffuse large B cell non-Hodgkin's lymphoma with huge right inguinal and pelvic area lymphadenopathy. S/p chemotherapy with Rituxan and CHOP. Clinically patient has responded to current therapy and his right lower extremity lymphedema as well as right groin lymph node mass has significantly decreased. Patient had last chemotherapy on 29 April.   Today is day 11 of his chemotherapy.  -Patient's white cell count may go down over the next 2 to 3 days.  May consider giving G-CSF if his white cell count starts going down. Respiratory failure: Patient extubated. Improving. Pulmonary following. Anemia:monitor. Hyperkalemia: Better. GAUTAM: Improving.       -Discussed with patient.

## 2022-05-09 NOTE — PROGRESS NOTES
Spiritual Care Assessment/Progress Note  [facility]      NAME: Erin Calles      MRN: [id number]  AGE: 27 y.o.  SEX: male  Shinto Affiliation: No preference   Language: English     5/9/2022     Total Time (in minutes): 18     Spiritual Assessment begun in [facility] CCU through conversation with:         [x]Patient        [x] Family    [] Friend(s)        Reason for Consult: Initial/Spiritual assessment, critical care     Spiritual beliefs: (Please include comment if needed)     [] Identifies with a gaby tradition:         [] Supported by a gaby community:            [] Claims no spiritual orientation:           [] Seeking spiritual identity:                [] Adheres to an individual form of spirituality:           [x] Not able to assess:                           Identified resources for coping:      [] Prayer                               [] Music                  [] Guided Imagery     [] Family/friends                 [] Pet visits     [] Devotional reading                         [x] Unknown     [] Other:                                              Interventions offered during this visit: (See comments for more details)    Patient Interventions: Normalization of emotional/spiritual concerns,Initial/Spiritual assessment, Critical care,Prayer (assurance of)     Family/Friend(s): Normalization of emotional/spiritual concerns,Initial Assessment     Plan of Care:     [] Support spiritual and/or cultural needs    [] Support AMD and/or advance care planning process      [] Support grieving process   [] Coordinate Rites and/or Rituals    [] Coordination with community clergy   [] No spiritual needs identified at this time   [] Detailed Plan of Care below (See Comments)  [] Make referral to Music Therapy  [] Make referral to Pet Therapy     [] Make referral to Addiction services  [] Make referral to [facility]  [] Make referral to Spiritual Care Partner  [] No future visits requested [x] Contact Spiritual Care for further referrals     Comments:  visited patient in [address] [facility] in response to family referral. Liliana Persons engaged in conversation with patient's sister regarding patient's medical challenges including recent admission in ED and now in ICU. Patient was awake and aware, engaged in brief conversation, he was Face Timing his son. Patient expressed appreciation for visit, indicated no needs at this time.  provided words of caring and support, advised patient to contact [facility] for any further referrals.     Visited by Akila Chapin, [facility], [facility]    can be contacted by calling  and requesting  on call

## 2022-05-09 NOTE — PROGRESS NOTES
Pulmonary/ CC progress note    Subjective:   Date of Consultation:  May 9, 2022  Referring Physician: Faye Mckoy MD    Patient seen and examined in ICU  Overnight events noted    Lying in bed comfortably  Awake and alert  Remains on mechanical ventilation with assist control/pressure control 35% FiO2 and PEEP of 7  ABGs and chest x-ray reviewed     Prior to Admission medications    Medication Sig Start Date End Date Taking? Authorizing Provider   Xarelto 20 mg tab tablet Take 20 mg by mouth daily. 22   Coy Puga MD   torsemide (DEMADEX) 20 mg tablet TAKE 1 TABLET BY MOUTH ONCE DAILY IN THE MORNING AS NEEDED FOR LEG EDEMA 22   Coy Puga MD   predniSONE (DELTASONE) 20 mg tablet Take 60 mg by mouth daily for 5 days. 22  Denise Parnell MD   allopurinoL (ZYLOPRIM) 300 mg tablet Take 1 Tablet by mouth daily. 3/5/22   Rene Beck MD   albuterol (ACCUNEB) 0.63 mg/3 mL nebulizer solution 3 mL by Nebulization route as needed. 21   Provider, Historical   ProAir HFA 90 mcg/actuation inhaler Take 2 Puffs by inhalation every six (6) hours as needed. 22   Provider, Historical   Symbicort 160-4.5 mcg/actuation HFAA Take 2 Puffs by inhalation daily. 22   Provider, Historical     No Known Allergies     Review of Systems: Could not be done because of patient's status    Objective:   Blood pressure 129/72, pulse 92, temperature 98.7 °F (37.1 °C), resp. rate 14, height 5' 6\" (1.676 m), weight 109.2 kg (240 lb 11.9 oz), SpO2 99 %. Temp (24hrs), Av.8 °F (37.1 °C), Min:98.1 °F (36.7 °C), Max:99.9 °F (37.7 °C)    XR CHEST SNGL V   Final Result   Findings/IMPRESSION:      Endotracheal tube terminates 4 cm superior to the cleo. A gastric tube courses   inferiorly out of the field of view. Unchanged positioning of a right internal   jugular Port-A-Cath. Overlying monitoring leads. The cardiac silhouette is normal and unchanged in size. Lung volumes are maintained.  No focal consolidation, large pleural effusion, or   discernible pneumothorax. CTA CHEST W OR W WO CONT   Final Result   Limited by breathing motion. 1. No large central pulmonary embolus. 2. Lines, tubes as above. XR CHEST PORT   Final Result   FINDINGS: IMPRESSION: Single frontal view of the chest.      ETT distal tip 6.9 cm above cleo, at level of clavicle heads. Right port distal tip SVC/RA junction. Normal heart size. No vascular congestion or pulmonary edema. Right pulmonary hypoinflation. No focal infiltrate, pleural effusion,   pneumothorax. No free air under the diaphragm. Bony structures grossly intact.          Data Review:   Current Facility-Administered Medications   Medication Dose Route Frequency    glucose chewable tablet 16 g  4 Tablet Oral PRN    dextrose 10% infusion 0-250 mL  0-250 mL IntraVENous PRN    glucagon (GLUCAGEN) injection 1 mg  1 mg IntraMUSCular PRN    insulin lispro (HUMALOG) injection   SubCUTAneous Q6H    cefTRIAXone (ROCEPHIN) 1 g in sterile water (preservative free) 10 mL IV syringe  1 g IntraVENous Q24H    methylPREDNISolone (PF) (SOLU-MEDROL) injection 60 mg  60 mg IntraVENous Q6H    propofol (DIPRIVAN) 10 mg/mL infusion  0-50 mcg/kg/min IntraVENous TITRATE    albuterol-ipratropium (DUO-NEB) 2.5 MG-0.5 MG/3 ML  3 mL Nebulization Q6H RT    budesonide (PULMICORT) 500 mcg/2 ml nebulizer suspension  1,000 mcg Nebulization BID RT    rivaroxaban (XARELTO) tablet 20 mg  20 mg Per NG tube DAILY    allopurinoL (ZYLOPRIM) tablet 300 mg  300 mg Per NG tube DAILY    sodium chloride (NS) flush 5-40 mL  5-40 mL IntraVENous Q8H    sodium chloride (NS) flush 5-40 mL  5-40 mL IntraVENous PRN    acetaminophen (TYLENOL) tablet 650 mg  650 mg Oral Q6H PRN    Or    acetaminophen (TYLENOL) suppository 650 mg  650 mg Rectal Q6H PRN    polyethylene glycol (MIRALAX) packet 17 g  17 g Oral DAILY PRN    ondansetron (ZOFRAN ODT) tablet 4 mg  4 mg Oral Q8H PRN    Or    ondansetron (ZOFRAN) injection 4 mg  4 mg IntraVENous Q6H PRN    fentaNYL (PF) 1,500 mcg/30 mL (50 mcg/mL) infusion  0-200 mcg/hr IntraVENous TITRATE    dexmedeTOMidine in 0.9 % NaCl (PRECEDEX) 400 mcg/100 mL (4 mcg/mL) infusion soln  0.1-1.5 mcg/kg/hr IntraVENous TITRATE        Exam:      This is a young well-built male who is currently orally intubated   Neck is supple. No cervical lymphadenopathy. JVD is absent. Head normocephalic and atraumatic. Chest: Good bilateral air entry. Few rhonchi audible. Heart: S1-S2 normal.  Abdomen: Soft, nontender, no visceromegaly  Extremities: No edema, cyanosis or clubbing  Neuro: No focal motor deficit. Withdrawing to noxious stimuli.   Psychiatric : Awake and alert, writing in response to questions    Recent Results (from the past 24 hour(s))   GLUCOSE, POC    Collection Time: 05/08/22 11:31 AM   Result Value Ref Range    Glucose (POC) 135 (H) 65 - 117 mg/dL    Performed by MARLENI PIZANO    GLUCOSE, POC    Collection Time: 05/08/22  5:32 PM   Result Value Ref Range    Glucose (POC) 170 (H) 65 - 117 mg/dL    Performed by MARLENI PIZANO    GLUCOSE, POC    Collection Time: 05/09/22 12:59 AM   Result Value Ref Range    Glucose (POC) 169 (H) 65 - 117 mg/dL    Performed by [facility]    BLOOD GAS, ARTERIAL    Collection Time: 05/09/22  4:27 AM   Result Value Ref Range    pH 7.53 (H) 7.35 - 7.45      PCO2 23 (L) 35 - 45 mmHg    PO2 59 (L) 75 - 100 mmHg    O2 SAT 94 (L) >95 %    BICARBONATE 23 22 - 26 mmol/L    BASE DEFICIT 2.2 (H) 0 - 2 mmol/L    O2 METHOD VENT      FIO2 35.0 %    MODE AssistControl/Pressure Control      SET RATE 28      IPAP/PIP 23      EPAP/CPAP/PEEP 7.0      SITE Right Radial      DONA'S TEST PASS     METABOLIC PANEL, BASIC    Collection Time: 05/09/22  4:30 AM   Result Value Ref Range    Sodium 138 136 - 145 mmol/L    Potassium 4.4 3.5 - 5.1 mmol/L    Chloride 109 (H) 97 - 108 mmol/L    CO2 21 21 - 32 mmol/L    Anion gap 8 5 - 15 mmol/L Glucose 177 (H) 65 - 100 mg/dL    BUN 23 (H) 6 - 20 mg/dL    Creatinine 1.39 (H) 0.70 - 1.30 mg/dL    BUN/Creatinine ratio 17 12 - 20      GFR est AA >60 >60 ml/min/1.73m2    GFR est non-AA 59 (L) >60 ml/min/1.73m2    Calcium 9.9 8.5 - 10.1 mg/dL   CBC WITH AUTOMATED DIFF    Collection Time: 05/09/22  4:30 AM   Result Value Ref Range    WBC 10.4 4.1 - 11.1 K/uL    RBC 3.80 (L) 4.10 - 5.70 M/uL    HGB 10.9 (L) 12.1 - 17.0 g/dL    HCT 33.3 (L) 36.6 - 50.3 %    MCV 87.6 80.0 - 99.0 FL    MCH 28.7 26.0 - 34.0 PG    MCHC 32.7 30.0 - 36.5 g/dL    RDW 16.5 (H) 11.5 - 14.5 %    PLATELET 551 790 - 231 K/uL    MPV 10.3 8.9 - 12.9 FL    NRBC 0.0 0.0  WBC    ABSOLUTE NRBC 0.00 0.00 - 0.01 K/uL    NEUTROPHILS 89 (H) 32 - 75 %    LYMPHOCYTES 3 (L) 12 - 49 %    MONOCYTES 7 5 - 13 %    EOSINOPHILS 0 0 - 7 %    BASOPHILS 0 0 - 1 %    IMMATURE GRANULOCYTES 1 (H) 0 - 0.5 %    ABS. NEUTROPHILS 9.2 (H) 1.8 - 8.0 K/UL    ABS. LYMPHOCYTES 0.3 (L) 0.8 - 3.5 K/UL    ABS. MONOCYTES 0.8 0.0 - 1.0 K/UL    ABS. EOSINOPHILS 0.0 0.0 - 0.4 K/UL    ABS. BASOPHILS 0.0 0.0 - 0.1 K/UL    ABS. IMM. GRANS. 0.1 (H) 0.00 - 0.04 K/UL    DF AUTOMATED     GLUCOSE, POC    Collection Time: 05/09/22  5:23 AM   Result Value Ref Range    Glucose (POC) 181 (H) 65 - 117 mg/dL    Performed by Yunier Dent        Impression:   Caryl Ayala is a 27 y.o. male with PMH of asthma, B-cell lymphoma and hypertension. Is admitted in hospital because of acute exacerbation of his asthma with chest tightness and wheezing. It did not improve with treatment in the ER. Because of his respiratory distress he was intubated. Plan:   1. Acute respiratory failure:  Patient was intubated in the ER because of his severe respiratory distress.   Initial blood gas on BiPAP showed 16/65/371/23/100  He was intubated  Repeat blood gas after 6 hours short  7.3 6/31/96/24 on 35% FiO2    This morning on assist control/pressure control rate of 28 with driving pressure of 23 FiO2 35% PEEP of 7    Awake and alert  No acute distress  Will initiate weaning and extubate as tolerated    2. Elevated WBC count: This is secondary to stress. No lung consolidation on chest x-ray    3. Elevated troponin iron level:  Secondary to demand ischemia. Will trend levels. 4.  Nutrition:  Hold tube feeds for weaning and possible extubation    DVT and GI prophylaxis    Questions of patient's family were answered at bedside in detail  Case discussed in detail with RN, RT, and care team  Thank you for involving me in the care of this patient  I will follow with you closely during hospitalization    Time spent more than 30 minutes under patient care with no overlap reviewing results and records, decision making, and answering questions.     Ev Peraza MD  Pulmonary/CC

## 2022-05-09 NOTE — PROGRESS NOTES
Problem: Ventilator Management  Goal: *Adequate oxygenation and ventilation  Outcome: Progressing Towards Goal  Goal: *Patient maintains clear airway/free of aspiration  Outcome: Progressing Towards Goal  Goal: *Absence of infection signs and symptoms  Outcome: Progressing Towards Goal  Goal: *Normal spontaneous ventilation  Outcome: Progressing Towards Goal     Problem: Patient Education: Go to Patient Education Activity  Goal: Patient/Family Education  Outcome: Progressing Towards Goal     Problem: Non-Violent Restraints  Goal: Removal from restraints as soon as assessed to be safe  Outcome: Progressing Towards Goal  Goal: No harm/injury to patient while restraints in use  Outcome: Progressing Towards Goal  Goal: Patient's dignity will be maintained  Outcome: Progressing Towards Goal  Goal: Patient Interventions  Outcome: Progressing Towards Goal     Problem: Pressure Injury - Risk of  Goal: *Prevention of pressure injury  Description: Document Valentino Scale and appropriate interventions in the flowsheet. Outcome: Progressing Towards Goal  Note: Pressure Injury Interventions:  Sensory Interventions: Avoid rigorous massage over bony prominences,Minimize linen layers,Monitor skin under medical devices,Pressure redistribution bed/mattress (bed type),Turn and reposition approx. every two hours (pillows and wedges if needed)    Moisture Interventions: Apply protective barrier, creams and emollients,Internal/External urinary devices,Minimize layers,Maintain skin hydration (lotion/cream),Moisture barrier    Activity Interventions: Pressure redistribution bed/mattress(bed type)    Mobility Interventions: Pressure redistribution bed/mattress (bed type),Turn and reposition approx.  every two hours(pillow and wedges)    Nutrition Interventions: Document food/fluid/supplement intake    Friction and Shear Interventions: Minimize layers                Problem: Patient Education: Go to Patient Education Activity  Goal: Patient/Family Education  Outcome: Progressing Towards Goal     Problem: Falls - Risk of  Goal: *Absence of Falls  Description: Document Sam Wren Fall Risk and appropriate interventions in the flowsheet.   Outcome: Progressing Towards Goal  Note: Fall Risk Interventions:       Mentation Interventions: Bed/chair exit alarm,More frequent rounding,Reorient patient    Medication Interventions: Bed/chair exit alarm    Elimination Interventions: Bed/chair exit alarm              Problem: Patient Education: Go to Patient Education Activity  Goal: Patient/Family Education  Outcome: Progressing Towards Goal

## 2022-05-09 NOTE — PROGRESS NOTES
Hospitalist Progress Note         Beth Contreras MD          Daily Progress Note: 5/9/2022      Subjective: The patient is seen for follow  up.  50-year-old gentleman with history of asthma needed for acute asthma exacerbation. Intubated due to respiratory distress and hypercapnia.,    Patient seen in follow-up. Remains intubated and sedated. Still with significant expiratory wheezing. No fevers overnight    Problem List:  Problem List as of 5/9/2022 Date Reviewed: 2/17/2022          Codes Class Noted - Resolved    Asthma ICD-10-CM: J45.909  ICD-9-CM: 493.90  5/7/2022 - Present        Acute respiratory failure with hypoxia [facility]) ICD-10-CM: J96.01  ICD-9-CM: 518.81  5/7/2022 - Present        Non Hodgkin's lymphoma ([facility] ICD-10-CM: C85.90  ICD-9-CM: 202.80  3/1/2022 - Present        Pelvic mass ICD-10-CM: R19.00  ICD-9-CM: 789.30  2/23/2022 - Present        Bilateral pulmonary embolism ([facility] ICD-10-CM: I26.99  ICD-9-CM: 415.19  2/23/2022 - Present        Pulmonary embolism ([facility] ICD-10-CM: I26.99  ICD-9-CM: 415.19  2/22/2022 - Present        Right inguinal pain ICD-10-CM: R10.31  ICD-9-CM: 789.03  2/17/2022 - Present        Right groin mass ICD-10-CM: R19.09  ICD-9-CM: 789.39  2/11/2022 - Present        Fall ICD-10-CM: Levonia Lown. Oran Ahumada  ICD-9-CM: E888.9  2/11/2022 - Present              Medications reviewed  Current Facility-Administered Medications   Medication Dose Route Frequency    glucose chewable tablet 16 g  4 Tablet Oral PRN    dextrose 10% infusion 0-250 mL  0-250 mL IntraVENous PRN    glucagon (GLUCAGEN) injection 1 mg  1 mg IntraMUSCular PRN    insulin lispro (HUMALOG) injection   SubCUTAneous Q6H    cefTRIAXone (ROCEPHIN) 1 g in sterile water (preservative free) 10 mL IV syringe  1 g IntraVENous Q24H    methylPREDNISolone (PF) (SOLU-MEDROL) injection 60 mg  60 mg IntraVENous Q6H    propofol (DIPRIVAN) 10 mg/mL infusion  0-50 mcg/kg/min IntraVENous TITRATE    albuterol-ipratropium (DUO-NEB) 2.5 MG-0.5 MG/3 ML  3 mL Nebulization Q6H RT    budesonide (PULMICORT) 500 mcg/2 ml nebulizer suspension  1,000 mcg Nebulization BID RT    rivaroxaban (XARELTO) tablet 20 mg  20 mg Per NG tube DAILY    allopurinoL (ZYLOPRIM) tablet 300 mg  300 mg Per NG tube DAILY    sodium chloride (NS) flush 5-40 mL  5-40 mL IntraVENous Q8H    sodium chloride (NS) flush 5-40 mL  5-40 mL IntraVENous PRN    acetaminophen (TYLENOL) tablet 650 mg  650 mg Oral Q6H PRN    Or    acetaminophen (TYLENOL) suppository 650 mg  650 mg Rectal Q6H PRN    polyethylene glycol (MIRALAX) packet 17 g  17 g Oral DAILY PRN    ondansetron (ZOFRAN ODT) tablet 4 mg  4 mg Oral Q8H PRN    Or    ondansetron (ZOFRAN) injection 4 mg  4 mg IntraVENous Q6H PRN    fentaNYL (PF) 1,500 mcg/30 mL (50 mcg/mL) infusion  0-200 mcg/hr IntraVENous TITRATE    dexmedeTOMidine in 0.9 % NaCl (PRECEDEX) 400 mcg/100 mL (4 mcg/mL) infusion soln  0.1-1.5 mcg/kg/hr IntraVENous TITRATE       Review of Systems:   A comprehensive review of systems was negative except for that written in the HPI. Objective:   Physical Exam:     Visit Vitals  /72 (BP 1 Location: Left upper arm, BP Patient Position: At rest)   Pulse 92   Temp 98.7 °F (37.1 °C)   Resp 14   Ht 5' 6\" (1.676 m)   Wt 109.2 kg (240 lb 11.9 oz)   SpO2 99%   BMI 38.86 kg/m²      O2 Device: Endotracheal tube,Ventilator    Temp (24hrs), Av.6 °F (37 °C), Min:98.1 °F (36.7 °C), Max:99.5 °F (37.5 °C)    701 - 1900  In: 655.4 [I.V.:655.4]  Out: -    1901 -  0700  In: 1772.5 [I.V.:1572.5]  Out: 3700 [Urine:3400]    General:   Intubated and sedated   Lungs:   Clear to auscultation bilaterally. Chest wall:  No tenderness or deformity. Heart:  Regular rate and rhythm, S1, S2 normal, no murmur, click, rub or gallop. Abdomen:   Soft, non-tender. Bowel sounds normal. No masses,  No organomegaly.    Extremities: Extremities normal, atraumatic, no cyanosis or edema. Pulses: 2+ and symmetric all extremities. Skin: Skin color, texture, turgor normal. No rashes or lesions   Neurologic: CNII-XII intact. No gross sensory or motor deficits     Data Review:       Recent Days:  Recent Labs     05/09/22  0430 05/08/22 0342 05/07/22  1455   WBC 10.4 13.1* 13.4*   HGB 10.9* 11.2* 11.9*   HCT 33.3* 35.1* 37.2    319 364     Recent Labs     05/09/22  0430 05/08/22  0824 05/08/22  0342 05/07/22  1740 05/07/22  1740 05/07/22  1345 05/07/22  1345     --  134*  --  133*   < > 136   K 4.4 5.2* 6.9*   < > 4.7   < > 4.4   *  --  105  --  101   < > 105   CO2 21  --  22  --  23   < > 25   *  --  170*  --  264*   < > 171*   BUN 23*  --  17  --  10   < > 11   CREA 1.39*  --  1.89*  --  1.35*   < > 1.04   CA 9.9  --  9.0  --  9.4   < > 9.3   MG  --   --  2.8*  --   --   --   --    ALB  --   --   --   --  4.4  --  4.3   TBILI  --   --   --   --  0.4  --  0.4   ALT  --   --   --   --  32  --  30    < > = values in this interval not displayed.      Recent Labs     05/09/22 0427 05/08/22 0421 05/07/22 2129   PH 7.53* 7.36 7.16*   PCO2 23* 31* 65*   PO2 59* 96 371*   HCO3 23 19* 23   FIO2 35.0 35.0 100.0       24 Hour Results:  Recent Results (from the past 24 hour(s))   GLUCOSE, POC    Collection Time: 05/08/22 11:31 AM   Result Value Ref Range    Glucose (POC) 135 (H) 65 - 117 mg/dL    Performed by MARLENI PIZANO    GLUCOSE, POC    Collection Time: 05/08/22  5:32 PM   Result Value Ref Range    Glucose (POC) 170 (H) 65 - 117 mg/dL    Performed by MARLENI PIZANO    GLUCOSE, POC    Collection Time: 05/09/22 12:59 AM   Result Value Ref Range    Glucose (POC) 169 (H) 65 - 117 mg/dL    Performed by Yunier Joya    BLOOD GAS, ARTERIAL    Collection Time: 05/09/22  4:27 AM   Result Value Ref Range    pH 7.53 (H) 7.35 - 7.45      PCO2 23 (L) 35 - 45 mmHg    PO2 59 (L) 75 - 100 mmHg    O2 SAT 94 (L) >95 %    BICARBONATE 23 22 - 26 mmol/L    BASE DEFICIT 2.2 (H) 0 - 2 mmol/L O2 METHOD VENT      FIO2 35.0 %    MODE AssistControl/Pressure Control      SET RATE 28      IPAP/PIP 23      EPAP/CPAP/PEEP 7.0      SITE Right Radial      DONA'S TEST PASS     METABOLIC PANEL, BASIC    Collection Time: 05/09/22  4:30 AM   Result Value Ref Range    Sodium 138 136 - 145 mmol/L    Potassium 4.4 3.5 - 5.1 mmol/L    Chloride 109 (H) 97 - 108 mmol/L    CO2 21 21 - 32 mmol/L    Anion gap 8 5 - 15 mmol/L    Glucose 177 (H) 65 - 100 mg/dL    BUN 23 (H) 6 - 20 mg/dL    Creatinine 1.39 (H) 0.70 - 1.30 mg/dL    BUN/Creatinine ratio 17 12 - 20      GFR est AA >60 >60 ml/min/1.73m2    GFR est non-AA 59 (L) >60 ml/min/1.73m2    Calcium 9.9 8.5 - 10.1 mg/dL   CBC WITH AUTOMATED DIFF    Collection Time: 05/09/22  4:30 AM   Result Value Ref Range    WBC 10.4 4.1 - 11.1 K/uL    RBC 3.80 (L) 4.10 - 5.70 M/uL    HGB 10.9 (L) 12.1 - 17.0 g/dL    HCT 33.3 (L) 36.6 - 50.3 %    MCV 87.6 80.0 - 99.0 FL    MCH 28.7 26.0 - 34.0 PG    MCHC 32.7 30.0 - 36.5 g/dL    RDW 16.5 (H) 11.5 - 14.5 %    PLATELET 130 142 - 710 K/uL    MPV 10.3 8.9 - 12.9 FL    NRBC 0.0 0.0  WBC    ABSOLUTE NRBC 0.00 0.00 - 0.01 K/uL    NEUTROPHILS 89 (H) 32 - 75 %    LYMPHOCYTES 3 (L) 12 - 49 %    MONOCYTES 7 5 - 13 %    EOSINOPHILS 0 0 - 7 %    BASOPHILS 0 0 - 1 %    IMMATURE GRANULOCYTES 1 (H) 0 - 0.5 %    ABS. NEUTROPHILS 9.2 (H) 1.8 - 8.0 K/UL    ABS. LYMPHOCYTES 0.3 (L) 0.8 - 3.5 K/UL    ABS. MONOCYTES 0.8 0.0 - 1.0 K/UL    ABS. EOSINOPHILS 0.0 0.0 - 0.4 K/UL    ABS. BASOPHILS 0.0 0.0 - 0.1 K/UL    ABS. IMM. GRANS. 0.1 (H) 0.00 - 0.04 K/UL    DF AUTOMATED     GLUCOSE, POC    Collection Time: 05/09/22  5:23 AM   Result Value Ref Range    Glucose (POC) 181 (H) 65 - 117 mg/dL    Performed by Linda Alfonso            Assessment/     Acute hypercapnic respiratory failure  Acute asthma exacerbation  Probable UTI  Hypokalemia. Resolving    Plan:  Continue supportive care including IV steroids.   Wean off mechanical ventilator as tolerated  Add Rocephin 1 g IV every 24 hours  Monitor routine electrolyte        Care Plan discussed with: Nurse    Total time spent with patient: 30 minutes.     Miguel Ott MD

## 2022-05-10 ENCOUNTER — APPOINTMENT (OUTPATIENT)
Dept: GENERAL RADIOLOGY | Age: 32
DRG: 133 | End: 2022-05-10
Attending: INTERNAL MEDICINE
Payer: MEDICAID

## 2022-05-10 VITALS
OXYGEN SATURATION: 96 % | HEIGHT: 66 IN | SYSTOLIC BLOOD PRESSURE: 161 MMHG | RESPIRATION RATE: 17 BRPM | TEMPERATURE: 98.4 F | DIASTOLIC BLOOD PRESSURE: 97 MMHG | BODY MASS INDEX: 38.69 KG/M2 | WEIGHT: 240.74 LBS | HEART RATE: 76 BPM

## 2022-05-10 LAB
GLUCOSE BLD STRIP.AUTO-MCNC: 163 MG/DL (ref 65–117)
PERFORMED BY, TECHID: ABNORMAL

## 2022-05-10 PROCEDURE — 74011250637 HC RX REV CODE- 250/637: Performed by: INTERNAL MEDICINE

## 2022-05-10 PROCEDURE — 74011636637 HC RX REV CODE- 636/637: Performed by: INTERNAL MEDICINE

## 2022-05-10 PROCEDURE — 74011250636 HC RX REV CODE- 250/636: Performed by: INTERNAL MEDICINE

## 2022-05-10 PROCEDURE — 74011000250 HC RX REV CODE- 250: Performed by: INTERNAL MEDICINE

## 2022-05-10 PROCEDURE — 71045 X-RAY EXAM CHEST 1 VIEW: CPT

## 2022-05-10 PROCEDURE — 94640 AIRWAY INHALATION TREATMENT: CPT

## 2022-05-10 PROCEDURE — 82962 GLUCOSE BLOOD TEST: CPT

## 2022-05-10 RX ORDER — AZITHROMYCIN 500 MG/1
500 TABLET, FILM COATED ORAL DAILY
Qty: 3 TABLET | Refills: 0 | Status: SHIPPED | OUTPATIENT
Start: 2022-05-10 | End: 2022-08-08 | Stop reason: ALTCHOICE

## 2022-05-10 RX ORDER — PREDNISONE 20 MG/1
20 TABLET ORAL 2 TIMES DAILY
Qty: 10 TABLET | Refills: 0 | Status: SHIPPED | OUTPATIENT
Start: 2022-05-10

## 2022-05-10 RX ADMIN — ACETAMINOPHEN 650 MG: 325 TABLET ORAL at 08:11

## 2022-05-10 RX ADMIN — METHYLPREDNISOLONE SODIUM SUCCINATE 60 MG: 40 INJECTION, POWDER, FOR SOLUTION INTRAMUSCULAR; INTRAVENOUS at 06:04

## 2022-05-10 RX ADMIN — RIVAROXABAN 20 MG: 20 TABLET, FILM COATED ORAL at 08:12

## 2022-05-10 RX ADMIN — IPRATROPIUM BROMIDE AND ALBUTEROL SULFATE 3 ML: .5; 3 SOLUTION RESPIRATORY (INHALATION) at 01:15

## 2022-05-10 RX ADMIN — SODIUM CHLORIDE, PRESERVATIVE FREE 10 ML: 5 INJECTION INTRAVENOUS at 06:04

## 2022-05-10 RX ADMIN — ALLOPURINOL 300 MG: 300 TABLET ORAL at 08:11

## 2022-05-10 RX ADMIN — IPRATROPIUM BROMIDE AND ALBUTEROL SULFATE 3 ML: .5; 3 SOLUTION RESPIRATORY (INHALATION) at 09:19

## 2022-05-10 RX ADMIN — BUDESONIDE 1000 MCG: 0.5 INHALANT RESPIRATORY (INHALATION) at 09:19

## 2022-05-10 RX ADMIN — INSULIN LISPRO 2 UNITS: 100 INJECTION, SOLUTION INTRAVENOUS; SUBCUTANEOUS at 06:00

## 2022-05-10 NOTE — DISCHARGE SUMMARY
Hospitalist discharge summary     Malika Blood MD          Date of admission: 5/7/2022  Date of discharge: 5/10/2022      Reason for hospitalization and hospital course   The patient is seen for follow  up.  42-year-old gentleman with history of asthma admitted 5/7 for acute asthma exacerbation. He was intubated due to respiratory distress and hypercapnia.,  Admitted to the ICU. He is started on IV steroids, breathing treatments and antibiotics    He had a creatinine of 1.0 on admission, julian to 1.89 on 5/8 and was trending down to 1.39 on 5/9. She was extubated on 5/9 and was quickly weaned to room air    On 5/10 patient was awake and alert, lungs were clear and he was breathing company on room air. Problem List:  Problem List as of 5/10/2022 Date Reviewed: 2/17/2022          Codes Class Noted - Resolved    Asthma ICD-10-CM: J45.909  ICD-9-CM: 493.90  5/7/2022 - Present        Acute respiratory failure with hypoxia [facility]) ICD-10-CM: J96.01  ICD-9-CM: 518.81  5/7/2022 - Present        Non Hodgkin's lymphoma ([facility] ICD-10-CM: C85.90  ICD-9-CM: 202.80  3/1/2022 - Present        Pelvic mass ICD-10-CM: R19.00  ICD-9-CM: 789.30  2/23/2022 - Present        Bilateral pulmonary embolism ([facility] ICD-10-CM: I26.99  ICD-9-CM: 415.19  2/23/2022 - Present        Pulmonary embolism ([facility] ICD-10-CM: I26.99  ICD-9-CM: 415.19  2/22/2022 - Present        Right inguinal pain ICD-10-CM: R10.31  ICD-9-CM: 789.03  2/17/2022 - Present        Right groin mass ICD-10-CM: R19.09  ICD-9-CM: 789.39  2/11/2022 - Present        Fall ICD-10-CM: [facility] Ryan Gamboa  ICD-9-CM: E888.9  2/11/2022 - Present              Medications reviewed  Current Facility-Administered Medications   Medication Dose Route Frequency    glucose chewable tablet 16 g  4 Tablet Oral PRN    dextrose 10% infusion 0-250 mL  0-250 mL IntraVENous PRN    glucagon (GLUCAGEN) injection 1 mg  1 mg IntraMUSCular PRN    insulin lispro (HUMALOG) injection   SubCUTAneous Q6H    cefTRIAXone (ROCEPHIN) 1 g in sterile water (preservative free) 10 mL IV syringe  1 g IntraVENous Q24H    methylPREDNISolone (PF) (SOLU-MEDROL) injection 60 mg  60 mg IntraVENous Q6H    propofol (DIPRIVAN) 10 mg/mL infusion  0-50 mcg/kg/min IntraVENous TITRATE    albuterol-ipratropium (DUO-NEB) 2.5 MG-0.5 MG/3 ML  3 mL Nebulization Q6H RT    budesonide (PULMICORT) 500 mcg/2 ml nebulizer suspension  1,000 mcg Nebulization BID RT    rivaroxaban (XARELTO) tablet 20 mg  20 mg Per NG tube DAILY    allopurinoL (ZYLOPRIM) tablet 300 mg  300 mg Per NG tube DAILY    sodium chloride (NS) flush 5-40 mL  5-40 mL IntraVENous Q8H    sodium chloride (NS) flush 5-40 mL  5-40 mL IntraVENous PRN    acetaminophen (TYLENOL) tablet 650 mg  650 mg Oral Q6H PRN    Or    acetaminophen (TYLENOL) suppository 650 mg  650 mg Rectal Q6H PRN    polyethylene glycol (MIRALAX) packet 17 g  17 g Oral DAILY PRN    ondansetron (ZOFRAN ODT) tablet 4 mg  4 mg Oral Q8H PRN    Or    ondansetron (ZOFRAN) injection 4 mg  4 mg IntraVENous Q6H PRN    fentaNYL (PF) 1,500 mcg/30 mL (50 mcg/mL) infusion  0-200 mcg/hr IntraVENous TITRATE    dexmedeTOMidine in 0.9 % NaCl (PRECEDEX) 400 mcg/100 mL (4 mcg/mL) infusion soln  0.1-1.5 mcg/kg/hr IntraVENous TITRATE       Review of Systems:   A comprehensive review of systems was negative except for that written in the HPI. Objective:   Physical Exam:     Visit Vitals  BP (!) 164/99 (BP 1 Location: Left upper arm, BP Patient Position: At rest)   Pulse 76   Temp 98.4 °F (36.9 °C)   Resp 17   Ht 5' 6\" (1.676 m)   Wt 109.2 kg (240 lb 11.9 oz)   SpO2 96%   BMI 38.86 kg/m²    O2 Flow Rate (L/min): 2 l/min O2 Device: None (Room air)    Temp (24hrs), Av.8 °F (37.1 °C), Min:98.1 °F (36.7 °C), Max:99.3 °F (37.4 °C)    No intake/output data recorded.     1901 - 05/10 0700  In: 855.4 [I.V.:655.4]  Out: 3360 [Urine:2300]    General:   Intubated and sedated   Lungs:   Clear to auscultation bilaterally. Chest wall:  No tenderness or deformity. Heart:  Regular rate and rhythm, S1, S2 normal, no murmur, click, rub or gallop. Abdomen:   Soft, non-tender. Bowel sounds normal. No masses,  No organomegaly. Extremities: Extremities normal, atraumatic, no cyanosis or edema. Pulses: 2+ and symmetric all extremities. Skin: Skin color, texture, turgor normal. No rashes or lesions   Neurologic: CNII-XII intact. No gross sensory or motor deficits     Data Review:       Recent Days:  Recent Labs     05/09/22 0430 05/08/22 0342 05/07/22  1455   WBC 10.4 13.1* 13.4*   HGB 10.9* 11.2* 11.9*   HCT 33.3* 35.1* 37.2    319 364     Recent Labs     05/09/22  0430 05/08/22  0824 05/08/22  0342 05/07/22  1740 05/07/22  1740 05/07/22  1345 05/07/22  1345     --  134*  --  133*   < > 136   K 4.4 5.2* 6.9*   < > 4.7   < > 4.4   *  --  105  --  101   < > 105   CO2 21  --  22  --  23   < > 25   *  --  170*  --  264*   < > 171*   BUN 23*  --  17  --  10   < > 11   CREA 1.39*  --  1.89*  --  1.35*   < > 1.04   CA 9.9  --  9.0  --  9.4   < > 9.3   MG  --   --  2.8*  --   --   --   --    ALB  --   --   --   --  4.4  --  4.3   TBILI  --   --   --   --  0.4  --  0.4   ALT  --   --   --   --  32  --  30    < > = values in this interval not displayed.      Recent Labs     05/09/22 0427 05/08/22 0421 05/07/22 2125   PH 7.53* 7.36 7.16*   PCO2 23* 31* 65*   PO2 59* 96 371*   HCO3 23 19* 23   FIO2 35.0 35.0 100.0       24 Hour Results:  Recent Results (from the past 24 hour(s))   GLUCOSE, POC    Collection Time: 05/09/22  1:20 PM   Result Value Ref Range    Glucose (POC) 126 (H) 65 - 117 mg/dL    Performed by [address] [place] [address], POC    Collection Time: 05/09/22 11:32 PM   Result Value Ref Range    Glucose (POC) 171 (H) 65 - 117 mg/dL    Performed by Tyna Dance    GLUCOSE, POC    Collection Time: 05/10/22  6:08 AM   Result Value Ref Range    Glucose (POC) 163 (H) 65 - 117 mg/dL Performed by Lalo Jaquez            Assessment/     Acute hypercapnic and hypoxic respiratory failure, requiring mechanical ventilation    Acute asthma exacerbation    Acute kidney injury, improving    Possible UTI. Urine culture is negative    Hypokalemia. Resolving    Plan:  Discharge home today  Prednisone 20 mg twice daily x5 days  Zithromax 500 mg daily x3 days  Continue other home medications        Care Plan discussed with: Nurse and patient    Total time spent with patient: 35 minutes.     Lisbeth Pina MD

## 2022-05-10 NOTE — PROGRESS NOTES
Patient cleared and suitable for discharge. Discharged instruction reviewed with patient and his mom, both verbalized understanding and no concerns voiced. IV access and telemetry discontinued, family provided discharged transportation, patient wheeled down by staff.

## 2022-05-10 NOTE — PROGRESS NOTES
Problem: Ventilator Management  Goal: *Adequate oxygenation and ventilation  Outcome: Progressing Towards Goal  Goal: *Patient maintains clear airway/free of aspiration  Outcome: Progressing Towards Goal  Goal: *Absence of infection signs and symptoms  Outcome: Progressing Towards Goal  Goal: *Normal spontaneous ventilation  Outcome: Progressing Towards Goal     Problem: Patient Education: Go to Patient Education Activity  Goal: Patient/Family Education  Outcome: Progressing Towards Goal     Problem: Non-Violent Restraints  Goal: Removal from restraints as soon as assessed to be safe  Outcome: Progressing Towards Goal  Goal: No harm/injury to patient while restraints in use  Outcome: Progressing Towards Goal  Goal: Patient's dignity will be maintained  Outcome: Progressing Towards Goal  Goal: Patient Interventions  Outcome: Progressing Towards Goal     Problem: Pressure Injury - Risk of  Goal: *Prevention of pressure injury  Description: Document Valentino Scale and appropriate interventions in the flowsheet. Outcome: Progressing Towards Goal  Note: Pressure Injury Interventions:  Sensory Interventions: Avoid rigorous massage over bony prominences,Maintain/enhance activity level,Minimize linen layers,Monitor skin under medical devices,Pressure redistribution bed/mattress (bed type),Turn and reposition approx.  every two hours (pillows and wedges if needed)    Moisture Interventions: Apply protective barrier, creams and emollients,Check for incontinence Q2 hours and as needed,Minimize layers,Moisture barrier    Activity Interventions: Pressure redistribution bed/mattress(bed type)    Mobility Interventions: Pressure redistribution bed/mattress (bed type)    Nutrition Interventions: Document food/fluid/supplement intake    Friction and Shear Interventions: Minimize layers                Problem: Patient Education: Go to Patient Education Activity  Goal: Patient/Family Education  Outcome: Progressing Towards Goal Problem: Falls - Risk of  Goal: *Absence of Falls  Description: Document Christiano Andujar Fall Risk and appropriate interventions in the flowsheet.   Outcome: Progressing Towards Goal  Note: Fall Risk Interventions:       Mentation Interventions: Adequate sleep, hydration, pain control,More frequent rounding,Reorient patient    Medication Interventions: Bed/chair exit alarm    Elimination Interventions: Call light in reach              Problem: Patient Education: Go to Patient Education Activity  Goal: Patient/Family Education  Outcome: Progressing Towards Goal

## 2022-05-10 NOTE — PROGRESS NOTES
Pulmonary/ CC progress note    Subjective:   Date of Consultation:  May 10, 2022  Referring Physician: Ayaan Holder MD    Patient seen and examined in ICU  Overnight events noted    Sitting up in the recliner comfortably  Extubated 2022  Tolerated well  Now on room air  No acute distress  Wants to go home     Prior to Admission medications    Medication Sig Start Date End Date Taking? Authorizing Provider   predniSONE (DELTASONE) 20 mg tablet Take 20 mg by mouth two (2) times a day. 5/10/22  Yes Nena Newby MD   azithromycin (Zithromax) 500 mg tab Take 1 Tablet by mouth daily. 5/10/22  Yes Nena Newby MD   Xarelto 20 mg tab tablet Take 20 mg by mouth daily. 22   Other, MD Coy   torsemide (DEMADEX) 20 mg tablet TAKE 1 TABLET BY MOUTH ONCE DAILY IN THE MORNING AS NEEDED FOR LEG EDEMA 22   Other, MD Coy   allopurinoL (ZYLOPRIM) 300 mg tablet Take 1 Tablet by mouth daily. 3/5/22   Maren Mixon MD   albuterol (ACCUNEB) 0.63 mg/3 mL nebulizer solution 3 mL by Nebulization route as needed. 21   Provider, Historical   ProAir HFA 90 mcg/actuation inhaler Take 2 Puffs by inhalation every six (6) hours as needed. 22   Provider, Historical   Symbicort 160-4.5 mcg/actuation HFAA Take 2 Puffs by inhalation daily. 22   Provider, Historical     No Known Allergies     Review of Systems: Could not be done because of patient's status    Objective:   Blood pressure (!) 161/97, pulse 76, temperature 98.4 °F (36.9 °C), resp. rate 17, height 5' 6\" (1.676 m), weight 109.2 kg (240 lb 11.9 oz), SpO2 96 %. Temp (24hrs), Av.8 °F (37.1 °C), Min:98.1 °F (36.7 °C), Max:99.1 °F (37.3 °C)    XR CHEST PORT   Final Result      XR CHEST SNGL V   Final Result   Findings/IMPRESSION:      Endotracheal tube terminates 4 cm superior to the cleo. A gastric tube courses   inferiorly out of the field of view. Unchanged positioning of a right internal   jugular Port-A-Cath.  Overlying monitoring leads.      The cardiac silhouette is normal and unchanged in size. Lung volumes are maintained. No focal consolidation, large pleural effusion, or   discernible pneumothorax. CTA CHEST W OR W WO CONT   Final Result   Limited by breathing motion. 1. No large central pulmonary embolus. 2. Lines, tubes as above. XR CHEST PORT   Final Result   FINDINGS: IMPRESSION: Single frontal view of the chest.      ETT distal tip 6.9 cm above cleo, at level of clavicle heads. Right port distal tip SVC/RA junction. Normal heart size. No vascular congestion or pulmonary edema. Right pulmonary hypoinflation. No focal infiltrate, pleural effusion,   pneumothorax. No free air under the diaphragm. Bony structures grossly intact.          Data Review:   Current Facility-Administered Medications   Medication Dose Route Frequency    glucose chewable tablet 16 g  4 Tablet Oral PRN    dextrose 10% infusion 0-250 mL  0-250 mL IntraVENous PRN    glucagon (GLUCAGEN) injection 1 mg  1 mg IntraMUSCular PRN    insulin lispro (HUMALOG) injection   SubCUTAneous Q6H    cefTRIAXone (ROCEPHIN) 1 g in sterile water (preservative free) 10 mL IV syringe  1 g IntraVENous Q24H    methylPREDNISolone (PF) (SOLU-MEDROL) injection 60 mg  60 mg IntraVENous Q6H    propofol (DIPRIVAN) 10 mg/mL infusion  0-50 mcg/kg/min IntraVENous TITRATE    albuterol-ipratropium (DUO-NEB) 2.5 MG-0.5 MG/3 ML  3 mL Nebulization Q6H RT    budesonide (PULMICORT) 500 mcg/2 ml nebulizer suspension  1,000 mcg Nebulization BID RT    rivaroxaban (XARELTO) tablet 20 mg  20 mg Per NG tube DAILY    allopurinoL (ZYLOPRIM) tablet 300 mg  300 mg Per NG tube DAILY    sodium chloride (NS) flush 5-40 mL  5-40 mL IntraVENous Q8H    sodium chloride (NS) flush 5-40 mL  5-40 mL IntraVENous PRN    acetaminophen (TYLENOL) tablet 650 mg  650 mg Oral Q6H PRN    Or    acetaminophen (TYLENOL) suppository 650 mg  650 mg Rectal Q6H PRN    polyethylene glycol (MIRALAX) packet 17 g  17 g Oral DAILY PRN    ondansetron (ZOFRAN ODT) tablet 4 mg  4 mg Oral Q8H PRN    Or    ondansetron (ZOFRAN) injection 4 mg  4 mg IntraVENous Q6H PRN    fentaNYL (PF) 1,500 mcg/30 mL (50 mcg/mL) infusion  0-200 mcg/hr IntraVENous TITRATE    dexmedeTOMidine in 0.9 % NaCl (PRECEDEX) 400 mcg/100 mL (4 mcg/mL) infusion soln  0.1-1.5 mcg/kg/hr IntraVENous TITRATE     Current Outpatient Medications   Medication Sig    predniSONE (DELTASONE) 20 mg tablet Take 20 mg by mouth two (2) times a day.  azithromycin (Zithromax) 500 mg tab Take 1 Tablet by mouth daily.  Xarelto 20 mg tab tablet Take 20 mg by mouth daily.  torsemide (DEMADEX) 20 mg tablet TAKE 1 TABLET BY MOUTH ONCE DAILY IN THE MORNING AS NEEDED FOR LEG EDEMA    allopurinoL (ZYLOPRIM) 300 mg tablet Take 1 Tablet by mouth daily.  albuterol (ACCUNEB) 0.63 mg/3 mL nebulizer solution 3 mL by Nebulization route as needed.  ProAir HFA 90 mcg/actuation inhaler Take 2 Puffs by inhalation every six (6) hours as needed.  Symbicort 160-4.5 mcg/actuation HFAA Take 2 Puffs by inhalation daily. Exam:      This is a young well-built male  On room air  Neck is supple. No cervical lymphadenopathy. JVD is absent. Head normocephalic and atraumatic. Chest: Good bilateral air entry. Few rhonchi audible.   Heart: S1-S2 normal.  Abdomen: Soft, nontender, no visceromegaly  Extremities: No edema, cyanosis or clubbing  Neuro: Alert, grossly nonfocal  Psychiatric : Awake and alert, stable    Recent Results (from the past 24 hour(s))   GLUCOSE, POC    Collection Time: 05/09/22  1:20 PM   Result Value Ref Range    Glucose (POC) 126 (H) 65 - 117 mg/dL    Performed by [address] [place] [address], POC    Collection Time: 05/09/22 11:32 PM   Result Value Ref Range    Glucose (POC) 171 (H) 65 - 117 mg/dL    Performed by Inga Ley    GLUCOSE, POC    Collection Time: 05/10/22  6:08 AM   Result Value Ref Range    Glucose (POC) 163 (H) 65 - 117 mg/dL    Performed by Bryanna Nazario        Impression:   Linda Medley is a 27 y.o. male with PMH of asthma, B-cell lymphoma and hypertension. Is admitted in hospital because of acute exacerbation of his asthma with chest tightness and wheezing. It did not improve with treatment in the ER. Because of his respiratory distress he was intubated. Plan:   1. Acute respiratory failure:  Patient was intubated in the ER because of his severe respiratory distress. Initial blood gas on BiPAP showed 16/65/371/23/100  He was intubated  Repeat blood gas after 6 hours short  7.3 6/31/96/24 on 35% FiO2    Extubated 5/9/2022  Tolerated well  Now on room air  No acute distress    2. Elevated WBC count: This is secondary to stress. No lung consolidation on chest x-ray    3. Elevated troponin iron level:  Secondary to demand ischemia. Will trend levels. 4.  Nutrition:  Hold tube feeds for weaning and possible extubation    DVT and GI prophylaxis    Wants to go home  Stable for transfer out of ICU    Questions of patient's family were answered at bedside in detail  Case discussed in detail with RN, RT, and care team  Thank you for involving me in the care of this patient  I will follow with you closely during hospitalization    Time spent more than 30 minutes under patient care with no overlap reviewing results and records, decision making, and answering questions.     Fernando Lacey MD  Pulmonary/CC

## 2022-05-19 ENCOUNTER — TRANSCRIBE ORDER (OUTPATIENT)
Dept: SCHEDULING | Age: 32
End: 2022-05-19

## 2022-05-19 DIAGNOSIS — C83.36 RETICULOSARCOMA OF INTRAPELVIC LYMPH NODES (HCC): Primary | ICD-10-CM

## 2022-06-04 ENCOUNTER — HOSPITAL ENCOUNTER (OUTPATIENT)
Dept: PET IMAGING | Age: 32
Discharge: HOME | End: 2022-06-04
Attending: INTERNAL MEDICINE
Payer: MEDICAID

## 2022-06-04 DIAGNOSIS — C83.36 RETICULOSARCOMA OF INTRAPELVIC LYMPH NODES (HCC): ICD-10-CM

## 2022-06-04 PROCEDURE — A9552 F18 FDG: HCPCS

## 2022-06-04 RX ADMIN — FLUDEOXYGLUCOSE F-18 9.13 MILLICURIE: 200 INJECTION INTRAVENOUS at 09:19

## 2022-06-06 RX ORDER — FLUDEOXYGLUCOSE F-18 200 MCI/ML
9.13 INJECTION INTRAVENOUS ONCE
Status: COMPLETED | OUTPATIENT
Start: 2022-06-06 | End: 2022-06-04

## 2022-06-08 ENCOUNTER — HOSPITAL ENCOUNTER (OUTPATIENT)
Dept: RADIATION THERAPY | Age: 32
Discharge: HOME | End: 2022-06-08
Payer: MEDICAID

## 2022-06-08 PROCEDURE — 77290 THER RAD SIMULAJ FIELD CPLX: CPT

## 2022-06-16 ENCOUNTER — HOSPITAL ENCOUNTER (OUTPATIENT)
Dept: RADIATION THERAPY | Age: 32
Discharge: HOME | End: 2022-06-16
Payer: MEDICAID

## 2022-06-16 PROCEDURE — 77295 3-D RADIOTHERAPY PLAN: CPT

## 2022-06-16 PROCEDURE — 77300 RADIATION THERAPY DOSE PLAN: CPT

## 2022-06-16 PROCEDURE — 77334 RADIATION TREATMENT AID(S): CPT

## 2022-06-20 ENCOUNTER — APPOINTMENT (OUTPATIENT)
Dept: RADIATION THERAPY | Age: 32
End: 2022-06-20

## 2022-07-01 ENCOUNTER — OFFICE VISIT (OUTPATIENT)
Dept: SURGERY | Age: 32
End: 2022-07-01
Payer: MEDICAID

## 2022-07-01 VITALS
WEIGHT: 263 LBS | RESPIRATION RATE: 18 BRPM | HEIGHT: 66 IN | TEMPERATURE: 98.4 F | HEART RATE: 87 BPM | OXYGEN SATURATION: 98 % | BODY MASS INDEX: 42.27 KG/M2 | DIASTOLIC BLOOD PRESSURE: 78 MMHG | SYSTOLIC BLOOD PRESSURE: 112 MMHG

## 2022-07-01 DIAGNOSIS — C85.95 NON-HODGKIN LYMPHOMA OF LYMPH NODES OF LOWER EXTREMITY, UNSPECIFIED NON-HODGKIN LYMPHOMA TYPE (HCC): ICD-10-CM

## 2022-07-01 DIAGNOSIS — R19.09 RIGHT GROIN MASS: Primary | ICD-10-CM

## 2022-07-01 PROCEDURE — 99213 OFFICE O/P EST LOW 20 MIN: CPT | Performed by: SURGERY

## 2022-07-01 RX ORDER — MONTELUKAST SODIUM 10 MG/1
1 TABLET ORAL DAILY
COMMUNITY
Start: 2022-06-22

## 2022-07-01 RX ORDER — CETIRIZINE HCL 10 MG
1 TABLET ORAL DAILY
COMMUNITY
Start: 2022-06-22

## 2022-07-01 RX ORDER — FLUTICASONE PROPIONATE 50 MCG
1 SPRAY, SUSPENSION (ML) NASAL AS NEEDED
COMMUNITY
Start: 2022-06-22

## 2022-07-01 NOTE — PROGRESS NOTES
Chief Complaint   Patient presents with    Follow-up     right groin      Visit Vitals  /78 (BP 1 Location: Left arm, BP Patient Position: Sitting)   Pulse 87   Temp 98.4 °F (36.9 °C) (Temporal)   Resp 18   Ht 5' 6\" (1.676 m)   Wt 263 lb (119.3 kg)   SpO2 98%   BMI 42.45 kg/m²     1. Have you been to the ER, urgent care clinic since your last visit? Hospitalized since your last visit? No    2. Have you seen or consulted any other health care providers outside of the [address] [place] [address] since your last visit? Include any pap smears or colon screening.  No

## 2022-07-05 RX ORDER — ENOXAPARIN SODIUM 100 MG/ML
100 INJECTION SUBCUTANEOUS EVERY 12 HOURS
Qty: 30 EACH | Refills: 0 | Status: SHIPPED | OUTPATIENT
Start: 2022-07-20

## 2022-07-05 NOTE — PROGRESS NOTES
General surgery history and Physical    Patient: Win Lisa  MRN: [id number]    YOB: 1990  Age: 27 y.o. Sex: male     Chief Complaint:  Chief Complaint   Patient presents with    Follow-up     right groin        History of Present Illness: Win Lisa is a 27 y.o. very pleasant man is here today discuss surgical biopsy on the right groin. Patient was diagnosed with a large right groin mass and retroperitoneal mass. Patient will be started on chemotherapy with diagnosis of lymphoma. Patient responded per oncology report. I was requested biopsy on the right groin mass. Patient is doing much better now. He was diagnosed with deep vein thrombosis as well. He is currently on Xarelto. Social History:     Social Connections:     Frequency of Communication with Friends and Family: Not on file    Frequency of Social Gatherings with Friends and Family: Not on file    Attends [employer] Services: Not on file    Active Member of Clubs or Organizations: Not on file    Attends Club or Organization Meetings: Not on file    Marital Status: Not on file       Past Medical History:  Past Medical History:   Diagnosis Date    Asthma     Hypertension     Lymphoma [facility])        Surgical History:  Past Surgical History:   Procedure Laterality Date    HX OTHER SURGICAL Right 03/02/2022    PICC line placed 3/2 and removed a week later.  IR FLUORO GUIDE PLC CVAD  3/30/2022    IR INSERT TUNL CVC W PORT OVER 5 YEARS  3/30/2022       Allergies:  No Known Allergies    Current Meds:  Current Outpatient Medications   Medication Sig Dispense Refill    cetirizine (ZYRTEC) 10 mg tablet Take 1 Tablet by mouth daily.  montelukast (SINGULAIR) 10 mg tablet Take 1 Tablet by mouth daily.  fluticasone propionate (FLONASE) 50 mcg/actuation nasal spray 1 [place] by Both Nostrils route as needed.  predniSONE (DELTASONE) 20 mg tablet Take 20 mg by mouth two (2) times a day.  10 Tablet 0    azithromycin (Zithromax) 500 mg tab Take 1 Tablet by mouth daily. 3 Tablet 0    Xarelto 20 mg tab tablet Take 20 mg by mouth daily.  torsemide (DEMADEX) 20 mg tablet TAKE 1 TABLET BY MOUTH ONCE DAILY IN THE MORNING AS NEEDED FOR LEG EDEMA      allopurinoL (ZYLOPRIM) 300 mg tablet Take 1 Tablet by mouth daily. 30 Tablet 0    albuterol (ACCUNEB) 0.63 mg/3 mL nebulizer solution 3 mL by Nebulization route as needed.  ProAir HFA 90 mcg/actuation inhaler Take 2 Puffs by inhalation every six (6) hours as needed.  Symbicort 160-4.5 mcg/actuation HFAA Take 2 Puffs by inhalation daily. Review of Systems:  I reviewed the rest of organ systems personally and they are negative. Pertinent findings discussed in HPI. Physical Examination    Visit Vitals  /78 (BP 1 Location: Left arm, BP Patient Position: Sitting)   Pulse 87   Temp 98.4 °F (36.9 °C) (Temporal)   Resp 18   Ht 5' 6\" (1.676 m)   Wt 263 lb (119.3 kg)   SpO2 98%   BMI 42.45 kg/m²     Gen: Well developed, well nourished 27 y.o. male in no acute distress  Head: normocephalic, atraumatic  EYES: Pupils are equal and reactive. Chest wall: Excursion normal with respiration cycle, no obvious audible wheeze. Mouth: Clear, no overt lesions, oral mucosa pink and moist  Neck: supple, no masses, no adenopathy or carotid bruits, trachea midline  Resp: clear to auscultation bilaterally, no wheeze, rhonchi or rales, excursions normal and symmetrical  Cardio: Regular rate and rhythm, no murmurs, clicks, gallops or rubs, no edema or varicosities  Abdomen: Soft nontender. Right groin still has a large mass noted. Neuro: sensation and strength grossly intact and symmetrical  Psych: alert and oriented to person, place and time  Skin: warm and moist.  Hematologic system: No bruising. Vascular examination: Intact in lower extremity. Labs:  No visits with results within 1 Month(s) from this visit.    Latest known visit with results is:   No results displayed because visit has over 200 results. Images:  No images are attached to the encounter. Radha Hall MD    Assessments:  Patient Active Problem List   Diagnosis Code    Right groin mass R19.09    Fall W19. Krissy Sherman    Right inguinal pain R10.31    Pulmonary embolism (HCC) I26.99    Pelvic mass R19.00    Bilateral pulmonary embolism (HCC) I26.99    Non Hodgkin's lymphoma (HCC) C85.90    Asthma J45.909    Acute respiratory failure with hypoxia (HCC) J96.01       Plans: I do not think we can do excisional biopsy of the right colon due to large mass. I will perform incisional biopsy instead. Patient was a discussed about this procedure for diagnosis. Patient currently on Xarelto. We will need to hold Xarelto for at least 5 days prior to surgery. Patient will need bridge with Lovenox. Patient tentatively scheduled for this procedure July 27, 2022.           Radha Hall MD

## 2022-07-07 ENCOUNTER — TELEPHONE (OUTPATIENT)
Dept: SURGERY | Age: 32
End: 2022-07-07

## 2022-07-07 NOTE — TELEPHONE ENCOUNTER
Left message to call back. Per Dr. Lesley Corona, he wants patient to be scheduled for surgery on 7/13/2022. He wants patient to stop his Xarelto today and start the Lovenox injections.

## 2022-07-07 NOTE — TELEPHONE ENCOUNTER
Spoke with patient about moving his surgery to 7/13/2022. Patient states he's unable to do that, could do 7/20/2022. I briefly discussed Lovenox and stopping the Xarelto prior to surgery. I advised him I will speak with Dr. Edgar Tanner in the morning about 7/20/2022.

## 2022-07-07 NOTE — TELEPHONE ENCOUNTER
----- Message from Meaghan Cheng MD sent at 7/5/2022  8:54 AM EDT -----  Can you let pt know he needs to stop xeralto on 7/20/22 for surgery on 7/27/22, and he needs lovenox injection till then, can you go over with patient, I sent lovenox to the [facility]. .    thanks

## 2022-07-19 ENCOUNTER — TELEPHONE (OUTPATIENT)
Dept: SURGERY | Age: 32
End: 2022-07-19

## 2022-07-19 NOTE — PERIOP NOTES
Patients mother called and states that yang stopped his xarelto on Friday without bridging to Loveno. I called Dr. Paul Dejesus and informed him. He states to tell the patient to go to the pharmacy and do one injection tonight. I also reviewed this with  and its okay to proceed with planned surgery.

## 2022-07-20 ENCOUNTER — HOSPITAL ENCOUNTER (OUTPATIENT)
Age: 32
Setting detail: OUTPATIENT SURGERY
Discharge: HOME | End: 2022-07-20
Attending: SURGERY | Admitting: SURGERY
Payer: MEDICAID

## 2022-07-20 VITALS
DIASTOLIC BLOOD PRESSURE: 71 MMHG | OXYGEN SATURATION: 99 % | TEMPERATURE: 98.1 F | HEIGHT: 66 IN | RESPIRATION RATE: 18 BRPM | SYSTOLIC BLOOD PRESSURE: 118 MMHG | WEIGHT: 250 LBS | HEART RATE: 83 BPM | BODY MASS INDEX: 40.18 KG/M2

## 2022-07-20 LAB
ALBUMIN SERPL-MCNC: 3.9 G/DL (ref 3.5–5)
ALBUMIN/GLOB SERPL: 1.4 {RATIO} (ref 1.1–2.2)
ALP SERPL-CCNC: 71 U/L (ref 45–117)
ALT SERPL-CCNC: 24 U/L (ref 12–78)
ANION GAP SERPL CALC-SCNC: 4 MMOL/L (ref 5–15)
APTT PPP: 28.2 SEC (ref 21.2–34.1)
AST SERPL W P-5'-P-CCNC: 17 U/L (ref 15–37)
BILIRUB SERPL-MCNC: 0.4 MG/DL (ref 0.2–1)
BUN SERPL-MCNC: 9 MG/DL (ref 6–20)
BUN/CREAT SERPL: 8 (ref 12–20)
CA-I BLD-MCNC: 9.1 MG/DL (ref 8.5–10.1)
CHLORIDE SERPL-SCNC: 109 MMOL/L (ref 97–108)
CO2 SERPL-SCNC: 25 MMOL/L (ref 21–32)
CREAT SERPL-MCNC: 1.07 MG/DL (ref 0.7–1.3)
ERYTHROCYTE [DISTWIDTH] IN BLOOD BY AUTOMATED COUNT: 15 % (ref 11.5–14.5)
GLOBULIN SER CALC-MCNC: 2.8 G/DL (ref 2–4)
GLUCOSE BLD STRIP.AUTO-MCNC: 125 MG/DL (ref 65–117)
GLUCOSE SERPL-MCNC: 112 MG/DL (ref 65–100)
HCT VFR BLD AUTO: 39.5 % (ref 36.6–50.3)
HGB BLD-MCNC: 12.7 G/DL (ref 12.1–17)
INR PPP: 1 (ref 0.9–1.1)
MCH RBC QN AUTO: 29 PG (ref 26–34)
MCHC RBC AUTO-ENTMCNC: 32.2 G/DL (ref 30–36.5)
MCV RBC AUTO: 90.2 FL (ref 80–99)
NRBC # BLD: 0 K/UL (ref 0–0.01)
NRBC BLD-RTO: 0 PER 100 WBC
PERFORMED BY, TECHID: ABNORMAL
PLATELET # BLD AUTO: 242 K/UL (ref 150–400)
PMV BLD AUTO: 10.2 FL (ref 8.9–12.9)
POTASSIUM SERPL-SCNC: 4.2 MMOL/L (ref 3.5–5.1)
PROT SERPL-MCNC: 6.7 G/DL (ref 6.4–8.2)
PROTHROMBIN TIME: 13.6 SEC (ref 11.9–14.6)
RBC # BLD AUTO: 4.38 M/UL (ref 4.1–5.7)
SODIUM SERPL-SCNC: 138 MMOL/L (ref 136–145)
THERAPEUTIC RANGE,PTTT: NORMAL SEC (ref 82–109)
WBC # BLD AUTO: 6.8 K/UL (ref 4.1–11.1)

## 2022-07-20 PROCEDURE — 82962 GLUCOSE BLOOD TEST: CPT

## 2022-07-20 PROCEDURE — 36415 COLL VENOUS BLD VENIPUNCTURE: CPT

## 2022-07-20 PROCEDURE — 80053 COMPREHEN METABOLIC PANEL: CPT

## 2022-07-20 PROCEDURE — 85027 COMPLETE CBC AUTOMATED: CPT

## 2022-07-20 PROCEDURE — 74011250636 HC RX REV CODE- 250/636: Performed by: SURGERY

## 2022-07-20 PROCEDURE — 85730 THROMBOPLASTIN TIME PARTIAL: CPT

## 2022-07-20 PROCEDURE — 85610 PROTHROMBIN TIME: CPT

## 2022-07-20 RX ORDER — SODIUM CHLORIDE 0.9 % (FLUSH) 0.9 %
5-40 SYRINGE (ML) INJECTION AS NEEDED
Status: CANCELLED | OUTPATIENT
Start: 2022-07-20

## 2022-07-20 RX ORDER — SODIUM CHLORIDE 0.9 % (FLUSH) 0.9 %
5-40 SYRINGE (ML) INJECTION EVERY 8 HOURS
Status: CANCELLED | OUTPATIENT
Start: 2022-07-20

## 2022-07-20 RX ORDER — SODIUM CHLORIDE, SODIUM LACTATE, POTASSIUM CHLORIDE, CALCIUM CHLORIDE 600; 310; 30; 20 MG/100ML; MG/100ML; MG/100ML; MG/100ML
25 INJECTION, SOLUTION INTRAVENOUS CONTINUOUS
Status: DISCONTINUED | OUTPATIENT
Start: 2022-07-20 | End: 2022-07-21 | Stop reason: HOSPADM

## 2022-07-20 RX ADMIN — SODIUM CHLORIDE, POTASSIUM CHLORIDE, SODIUM LACTATE AND CALCIUM CHLORIDE 25 ML/HR: 600; 310; 30; 20 INJECTION, SOLUTION INTRAVENOUS at 07:19

## 2022-07-20 NOTE — PROGRESS NOTES
Asked patient about history of blood clot and use of blood thinners. Patient stated blood clot was in lungs and the doctor has stated it is no longer there. Patient last took xarelto on Friday 7/15/22. Patient took dose of lovenox yesterday 7/19/22.

## 2022-07-20 NOTE — H&P
Progress Notes      Signed   Encounter Date:  7/1/2022                                                                                                                                                                                                                                                General surgery history and Physical     Patient: Win Lisa                 MRN: [id number]          YOB: 1990            Age: 27 y.o. Sex: male      Chief Complaint:       Chief Complaint   Patient presents with    Follow-up       right groin          History of Present Illness: Win Lisa is a 27 y.o. very pleasant man is here today discuss surgical biopsy on the right groin. Patient was diagnosed with a large right groin mass and retroperitoneal mass. Patient will be started on chemotherapy with diagnosis of lymphoma. Patient responded per oncology report. I was requested biopsy on the right groin mass. Patient is doing much better now. He was diagnosed with deep vein thrombosis as well. He is currently on Xarelto. Social History:         Social Connections:    Frequency of Communication with Friends and Family: Not on file    Frequency of Social Gatherings with Friends and Family: Not on file    Attends [employer] Services: Not on file    Active Member of Clubs or Organizations: Not on file    Attends Club or Organization Meetings: Not on file    Marital Status: Not on file         Past Medical History:       Past Medical History:   Diagnosis Date    Asthma      Hypertension      Lymphoma [facility])           Surgical History:        Past Surgical History:   Procedure Laterality Date    HX OTHER SURGICAL Right 03/02/2022     PICC line placed 3/2 and removed a week later.     IR FLUORO GUIDE PLC CVAD   3/30/2022    IR INSERT TUNL CVC W PORT OVER 5 YEARS   3/30/2022         Allergies:  No Known Allergies     Current Meds:         Current Outpatient Medications   Medication Sig Dispense Refill    cetirizine (ZYRTEC) 10 mg tablet Take 1 Tablet by mouth daily. montelukast (SINGULAIR) 10 mg tablet Take 1 Tablet by mouth daily. fluticasone propionate (FLONASE) 50 mcg/actuation nasal spray 1 [place] by Both Nostrils route as needed. predniSONE (DELTASONE) 20 mg tablet Take 20 mg by mouth two (2) times a day. 10 Tablet 0    azithromycin (Zithromax) 500 mg tab Take 1 Tablet by mouth daily. 3 Tablet 0    Xarelto 20 mg tab tablet Take 20 mg by mouth daily. torsemide (DEMADEX) 20 mg tablet TAKE 1 TABLET BY MOUTH ONCE DAILY IN THE MORNING AS NEEDED FOR LEG EDEMA        allopurinoL (ZYLOPRIM) 300 mg tablet Take 1 Tablet by mouth daily. 30 Tablet 0    albuterol (ACCUNEB) 0.63 mg/3 mL nebulizer solution 3 mL by Nebulization route as needed. ProAir HFA 90 mcg/actuation inhaler Take 2 Puffs by inhalation every six (6) hours as needed. Symbicort 160-4.5 mcg/actuation HFAA Take 2 Puffs by inhalation daily. Review of Systems:  I reviewed the rest of organ systems personally and they are negative. Pertinent findings discussed in HPI. Physical Examination     Visit Vitals  /78 (BP 1 Location: Left arm, BP Patient Position: Sitting)   Pulse 87   Temp 98.4 °F (36.9 °C) (Temporal)   Resp 18   Ht 5' 6\" (1.676 m)   Wt 263 lb (119.3 kg)   SpO2 98%   BMI 42.45 kg/m²      Gen: Well developed, well nourished 27 y.o. male in no acute distress  Head: normocephalic, atraumatic  EYES: Pupils are equal and reactive. Chest wall: Excursion normal with respiration cycle, no obvious audible wheeze. Mouth: Clear, no overt lesions, oral mucosa pink and moist  Neck: supple, no masses, no adenopathy or carotid bruits, trachea midline  Resp: clear to auscultation bilaterally, no wheeze, rhonchi or rales, excursions normal and symmetrical  Cardio: Regular rate and rhythm, no murmurs, clicks, gallops or rubs, no edema or varicosities  Abdomen: Soft nontender.   Right groin still has a large mass noted. Neuro: sensation and strength grossly intact and symmetrical  Psych: alert and oriented to person, place and time  Skin: warm and moist.  Hematologic system: No bruising. Vascular examination: Intact in lower extremity. Labs:  No visits with results within 1 Month(s) from this visit. Latest known visit with results is:   No results displayed because visit has over 200 results. Images:  No images are attached to the encounter. Michael Gupta MD     Assessments:       Patient Active Problem List   Diagnosis Code    Right groin mass R19.09    Fall W19. XXXA    Right inguinal pain R10.31    Pulmonary embolism (HCC) I26.99    Pelvic mass R19.00    Bilateral pulmonary embolism (HCC) I26.99    Non Hodgkin's lymphoma (HCC) C85.90    Asthma J45.909    Acute respiratory failure with hypoxia (HCC) J96.01         Plans: I do not think we can do excisional biopsy of the right colon due to large mass. I will perform incisional biopsy instead. Patient was a discussed about this procedure for diagnosis. Patient currently on Xarelto. We will need to hold Xarelto for at least 5 days prior to surgery. Patient will need bridge with Lovenox. Patient tentatively scheduled for this procedure July 27, 2022.

## 2022-08-08 ENCOUNTER — OFFICE VISIT (OUTPATIENT)
Dept: SURGERY | Age: 32
End: 2022-08-08
Payer: MEDICAID

## 2022-08-08 VITALS
DIASTOLIC BLOOD PRESSURE: 75 MMHG | TEMPERATURE: 98 F | RESPIRATION RATE: 12 BRPM | SYSTOLIC BLOOD PRESSURE: 131 MMHG | BODY MASS INDEX: 41.78 KG/M2 | WEIGHT: 260 LBS | OXYGEN SATURATION: 98 % | HEIGHT: 66 IN | HEART RATE: 69 BPM

## 2022-08-08 DIAGNOSIS — C85.95 NON-HODGKIN LYMPHOMA OF LYMPH NODES OF LOWER EXTREMITY, UNSPECIFIED NON-HODGKIN LYMPHOMA TYPE (HCC): Primary | ICD-10-CM

## 2022-08-08 PROCEDURE — 99213 OFFICE O/P EST LOW 20 MIN: CPT | Performed by: SURGERY

## 2022-08-08 NOTE — PROGRESS NOTES
Chief Complaint   Patient presents with    Follow-up     Discuss rescheduling surgery     Visit Vitals  /75 (BP 1 Location: Left upper arm, BP Patient Position: Sitting, BP Cuff Size: Adult)   Pulse 69   Temp 98 °F (36.7 °C) (Temporal)   Resp 12   Ht 5' 6\" (1.676 m)   Wt 260 lb (117.9 kg)   SpO2 98%   BMI 41.97 kg/m²

## 2022-08-10 NOTE — PERIOP NOTES
12 Dr. Milad Greer called, made aware pt takes lovenox 100 mg bid, order given to instruct patient to hold his lovenox after his last dose on morning of 8/16/2022 for his surgery scheduled on 8/17/2022.  Patient called , made aware of lovenox instruction as noted above, pt verbalized understanding

## 2022-08-15 NOTE — PROGRESS NOTES
General surgery history and Physical    Patient: Chacorta Cruz  MRN: [id number]    YOB: 1990  Age: 27 y.o. Sex: male     Chief Complaint:  Chief Complaint   Patient presents with    Follow-up     Discuss rescheduling surgery       History of Present Illness: Chacorta Cruz is a 27 y.o. pleasant man is here today for preop evaluation of the incisional biopsy of the right groin mass. Patient could not get the procedure done last couple weeks. Patient has responded with chemotherapy for systemic lymphoma. Primary oncologist once biopsy results. Social History:  Social Connections: Not on file       Past Medical History:  Past Medical History:   Diagnosis Date    Asthma     Hypertension     Lymphoma ([facility] [address]     pt states dx feb 2022    Thromboembolus [facility])     pt states of right leg feb 2022       Surgical History:  Past Surgical History:   Procedure Laterality Date    HX OTHER SURGICAL Right 03/02/2022    PICC line placed 3/2 and removed a week later. IR FLUORO GUIDE PLC CVAD  3/30/2022    IR INSERT TUNL CVC W PORT OVER 5 YEARS  3/30/2022       Allergies:  No Known Allergies    Current Meds:  Current Outpatient Medications   Medication Sig Dispense Refill    enoxaparin (LOVENOX) 100 mg/mL 100 mg by SubCUTAneous route every twelve (12) hours. 30 Each 0    cetirizine (ZYRTEC) 10 mg tablet Take 1 Tablet by mouth daily. montelukast (SINGULAIR) 10 mg tablet Take 1 Tablet by mouth daily. fluticasone propionate (FLONASE) 50 mcg/actuation nasal spray 1 [place] by Both Nostrils route as needed. predniSONE (DELTASONE) 20 mg tablet Take 20 mg by mouth two (2) times a day. 10 Tablet 0    Xarelto 20 mg tab tablet Take 20 mg by mouth daily. (Patient not taking: Reported on 8/10/2022)      albuterol (ACCUNEB) 0.63 mg/3 mL nebulizer solution 3 mL by Nebulization route as needed. ProAir HFA 90 mcg/actuation inhaler Take 2 Puffs by inhalation every six (6) hours as needed.       Symbicort 160-4.5 mcg/actuation HFAA Take 2 Puffs by inhalation daily. torsemide (DEMADEX) 20 mg tablet TAKE 1 TABLET BY MOUTH ONCE DAILY IN THE MORNING AS NEEDED FOR LEG EDEMA (Patient not taking: No sig reported)      allopurinoL (ZYLOPRIM) 300 mg tablet Take 1 Tablet by mouth daily. (Patient not taking: No sig reported) 30 Tablet 0       Review of Systems:  I reviewed the rest of organ systems personally and they are negative. Pertinent findings discussed in HPI. Physical Examination    Visit Vitals  /75 (BP 1 Location: Left upper arm, BP Patient Position: Sitting, BP Cuff Size: Adult)   Pulse 69   Temp 98 °F (36.7 °C) (Temporal)   Resp 12   Ht 5' 6\" (1.676 m)   Wt 260 lb (117.9 kg)   SpO2 98%   BMI 41.97 kg/m²     Gen: Well developed, well nourished 27 y.o. male in no acute distress  Head: normocephalic, atraumatic  EYES: Pupils are equal and reactive. Chest wall: Excursion normal with respiration cycle, no obvious audible wheeze. Mouth: Clear, no overt lesions, oral mucosa pink and moist  Neck: supple, no masses, no adenopathy or carotid bruits, trachea midline  Resp: clear to auscultation bilaterally, no wheeze, rhonchi or rales, excursions normal and symmetrical  Cardio: Regular rate and rhythm, no murmurs, clicks, gallops or rubs, no edema or varicosities  Abdomen: Patient does have firm mass noted right groin no changes. Neuro: sensation and strength grossly intact and symmetrical  Psych: alert and oriented to person, place and time  Skin: warm and moist.  Hematologic system: No bruising. Vascular examination: Intact in lower extremity.   Labs:  Admission on 07/20/2022, Discharged on 07/20/2022   Component Date Value Ref Range Status    WBC 07/20/2022 6.8  4.1 - 11.1 K/uL Final    RBC 07/20/2022 4.38  4.10 - 5.70 M/uL Final    HGB 07/20/2022 12.7  12.1 - 17.0 g/dL Final    HCT 07/20/2022 39.5  36.6 - 50.3 % Final    MCV 07/20/2022 90.2  80.0 - 99.0 FL Final    MCH 07/20/2022 29.0  26.0 - 34.0 PG Final    MCHC 07/20/2022 32.2  30.0 - 36.5 g/dL Final    RDW 07/20/2022 15.0 (A) 11.5 - 14.5 % Final    PLATELET 96/88/7889 878  150 - 400 K/uL Final    MPV 07/20/2022 10.2  8.9 - 12.9 FL Final    NRBC 07/20/2022 0.0  0.0  WBC Final    ABSOLUTE NRBC 07/20/2022 0.00  0.00 - 0.01 K/uL Final    Sodium 07/20/2022 138  136 - 145 mmol/L Final    Potassium 07/20/2022 4.2  3.5 - 5.1 mmol/L Final    Chloride 07/20/2022 109 (A) 97 - 108 mmol/L Final    CO2 07/20/2022 25  21 - 32 mmol/L Final    Anion gap 07/20/2022 4 (A) 5 - 15 mmol/L Final    Glucose 07/20/2022 112 (A) 65 - 100 mg/dL Final    BUN 07/20/2022 9  6 - 20 mg/dL Final    Creatinine 07/20/2022 1.07  0.70 - 1.30 mg/dL Final    BUN/Creatinine ratio 07/20/2022 8 (A) 12 - 20   Final    GFR est AA 07/20/2022 >60  >60 ml/min/1.73m2 Final    GFR est non-AA 07/20/2022 >60  >60 ml/min/1.73m2 Final    Comment: Estimated GFR is calculated using the IDMS-traceable Modification of Diet in Renal Disease (MDRD) Study equation, reported for both  Americans (GFRAA) and non- Americans (GFRNA), and normalized to 1.73m2 body surface area. The physician must decide which value applies to the patient. The MDRD study equation should only be used in individuals age 25 or older. It has not been validated for the following: pregnant women, patients with serious comorbid conditions, or on certain medications, or persons with extremes of body size, muscle mass, or nutritional status. Calcium 07/20/2022 9.1  8.5 - 10.1 mg/dL Final    Bilirubin, total 07/20/2022 0.4  0.2 - 1.0 mg/dL Final    AST (SGOT) 07/20/2022 17  15 - 37 U/L Final    ALT (SGPT) 07/20/2022 24  12 - 78 U/L Final    Alk.  phosphatase 07/20/2022 71  45 - 117 U/L Final    Protein, total 07/20/2022 6.7  6.4 - 8.2 g/dL Final    Albumin 07/20/2022 3.9  3.5 - 5.0 g/dL Final    Globulin 07/20/2022 2.8  2.0 - 4.0 g/dL Final    A-G Ratio 07/20/2022 1.4  1.1 - 2.2   Final    Prothrombin time 07/20/2022 13.6 11.9 - 14.6 sec Final    INR 07/20/2022 1.0  0.9 - 1.1   Final    aPTT 07/20/2022 28.2  21.2 - 34.1 sec Final    PLEASE NOTE NEW REFERENCE RANGE    aPTT, therapeutic range 07/20/2022    82 - 109 sec Final    Glucose (POC) 07/20/2022 125 (A) 65 - 117 mg/dL Final    Comment: The Accu-Chek Inform II glucometer is not FDA cleared for critically ill patient use. A study was performed validating the equivalence of glucometer and clinical laboratory results on this patient population. Despite the study, use of glucometers with capillary specimens from critically ill patients, regardless of their location, makes the test high complexity and requires the performing individual to comply with CLIA requirements more stringent than those for waived testing in the hospital setting. Critical thinking skills are necessary to determine a potentially critically ill patients status prior to using a glucometer. Performed by 07/20/2022 [facility]   Final         Images:  No images are attached to the encounter. Socorro Spicer MD    Assessments:  Patient Active Problem List   Diagnosis Code    Right groin mass R19.09    Fall W19. XXXA    Right inguinal pain R10.31    Pulmonary embolism (HCC) I26.99    Pelvic mass R19.00    Bilateral pulmonary embolism (HCC) I26.99    Non Hodgkin's lymphoma (HCC) C85.90    Asthma J45.909    Acute respiratory failure with hypoxia (HCC) J96.01       Plans: I will schedule for incisional lymph node biopsy right groin next week. Patient was discussed type of incisions, rational for the surgery risks benefits complication.           Socorro Spicer MD

## 2022-08-17 ENCOUNTER — ANESTHESIA (OUTPATIENT)
Dept: SURGERY | Age: 32
End: 2022-08-17
Payer: MEDICAID

## 2022-08-17 ENCOUNTER — ANESTHESIA EVENT (OUTPATIENT)
Dept: SURGERY | Age: 32
End: 2022-08-17
Payer: MEDICAID

## 2022-08-17 ENCOUNTER — HOSPITAL ENCOUNTER (OUTPATIENT)
Age: 32
Discharge: HOME | End: 2022-08-17
Attending: SURGERY | Admitting: SURGERY
Payer: MEDICAID

## 2022-08-17 VITALS
BODY MASS INDEX: 41.78 KG/M2 | OXYGEN SATURATION: 100 % | SYSTOLIC BLOOD PRESSURE: 128 MMHG | HEIGHT: 66 IN | HEART RATE: 69 BPM | WEIGHT: 260 LBS | RESPIRATION RATE: 16 BRPM | TEMPERATURE: 97.8 F | DIASTOLIC BLOOD PRESSURE: 73 MMHG

## 2022-08-17 DIAGNOSIS — C85.95 NON-HODGKIN LYMPHOMA OF LYMPH NODES OF LOWER EXTREMITY, UNSPECIFIED NON-HODGKIN LYMPHOMA TYPE (HCC): Primary | ICD-10-CM

## 2022-08-17 LAB
GLUCOSE BLD STRIP.AUTO-MCNC: 108 MG/DL (ref 65–117)
PERFORMED BY, TECHID: NORMAL

## 2022-08-17 PROCEDURE — 74011250636 HC RX REV CODE- 250/636: Performed by: REGISTERED NURSE

## 2022-08-17 PROCEDURE — 74011000250 HC RX REV CODE- 250: Performed by: ANESTHESIOLOGY

## 2022-08-17 PROCEDURE — 88341 IMHCHEM/IMCYTCHM EA ADD ANTB: CPT

## 2022-08-17 PROCEDURE — 77030040361 HC SLV COMPR DVT MDII -B: Performed by: SURGERY

## 2022-08-17 PROCEDURE — 2709999900 HC NON-CHARGEABLE SUPPLY: Performed by: SURGERY

## 2022-08-17 PROCEDURE — 76210000026 HC REC RM PH II 1 TO 1.5 HR: Performed by: SURGERY

## 2022-08-17 PROCEDURE — 88305 TISSUE EXAM BY PATHOLOGIST: CPT

## 2022-08-17 PROCEDURE — 77030002933 HC SUT MCRYL J&J -A: Performed by: SURGERY

## 2022-08-17 PROCEDURE — 77030002986 HC SUT PROL J&J -A: Performed by: SURGERY

## 2022-08-17 PROCEDURE — 88364 INSITU HYBRIDIZATION (FISH): CPT

## 2022-08-17 PROCEDURE — 74011000250 HC RX REV CODE- 250: Performed by: REGISTERED NURSE

## 2022-08-17 PROCEDURE — 11106 INCAL BX SKN SINGLE LES: CPT | Performed by: SURGERY

## 2022-08-17 PROCEDURE — 82962 GLUCOSE BLOOD TEST: CPT

## 2022-08-17 PROCEDURE — 88342 IMHCHEM/IMCYTCHM 1ST ANTB: CPT

## 2022-08-17 PROCEDURE — 77030003029 HC SUT VCRL J&J -B: Performed by: SURGERY

## 2022-08-17 PROCEDURE — 88365 INSITU HYBRIDIZATION (FISH): CPT

## 2022-08-17 PROCEDURE — 77030031139 HC SUT VCRL2 J&J -A: Performed by: SURGERY

## 2022-08-17 PROCEDURE — 76010000138 HC OR TIME 0.5 TO 1 HR: Performed by: SURGERY

## 2022-08-17 PROCEDURE — 76210000063 HC OR PH I REC FIRST 0.5 HR: Performed by: SURGERY

## 2022-08-17 PROCEDURE — 77030012406 HC DRN WND PENRS BARD -A: Performed by: SURGERY

## 2022-08-17 PROCEDURE — 76060000032 HC ANESTHESIA 0.5 TO 1 HR: Performed by: SURGERY

## 2022-08-17 RX ORDER — ONDANSETRON 2 MG/ML
4 INJECTION INTRAMUSCULAR; INTRAVENOUS
Status: CANCELLED | OUTPATIENT
Start: 2022-08-17

## 2022-08-17 RX ORDER — DEXAMETHASONE SODIUM PHOSPHATE 4 MG/ML
INJECTION, SOLUTION INTRA-ARTICULAR; INTRALESIONAL; INTRAMUSCULAR; INTRAVENOUS; SOFT TISSUE AS NEEDED
Status: DISCONTINUED | OUTPATIENT
Start: 2022-08-17 | End: 2022-08-17 | Stop reason: HOSPADM

## 2022-08-17 RX ORDER — MIDAZOLAM HYDROCHLORIDE 1 MG/ML
1 INJECTION, SOLUTION INTRAMUSCULAR; INTRAVENOUS AS NEEDED
Status: DISCONTINUED | OUTPATIENT
Start: 2022-08-17 | End: 2022-08-17 | Stop reason: HOSPADM

## 2022-08-17 RX ORDER — SODIUM CHLORIDE, SODIUM LACTATE, POTASSIUM CHLORIDE, CALCIUM CHLORIDE 600; 310; 30; 20 MG/100ML; MG/100ML; MG/100ML; MG/100ML
INJECTION, SOLUTION INTRAVENOUS
Status: DISCONTINUED | OUTPATIENT
Start: 2022-08-17 | End: 2022-08-17 | Stop reason: HOSPADM

## 2022-08-17 RX ORDER — HYDROCODONE BITARTRATE AND ACETAMINOPHEN 5; 325 MG/1; MG/1
1 TABLET ORAL AS NEEDED
Status: DISCONTINUED | OUTPATIENT
Start: 2022-08-17 | End: 2022-08-17 | Stop reason: HOSPADM

## 2022-08-17 RX ORDER — SODIUM CHLORIDE 0.9 % (FLUSH) 0.9 %
5-40 SYRINGE (ML) INJECTION EVERY 8 HOURS
Status: CANCELLED | OUTPATIENT
Start: 2022-08-17

## 2022-08-17 RX ORDER — ENOXAPARIN SODIUM 100 MG/ML
40 INJECTION SUBCUTANEOUS DAILY
Status: CANCELLED | OUTPATIENT
Start: 2022-08-17

## 2022-08-17 RX ORDER — SODIUM CHLORIDE 0.9 % (FLUSH) 0.9 %
5-40 SYRINGE (ML) INJECTION AS NEEDED
Status: DISCONTINUED | OUTPATIENT
Start: 2022-08-17 | End: 2022-08-17 | Stop reason: HOSPADM

## 2022-08-17 RX ORDER — SODIUM CHLORIDE 0.9 % (FLUSH) 0.9 %
5-40 SYRINGE (ML) INJECTION EVERY 8 HOURS
Status: DISCONTINUED | OUTPATIENT
Start: 2022-08-17 | End: 2022-08-17 | Stop reason: HOSPADM

## 2022-08-17 RX ORDER — SODIUM CHLORIDE, SODIUM LACTATE, POTASSIUM CHLORIDE, CALCIUM CHLORIDE 600; 310; 30; 20 MG/100ML; MG/100ML; MG/100ML; MG/100ML
20 INJECTION, SOLUTION INTRAVENOUS CONTINUOUS
Status: DISCONTINUED | OUTPATIENT
Start: 2022-08-17 | End: 2022-08-17 | Stop reason: HOSPADM

## 2022-08-17 RX ORDER — FENTANYL CITRATE 50 UG/ML
50 INJECTION, SOLUTION INTRAMUSCULAR; INTRAVENOUS AS NEEDED
Status: DISCONTINUED | OUTPATIENT
Start: 2022-08-17 | End: 2022-08-17 | Stop reason: HOSPADM

## 2022-08-17 RX ORDER — CEFAZOLIN SODIUM 1 G/3ML
INJECTION, POWDER, FOR SOLUTION INTRAMUSCULAR; INTRAVENOUS
Status: COMPLETED
Start: 2022-08-17 | End: 2022-08-17

## 2022-08-17 RX ORDER — CEFAZOLIN SODIUM 1 G/3ML
INJECTION, POWDER, FOR SOLUTION INTRAMUSCULAR; INTRAVENOUS AS NEEDED
Status: DISCONTINUED | OUTPATIENT
Start: 2022-08-17 | End: 2022-08-17 | Stop reason: HOSPADM

## 2022-08-17 RX ORDER — OXYCODONE HYDROCHLORIDE 5 MG/1
10 TABLET ORAL
Status: CANCELLED | OUTPATIENT
Start: 2022-08-17

## 2022-08-17 RX ORDER — OXYCODONE AND ACETAMINOPHEN 5; 325 MG/1; MG/1
1 TABLET ORAL
Qty: 21 TABLET | Refills: 0 | Status: SHIPPED | OUTPATIENT
Start: 2022-08-17 | End: 2022-08-24

## 2022-08-17 RX ORDER — LIDOCAINE HYDROCHLORIDE 10 MG/ML
0.1 INJECTION, SOLUTION EPIDURAL; INFILTRATION; INTRACAUDAL; PERINEURAL AS NEEDED
Status: DISCONTINUED | OUTPATIENT
Start: 2022-08-17 | End: 2022-08-17 | Stop reason: HOSPADM

## 2022-08-17 RX ORDER — MIDAZOLAM HYDROCHLORIDE 1 MG/ML
0.5 INJECTION, SOLUTION INTRAMUSCULAR; INTRAVENOUS
Status: DISCONTINUED | OUTPATIENT
Start: 2022-08-17 | End: 2022-08-17 | Stop reason: HOSPADM

## 2022-08-17 RX ORDER — LIDOCAINE HYDROCHLORIDE 20 MG/ML
INJECTION, SOLUTION EPIDURAL; INFILTRATION; INTRACAUDAL; PERINEURAL AS NEEDED
Status: DISCONTINUED | OUTPATIENT
Start: 2022-08-17 | End: 2022-08-17 | Stop reason: HOSPADM

## 2022-08-17 RX ORDER — MIDAZOLAM HYDROCHLORIDE 1 MG/ML
INJECTION, SOLUTION INTRAMUSCULAR; INTRAVENOUS AS NEEDED
Status: DISCONTINUED | OUTPATIENT
Start: 2022-08-17 | End: 2022-08-17 | Stop reason: HOSPADM

## 2022-08-17 RX ORDER — NORETHINDRONE AND ETHINYL ESTRADIOL 0.5-0.035
5 KIT ORAL AS NEEDED
Status: DISCONTINUED | OUTPATIENT
Start: 2022-08-17 | End: 2022-08-17 | Stop reason: HOSPADM

## 2022-08-17 RX ORDER — ESMOLOL HYDROCHLORIDE 10 MG/ML
INJECTION INTRAVENOUS AS NEEDED
Status: DISCONTINUED | OUTPATIENT
Start: 2022-08-17 | End: 2022-08-17 | Stop reason: HOSPADM

## 2022-08-17 RX ORDER — DIPHENHYDRAMINE HYDROCHLORIDE 50 MG/ML
12.5 INJECTION, SOLUTION INTRAMUSCULAR; INTRAVENOUS AS NEEDED
Status: DISCONTINUED | OUTPATIENT
Start: 2022-08-17 | End: 2022-08-17 | Stop reason: HOSPADM

## 2022-08-17 RX ORDER — ONDANSETRON 4 MG/1
4 TABLET, ORALLY DISINTEGRATING ORAL
Status: CANCELLED | OUTPATIENT
Start: 2022-08-17

## 2022-08-17 RX ORDER — ONDANSETRON 2 MG/ML
4 INJECTION INTRAMUSCULAR; INTRAVENOUS AS NEEDED
Status: DISCONTINUED | OUTPATIENT
Start: 2022-08-17 | End: 2022-08-17 | Stop reason: HOSPADM

## 2022-08-17 RX ORDER — CEPHALEXIN 500 MG/1
500 CAPSULE ORAL 4 TIMES DAILY
Qty: 40 CAPSULE | Refills: 0 | Status: SHIPPED | OUTPATIENT
Start: 2022-08-17 | End: 2022-08-27

## 2022-08-17 RX ORDER — MORPHINE SULFATE 2 MG/ML
2 INJECTION, SOLUTION INTRAMUSCULAR; INTRAVENOUS
Status: DISCONTINUED | OUTPATIENT
Start: 2022-08-17 | End: 2022-08-17 | Stop reason: HOSPADM

## 2022-08-17 RX ORDER — FENTANYL CITRATE 50 UG/ML
25 INJECTION, SOLUTION INTRAMUSCULAR; INTRAVENOUS
Status: DISCONTINUED | OUTPATIENT
Start: 2022-08-17 | End: 2022-08-17 | Stop reason: HOSPADM

## 2022-08-17 RX ORDER — HYDROMORPHONE HYDROCHLORIDE 1 MG/ML
0.5 INJECTION, SOLUTION INTRAMUSCULAR; INTRAVENOUS; SUBCUTANEOUS
Status: DISCONTINUED | OUTPATIENT
Start: 2022-08-17 | End: 2022-08-17 | Stop reason: HOSPADM

## 2022-08-17 RX ORDER — SODIUM CHLORIDE 0.9 % (FLUSH) 0.9 %
5-40 SYRINGE (ML) INJECTION AS NEEDED
Status: CANCELLED | OUTPATIENT
Start: 2022-08-17

## 2022-08-17 RX ORDER — KETOROLAC TROMETHAMINE 30 MG/ML
INJECTION, SOLUTION INTRAMUSCULAR; INTRAVENOUS AS NEEDED
Status: DISCONTINUED | OUTPATIENT
Start: 2022-08-17 | End: 2022-08-17 | Stop reason: HOSPADM

## 2022-08-17 RX ORDER — ONDANSETRON 2 MG/ML
INJECTION INTRAMUSCULAR; INTRAVENOUS AS NEEDED
Status: DISCONTINUED | OUTPATIENT
Start: 2022-08-17 | End: 2022-08-17 | Stop reason: HOSPADM

## 2022-08-17 RX ORDER — PROPOFOL 10 MG/ML
INJECTION, EMULSION INTRAVENOUS AS NEEDED
Status: DISCONTINUED | OUTPATIENT
Start: 2022-08-17 | End: 2022-08-17 | Stop reason: HOSPADM

## 2022-08-17 RX ORDER — FENTANYL CITRATE 50 UG/ML
INJECTION, SOLUTION INTRAMUSCULAR; INTRAVENOUS AS NEEDED
Status: DISCONTINUED | OUTPATIENT
Start: 2022-08-17 | End: 2022-08-17 | Stop reason: HOSPADM

## 2022-08-17 RX ADMIN — PROPOFOL 200 MG: 10 INJECTION, EMULSION INTRAVENOUS at 08:40

## 2022-08-17 RX ADMIN — PROPOFOL 30 MG: 10 INJECTION, EMULSION INTRAVENOUS at 08:58

## 2022-08-17 RX ADMIN — FENTANYL CITRATE 100 MCG: 50 INJECTION, SOLUTION INTRAMUSCULAR; INTRAVENOUS at 08:43

## 2022-08-17 RX ADMIN — LIDOCAINE HYDROCHLORIDE 100 MG: 20 INJECTION, SOLUTION EPIDURAL; INFILTRATION; INTRACAUDAL; PERINEURAL at 08:40

## 2022-08-17 RX ADMIN — FENTANYL CITRATE 100 MCG: 50 INJECTION, SOLUTION INTRAMUSCULAR; INTRAVENOUS at 08:58

## 2022-08-17 RX ADMIN — PROPOFOL 50 MG: 10 INJECTION, EMULSION INTRAVENOUS at 08:47

## 2022-08-17 RX ADMIN — CEFAZOLIN SODIUM 2 G: 1 INJECTION, POWDER, FOR SOLUTION INTRAMUSCULAR; INTRAVENOUS at 08:40

## 2022-08-17 RX ADMIN — ESMOLOL HYDROCHLORIDE 50 MG: 10 INJECTION, SOLUTION INTRAVENOUS at 08:47

## 2022-08-17 RX ADMIN — MIDAZOLAM HYDROCHLORIDE 2 MG: 2 INJECTION, SOLUTION INTRAMUSCULAR; INTRAVENOUS at 08:27

## 2022-08-17 RX ADMIN — ONDANSETRON 4 MG: 2 INJECTION INTRAMUSCULAR; INTRAVENOUS at 08:47

## 2022-08-17 RX ADMIN — KETOROLAC TROMETHAMINE 30 MG: 30 INJECTION, SOLUTION INTRAMUSCULAR at 09:06

## 2022-08-17 RX ADMIN — DEXAMETHASONE SODIUM PHOSPHATE 4 MG: 4 INJECTION, SOLUTION INTRA-ARTICULAR; INTRALESIONAL; INTRAMUSCULAR; INTRAVENOUS; SOFT TISSUE at 08:45

## 2022-08-17 RX ADMIN — SODIUM CHLORIDE, POTASSIUM CHLORIDE, SODIUM LACTATE AND CALCIUM CHLORIDE: 600; 310; 30; 20 INJECTION, SOLUTION INTRAVENOUS at 08:26

## 2022-08-17 NOTE — ROUTINE PROCESS
TRANSFER - OUT REPORT:    Verbal /bedside report given to [facility] LPN on Jeffrey Forget  being transferred to [facility] bed 23for        Report consisted of patients Situation, Background, Assessment and   Recommendations(SBAR). Information from the following report(s)  was reviewed with the receiving nurse. Opportunity for questions and clarification was provided.

## 2022-08-17 NOTE — BRIEF OP NOTE
Brief Postoperative Note    Patient: Emily Conrad  YOB: 1990  MRN: [id number]    Date of Procedure: 8/17/2022     Pre-Op Diagnosis: RIGHT GROIN MASS    Post-Op Diagnosis: same    Procedure(s):  RIGHT GROIN MASS INCISIONAL BIOPSY    Surgeon(s):  Cecily Kulkarni MD    Surgical Assistant: Surg Asst-1: Alex Browne    Anesthesia: General     Estimated Blood Loss (mL): minimal    Complications: none    Specimens:   ID Type Source Tests Collected by Time Destination   1 : right groin mass #1 Preservative Groin  Cecily Kulkarni MD 8/17/2022 5195 Pathology   2 : right groin mass #2 Preservative Groin  Cecily Kulkarni MD 8/17/2022 0901 Pathology        Implants: * No implants in log *    Drains: * No LDAs found *    Findings:as above    Electronically Signed by Loulou Espinal MD on 8/17/2022 at 9:22 AM

## 2022-08-17 NOTE — PROGRESS NOTES
ASSISTED TO BR ,VOIDED , DRESSING RIGHT GROIN INTACT WITH SMALL AMT BLOODY DRAINAGE NOTED , IV DC'D SITE INTACT NO SWELLING NOTED , DISCHARGE INSTRUCTIONS GIVEN TO PT AND PT'S FRIEND STEVE , BOTH VERBALIZED UNDERSTANDING , NO DISTRESS NOTED

## 2022-08-17 NOTE — H&P
General surgery history and Physical     Patient: Gigi Cummings                 MRN: [id number]          YOB: 1990            Age: 27 y.o. Sex: male      Chief Complaint:          Chief Complaint   Patient presents with    Follow-up       right groin          History of Present Illness: Gigi Cummings is a 27 y.o. very pleasant man is here today discuss surgical biopsy on the right groin. Patient was diagnosed with a large right groin mass and retroperitoneal mass. Patient will be started on chemotherapy with diagnosis of lymphoma. Patient responded per oncology report. I was requested biopsy on the right groin mass. Patient is doing much better now. He was diagnosed with deep vein thrombosis as well. He is currently on Xarelto. Social History:           Social Connections:    Frequency of Communication with Friends and Family: Not on file    Frequency of Social Gatherings with Friends and Family: Not on file    Attends Anabaptist Services: Not on file    Active Member of Clubs or Organizations: Not on file    Attends Club or Organization Meetings: Not on file    Marital Status: Not on file         Past Medical History:          Past Medical History:   Diagnosis Date    Asthma      Hypertension      Lymphoma [facility])           Surgical History:            Past Surgical History:   Procedure Laterality Date    HX OTHER SURGICAL Right 03/02/2022     PICC line placed 3/2 and removed a week later. IR FLUORO GUIDE PLC CVAD   3/30/2022    IR INSERT TUNL CVC W PORT OVER 5 YEARS   3/30/2022         Allergies:  No Known Allergies     Current Meds:              Current Outpatient Medications   Medication Sig Dispense Refill    cetirizine (ZYRTEC) 10 mg tablet Take 1 Tablet by mouth daily. montelukast (SINGULAIR) 10 mg tablet Take 1 Tablet by mouth daily. fluticasone propionate (FLONASE) 50 mcg/actuation nasal spray 1 [place] by Both Nostrils route as needed.         predniSONE (DELTASONE) 20 mg tablet Take 20 mg by mouth two (2) times a day. 10 Tablet 0    azithromycin (Zithromax) 500 mg tab Take 1 Tablet by mouth daily. 3 Tablet 0    Xarelto 20 mg tab tablet Take 20 mg by mouth daily. torsemide (DEMADEX) 20 mg tablet TAKE 1 TABLET BY MOUTH ONCE DAILY IN THE MORNING AS NEEDED FOR LEG EDEMA        allopurinoL (ZYLOPRIM) 300 mg tablet Take 1 Tablet by mouth daily. 30 Tablet 0    albuterol (ACCUNEB) 0.63 mg/3 mL nebulizer solution 3 mL by Nebulization route as needed. ProAir HFA 90 mcg/actuation inhaler Take 2 Puffs by inhalation every six (6) hours as needed. Symbicort 160-4.5 mcg/actuation HFAA Take 2 Puffs by inhalation daily. Review of Systems:  I reviewed the rest of organ systems personally and they are negative. Pertinent findings discussed in HPI. Physical Examination     Visit Vitals  /78 (BP 1 Location: Left arm, BP Patient Position: Sitting)   Pulse 87   Temp 98.4 °F (36.9 °C) (Temporal)   Resp 18   Ht 5' 6\" (1.676 m)   Wt 263 lb (119.3 kg)   SpO2 98%   BMI 42.45 kg/m²      Gen: Well developed, well nourished 27 y.o. male in no acute distress  Head: normocephalic, atraumatic  EYES: Pupils are equal and reactive. Chest wall: Excursion normal with respiration cycle, no obvious audible wheeze. Mouth: Clear, no overt lesions, oral mucosa pink and moist  Neck: supple, no masses, no adenopathy or carotid bruits, trachea midline  Resp: clear to auscultation bilaterally, no wheeze, rhonchi or rales, excursions normal and symmetrical  Cardio: Regular rate and rhythm, no murmurs, clicks, gallops or rubs, no edema or varicosities  Abdomen: Soft nontender. Right groin still has a large mass noted. Neuro: sensation and strength grossly intact and symmetrical  Psych: alert and oriented to person, place and time  Skin: warm and moist.  Hematologic system: No bruising. Vascular examination: Intact in lower extremity.   Labs:  No visits with results within 1 Month(s) from this visit. Latest known visit with results is:   No results displayed because visit has over 200 results. Images:  No images are attached to the encounter. Tan Longoria MD     Assessments:          Patient Active Problem List   Diagnosis Code    Right groin mass R19.09    Fall W19. XXXA    Right inguinal pain R10.31    Pulmonary embolism (HCC) I26.99    Pelvic mass R19.00    Bilateral pulmonary embolism (HCC) I26.99    Non Hodgkin's lymphoma (HCC) C85.90    Asthma J45.909    Acute respiratory failure with hypoxia (HCC) J96.01         Plans: I do not think we can do excisional biopsy of the right colon due to large mass. I will perform incisional biopsy instead. Patient was a discussed about this procedure for diagnosis. Patient currently on Xarelto. We will need to hold Xarelto for at least 5 days prior to surgery. Patient will need bridge with Lovenox. Patient tentatively scheduled for this procedure August 17, 2022.

## 2022-08-17 NOTE — ANESTHESIA POSTPROCEDURE EVALUATION
Procedure(s):  RIGHT GROIN MASS INCISIONAL BIOPSY. general    Anesthesia Post Evaluation      Multimodal analgesia: multimodal analgesia used between 6 hours prior to anesthesia start to PACU discharge  Patient location during evaluation: PACU  Patient participation: complete - patient participated  Level of consciousness: awake  Pain score: 0  Pain management: adequate  Airway patency: patent  Anesthetic complications: no  Cardiovascular status: acceptable  Respiratory status: acceptable  Hydration status: acceptable  Post anesthesia nausea and vomiting:  controlled  Final Post Anesthesia Temperature Assessment:  Normothermia (36.0-37.5 degrees C)      INITIAL Post-op Vital signs:   Vitals Value Taken Time   /89 08/17/22 0939   Temp 36.3 °C (97.4 °F) 08/17/22 0925   Pulse 71 08/17/22 0939   Resp 14 08/17/22 0939   SpO2 100 % 08/17/22 0939     Dr. Jennie Melissa M.D.   Anesthesiology, Critical Care, Pain Management  8/17/2022

## 2022-08-17 NOTE — ANESTHESIA PREPROCEDURE EVALUATION
Relevant Problems   RESPIRATORY SYSTEM   (+) Asthma      PERSONAL HX & FAMILY HX OF CANCER   (+) Non Hodgkin's lymphoma ([facility] [address]       Anesthetic History   No history of anesthetic complications            Review of Systems / Medical History  Patient summary reviewed, nursing notes reviewed and pertinent labs reviewed    Pulmonary            Asthma        Neuro/Psych              Cardiovascular    Hypertension              Exercise tolerance: >4 METS  Comments: DVT/PE   GI/Hepatic/Renal     GERD           Endo/Other        Blood dyscrasia and cancer     Other Findings   Comments: Medical History    Hypertension  Asthma  Lymphoma ([facility] [address]  [facility]           Physical Exam    Airway  Mallampati: II  TM Distance: 4 - 6 cm  Neck ROM: normal range of motion        Cardiovascular    Rhythm: regular  Rate: normal         Dental  No notable dental hx       Pulmonary  Breath sounds clear to auscultation               Abdominal  Abdominal exam normal      Comments:  Other Findings            Anesthetic Plan    ASA: 3  Anesthesia type: general          Induction: Intravenous  Anesthetic plan and risks discussed with: Patient            Dr. Lennox Hughes, M.D.   Anesthesiology, Critical Care, Pain Management  8/17/2022

## 2022-08-18 NOTE — OP NOTES
This is operative report on Raman Hoyos, patient's YOB: 1990. Surgeon: Dr. Александр Moreira  Date of surgery: August 17, 2022. Preop diagnosis: History of lymphoma, right groin mass,     Postop diagnosis: Same as above. Procedure: Right groin incisional biopsy right groin mass. including skin, subcutaneous tissue soft tissue mass at the musculature level. Anesthesia: General  Anesthesiologist: See operative report  EBL: Minimal  Assistant: Mr. Gutierrezandrew Heath  Specimen: Incisional biopsy of right groin mass. Implants: None  Condition stable. Indication for surgery. Patient is a 18-year-old man with history of right groin mass with status postchemotherapy. Patient responded well. Patient still have residual right groin mass requiring incisional biopsy. Patient was discussed rational for the surgery, risks benefits complication. Patient signed surgical consent. Description of procedure: Patient was brought to the operating table comfortable supine position. Followed by general anesthesia was initiated. Followed by patient's right groin was prepped using Betadine solution. Followed by sterile drapes were placed usual fashion. At this time timeout was called after confirmation was made procedure commenced. Generous longitudinal incision was made on the right groin over the right groin mass using #15 blade, followed by dissection was continued until what appears to be scar tissue and tumor necrosis. Incisional biopsy was made including skin, after hemostasis obtained wounds are closed with 3-0 Vicryl sutures and 4-0 Monocryl sutures. Patient tolerated procedure well without any complication.

## 2022-08-29 ENCOUNTER — OFFICE VISIT (OUTPATIENT)
Dept: SURGERY | Age: 32
End: 2022-08-29
Payer: MEDICAID

## 2022-08-29 VITALS
BODY MASS INDEX: 41.14 KG/M2 | OXYGEN SATURATION: 98 % | WEIGHT: 256 LBS | RESPIRATION RATE: 16 BRPM | TEMPERATURE: 98.7 F | HEIGHT: 66 IN | SYSTOLIC BLOOD PRESSURE: 125 MMHG | HEART RATE: 77 BPM | DIASTOLIC BLOOD PRESSURE: 72 MMHG

## 2022-08-29 DIAGNOSIS — R19.09 RIGHT GROIN MASS: Primary | ICD-10-CM

## 2022-08-29 PROCEDURE — 99213 OFFICE O/P EST LOW 20 MIN: CPT | Performed by: SURGERY

## 2022-08-29 NOTE — PROGRESS NOTES
Chief Complaint   Patient presents with    Follow-up     S/P right groin bx 8/17/2022     Visit Vitals  /72 (BP 1 Location: Left upper arm, BP Patient Position: Sitting, BP Cuff Size: Adult)   Pulse 77   Temp 98.7 °F (37.1 °C) (Temporal)   Resp 16   Ht 5' 6\" (1.676 m)   Wt 256 lb (116.1 kg)   SpO2 98%   BMI 41.32 kg/m²

## 2022-09-01 PROBLEM — C85.90 LYMPHOMA (HCC): Status: ACTIVE | Noted: 2022-03-01

## 2022-09-01 NOTE — PROGRESS NOTES
General surgery history and Physical    Patient: Ari Juarez  MRN: [id number]    YOB: 1990  Age: 27 y.o. Sex: male     Chief Complaint:  Chief Complaint   Patient presents with    Follow-up     S/P right groin bx 8/17/2022       History of Present Illness: Ari Juarez is a 27 y.o. very pleasant man who had a lymphoma diagnosis of right groin mass. Patient did receive systemic chemotherapy. Patient appears to have excellent response for chemotherapy. Patient underwent right groin biopsy. Social History:  Social Connections: Not on file       Past Medical History:  Past Medical History:   Diagnosis Date    Asthma     Hypertension     Lymphoma ([address]     pt states dx feb 2022    Thromboembolus [facility])     pt states of right leg feb 2022       Surgical History:  Past Surgical History:   Procedure Laterality Date    HX OTHER SURGICAL Right 03/02/2022    PICC line placed 3/2 and removed a week later. IR FLUORO GUIDE PLC CVAD  3/30/2022    IR INSERT TUNL CVC W PORT OVER 5 YEARS  3/30/2022       Allergies:  No Known Allergies    Current Meds:  Current Outpatient Medications   Medication Sig Dispense Refill    enoxaparin (LOVENOX) 100 mg/mL 100 mg by SubCUTAneous route every twelve (12) hours. 30 Each 0    cetirizine (ZYRTEC) 10 mg tablet Take 1 Tablet by mouth daily. montelukast (SINGULAIR) 10 mg tablet Take 1 Tablet by mouth daily. fluticasone propionate (FLONASE) 50 mcg/actuation nasal spray 1 [place] by Both Nostrils route as needed. predniSONE (DELTASONE) 20 mg tablet Take 20 mg by mouth two (2) times a day. 10 Tablet 0    Xarelto 20 mg tab tablet Take 20 mg by mouth daily. albuterol (ACCUNEB) 0.63 mg/3 mL nebulizer solution 3 mL by Nebulization route as needed. ProAir HFA 90 mcg/actuation inhaler Take 2 Puffs by inhalation every six (6) hours as needed. Symbicort 160-4.5 mcg/actuation HFAA Take 2 Puffs by inhalation daily.          Review of Systems:  I reviewed the rest of organ systems personally and they are negative. Pertinent findings discussed in HPI. Physical Examination    Visit Vitals  /72 (BP 1 Location: Left upper arm, BP Patient Position: Sitting, BP Cuff Size: Adult)   Pulse 77   Temp 98.7 °F (37.1 °C) (Temporal)   Resp 16   Ht 5' 6\" (1.676 m)   Wt 256 lb (116.1 kg)   SpO2 98%   BMI 41.32 kg/m²     Gen: Well developed, well nourished 27 y.o. male in no acute distress  Head: normocephalic, atraumatic  EYES: Pupils are equal and reactive. Chest wall: Excursion normal with respiration cycle, no obvious audible wheeze. Mouth: Clear, no overt lesions, oral mucosa pink and moist  Neck: supple, no masses, no adenopathy or carotid bruits, trachea midline  Resp: clear to auscultation bilaterally, no wheeze, rhonchi or rales, excursions normal and symmetrical  Cardio: Regular rate and rhythm, no murmurs, clicks, gallops or rubs, no edema or varicosities  Abdomen: Soft nontender. Right groin appears to be healing well. However there is a small swelling on the incision site. Neuro: sensation and strength grossly intact and symmetrical  Psych: alert and oriented to person, place and time  Skin: warm and moist.  Hematologic system: No bruising. Vascular examination: Intact in lower extremity. Labs:  Admission on 08/17/2022, Discharged on 08/17/2022   Component Date Value Ref Range Status    Glucose (POC) 08/17/2022 108  65 - 117 mg/dL Final    Comment: The Accu-Chek Inform II glucometer is not FDA cleared for critically ill patient use. A study was performed validating the equivalence of glucometer and clinical laboratory results on this patient population.   Despite the study, use of glucometers with capillary specimens from critically ill patients, regardless of their location, makes the test high complexity and requires the performing individual to comply with CLIA requirements more stringent than those for waived testing in the hospital setting. Critical thinking skills are necessary to determine a potentially critically ill patients status prior to using a glucometer. Performed by 08/17/2022 [facility]   Final         Images:  No images are attached to the encounter. Paulina Thompson MD    Assessments:  Patient Active Problem List   Diagnosis Code    Right groin mass R19.09    Fall W19. XXXA    Right inguinal pain R10.31    Pulmonary embolism (HCC) I26.99    Pelvic mass R19.00    Bilateral pulmonary embolism (HCC) I26.99    Lymphoma (HCC) C85.90    Asthma J45.909    Acute respiratory failure with hypoxia (HCC) J96.01       Plans: I discussed with the patient pathology report which shows necrotic tissue no obvious cancer cells found on the incisional biopsy area. Patient will be reevaluated right groin wound examination in 2-month follow-up.           Paulina Thompson MD

## 2022-09-02 ENCOUNTER — TRANSCRIBE ORDER (OUTPATIENT)
Dept: SCHEDULING | Age: 32
End: 2022-09-02

## 2022-09-02 DIAGNOSIS — C83.36 RETICULOSARCOMA OF INTRAPELVIC LYMPH NODES (HCC): Primary | ICD-10-CM

## 2022-09-19 ENCOUNTER — TELEPHONE (OUTPATIENT)
Dept: SURGERY | Age: 32
End: 2022-09-19

## 2022-09-19 NOTE — TELEPHONE ENCOUNTER
Patient called today regarding his incision. He has some questions to ask. Please call him at [phone].

## 2022-09-20 NOTE — TELEPHONE ENCOUNTER
Called patient back. He states that the incision was draining, but stopped yesterday - drained a little last night. Patient has been covering the area with a bandage.

## 2022-10-03 ENCOUNTER — HOSPITAL ENCOUNTER (OUTPATIENT)
Dept: PET IMAGING | Age: 32
Discharge: HOME | End: 2022-10-03
Attending: INTERNAL MEDICINE
Payer: MEDICAID

## 2022-10-03 DIAGNOSIS — C83.36 RETICULOSARCOMA OF INTRAPELVIC LYMPH NODES (HCC): ICD-10-CM

## 2022-10-03 PROCEDURE — A9552 F18 FDG: HCPCS

## 2022-10-03 RX ORDER — FLUDEOXYGLUCOSE F-18 200 MCI/ML
8.7 INJECTION INTRAVENOUS ONCE
Status: COMPLETED | OUTPATIENT
Start: 2022-10-03 | End: 2022-10-03

## 2022-10-03 RX ADMIN — FLUDEOXYGLUCOSE F-18 8.7 MILLICURIE: 200 INJECTION INTRAVENOUS at 08:31

## 2022-10-05 ENCOUNTER — HOSPITAL ENCOUNTER (OUTPATIENT)
Dept: RADIATION THERAPY | Age: 32
Discharge: HOME | End: 2022-10-05

## 2022-10-10 ENCOUNTER — HOSPITAL ENCOUNTER (OUTPATIENT)
Dept: RADIATION THERAPY | Age: 32
Discharge: HOME | End: 2022-10-10
Payer: MEDICAID

## 2022-10-10 PROCEDURE — 77290 THER RAD SIMULAJ FIELD CPLX: CPT

## 2022-10-20 ENCOUNTER — HOSPITAL ENCOUNTER (OUTPATIENT)
Dept: RADIATION THERAPY | Age: 32
Discharge: HOME | End: 2022-10-20
Payer: MEDICAID

## 2022-10-20 PROCEDURE — 77417 THER RADIOLOGY PORT IMAGE(S): CPT

## 2022-10-20 PROCEDURE — 77412 RADIATION TX DELIVERY LVL 3: CPT

## 2022-10-21 ENCOUNTER — HOSPITAL ENCOUNTER (OUTPATIENT)
Dept: RADIATION THERAPY | Age: 32
Discharge: HOME | End: 2022-10-21
Payer: MEDICAID

## 2022-10-21 PROCEDURE — 77412 RADIATION TX DELIVERY LVL 3: CPT

## 2022-10-24 ENCOUNTER — HOSPITAL ENCOUNTER (OUTPATIENT)
Dept: RADIATION THERAPY | Age: 32
Discharge: HOME | End: 2022-10-24
Payer: MEDICAID

## 2022-10-24 PROCEDURE — 77412 RADIATION TX DELIVERY LVL 3: CPT

## 2022-10-24 PROCEDURE — 77417 THER RADIOLOGY PORT IMAGE(S): CPT

## 2022-10-25 ENCOUNTER — HOSPITAL ENCOUNTER (OUTPATIENT)
Dept: RADIATION THERAPY | Age: 32
Discharge: HOME | End: 2022-10-25
Payer: MEDICAID

## 2022-10-25 PROCEDURE — 77412 RADIATION TX DELIVERY LVL 3: CPT

## 2022-10-26 ENCOUNTER — HOSPITAL ENCOUNTER (OUTPATIENT)
Dept: RADIATION THERAPY | Age: 32
Discharge: HOME | End: 2022-10-26
Payer: MEDICAID

## 2022-10-26 PROCEDURE — 77336 RADIATION PHYSICS CONSULT: CPT

## 2022-10-26 PROCEDURE — 77412 RADIATION TX DELIVERY LVL 3: CPT

## 2022-10-27 ENCOUNTER — HOSPITAL ENCOUNTER (OUTPATIENT)
Dept: RADIATION THERAPY | Age: 32
Discharge: HOME | End: 2022-10-27
Payer: MEDICAID

## 2022-10-27 PROCEDURE — 77412 RADIATION TX DELIVERY LVL 3: CPT

## 2022-10-28 ENCOUNTER — HOSPITAL ENCOUNTER (OUTPATIENT)
Dept: RADIATION THERAPY | Age: 32
Discharge: HOME | End: 2022-10-28
Payer: MEDICAID

## 2022-10-28 PROCEDURE — 77412 RADIATION TX DELIVERY LVL 3: CPT

## 2022-10-31 ENCOUNTER — HOSPITAL ENCOUNTER (OUTPATIENT)
Dept: RADIATION THERAPY | Age: 32
Discharge: HOME | End: 2022-10-31
Payer: MEDICAID

## 2022-10-31 PROCEDURE — 77412 RADIATION TX DELIVERY LVL 3: CPT

## 2022-10-31 PROCEDURE — 77417 THER RADIOLOGY PORT IMAGE(S): CPT

## 2022-11-01 ENCOUNTER — HOSPITAL ENCOUNTER (OUTPATIENT)
Dept: RADIATION THERAPY | Age: 32
Discharge: HOME | End: 2022-11-01
Payer: MEDICAID

## 2022-11-01 PROCEDURE — 77412 RADIATION TX DELIVERY LVL 3: CPT

## 2022-11-02 ENCOUNTER — HOSPITAL ENCOUNTER (OUTPATIENT)
Dept: RADIATION THERAPY | Age: 32
Discharge: HOME | End: 2022-11-02
Payer: MEDICAID

## 2022-11-02 PROCEDURE — 77336 RADIATION PHYSICS CONSULT: CPT

## 2022-11-02 PROCEDURE — 77412 RADIATION TX DELIVERY LVL 3: CPT

## 2022-11-03 ENCOUNTER — HOSPITAL ENCOUNTER (OUTPATIENT)
Dept: RADIATION THERAPY | Age: 32
Discharge: HOME | End: 2022-11-03
Payer: MEDICAID

## 2022-11-03 PROCEDURE — 77412 RADIATION TX DELIVERY LVL 3: CPT

## 2022-11-04 ENCOUNTER — HOSPITAL ENCOUNTER (OUTPATIENT)
Dept: RADIATION THERAPY | Age: 32
Discharge: HOME | End: 2022-11-04
Payer: MEDICAID

## 2022-11-04 PROCEDURE — 77412 RADIATION TX DELIVERY LVL 3: CPT

## 2022-11-07 ENCOUNTER — HOSPITAL ENCOUNTER (OUTPATIENT)
Dept: RADIATION THERAPY | Age: 32
Discharge: HOME | End: 2022-11-07
Payer: MEDICAID

## 2022-11-07 PROCEDURE — 77412 RADIATION TX DELIVERY LVL 3: CPT

## 2022-11-07 PROCEDURE — 77417 THER RADIOLOGY PORT IMAGE(S): CPT

## 2022-11-08 ENCOUNTER — HOSPITAL ENCOUNTER (OUTPATIENT)
Dept: RADIATION THERAPY | Age: 32
Discharge: HOME | End: 2022-11-08
Payer: MEDICAID

## 2022-11-08 PROCEDURE — 77412 RADIATION TX DELIVERY LVL 3: CPT

## 2022-11-09 ENCOUNTER — HOSPITAL ENCOUNTER (OUTPATIENT)
Dept: RADIATION THERAPY | Age: 32
Discharge: HOME | End: 2022-11-09
Payer: MEDICAID

## 2022-11-09 PROCEDURE — 77412 RADIATION TX DELIVERY LVL 3: CPT

## 2022-11-09 PROCEDURE — 77336 RADIATION PHYSICS CONSULT: CPT

## 2022-11-10 ENCOUNTER — HOSPITAL ENCOUNTER (OUTPATIENT)
Dept: RADIATION THERAPY | Age: 32
Discharge: HOME | End: 2022-11-10
Payer: MEDICAID

## 2022-11-10 PROCEDURE — 77412 RADIATION TX DELIVERY LVL 3: CPT

## 2022-11-11 ENCOUNTER — HOSPITAL ENCOUNTER (OUTPATIENT)
Dept: RADIATION THERAPY | Age: 32
Discharge: HOME | End: 2022-11-11
Payer: MEDICAID

## 2022-11-11 PROCEDURE — 77412 RADIATION TX DELIVERY LVL 3: CPT

## 2022-11-14 ENCOUNTER — HOSPITAL ENCOUNTER (OUTPATIENT)
Dept: RADIATION THERAPY | Age: 32
Discharge: HOME | End: 2022-11-14
Payer: MEDICAID

## 2022-11-14 PROCEDURE — 77412 RADIATION TX DELIVERY LVL 3: CPT

## 2022-11-14 PROCEDURE — 77417 THER RADIOLOGY PORT IMAGE(S): CPT

## 2022-11-15 ENCOUNTER — HOSPITAL ENCOUNTER (OUTPATIENT)
Dept: RADIATION THERAPY | Age: 32
Discharge: HOME | End: 2022-11-15
Payer: MEDICAID

## 2022-11-15 PROCEDURE — 77412 RADIATION TX DELIVERY LVL 3: CPT

## 2022-11-16 ENCOUNTER — HOSPITAL ENCOUNTER (OUTPATIENT)
Dept: RADIATION THERAPY | Age: 32
Discharge: HOME | End: 2022-11-16
Payer: MEDICAID

## 2022-11-16 PROCEDURE — 77336 RADIATION PHYSICS CONSULT: CPT

## 2022-11-16 PROCEDURE — 77412 RADIATION TX DELIVERY LVL 3: CPT

## 2022-11-17 ENCOUNTER — HOSPITAL ENCOUNTER (OUTPATIENT)
Dept: RADIATION THERAPY | Age: 32
Discharge: HOME | End: 2022-11-17
Payer: MEDICAID

## 2022-11-17 PROCEDURE — 77412 RADIATION TX DELIVERY LVL 3: CPT

## 2022-11-18 ENCOUNTER — HOSPITAL ENCOUNTER (OUTPATIENT)
Dept: RADIATION THERAPY | Age: 32
Discharge: HOME | End: 2022-11-18
Payer: MEDICAID

## 2022-11-18 PROCEDURE — 77412 RADIATION TX DELIVERY LVL 3: CPT

## 2022-11-21 ENCOUNTER — HOSPITAL ENCOUNTER (OUTPATIENT)
Dept: RADIATION THERAPY | Age: 32
Discharge: HOME | End: 2022-11-21
Payer: MEDICAID

## 2022-11-21 PROCEDURE — 77336 RADIATION PHYSICS CONSULT: CPT

## 2022-11-21 PROCEDURE — 77412 RADIATION TX DELIVERY LVL 3: CPT

## 2022-11-22 ENCOUNTER — HOSPITAL ENCOUNTER (EMERGENCY)
Age: 32
Discharge: HOME | End: 2022-11-22
Attending: EMERGENCY MEDICINE
Payer: MEDICAID

## 2022-11-22 ENCOUNTER — APPOINTMENT (OUTPATIENT)
Dept: CT IMAGING | Age: 32
End: 2022-11-22
Attending: EMERGENCY MEDICINE
Payer: MEDICAID

## 2022-11-22 VITALS
RESPIRATION RATE: 16 BRPM | HEIGHT: 66 IN | SYSTOLIC BLOOD PRESSURE: 143 MMHG | DIASTOLIC BLOOD PRESSURE: 90 MMHG | TEMPERATURE: 98.5 F | WEIGHT: 230 LBS | OXYGEN SATURATION: 100 % | HEART RATE: 81 BPM | BODY MASS INDEX: 36.96 KG/M2

## 2022-11-22 DIAGNOSIS — K52.9 ENTEROCOLITIS: Primary | ICD-10-CM

## 2022-11-22 LAB
ALBUMIN SERPL-MCNC: 4 G/DL (ref 3.5–5)
ALBUMIN/GLOB SERPL: 1.1 {RATIO} (ref 1.1–2.2)
ALP SERPL-CCNC: 83 U/L (ref 45–117)
ALT SERPL-CCNC: 23 U/L (ref 12–78)
ANION GAP SERPL CALC-SCNC: 7 MMOL/L (ref 5–15)
APPEARANCE UR: CLEAR
AST SERPL W P-5'-P-CCNC: 5 U/L (ref 15–37)
BACTERIA URNS QL MICRO: NEGATIVE /HPF
BASOPHILS # BLD: 0 K/UL (ref 0–0.1)
BASOPHILS NFR BLD: 0 % (ref 0–1)
BILIRUB SERPL-MCNC: 0.7 MG/DL (ref 0.2–1)
BILIRUB UR QL: NEGATIVE
BUN SERPL-MCNC: 9 MG/DL (ref 6–20)
BUN/CREAT SERPL: 8 (ref 12–20)
CA-I BLD-MCNC: 9.4 MG/DL (ref 8.5–10.1)
CHLORIDE SERPL-SCNC: 103 MMOL/L (ref 97–108)
CO2 SERPL-SCNC: 25 MMOL/L (ref 21–32)
COLOR UR: ABNORMAL
CREAT SERPL-MCNC: 1.11 MG/DL (ref 0.7–1.3)
DIFFERENTIAL METHOD BLD: ABNORMAL
EOSINOPHIL # BLD: 0.5 K/UL (ref 0–0.4)
EOSINOPHIL NFR BLD: 9 % (ref 0–7)
ERYTHROCYTE [DISTWIDTH] IN BLOOD BY AUTOMATED COUNT: 14.2 % (ref 11.5–14.5)
GLOBULIN SER CALC-MCNC: 3.7 G/DL (ref 2–4)
GLUCOSE SERPL-MCNC: 126 MG/DL (ref 65–100)
GLUCOSE UR STRIP.AUTO-MCNC: NEGATIVE MG/DL
HCT VFR BLD AUTO: 42.5 % (ref 36.6–50.3)
HGB BLD-MCNC: 14.4 G/DL (ref 12.1–17)
HGB UR QL STRIP: NEGATIVE
IMM GRANULOCYTES # BLD AUTO: 0 K/UL (ref 0–0.04)
IMM GRANULOCYTES NFR BLD AUTO: 0 % (ref 0–0.5)
KETONES UR QL STRIP.AUTO: 80 MG/DL
LEUKOCYTE ESTERASE UR QL STRIP.AUTO: NEGATIVE
LIPASE SERPL-CCNC: 95 U/L (ref 73–393)
LYMPHOCYTES # BLD: 0.3 K/UL (ref 0.8–3.5)
LYMPHOCYTES NFR BLD: 6 % (ref 12–49)
MCH RBC QN AUTO: 29.8 PG (ref 26–34)
MCHC RBC AUTO-ENTMCNC: 33.9 G/DL (ref 30–36.5)
MCV RBC AUTO: 88 FL (ref 80–99)
MONOCYTES # BLD: 1.2 K/UL (ref 0–1)
MONOCYTES NFR BLD: 23 % (ref 5–13)
MUCOUS THREADS URNS QL MICRO: ABNORMAL /LPF
NEUTS SEG # BLD: 3.3 K/UL (ref 1.8–8)
NEUTS SEG NFR BLD: 62 % (ref 32–75)
NITRITE UR QL STRIP.AUTO: NEGATIVE
NRBC # BLD: 0 K/UL (ref 0–0.01)
NRBC BLD-RTO: 0 PER 100 WBC
PH UR STRIP: 5 [PH] (ref 5–8)
PLATELET # BLD AUTO: 231 K/UL (ref 150–400)
PMV BLD AUTO: 9.6 FL (ref 8.9–12.9)
POTASSIUM SERPL-SCNC: 3.6 MMOL/L (ref 3.5–5.1)
PROT SERPL-MCNC: 7.7 G/DL (ref 6.4–8.2)
PROT UR STRIP-MCNC: 30 MG/DL
RBC # BLD AUTO: 4.83 M/UL (ref 4.1–5.7)
RBC #/AREA URNS HPF: ABNORMAL /HPF (ref 0–5)
SODIUM SERPL-SCNC: 135 MMOL/L (ref 136–145)
SP GR UR REFRACTOMETRY: 1.03 (ref 1–1.03)
UA: UC IF INDICATED,UAUC: ABNORMAL
UROBILINOGEN UR QL STRIP.AUTO: 0.1 EU/DL (ref 0.1–1)
WBC # BLD AUTO: 5.3 K/UL (ref 4.1–11.1)
WBC URNS QL MICRO: ABNORMAL /HPF (ref 0–4)

## 2022-11-22 PROCEDURE — 83690 ASSAY OF LIPASE: CPT

## 2022-11-22 PROCEDURE — 99285 EMERGENCY DEPT VISIT HI MDM: CPT

## 2022-11-22 PROCEDURE — 96374 THER/PROPH/DIAG INJ IV PUSH: CPT

## 2022-11-22 PROCEDURE — 80053 COMPREHEN METABOLIC PANEL: CPT

## 2022-11-22 PROCEDURE — 74011000636 HC RX REV CODE- 636: Performed by: EMERGENCY MEDICINE

## 2022-11-22 PROCEDURE — 96375 TX/PRO/DX INJ NEW DRUG ADDON: CPT

## 2022-11-22 PROCEDURE — 74177 CT ABD & PELVIS W/CONTRAST: CPT

## 2022-11-22 PROCEDURE — 81001 URINALYSIS AUTO W/SCOPE: CPT

## 2022-11-22 PROCEDURE — 36415 COLL VENOUS BLD VENIPUNCTURE: CPT

## 2022-11-22 PROCEDURE — 85025 COMPLETE CBC W/AUTO DIFF WBC: CPT

## 2022-11-22 PROCEDURE — 74011250636 HC RX REV CODE- 250/636: Performed by: EMERGENCY MEDICINE

## 2022-11-22 RX ORDER — MORPHINE SULFATE 4 MG/ML
4 INJECTION INTRAVENOUS ONCE
Status: COMPLETED | OUTPATIENT
Start: 2022-11-22 | End: 2022-11-22

## 2022-11-22 RX ORDER — DIPHENOXYLATE HYDROCHLORIDE AND ATROPINE SULFATE 2.5; .025 MG/1; MG/1
1 TABLET ORAL
Qty: 20 TABLET | Refills: 0 | Status: ON HOLD | OUTPATIENT
Start: 2022-11-22

## 2022-11-22 RX ORDER — HYDROCODONE BITARTRATE AND ACETAMINOPHEN 5; 325 MG/1; MG/1
1 TABLET ORAL
Qty: 12 TABLET | Refills: 0 | Status: SHIPPED | OUTPATIENT
Start: 2022-11-22 | End: 2022-11-25

## 2022-11-22 RX ORDER — ONDANSETRON 4 MG/1
4 TABLET, ORALLY DISINTEGRATING ORAL
Qty: 20 TABLET | Refills: 0 | Status: ON HOLD | OUTPATIENT
Start: 2022-11-22

## 2022-11-22 RX ORDER — ONDANSETRON 2 MG/ML
4 INJECTION INTRAMUSCULAR; INTRAVENOUS ONCE
Status: COMPLETED | OUTPATIENT
Start: 2022-11-22 | End: 2022-11-22

## 2022-11-22 RX ADMIN — MORPHINE SULFATE 4 MG: 4 INJECTION, SOLUTION INTRAMUSCULAR; INTRAVENOUS at 04:56

## 2022-11-22 RX ADMIN — IOPAMIDOL 100 ML: 755 INJECTION, SOLUTION INTRAVENOUS at 06:13

## 2022-11-22 RX ADMIN — ONDANSETRON 4 MG: 2 INJECTION INTRAMUSCULAR; INTRAVENOUS at 04:56

## 2022-11-22 RX ADMIN — SODIUM CHLORIDE 1000 ML: 9 INJECTION, SOLUTION INTRAVENOUS at 04:55

## 2022-11-22 NOTE — ED PROVIDER NOTES
EMERGENCY DEPARTMENT HISTORY AND PHYSICAL EXAM      Date: 11/22/2022  Patient Name: Daisy Hayes    History of Presenting Illness     Chief Complaint   Patient presents with    Abdominal Pain       History Provided By: Patient and Patient's Wife    HPI: Daisy Hayes, 34 y.o. male with a past medical history significant No significant past medical history presents to the ED with chief complaint of Abdominal Pain  . 1year-old male with B-cell lymphoma follows with oncology. Had radiation treatment. Still having a lot of abdominal bloating pain. Tried simethicone with no relief. Came to the ER advised to the oncologist.  Moderate to severe pain all over. yes nausea or vomiting. There are no other complaints, changes, or physical findings at this time. PCP: Elli Arango MD    Current Outpatient Medications   Medication Sig Dispense Refill    HYDROcodone-acetaminophen (Norco) 5-325 mg per tablet Take 1 Tablet by mouth every six (6) hours as needed for Pain for up to 3 days. Max Daily Amount: 4 Tablets. 12 Tablet 0    diphenoxylate-atropine (LomotiL) 2.5-0.025 mg per tablet Take 1 Tablet by mouth four (4) times daily as needed for Diarrhea (Take after each loose stool. ). Max Daily Amount: 4 Tablets. 20 Tablet 0    ondansetron (ZOFRAN ODT) 4 mg disintegrating tablet Take 1 Tablet by mouth every eight (8) hours as needed for Nausea or Vomiting. 20 Tablet 0    enoxaparin (LOVENOX) 100 mg/mL 100 mg by SubCUTAneous route every twelve (12) hours. 30 Each 0    cetirizine (ZYRTEC) 10 mg tablet Take 1 Tablet by mouth daily. montelukast (SINGULAIR) 10 mg tablet Take 1 Tablet by mouth daily. fluticasone propionate (FLONASE) 50 mcg/actuation nasal spray 1 [place] by Both Nostrils route as needed. predniSONE (DELTASONE) 20 mg tablet Take 20 mg by mouth two (2) times a day. 10 Tablet 0    Xarelto 20 mg tab tablet Take 20 mg by mouth daily.       albuterol (ACCUNEB) 0.63 mg/3 mL nebulizer solution 3 mL by Nebulization route as needed. ProAir HFA 90 mcg/actuation inhaler Take 2 Puffs by inhalation every six (6) hours as needed. Symbicort 160-4.5 mcg/actuation HFAA Take 2 Puffs by inhalation daily. 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 IR FLUORO GUIDE PLC CVAD  3/30/2022    IR INSERT TUNL CVC W PORT OVER 5 YEARS  3/30/2022       Family History:  Family History   Problem Relation Age of Onset    Diabetes Mother     Hypertension Mother     No Known Problems Father     Myasthenia Gravis Maternal Grandmother     Cancer Maternal Grandfather        Social History:  Social History     Tobacco Use    Smoking status: Former     Years: 1.00     Types: Cigarettes     Quit date:      Years since quittin.8    Smokeless tobacco: Never   Vaping Use    Vaping Use: Never used   Substance Use Topics    Alcohol use: Not Currently    Drug use: Not Currently     Types: Marijuana     Comment: daily       Allergies:  No Known Allergies      Review of Systems   Review of Systems   Constitutional: Negative. Negative for chills, fatigue and fever. HENT: Negative. Negative for congestion, ear pain, nosebleeds and sore throat. Eyes: Negative. Negative for pain, discharge and visual disturbance. Respiratory: Negative. Negative for cough, chest tightness and shortness of breath. Cardiovascular: Negative. Negative for chest pain and leg swelling. Gastrointestinal:  Positive for abdominal pain, nausea and vomiting. Negative for blood in stool, constipation and diarrhea. Endocrine: Negative. Genitourinary: Negative.   Negative for difficulty urinating, dysuria and flank pain. Musculoskeletal: Negative. Negative for back pain and myalgias. Skin: Negative. Negative for rash and wound. Allergic/Immunologic: Negative. Neurological: Negative. Negative for dizziness, syncope, weakness, numbness and headaches. Hematological: Negative. Does not bruise/bleed easily. Psychiatric/Behavioral: Negative. Negative for agitation, confusion, hallucinations and suicidal ideas. All other systems reviewed and are negative. Physical Exam   Physical Exam  Vitals and nursing note reviewed. Constitutional:       General: He is not in acute distress. Appearance: He is normal weight. He is not ill-appearing. HENT:      Head: Normocephalic and atraumatic. Right Ear: External ear normal.      Left Ear: External ear normal.      Nose: Nose normal. No rhinorrhea. Mouth/Throat:      Mouth: Mucous membranes are moist.      Pharynx: Oropharynx is clear. Eyes:      Extraocular Movements: Extraocular movements intact. Conjunctiva/sclera: Conjunctivae normal.      Pupils: Pupils are equal, round, and reactive to light. Cardiovascular:      Rate and Rhythm: Normal rate and regular rhythm. Pulses: Normal pulses. Heart sounds: Normal heart sounds. Pulmonary:      Effort: Pulmonary effort is normal. No respiratory distress. Breath sounds: Normal breath sounds. Abdominal:      General: Abdomen is flat. Bowel sounds are normal.      Palpations: Abdomen is soft. Tenderness: There is generalized abdominal tenderness. Musculoskeletal:         General: No tenderness or deformity. Normal range of motion. Cervical back: Normal range of motion and neck supple. Skin:     General: Skin is warm and dry. Capillary Refill: Capillary refill takes less than 2 seconds. Findings: No bruising, lesion or rash. Neurological:      General: No focal deficit present.       Mental Status: He is alert and oriented to person, place, and time. Mental status is at baseline. Psychiatric:         Mood and Affect: Mood normal.         Behavior: Behavior normal.         Thought Content: Thought content normal.         Judgment: Judgment normal.       Diagnostic Study Results     Labs -     Recent Results (from the past 12 hour(s))   CBC WITH AUTOMATED DIFF    Collection Time: 11/22/22  4:47 AM   Result Value Ref Range    WBC 5.3 4.1 - 11.1 K/uL    RBC 4.83 4.10 - 5.70 M/uL    HGB 14.4 12.1 - 17.0 g/dL    HCT 42.5 36.6 - 50.3 %    MCV 88.0 80.0 - 99.0 FL    MCH 29.8 26.0 - 34.0 PG    MCHC 33.9 30.0 - 36.5 g/dL    RDW 14.2 11.5 - 14.5 %    PLATELET 556 248 - 296 K/uL    MPV 9.6 8.9 - 12.9 FL    NRBC 0.0 0.0  WBC    ABSOLUTE NRBC 0.00 0.00 - 0.01 K/uL    NEUTROPHILS 62 32 - 75 %    LYMPHOCYTES 6 (L) 12 - 49 %    MONOCYTES 23 (H) 5 - 13 %    EOSINOPHILS 9 (H) 0 - 7 %    BASOPHILS 0 0 - 1 %    IMMATURE GRANULOCYTES 0 0 - 0.5 %    ABS. NEUTROPHILS 3.3 1.8 - 8.0 K/UL    ABS. LYMPHOCYTES 0.3 (L) 0.8 - 3.5 K/UL    ABS. MONOCYTES 1.2 (H) 0.0 - 1.0 K/UL    ABS. EOSINOPHILS 0.5 (H) 0.0 - 0.4 K/UL    ABS. BASOPHILS 0.0 0.0 - 0.1 K/UL    ABS. IMM. GRANS. 0.0 0.00 - 0.04 K/UL    DF AUTOMATED     METABOLIC PANEL, COMPREHENSIVE    Collection Time: 11/22/22  4:47 AM   Result Value Ref Range    Sodium 135 (L) 136 - 145 mmol/L    Potassium 3.6 3.5 - 5.1 mmol/L    Chloride 103 97 - 108 mmol/L    CO2 25 21 - 32 mmol/L    Anion gap 7 5 - 15 mmol/L    Glucose 126 (H) 65 - 100 mg/dL    BUN 9 6 - 20 mg/dL    Creatinine 1.11 0.70 - 1.30 mg/dL    BUN/Creatinine ratio 8 (L) 12 - 20      eGFR >60 >60 ml/min/1.73m2    Calcium 9.4 8.5 - 10.1 mg/dL    Bilirubin, total 0.7 0.2 - 1.0 mg/dL    AST (SGOT) 5 (L) 15 - 37 U/L    ALT (SGPT) 23 12 - 78 U/L    Alk.  phosphatase 83 45 - 117 U/L    Protein, total 7.7 6.4 - 8.2 g/dL    Albumin 4.0 3.5 - 5.0 g/dL    Globulin 3.7 2.0 - 4.0 g/dL    A-G Ratio 1.1 1.1 - 2.2     LIPASE    Collection Time: 11/22/22  4:47 AM Result Value Ref Range    Lipase 95 73 - 393 U/L   URINALYSIS W/ REFLEX CULTURE    Collection Time: 11/22/22  4:55 AM    Specimen: Urine   Result Value Ref Range    Color Yellow/Straw      Appearance Clear Clear      Specific gravity 1.030 1.003 - 1.030      pH (UA) 5.0 5.0 - 8.0      Protein 30 (A) Negative mg/dL    Glucose Negative Negative mg/dL    Ketone 80 (A) Negative mg/dL    Bilirubin Negative Negative      Blood Negative Negative      Urobilinogen 0.1 0.1 - 1.0 EU/dL    Nitrites Negative Negative      Leukocyte Esterase Negative Negative      UA:UC IF INDICATED Culture not indicated by UA result Culture not indicated by UA result      WBC 0-4 0 - 4 /hpf    RBC 0-5 0 - 5 /hpf    Bacteria Negative Negative /hpf    Mucus 3+ /lpf         Radiologic Studies -   CT ABD PELV W CONT   Final Result   Findings of enterocolitis likely due to nonspecific infection or inflammation. CT Results  (Last 48 hours)                 11/22/22 0613  CT ABD PELV W CONT Final result    Impression:  Findings of enterocolitis likely due to nonspecific infection or inflammation. Narrative:  EXAM:  CT ABD PELV W CONT       INDICATION: Abdominal pain. History of lymphoma. COMPARISON: CT 2/11/2022. TECHNIQUE: Helical CT of the abdomen  and pelvis  following the uneventful   intravenous administration of nonionic contrast.  Coronal and sagittal reformats   are performed. CT dose reduction was achieved through use of a standardized   protocol tailored for this examination and automatic exposure control for dose   modulation. FINDINGS:    The visualized lung bases demonstrate no mass or consolidation. The heart size   is normal. There is no pericardial or pleural effusion. The liver, spleen, pancreas, and adrenal glands are normal. There are probable   gallstones without intra- or extra-hepatic biliary dilatation. The kidneys are symmetric without hydronephrosis.         The proximal colon is fluid-filled, and there is a long segment of ileal wall   thickening. There are no dilated bowel loops. The appendix is normal.         Right inguinal posttreatment changes are noted with no lymphadenopathy on the   current study. There are no enlarged lymph nodes. There is no free fluid or   free air. The aorta is normal in caliber. The urinary bladder is normal.  There is no pelvic mass. The prostate is not   enlarged. The bony structures are age-appropriate. CXR Results  (Last 48 hours)      None            Medical Decision Making and ED Course   I am the first provider for this patient. I reviewed the vital signs, available nursing notes, past medical history, past surgical history, family history and social history. Vital Signs-Reviewed the patient's vital signs. Patient Vitals for the past 12 hrs:   Temp Pulse Resp BP SpO2   11/22/22 0528 -- -- -- -- 100 %   11/22/22 0500 -- -- -- (!) 143/90 99 %   11/22/22 0430 98.5 °F (36.9 °C) 81 16 (!) 158/89 99 %       EKG interpretation:         Records Reviewed: Previous Hospital chart. EMS run report      ED Course:   Initial assessment performed. The patients presenting problems have been discussed, and they are in agreement with the care plan formulated and outlined with them. I have encouraged them to ask questions as they arise throughout their visit. Orders Placed This Encounter    CT ABD PELV W CONT     Standing Status:   Standing     Number of Occurrences:   1     Order Specific Question: This order utilizes IV contrast.  What additional contrast is needed? Answer:   None     Order Specific Question:   Does patient have history of Renal Disease?      Answer:   No     Order Specific Question:   Transport     Answer:   Stretcher [5]     Order Specific Question:   Reason for Exam     Answer:   abd p     Order Specific Question:   Decision Support Exception     Answer:   Emergency Medical Condition (MA) [1]    CBC WITH AUTOMATED DIFF     Standing Status:   Standing     Number of Occurrences:   1    METABOLIC PANEL, COMPREHENSIVE     Standing Status:   Standing     Number of Occurrences:   1    URINALYSIS W/ REFLEX CULTURE     Standing Status:   Standing     Number of Occurrences:   1    LIPASE     Standing Status:   Standing     Number of Occurrences:   1    morphine injection 4 mg    ondansetron (ZOFRAN) injection 4 mg    sodium chloride 0.9 % bolus infusion 1,000 mL    iopamidoL (ISOVUE-370) 76 % injection 100 mL    HYDROcodone-acetaminophen (Norco) 5-325 mg per tablet     Sig: Take 1 Tablet by mouth every six (6) hours as needed for Pain for up to 3 days. Max Daily Amount: 4 Tablets. Dispense:  12 Tablet     Refill:  0    diphenoxylate-atropine (LomotiL) 2.5-0.025 mg per tablet     Sig: Take 1 Tablet by mouth four (4) times daily as needed for Diarrhea (Take after each loose stool. ). Max Daily Amount: 4 Tablets. Dispense:  20 Tablet     Refill:  0    ondansetron (ZOFRAN ODT) 4 mg disintegrating tablet     Sig: Take 1 Tablet by mouth every eight (8) hours as needed for Nausea or Vomiting. Dispense:  20 Tablet     Refill:  0                 Provider Notes (Medical Decision Making):   34-year-old male status post radiation treatment for be lymphoma. Now with severe abdominal pain nausea vomiting not improving with over-the-counter treatment. Consults              Discharged    Procedures               Disposition       Emergency Department Disposition:  Discharged      Diagnosis     Clinical Impression:   1. Enterocolitis        Attestations:    Mika Engle MD    Please note that this dictation was completed with Recommendi, the computer voice recognition software. Quite often unanticipated grammatical, syntax, homophones, and other interpretive errors are inadvertently transcribed by the computer software. Please disregard these errors. Please excuse any errors that have escaped final proofreading. Thank you.

## 2022-11-22 NOTE — Clinical Note
[facility] EMERGENCY DEPT  [address] [place] [id number]  [phone]    Work/School Note    Date: 11/22/2022    To Whom It May concern:    Katherin Kemp was seen and treated today in the emergency room by the following provider(s):  Attending Provider: Tyshawn Styles MD.      Katherin Kemp is excused from work/school on 11/22/22 and 11/23/22. He is medically clear to return to work/school on 11/24/2022.        Sincerely,          Todd Pond

## 2022-11-22 NOTE — DISCHARGE INSTRUCTIONS
Thank you! Thank you for allowing me to care for you in the emergency department. It is my goal to provide you with excellent care. If you have not received excellent quality care, please ask to speak to the nurse manager. Please fill out the survey that will come to you by mail or email since we listen to your feedback! Below you will find a list of your tests from today's visit. Should you have any questions, please do not hesitate to call the emergency department. Labs  Recent Results (from the past 12 hour(s))   CBC WITH AUTOMATED DIFF    Collection Time: 11/22/22  4:47 AM   Result Value Ref Range    WBC 5.3 4.1 - 11.1 K/uL    RBC 4.83 4.10 - 5.70 M/uL    HGB 14.4 12.1 - 17.0 g/dL    HCT 42.5 36.6 - 50.3 %    MCV 88.0 80.0 - 99.0 FL    MCH 29.8 26.0 - 34.0 PG    MCHC 33.9 30.0 - 36.5 g/dL    RDW 14.2 11.5 - 14.5 %    PLATELET 721 677 - 480 K/uL    MPV 9.6 8.9 - 12.9 FL    NRBC 0.0 0.0  WBC    ABSOLUTE NRBC 0.00 0.00 - 0.01 K/uL    NEUTROPHILS 62 32 - 75 %    LYMPHOCYTES 6 (L) 12 - 49 %    MONOCYTES 23 (H) 5 - 13 %    EOSINOPHILS 9 (H) 0 - 7 %    BASOPHILS 0 0 - 1 %    IMMATURE GRANULOCYTES 0 0 - 0.5 %    ABS. NEUTROPHILS 3.3 1.8 - 8.0 K/UL    ABS. LYMPHOCYTES 0.3 (L) 0.8 - 3.5 K/UL    ABS. MONOCYTES 1.2 (H) 0.0 - 1.0 K/UL    ABS. EOSINOPHILS 0.5 (H) 0.0 - 0.4 K/UL    ABS. BASOPHILS 0.0 0.0 - 0.1 K/UL    ABS. IMM.  GRANS. 0.0 0.00 - 0.04 K/UL    DF AUTOMATED     METABOLIC PANEL, COMPREHENSIVE    Collection Time: 11/22/22  4:47 AM   Result Value Ref Range    Sodium 135 (L) 136 - 145 mmol/L    Potassium 3.6 3.5 - 5.1 mmol/L    Chloride 103 97 - 108 mmol/L    CO2 25 21 - 32 mmol/L    Anion gap 7 5 - 15 mmol/L    Glucose 126 (H) 65 - 100 mg/dL    BUN 9 6 - 20 mg/dL    Creatinine 1.11 0.70 - 1.30 mg/dL    BUN/Creatinine ratio 8 (L) 12 - 20      eGFR >60 >60 ml/min/1.73m2    Calcium 9.4 8.5 - 10.1 mg/dL    Bilirubin, total 0.7 0.2 - 1.0 mg/dL    AST (SGOT) 5 (L) 15 - 37 U/L    ALT (SGPT) 23 12 - 78 U/L Alk. phosphatase 83 45 - 117 U/L    Protein, total 7.7 6.4 - 8.2 g/dL    Albumin 4.0 3.5 - 5.0 g/dL    Globulin 3.7 2.0 - 4.0 g/dL    A-G Ratio 1.1 1.1 - 2.2     LIPASE    Collection Time: 11/22/22  4:47 AM   Result Value Ref Range    Lipase 95 73 - 393 U/L   URINALYSIS W/ REFLEX CULTURE    Collection Time: 11/22/22  4:55 AM    Specimen: Urine   Result Value Ref Range    Color Yellow/Straw      Appearance Clear Clear      Specific gravity 1.030 1.003 - 1.030      pH (UA) 5.0 5.0 - 8.0      Protein 30 (A) Negative mg/dL    Glucose Negative Negative mg/dL    Ketone 80 (A) Negative mg/dL    Bilirubin Negative Negative      Blood Negative Negative      Urobilinogen 0.1 0.1 - 1.0 EU/dL    Nitrites Negative Negative      Leukocyte Esterase Negative Negative      UA:UC IF INDICATED Culture not indicated by UA result Culture not indicated by UA result      WBC 0-4 0 - 4 /hpf    RBC 0-5 0 - 5 /hpf    Bacteria Negative Negative /hpf    Mucus 3+ /lpf       Radiologic Studies  CT ABD PELV W CONT   Final Result   Findings of enterocolitis likely due to nonspecific infection or inflammation. CT Results  (Last 48 hours)                 11/22/22 0613  CT ABD PELV W CONT Final result    Impression:  Findings of enterocolitis likely due to nonspecific infection or inflammation. Narrative:  EXAM:  CT ABD PELV W CONT       INDICATION: Abdominal pain. History of lymphoma. COMPARISON: CT 2/11/2022. TECHNIQUE: Helical CT of the abdomen  and pelvis  following the uneventful   intravenous administration of nonionic contrast.  Coronal and sagittal reformats   are performed. CT dose reduction was achieved through use of a standardized   protocol tailored for this examination and automatic exposure control for dose   modulation. FINDINGS:    The visualized lung bases demonstrate no mass or consolidation. The heart size   is normal. There is no pericardial or pleural effusion.        The liver, spleen, pancreas, and adrenal glands are normal. There are probable   gallstones without intra- or extra-hepatic biliary dilatation. The kidneys are symmetric without hydronephrosis. The proximal colon is fluid-filled, and there is a long segment of ileal wall   thickening. There are no dilated bowel loops. The appendix is normal.         Right inguinal posttreatment changes are noted with no lymphadenopathy on the   current study. There are no enlarged lymph nodes. There is no free fluid or   free air. The aorta is normal in caliber. The urinary bladder is normal.  There is no pelvic mass. The prostate is not   enlarged. The bony structures are age-appropriate. CXR Results  (Last 48 hours)      None          ------------------------------------------------------------------------------------------------------------  The exam and treatment you received in the Emergency Department were for an urgent problem and are not intended as complete care. It is important that you follow-up with a doctor, nurse practitioner, or physician assistant to:  (1) confirm your diagnosis,  (2) re-evaluation of changes in your illness and treatment, and  (3) for ongoing care. Please take your discharge instructions with you when you go to your follow-up appointment. If you have any problem arranging a follow-up appointment, contact the Emergency Department. If your symptoms become worse or you do not improve as expected and you are unable to reach your health care provider, please return to the Emergency Department. We are available 24 hours a day. If a prescription has been provided, please have it filled as soon as possible to prevent a delay in treatment. If you have any questions or reservations about taking the medication due to side effects or interactions with other medications, please call your primary care provider or contact the ER.

## 2022-11-22 NOTE — Clinical Note
[facility] EMERGENCY DEPT  [address] [place] [id number]  [phone]    Work/School Note    Date: 11/22/2022    To Whom It May concern:    Evan Lantigua was seen and treated today in the emergency room by the following provider(s):  Attending Provider: Bertina Bence, MD.      Evan Lantigua is excused from work/school on 11/22/22 and 11/23/22. He is medically clear to return to work/school on 11/24/2022.        Sincerely,          Isabella Minor MD

## 2022-11-22 NOTE — ED TRIAGE NOTES
Pt having generalized abd pain and had his last radiation for lymphoma on his right groin/abd area.  Pt also c/o of diarrhea

## 2022-11-25 ENCOUNTER — APPOINTMENT (OUTPATIENT)
Dept: GENERAL RADIOLOGY | Age: 32
DRG: 721 | End: 2022-11-25
Attending: STUDENT IN AN ORGANIZED HEALTH CARE EDUCATION/TRAINING PROGRAM
Payer: MEDICAID

## 2022-11-25 ENCOUNTER — HOSPITAL ENCOUNTER (EMERGENCY)
Age: 32
Discharge: HOME | DRG: 721 | End: 2022-11-25
Attending: STUDENT IN AN ORGANIZED HEALTH CARE EDUCATION/TRAINING PROGRAM
Payer: MEDICAID

## 2022-11-25 VITALS
OXYGEN SATURATION: 97 % | WEIGHT: 230 LBS | RESPIRATION RATE: 20 BRPM | TEMPERATURE: 98.8 F | HEART RATE: 105 BPM | SYSTOLIC BLOOD PRESSURE: 144 MMHG | DIASTOLIC BLOOD PRESSURE: 79 MMHG | HEIGHT: 66 IN | BODY MASS INDEX: 36.96 KG/M2

## 2022-11-25 DIAGNOSIS — J10.1 INFLUENZA A: Primary | ICD-10-CM

## 2022-11-25 LAB
COVID-19 RAPID TEST, COVR: NOT DETECTED
FLUAV AG NPH QL IA: POSITIVE
FLUBV AG NOSE QL IA: NEGATIVE

## 2022-11-25 PROCEDURE — 87635 SARS-COV-2 COVID-19 AMP PRB: CPT

## 2022-11-25 PROCEDURE — 87804 INFLUENZA ASSAY W/OPTIC: CPT

## 2022-11-25 PROCEDURE — 74011250637 HC RX REV CODE- 250/637: Performed by: STUDENT IN AN ORGANIZED HEALTH CARE EDUCATION/TRAINING PROGRAM

## 2022-11-25 PROCEDURE — 71045 X-RAY EXAM CHEST 1 VIEW: CPT

## 2022-11-25 RX ORDER — OXYMETAZOLINE HCL 0.05 %
2 SPRAY, NON-AEROSOL (ML) NASAL 2 TIMES DAILY
Qty: 1 EACH | Refills: 0 | Status: SHIPPED | OUTPATIENT
Start: 2022-11-25 | End: 2022-11-28

## 2022-11-25 RX ORDER — ACETAMINOPHEN 325 MG/1
650 TABLET ORAL
Qty: 20 TABLET | Refills: 0 | Status: SHIPPED | OUTPATIENT
Start: 2022-11-25

## 2022-11-25 RX ORDER — ACETAMINOPHEN 500 MG
1000 TABLET ORAL
Status: COMPLETED | OUTPATIENT
Start: 2022-11-25 | End: 2022-11-25

## 2022-11-25 RX ORDER — OSELTAMIVIR PHOSPHATE 75 MG/1
75 CAPSULE ORAL ONCE
Status: COMPLETED | OUTPATIENT
Start: 2022-11-25 | End: 2022-11-25

## 2022-11-25 RX ORDER — IBUPROFEN 600 MG/1
600 TABLET ORAL
Status: COMPLETED | OUTPATIENT
Start: 2022-11-25 | End: 2022-11-25

## 2022-11-25 RX ORDER — GUAIFENESIN 100 MG/5ML
200 SOLUTION ORAL
Qty: 200 ML | Refills: 0 | Status: SHIPPED | OUTPATIENT
Start: 2022-11-25

## 2022-11-25 RX ORDER — OSELTAMIVIR PHOSPHATE 75 MG/1
75 CAPSULE ORAL 2 TIMES DAILY
Qty: 10 CAPSULE | Refills: 0 | Status: SHIPPED | OUTPATIENT
Start: 2022-11-25 | End: 2022-11-30

## 2022-11-25 RX ADMIN — OSELTAMIVIR PHOSPHATE 75 MG: 75 CAPSULE ORAL at 07:51

## 2022-11-25 RX ADMIN — ACETAMINOPHEN 1000 MG: 500 TABLET ORAL at 07:08

## 2022-11-25 RX ADMIN — IBUPROFEN 600 MG: 600 TABLET ORAL at 07:09

## 2022-11-25 NOTE — Clinical Note
[facility] EMERGENCY DEPT  [address] [place] [id number]  [phone]    Work/School Note    Date: 11/25/2022    To Whom It May concern:    Mauro De La Cruz was seen and treated today in the emergency room by the following provider(s):  Attending Provider: Kiesha William MD.      Mauro De La Cruz is excused from work/school on 11/25/2022 through 11/27/2022. He is medically clear to return to work/school on 11/28/2022.          Sincerely,          Judy Mayo MD

## 2022-11-25 NOTE — Clinical Note
[facility] EMERGENCY DEPT  [address] [place] [address] [place] [address]  [phone]    Work/School Note    Date: 11/25/2022    To Whom It May concern:    Saurabh Choi was seen and treated today in the emergency room by the following provider(s):  Attending Provider: Jazmine Parker MD.      Saurabh Choi is excused from work/school on 11/25/2022 through 11/27/2022. He is medically clear to return to work/school on 11/28/2022.          Sincerely,          Madan Redding RN

## 2022-11-25 NOTE — ED PROVIDER NOTES
[id number]  EMERGENCY DEPARTMENT ENCOUNTER NOTE    Date: 11/25/2022  Patient Name: Tsering Jimenez    History of Presenting Illness     Chief Complaint   Patient presents with    Flu Like Symptoms     HPI: Tsering Jimenez, 34 y.o. male with a past medical history and home medications as listed below presents for URI symptoms. The patient reports a 2 day history of the following symptoms: Fever, Chills, Fatigue, Myalgias, Arthralgias, Congestion, and Cough. The patient does not report any Sore Throat, Headache, Diarrhea, Nausea, Vomiting, Abdominal Pain, Chest Pain, and Shortness of breath. 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      IR FLUORO GUIDE PLC CVAD  3/30/2022    IR INSERT TUNL CVC W PORT OVER 5 YEARS  3/30/2022     Current Outpatient Medications:  Current Outpatient Medications   Medication Instructions    acetaminophen (TYLENOL) 650 mg, Oral, 4 TIMES DAILY AS NEEDED    albuterol (ACCUNEB) 0.63 mg/3 mL nebulizer solution 3 mL, Nebulization, AS NEEDED    albuterol sulfate 90 mcg/actuation aebs 1-2 Puffs, Inhalation, EVERY 4 HOURS AS NEEDED    cetirizine (ZYRTEC) 10 mg tablet 1 Tablet, Oral, DAILY    diphenoxylate-atropine (LomotiL) 2.5-0.025 mg per tablet 1 Tablet, Oral, 4 TIMES DAILY AS NEEDED    enoxaparin (LOVENOX) 100 mg, SubCUTAneous, EVERY 12 HOURS    fluticasone propionate (FLONASE) 50 mcg/actuation nasal spray 1 Spray, Both Nostrils, AS NEEDED    guaiFENesin (ROBITUSSIN) 200 mg, Oral, 3 TIMES DAILY AS NEEDED    HYDROcodone-acetaminophen (Norco) 5-325 mg per tablet 1 Tablet, Oral, EVERY 6 HOURS AS NEEDED    montelukast (SINGULAIR) 10 mg tablet 1 Tablet, Oral, DAILY    ondansetron (ZOFRAN ODT) 4 mg, Oral, EVERY 8 HOURS AS NEEDED    oseltamivir (TAMIFLU) 75 mg, Oral, 2 TIMES DAILY    oxymetazoline (Afrin, oxymetazoline,) 0.05 % nasal spray 2 Sprays, Both Nostrils, 2 TIMES DAILY, DO NOT TAKE MORE THAN 3-4 DAYS TO PREVENT BAD RUNNY NOSE AS SIDE EFFECT.    predniSONE (DELTASONE) 20 mg, Oral, 2 TIMES DAILY    ProAir HFA 90 mcg/actuation inhaler 2 Puffs, Inhalation, EVERY 6 HOURS AS NEEDED    Symbicort 160-4.5 mcg/actuation HFAA 2 Puffs, Inhalation, DAILY    Xarelto 20 mg, Oral, DAILY      Family History:  Family History   Problem Relation Age of Onset    Diabetes Mother     Hypertension Mother     No Known Problems Father     Myasthenia Gravis Maternal Grandmother     Cancer Maternal Grandfather      Social History:  Social History     Tobacco Use    Smoking status: Former     Years: 1.00     Types: Cigarettes     Quit date:      Years since quittin.9    Smokeless tobacco: Never   Vaping Use    Vaping Use: Never used   Substance Use Topics    Alcohol use: Not Currently    Drug use: Not Currently     Types: Marijuana     Comment: daily     Allergies:  No Known Allergies    Review of Systems     Positives and pertinent negatives as per HPI. All other systems were reviewed and are negative. Physical Exam and Vital Signs   Vital Signs - Reviewed the patient's vital signs.     Patient Vitals for the past 12 hrs:   Temp Pulse Resp BP SpO2   22 0739 98.8 °F (37.1 °C) (!) 105 20 (!) 144/79 97 %   22 0617 (!) 101.1 °F (38.4 °C) (!) 119 17 (!) 150/86 95 %     Physical Exam:    GENERAL: awake, alert, cooperative, not in distress  HEENT  * Pupils equal, head atraumatic  * Normal voice, no drooling, no stridor  * No facial swelling, erythema, or cellulitis  CV:  * warm extremities  * no cyanosis  PULMONARY: no respiratory distress, non labored breathing, no audible wheezing or stridor, no accessory muscle use  ABDOMEN: soft, moving in bed and pulls to seated position without grimace or pain  EXTREMITIES/BACK: moving four extremities without limitation  SKIN: no rashes or signs of trauma  NEURO:  * Speech clear  * GCS 15      Medical Decision Making   - I am the first and primary provider for this patient and am the primary provider of record. - I reviewed the vital signs, available nursing notes, past medical history, past surgical history, family history and social history. - Initial assessment performed. The patients presenting problems have been discussed, and the staff are in agreement with the care plan formulated and outlined with them. I have encouraged them to ask questions as they arise throughout their visit. - Available medical records, nursing notes, old EKGs, and EMS run sheets (if patient was EMS transported) were reviewed    MDM:   Patient is a 34 y.o. male presenting for URI symptoms. Vitals reveal no hypoxia, hypotension or tachypnea and physical exam reveals no significant abnormalities. Based on the history, physical exam, risk factors, and vital signs, differential includes: URI, COVID19, Flu, postnasal drip, common cold. This well-appearing patient presents with URI symptoms with low suspicion for serious bacterial infection given nontoxic appearance. Patient has good PO intake, no lethargy, and no physical exam or vital sign findings to suggest clinically significant dehydration or malignant process. There is low suspicion for pneumonia as the patient has no abnormal lung sounds on exam, appears nontoxic, and has a reassuring clinical picture.  The presentation is consistent with an isolated viral upper respiratory tract infection. Results     Labs:  Recent Results (from the past 12 hour(s))   INFLUENZA A & B AG (RAPID TEST)    Collection Time: 11/25/22  6:28 AM   Result Value Ref Range    Influenza A Antigen Positive (A) Negative      Influenza B Antigen Negative Negative     COVID-19 RAPID TEST    Collection Time: 11/25/22  6:28 AM   Result Value Ref Range    COVID-19 rapid test Not Detected Not Detected       Radiologic Studies:  CT Results  (Last 48 hours)      None          CXR Results  (Last 48 hours)                 11/25/22 0656  XR CHEST PORT Final result    Impression:      No acute process. Narrative:  EXAM:  XR CHEST PORT       INDICATION: Cough       COMPARISON: 5/10/2022       TECHNIQUE: Portable AP upright chest view at 0639 hours       FINDINGS: The right chest Port-A-Cath is stable. The cardiac silhouette is   within normal limits. The pulmonary vasculature is within normal limits. The lungs and pleural spaces are clear. There is no pneumothorax. The visualized   bones and upper abdomen are age-appropriate. Medications ordered:  Medications   oseltamivir (TAMIFLU) capsule 75 mg (has no administration in time range)   ibuprofen (MOTRIN) tablet 600 mg (600 mg Oral Given 11/25/22 0709)   acetaminophen (TYLENOL) tablet 1,000 mg (1,000 mg Oral Given 11/25/22 9874)     ED Course and Reassessment     ED Course:     ED Course as of 11/25/22 0746   Fri Nov 25, 2022   0702 Chest x-ray negative for acute pathology: no CXR evidence of pulmonary edema, pleural effusion, pneumothorax, or pneumonia. [SS]   0723 Flu positive. COVID-19 testing is negative.   [SS]      ED Course User Index  [SS] Caitlin Ortega MD       Reassessment:    Patient is well appearing, not hypoxic, and is not in respiratory distress. Due to normal exam and the absence of hypoxia, there is no concern for significant pneumonia.  Patient does not meet criteria for admission or inpatient treatment. As the patient appears generally well, non-toxic with a completely reassuring clinical picture and exam, and is able to tolerate PO intake, I feel the patient is a good candidate for outpatient treatment with return precautions. Understanding was insured that at this time there is no evidence for a more malignant underlying process, but that early in the process of an illness, an emergency department workup can be falsely reassuring. Routine discharge counseling was given including the fact that any worsening, changing or persistent symptoms should prompt an immediate call or follow up with their primary physician or the emergency department. The importance of appropriate follow up was also discussed. More extensive discharge instructions were given in the patient's discharge paperwork. After completion of evaluation and discussion of results and diagnoses, all the questions were answered. If required, all follow up appointments and treatments were discussed and explained. Understanding was insured prior to discharge. Final Disposition     DISPOSITION: DISCHARGED    Patient will be discharged from the Emergency Department in stable condition. All of the diagnostic tests were reviewed and any questions were answered. Diagnosis, results, follow up if applicable, and return precautions were discussed. I have also put together printed discharge instructions for them that include: 1) educational information regarding their diagnosis, 2) how to care for their diagnosis at home, as well a 3) list of reasons why they would want to return to the ED prior to their follow-up appointment, should their condition change. Any labs or imaging done in the ED will be either printed with the discharge paperwork or available through [address] DISCHARGE PLAN:  1.    Current Discharge Medication List        CONTINUE these medications which have NOT CHANGED    Details HYDROcodone-acetaminophen (Norco) 5-325 mg per tablet Take 1 Tablet by mouth every six (6) hours as needed for Pain for up to 3 days. Max Daily Amount: 4 Tablets. Qty: 12 Tablet, Refills: 0    Associated Diagnoses: Enterocolitis      diphenoxylate-atropine (LomotiL) 2.5-0.025 mg per tablet Take 1 Tablet by mouth four (4) times daily as needed for Diarrhea (Take after each loose stool. ). Max Daily Amount: 4 Tablets. Qty: 20 Tablet, Refills: 0    Associated Diagnoses: Enterocolitis      ondansetron (ZOFRAN ODT) 4 mg disintegrating tablet Take 1 Tablet by mouth every eight (8) hours as needed for Nausea or Vomiting. Qty: 20 Tablet, Refills: 0      enoxaparin (LOVENOX) 100 mg/mL 100 mg by SubCUTAneous route every twelve (12) hours. Qty: 30 Each, Refills: 0      cetirizine (ZYRTEC) 10 mg tablet Take 1 Tablet by mouth daily. montelukast (SINGULAIR) 10 mg tablet Take 1 Tablet by mouth daily. fluticasone propionate (FLONASE) 50 mcg/actuation nasal spray 1 [place] by Both Nostrils route as needed. predniSONE (DELTASONE) 20 mg tablet Take 20 mg by mouth two (2) times a day. Qty: 10 Tablet, Refills: 0      Xarelto 20 mg tab tablet Take 20 mg by mouth daily. albuterol (ACCUNEB) 0.63 mg/3 mL nebulizer solution 3 mL by Nebulization route as needed. ProAir HFA 90 mcg/actuation inhaler Take 2 Puffs by inhalation every six (6) hours as needed. Symbicort 160-4.5 mcg/actuation HFAA Take 2 Puffs by inhalation daily. 2.   Follow-up Information       Follow up With Specialties Details Why Contact Info    Vilma Sánchez MD Internal Medicine Physician Schedule an appointment as soon as possible for a visit in 1 week  [address] [address]  [phone]      [address] [place] [address] DEPT Emergency Medicine Go to  If symptoms worsen [address] [facility] [address]  [phone]          3. Return to ED if worse    4.    Current Discharge Medication List START taking these medications    Details   acetaminophen (TYLENOL) 325 mg tablet Take 2 Tablets by mouth four (4) times daily as needed for Pain. Qty: 20 Tablet, Refills: 0  Start date: 11/25/2022      guaiFENesin (ROBITUSSIN) 100 mg/5 mL liquid Take 10 mL by mouth three (3) times daily as needed for Cough. Qty: 200 mL, Refills: 0  Start date: 11/25/2022      oxymetazoline (Afrin, oxymetazoline,) 0.05 % nasal spray 2 Sprays by Both Nostrils route two (2) times a day for 3 days. DO NOT TAKE MORE THAN 3-4 DAYS TO PREVENT BAD RUNNY NOSE AS SIDE EFFECT. Qty: 1 Each, Refills: 0  Start date: 11/25/2022, End date: 11/28/2022      albuterol sulfate 90 mcg/actuation aebs Take 1-2 Puffs by inhalation every four (4) hours as needed for Wheezing or Shortness of Breath. Qty: 1 Each, Refills: 0  Start date: 11/25/2022      oseltamivir (Tamiflu) 75 mg capsule Take 1 Capsule by mouth two (2) times a day for 5 days. Qty: 10 Capsule, Refills: 0  Start date: 11/25/2022, End date: 11/30/2022           CONTINUE these medications which have NOT CHANGED    Details   albuterol (ACCUNEB) 0.63 mg/3 mL nebulizer solution 3 mL by Nebulization route as needed. ProAir HFA 90 mcg/actuation inhaler Take 2 Puffs by inhalation every six (6) hours as needed. Diagnosis     Clinical Impression:   1. Influenza A      Attestations:    Emanuel Hoang MD    Please note that this dictation was completed with LOAG, the [employer] voice recognition software. Quite often unanticipated grammatical, syntax, homophones, and other interpretive errors are inadvertently transcribed by the computer software. Please disregard these errors. Please excuse any errors that have escaped final proofreading. Thank you.

## 2022-11-25 NOTE — DISCHARGE INSTRUCTIONS
Thank you! Thank you for allowing me to care for you in the emergency department. I sincerely hope that you are satisfied with your visit today. It is my goal to provide you with excellent care. Below you will find a list of your labs and imaging from your visit today if applicable. Should you have any questions regarding these results please do not hesitate to call the emergency department. Please review [facility] for a more detailed result list since the below list may not be comprehensive. Instructions on how to sign up to [facility] should be provided in this packet. Labs -     Recent Results (from the past 12 hour(s))   INFLUENZA A & B AG (RAPID TEST)    Collection Time: 11/25/22  6:28 AM   Result Value Ref Range    Influenza A Antigen Positive (A) Negative      Influenza B Antigen Negative Negative     COVID-19 RAPID TEST    Collection Time: 11/25/22  6:28 AM   Result Value Ref Range    COVID-19 rapid test Not Detected Not Detected         Radiologic Studies -   XR CHEST PORT   Final Result      No acute process. CT Results  (Last 48 hours)      None          CXR Results  (Last 48 hours)                 11/25/22 0656  XR CHEST PORT Final result    Impression:      No acute process. Narrative:  EXAM:  XR CHEST PORT       INDICATION: Cough       COMPARISON: 5/10/2022       TECHNIQUE: Portable AP upright chest view at 0639 hours       FINDINGS: The right chest Port-A-Cath is stable. The cardiac silhouette is   within normal limits. The pulmonary vasculature is within normal limits. The lungs and pleural spaces are clear. There is no pneumothorax. The visualized   bones and upper abdomen are age-appropriate. If you feel that you have not received excellent quality care or timely care, please ask to speak to the nurse manager. Please choose us in the future for your continued health care needs. ------------------------------------------------------------------------------------------------------------  The exam and treatment you received in the Emergency Department were for an urgent problem and are not intended as complete care. It is important that you follow-up with a doctor, nurse practitioner, or physician assistant to:  (1) confirm your diagnosis,  (2) re-evaluation of changes in your illness and treatment, and  (3) for ongoing care. If your symptoms become worse or you do not improve as expected and you are unable to reach your usual health care provider, you should return to the Emergency Department. We are available 24 hours a day. Please take your discharge instructions with you when you go to your follow-up appointment. If a prescription has been provided, please have it filled as soon as possible to prevent a delay in treatment. Read the entire medication instruction sheet provided to you by the pharmacy. If you have any questions or reservations about taking the medication due to side effects or interactions with other medications, please call your primary care physician or contact the ER to speak with the charge nurse. Make an appointment with your family doctor or the physician you were referred to for follow-up of this visit as instructed on your discharge paperwork, as this is a mandatory follow-up. Return to the ER if you are unable to be seen or if you are unable to be seen in a timely manner. If you have any problem arranging the follow-up visit, contact the Emergency Department immediately.

## 2022-11-25 NOTE — ED TRIAGE NOTES
Pt states family members in household have had cold recently.      Pt c/o fever, generalized weakness, and cough    Diaphoretic in triage

## 2022-11-27 ENCOUNTER — HOSPITAL ENCOUNTER (INPATIENT)
Age: 32
LOS: 11 days | Discharge: HOME HEALTH | DRG: 721 | End: 2022-12-08
Attending: EMERGENCY MEDICINE | Admitting: INTERNAL MEDICINE
Payer: MEDICAID

## 2022-11-27 ENCOUNTER — APPOINTMENT (OUTPATIENT)
Dept: GENERAL RADIOLOGY | Age: 32
DRG: 721 | End: 2022-11-27
Attending: EMERGENCY MEDICINE
Payer: MEDICAID

## 2022-11-27 DIAGNOSIS — A41.9 SEVERE SEPSIS (HCC): ICD-10-CM

## 2022-11-27 DIAGNOSIS — Z85.72 HISTORY OF NON-HODGKIN'S LYMPHOMA: ICD-10-CM

## 2022-11-27 DIAGNOSIS — B95.62 MRSA BACTEREMIA: ICD-10-CM

## 2022-11-27 DIAGNOSIS — J18.9 MULTIFOCAL PNEUMONIA: Primary | ICD-10-CM

## 2022-11-27 DIAGNOSIS — R78.81 MRSA BACTEREMIA: ICD-10-CM

## 2022-11-27 DIAGNOSIS — R65.20 SEVERE SEPSIS (HCC): ICD-10-CM

## 2022-11-27 PROBLEM — J10.1 INFLUENZA A: Status: ACTIVE | Noted: 2022-11-27

## 2022-11-27 PROBLEM — R06.02 SHORTNESS OF BREATH: Status: ACTIVE | Noted: 2022-11-27

## 2022-11-27 PROBLEM — Z87.09 HISTORY OF ASTHMA: Status: ACTIVE | Noted: 2022-11-27

## 2022-11-27 LAB
ALBUMIN SERPL-MCNC: 2.6 G/DL (ref 3.5–5)
ALBUMIN/GLOB SERPL: 0.7 {RATIO} (ref 1.1–2.2)
ALP SERPL-CCNC: 114 U/L (ref 45–117)
ALT SERPL-CCNC: 60 U/L (ref 12–78)
ANION GAP SERPL CALC-SCNC: 16 MMOL/L (ref 5–15)
APTT PPP: 30.5 SEC (ref 21.2–34.1)
ARTERIAL PATENCY WRIST A: YES
AST SERPL W P-5'-P-CCNC: 72 U/L (ref 15–37)
BASE DEFICIT BLDA-SCNC: 3 MMOL/L
BASE DEFICIT BLDV-SCNC: 5.6 MMOL/L
BASOPHILS # BLD: 0 K/UL (ref 0–0.1)
BASOPHILS NFR BLD: 0 % (ref 0–1)
BDY SITE: ABNORMAL
BDY SITE: ABNORMAL
BILIRUB SERPL-MCNC: 3.4 MG/DL (ref 0.2–1)
BNP SERPL-MCNC: 205 PG/ML
BODY TEMPERATURE: 101.6
BODY TEMPERATURE: 98.6
BUN SERPL-MCNC: 20 MG/DL (ref 6–20)
BUN/CREAT SERPL: 13 (ref 12–20)
CA-I BLD-MCNC: 8.7 MG/DL (ref 8.5–10.1)
CHLORIDE SERPL-SCNC: 93 MMOL/L (ref 97–108)
CO2 SERPL-SCNC: 20 MMOL/L (ref 21–32)
COHGB MFR BLD: 0.3 % (ref 1–2)
CREAT SERPL-MCNC: 1.5 MG/DL (ref 0.7–1.3)
DIFFERENTIAL METHOD BLD: ABNORMAL
EOSINOPHIL # BLD: 0 K/UL (ref 0–0.4)
EOSINOPHIL NFR BLD: 0 % (ref 0–7)
ERYTHROCYTE [DISTWIDTH] IN BLOOD BY AUTOMATED COUNT: 14.3 % (ref 11.5–14.5)
FIO2 ON VENT: 21 %
GAS FLOW.O2 O2 DELIVERY SYS: 2 L/MIN
GLOBULIN SER CALC-MCNC: 3.8 G/DL (ref 2–4)
GLUCOSE SERPL-MCNC: 185 MG/DL (ref 65–100)
HCO3 BLDA-SCNC: 18 MMOL/L (ref 22–26)
HCO3 BLDV-SCNC: 19 MMOL/L (ref 24–25)
HCT VFR BLD AUTO: 38 % (ref 36.6–50.3)
HGB BLD-MCNC: 13 G/DL (ref 12.1–17)
IMM GRANULOCYTES # BLD AUTO: 0 K/UL
IMM GRANULOCYTES NFR BLD AUTO: 0 %
INR PPP: 1.3 (ref 0.9–1.1)
LACTATE SERPL-SCNC: 5 MMOL/L (ref 0.4–2)
LACTATE SERPL-SCNC: 5.2 MMOL/L (ref 0.4–2)
LYMPHOCYTES # BLD: 0.1 K/UL (ref 0.8–3.5)
LYMPHOCYTES NFR BLD: 5 % (ref 12–49)
MAGNESIUM SERPL-MCNC: 2.2 MG/DL (ref 1.6–2.4)
MCH RBC QN AUTO: 29.3 PG (ref 26–34)
MCHC RBC AUTO-ENTMCNC: 34.2 G/DL (ref 30–36.5)
MCV RBC AUTO: 85.6 FL (ref 80–99)
METAMYELOCYTES NFR BLD MANUAL: 17 %
METHGB MFR BLD: 0.4 % (ref 0–1.4)
MONOCYTES # BLD: 0.2 K/UL (ref 0–1)
MONOCYTES NFR BLD: 8 % (ref 5–13)
MRSA DNA SPEC QL NAA+PROBE: DETECTED
MYELOCYTES NFR BLD MANUAL: 7 %
NEUTS BAND NFR BLD MANUAL: 15 % (ref 0–6)
NEUTS SEG # BLD: 1.4 K/UL (ref 1.8–8)
NEUTS SEG NFR BLD: 47 % (ref 32–75)
NRBC # BLD: 0 K/UL (ref 0–0.01)
NRBC BLD-RTO: 0 PER 100 WBC
OTHER CELLS NFR BLD MANUAL: 1 %
OXYHGB MFR BLD: 94.7 % (ref 95–99)
PCO2 BLDA: 22 MMHG (ref 35–45)
PCO2 BLDV: 35.2 MMHG (ref 41–51)
PERFORMED BY, TECHID: ABNORMAL
PERFORMED BY, TECHID: ABNORMAL
PH BLDA: 7.53 [PH] (ref 7.35–7.45)
PH BLDV: 7.35 [PH] (ref 7.32–7.42)
PLATELET # BLD AUTO: 145 K/UL (ref 150–400)
PMV BLD AUTO: 10.1 FL (ref 8.9–12.9)
PO2 BLDA: 73 MMHG (ref 80–100)
PO2 BLDV: 34 MMHG (ref 25–40)
POTASSIUM SERPL-SCNC: 3.8 MMOL/L (ref 3.5–5.1)
PROCALCITONIN SERPL-MCNC: 18.89 NG/ML
PROT SERPL-MCNC: 6.4 G/DL (ref 6.4–8.2)
PROTHROMBIN TIME: 16.2 SEC (ref 11.9–14.6)
RBC # BLD AUTO: 4.44 M/UL (ref 4.1–5.7)
RBC MORPH BLD: ABNORMAL
SAO2 % BLD: 95 % (ref 95–99)
SAO2% DEVICE SAO2% SENSOR NAME: ABNORMAL
SAO2% DEVICE SAO2% SENSOR NAME: ABNORMAL
SODIUM SERPL-SCNC: 129 MMOL/L (ref 136–145)
SPECIMEN SITE: ABNORMAL
SPECIMEN SITE: ABNORMAL
THERAPEUTIC RANGE,PTTT: NORMAL SEC (ref 82–109)
TROPONIN-HIGH SENSITIVITY: 19 NG/L (ref 0–76)
WBC # BLD AUTO: 2.2 K/UL (ref 4.1–11.1)

## 2022-11-27 PROCEDURE — 96365 THER/PROPH/DIAG IV INF INIT: CPT

## 2022-11-27 PROCEDURE — 84484 ASSAY OF TROPONIN QUANT: CPT

## 2022-11-27 PROCEDURE — 74011000258 HC RX REV CODE- 258: Performed by: EMERGENCY MEDICINE

## 2022-11-27 PROCEDURE — 74011000250 HC RX REV CODE- 250: Performed by: EMERGENCY MEDICINE

## 2022-11-27 PROCEDURE — 87641 MR-STAPH DNA AMP PROBE: CPT

## 2022-11-27 PROCEDURE — 87186 SC STD MICRODIL/AGAR DIL: CPT

## 2022-11-27 PROCEDURE — 80053 COMPREHEN METABOLIC PANEL: CPT

## 2022-11-27 PROCEDURE — 65610000006 HC RM INTENSIVE CARE

## 2022-11-27 PROCEDURE — 93005 ELECTROCARDIOGRAM TRACING: CPT

## 2022-11-27 PROCEDURE — 85730 THROMBOPLASTIN TIME PARTIAL: CPT

## 2022-11-27 PROCEDURE — 36415 COLL VENOUS BLD VENIPUNCTURE: CPT

## 2022-11-27 PROCEDURE — 74011250637 HC RX REV CODE- 250/637: Performed by: NURSE PRACTITIONER

## 2022-11-27 PROCEDURE — 74011250636 HC RX REV CODE- 250/636: Performed by: EMERGENCY MEDICINE

## 2022-11-27 PROCEDURE — 87040 BLOOD CULTURE FOR BACTERIA: CPT

## 2022-11-27 PROCEDURE — 96375 TX/PRO/DX INJ NEW DRUG ADDON: CPT

## 2022-11-27 PROCEDURE — 74011250637 HC RX REV CODE- 250/637: Performed by: EMERGENCY MEDICINE

## 2022-11-27 PROCEDURE — 65270000029 HC RM PRIVATE

## 2022-11-27 PROCEDURE — 87077 CULTURE AEROBIC IDENTIFY: CPT

## 2022-11-27 PROCEDURE — 71045 X-RAY EXAM CHEST 1 VIEW: CPT

## 2022-11-27 PROCEDURE — 94640 AIRWAY INHALATION TREATMENT: CPT

## 2022-11-27 PROCEDURE — 87070 CULTURE OTHR SPECIMN AEROBIC: CPT

## 2022-11-27 PROCEDURE — 83605 ASSAY OF LACTIC ACID: CPT

## 2022-11-27 PROCEDURE — 85610 PROTHROMBIN TIME: CPT

## 2022-11-27 PROCEDURE — 99285 EMERGENCY DEPT VISIT HI MDM: CPT

## 2022-11-27 PROCEDURE — 83735 ASSAY OF MAGNESIUM: CPT

## 2022-11-27 PROCEDURE — 74011000250 HC RX REV CODE- 250: Performed by: NURSE PRACTITIONER

## 2022-11-27 PROCEDURE — 36600 WITHDRAWAL OF ARTERIAL BLOOD: CPT

## 2022-11-27 PROCEDURE — 87147 CULTURE TYPE IMMUNOLOGIC: CPT

## 2022-11-27 PROCEDURE — 82803 BLOOD GASES ANY COMBINATION: CPT

## 2022-11-27 PROCEDURE — 83880 ASSAY OF NATRIURETIC PEPTIDE: CPT

## 2022-11-27 PROCEDURE — 74011250636 HC RX REV CODE- 250/636: Performed by: NURSE PRACTITIONER

## 2022-11-27 PROCEDURE — 85025 COMPLETE CBC W/AUTO DIFF WBC: CPT

## 2022-11-27 PROCEDURE — 84145 PROCALCITONIN (PCT): CPT

## 2022-11-27 RX ORDER — SODIUM CHLORIDE 0.9 % (FLUSH) 0.9 %
5-10 SYRINGE (ML) INJECTION AS NEEDED
Status: DISCONTINUED | OUTPATIENT
Start: 2022-11-27 | End: 2022-12-08 | Stop reason: HOSPADM

## 2022-11-27 RX ORDER — MORPHINE SULFATE 2 MG/ML
2 INJECTION, SOLUTION INTRAMUSCULAR; INTRAVENOUS
Status: DISPENSED | OUTPATIENT
Start: 2022-11-27 | End: 2022-11-29

## 2022-11-27 RX ORDER — OSELTAMIVIR PHOSPHATE 75 MG/1
75 CAPSULE ORAL 2 TIMES DAILY
Status: COMPLETED | OUTPATIENT
Start: 2022-11-27 | End: 2022-11-29

## 2022-11-27 RX ORDER — BISACODYL 5 MG
5 TABLET, DELAYED RELEASE (ENTERIC COATED) ORAL DAILY PRN
Status: DISCONTINUED | OUTPATIENT
Start: 2022-11-27 | End: 2022-12-08 | Stop reason: HOSPADM

## 2022-11-27 RX ORDER — SODIUM CHLORIDE 0.9 % (FLUSH) 0.9 %
5-40 SYRINGE (ML) INJECTION EVERY 8 HOURS
Status: DISCONTINUED | OUTPATIENT
Start: 2022-11-27 | End: 2022-11-27

## 2022-11-27 RX ORDER — ACETAMINOPHEN 325 MG/1
650 TABLET ORAL
Status: DISCONTINUED | OUTPATIENT
Start: 2022-11-27 | End: 2022-12-08 | Stop reason: HOSPADM

## 2022-11-27 RX ORDER — POLYETHYLENE GLYCOL 3350 17 G/17G
17 POWDER, FOR SOLUTION ORAL DAILY PRN
Status: DISCONTINUED | OUTPATIENT
Start: 2022-11-27 | End: 2022-12-08 | Stop reason: HOSPADM

## 2022-11-27 RX ORDER — ONDANSETRON 2 MG/ML
4 INJECTION INTRAMUSCULAR; INTRAVENOUS
Status: DISCONTINUED | OUTPATIENT
Start: 2022-11-27 | End: 2022-12-08 | Stop reason: HOSPADM

## 2022-11-27 RX ORDER — FLUTICASONE PROPIONATE 50 MCG
1 SPRAY, SUSPENSION (ML) NASAL AS NEEDED
Status: DISCONTINUED | OUTPATIENT
Start: 2022-11-27 | End: 2022-12-03

## 2022-11-27 RX ORDER — HYDROXYZINE 50 MG/ML
50 INJECTION, SOLUTION INTRAMUSCULAR
Status: DISCONTINUED | OUTPATIENT
Start: 2022-11-27 | End: 2022-12-08 | Stop reason: HOSPADM

## 2022-11-27 RX ORDER — CETIRIZINE HYDROCHLORIDE 10 MG/1
10 TABLET ORAL DAILY
Status: DISCONTINUED | OUTPATIENT
Start: 2022-11-28 | End: 2022-12-08 | Stop reason: HOSPADM

## 2022-11-27 RX ORDER — SODIUM CHLORIDE 0.9 % (FLUSH) 0.9 %
5-40 SYRINGE (ML) INJECTION AS NEEDED
Status: DISCONTINUED | OUTPATIENT
Start: 2022-11-27 | End: 2022-12-08 | Stop reason: HOSPADM

## 2022-11-27 RX ORDER — MONTELUKAST SODIUM 10 MG/1
10 TABLET ORAL DAILY
Status: DISCONTINUED | OUTPATIENT
Start: 2022-11-28 | End: 2022-12-08 | Stop reason: HOSPADM

## 2022-11-27 RX ORDER — MORPHINE SULFATE 2 MG/ML
2 INJECTION, SOLUTION INTRAMUSCULAR; INTRAVENOUS ONCE
Status: COMPLETED | OUTPATIENT
Start: 2022-11-27 | End: 2022-11-27

## 2022-11-27 RX ORDER — ONDANSETRON 4 MG/1
4 TABLET, ORALLY DISINTEGRATING ORAL
Status: DISCONTINUED | OUTPATIENT
Start: 2022-11-27 | End: 2022-12-08 | Stop reason: HOSPADM

## 2022-11-27 RX ORDER — IPRATROPIUM BROMIDE AND ALBUTEROL SULFATE 2.5; .5 MG/3ML; MG/3ML
3 SOLUTION RESPIRATORY (INHALATION)
Status: DISCONTINUED | OUTPATIENT
Start: 2022-11-27 | End: 2022-11-29

## 2022-11-27 RX ORDER — METOPROLOL TARTRATE 5 MG/5ML
5 INJECTION INTRAVENOUS
Status: DISCONTINUED | OUTPATIENT
Start: 2022-11-27 | End: 2022-12-08 | Stop reason: HOSPADM

## 2022-11-27 RX ORDER — ACETAMINOPHEN 650 MG/1
650 SUPPOSITORY RECTAL
Status: DISCONTINUED | OUTPATIENT
Start: 2022-11-27 | End: 2022-12-08 | Stop reason: HOSPADM

## 2022-11-27 RX ORDER — BUDESONIDE AND FORMOTEROL FUMARATE DIHYDRATE 160; 4.5 UG/1; UG/1
2 AEROSOL RESPIRATORY (INHALATION) DAILY
Status: DISCONTINUED | OUTPATIENT
Start: 2022-11-28 | End: 2022-11-28

## 2022-11-27 RX ORDER — LEVOFLOXACIN 5 MG/ML
750 INJECTION, SOLUTION INTRAVENOUS EVERY 24 HOURS
Status: DISCONTINUED | OUTPATIENT
Start: 2022-11-27 | End: 2022-11-28

## 2022-11-27 RX ORDER — ACETAMINOPHEN 500 MG
1000 TABLET ORAL
Status: COMPLETED | OUTPATIENT
Start: 2022-11-27 | End: 2022-11-27

## 2022-11-27 RX ORDER — CODEINE PHOSPHATE AND GUAIFENESIN 10; 100 MG/5ML; MG/5ML
10 SOLUTION ORAL
Status: DISCONTINUED | OUTPATIENT
Start: 2022-11-27 | End: 2022-12-08 | Stop reason: HOSPADM

## 2022-11-27 RX ORDER — IPRATROPIUM BROMIDE AND ALBUTEROL SULFATE 2.5; .5 MG/3ML; MG/3ML
3 SOLUTION RESPIRATORY (INHALATION)
Status: COMPLETED | OUTPATIENT
Start: 2022-11-27 | End: 2022-11-27

## 2022-11-27 RX ORDER — SODIUM CHLORIDE 9 MG/ML
30 INJECTION, SOLUTION INTRAVENOUS CONTINUOUS
Status: DISCONTINUED | OUTPATIENT
Start: 2022-11-27 | End: 2022-11-28

## 2022-11-27 RX ADMIN — METHYLPREDNISOLONE SODIUM SUCCINATE 40 MG: 40 INJECTION, POWDER, FOR SOLUTION INTRAMUSCULAR; INTRAVENOUS at 17:40

## 2022-11-27 RX ADMIN — OSELTAMIVIR PHOSPHATE 75 MG: 75 CAPSULE ORAL at 20:18

## 2022-11-27 RX ADMIN — SODIUM CHLORIDE, PRESERVATIVE FREE 2 G: 5 INJECTION INTRAVENOUS at 16:49

## 2022-11-27 RX ADMIN — ACETAMINOPHEN 650 MG: 325 TABLET ORAL at 19:44

## 2022-11-27 RX ADMIN — LEVOFLOXACIN 750 MG: 5 INJECTION, SOLUTION INTRAVENOUS at 16:52

## 2022-11-27 RX ADMIN — ACETAMINOPHEN 1000 MG: 500 TABLET ORAL at 14:25

## 2022-11-27 RX ADMIN — METOPROLOL TARTRATE 5 MG: 1 INJECTION, SOLUTION INTRAVENOUS at 19:59

## 2022-11-27 RX ADMIN — MORPHINE SULFATE 2 MG: 2 INJECTION, SOLUTION INTRAMUSCULAR; INTRAVENOUS at 16:46

## 2022-11-27 RX ADMIN — VANCOMYCIN HYDROCHLORIDE 2000 MG: 10 INJECTION, POWDER, LYOPHILIZED, FOR SOLUTION INTRAVENOUS at 15:13

## 2022-11-27 RX ADMIN — MORPHINE SULFATE 2 MG: 2 INJECTION, SOLUTION INTRAMUSCULAR; INTRAVENOUS at 19:38

## 2022-11-27 RX ADMIN — DOXYCYCLINE 100 MG: 100 INJECTION, POWDER, LYOPHILIZED, FOR SOLUTION INTRAVENOUS at 14:48

## 2022-11-27 RX ADMIN — IPRATROPIUM BROMIDE AND ALBUTEROL SULFATE 3 ML: .5; 2.5 SOLUTION RESPIRATORY (INHALATION) at 14:51

## 2022-11-27 RX ADMIN — SODIUM CHLORIDE 3129 ML: 9 INJECTION, SOLUTION INTRAVENOUS at 14:25

## 2022-11-27 RX ADMIN — METOPROLOL TARTRATE 5 MG: 1 INJECTION, SOLUTION INTRAVENOUS at 23:46

## 2022-11-27 RX ADMIN — CEFTRIAXONE 2 G: 2 INJECTION, POWDER, FOR SOLUTION INTRAMUSCULAR; INTRAVENOUS at 16:46

## 2022-11-27 RX ADMIN — OSELTAMIVIR PHOSPHATE 75 MG: 75 CAPSULE ORAL at 15:17

## 2022-11-27 RX ADMIN — CEFTRIAXONE 2 G: 2 INJECTION, POWDER, FOR SOLUTION INTRAMUSCULAR; INTRAVENOUS at 14:48

## 2022-11-27 RX ADMIN — IPRATROPIUM BROMIDE AND ALBUTEROL SULFATE 3 ML: .5; 2.5 SOLUTION RESPIRATORY (INHALATION) at 21:14

## 2022-11-27 RX ADMIN — METHYLPREDNISOLONE SODIUM SUCCINATE 40 MG: 40 INJECTION, POWDER, FOR SOLUTION INTRAMUSCULAR; INTRAVENOUS at 21:56

## 2022-11-27 NOTE — H&P
History and Physical    Patient: Bia Cochran MRN: [id number]  SSN: [id number]    YOB: 1990  Age: 34 y.o. Sex: male      Subjective:      Bia Cochran is a 34 y.o. male who has a PMH HTN asthma, non-Hodgkin's lymphoma status postchemotherapy and currently on radiation treatments, DVT/PE. Was seen in the emergency room on 11/25 diagnosed with influenza A, sent home with Tamiflu. He returns to the emergency room with worsening symptoms shortness of breath, fevers, cough, subcostal chest pain with cough, weakness. Significant labs on admission lactic acid 5.2, procalcitonin 18.89, sodium 129, chloride 93, CO2 20, anion gap 16, creatinine 1.50, WBC 2.2. He presents tachypneic RR 30, tachycardic  and hypoxic and febrile at 101.6. ABG: pH 7.53, CO2 22, PO2 73, bicarb 18. Chest x-ray revealing bilateral airspace disease. Admitted for further management severe sepsis, bilateral pneumonia, influenza A, respiratory failure with hypoxia. Consulted ID and pulmonary. Started on fluid resuscitation in the ED. Initiated IV antibiotics vancomycin, cefepime, Levaquin. Will admit to ICU for BiPAP support if required    Past Medical History:   Diagnosis Date    Asthma     DVT (deep venous thrombosis) ([facility] [address]     Hypertension     Lymphoma ([facility] [address]     pt states dx feb 2022    PE (pulmonary thromboembolism) ([facility] [address]     Thromboembolus ([facility] [address]     pt states of right leg feb 2022     Past Surgical History:   Procedure Laterality Date    HX OTHER SURGICAL Right 03/02/2022    PICC line placed 3/2 and removed a week later.      IR FLUORO GUIDE PLC CVAD  3/30/2022    IR INSERT TUNL CVC W PORT OVER 5 YEARS  3/30/2022      Family History   Problem Relation Age of Onset    Diabetes Mother     Hypertension Mother     No Known Problems Father     Myasthenia Gravis Maternal Grandmother     Cancer Maternal Grandfather      Social History     Tobacco Use    Smoking status: Former     Years: 1.00     Types: Cigarettes     Quit date:      Years since quittin.9    Smokeless tobacco: Never   Substance Use Topics    Alcohol use: Not Currently      Prior to Admission medications    Medication Sig Start Date End Date Taking? Authorizing Provider   acetaminophen (TYLENOL) 325 mg tablet Take 2 Tablets by mouth four (4) times daily as needed for Pain. 22  Yes Shyam Shelley MD   guaiFENesin (ROBITUSSIN) 100 mg/5 mL liquid Take 10 mL by mouth three (3) times daily as needed for Cough. 22  Yes Shyam Shelley MD   oxymetazoline (Afrin, oxymetazoline,) 0.05 % nasal spray 2 Sprays by Both Nostrils route two (2) times a day for 3 days. DO NOT TAKE MORE THAN 3-4 DAYS TO PREVENT BAD RUNNY NOSE AS SIDE EFFECT. 22 Yes Shyam Shelley MD   albuterol sulfate 90 mcg/actuation aebs Take 1-2 Puffs by inhalation every four (4) hours as needed for Wheezing or Shortness of Breath. 22  Yes Edwin Beatty MD   oseltamivir (Tamiflu) 75 mg capsule Take 1 Capsule by mouth two (2) times a day for 5 days. 22 Yes Shyam Shelley MD   diphenoxylate-atropine (LomotiL) 2.5-0.025 mg per tablet Take 1 Tablet by mouth four (4) times daily as needed for Diarrhea (Take after each loose stool. ). Max Daily Amount: 4 Tablets. 22  Yes Valentino Haynes MD   ondansetron (ZOFRAN ODT) 4 mg disintegrating tablet Take 1 Tablet by mouth every eight (8) hours as needed for Nausea or Vomiting. 22  Yes Valentino Haynes MD   cetirizine (ZYRTEC) 10 mg tablet Take 1 Tablet by mouth daily. 22  Yes Provider, Historical   montelukast (SINGULAIR) 10 mg tablet Take 1 Tablet by mouth daily. 22  Yes Provider, Historical   fluticasone propionate (FLONASE) 50 mcg/actuation nasal spray 1 [place] by Both Nostrils route as needed. 22  Yes Provider, Historical   predniSONE (DELTASONE) 20 mg tablet Take 20 mg by mouth two (2) times a day.  5/10/22  Yes Samantha Muhammad MD   Xarelto 20 mg tab tablet Take 20 mg by mouth daily. 4/24/22  Yes Other, MD Coy   albuterol (ACCUNEB) 0.63 mg/3 mL nebulizer solution 3 mL by Nebulization route as needed. 12/30/21  Yes Provider, Historical   Symbicort 160-4.5 mcg/actuation HFAA Take 2 Puffs by inhalation daily. 2/8/22  Yes Provider, Historical        No Known Allergies    Review of Systems:  Review of Systems   Constitutional:  Positive for chills, fever and malaise/fatigue. HENT:  Positive for congestion. Respiratory:  Positive for cough, sputum production and shortness of breath. Cardiovascular:  Positive for chest pain. Musculoskeletal:  Positive for myalgias. Neurological:  Positive for weakness. Psychiatric/Behavioral:  The patient is nervous/anxious. Objective:     Recent Results (from the past 24 hour(s))   LACTIC ACID    Collection Time: 11/27/22  2:25 PM   Result Value Ref Range    Lactic acid 5.2 (HH) 0.4 - 2.0 mmol/L   METABOLIC PANEL, COMPREHENSIVE    Collection Time: 11/27/22  2:25 PM   Result Value Ref Range    Sodium 129 (L) 136 - 145 mmol/L    Potassium 3.8 3.5 - 5.1 mmol/L    Chloride 93 (L) 97 - 108 mmol/L    CO2 20 (L) 21 - 32 mmol/L    Anion gap 16 (H) 5 - 15 mmol/L    Glucose 185 (H) 65 - 100 mg/dL    BUN 20 6 - 20 mg/dL    Creatinine 1.50 (H) 0.70 - 1.30 mg/dL    BUN/Creatinine ratio 13 12 - 20      eGFR >60 >60 ml/min/1.73m2    Calcium 8.7 8.5 - 10.1 mg/dL    Bilirubin, total 3.4 (H) 0.2 - 1.0 mg/dL    AST (SGOT) 72 (H) 15 - 37 U/L    ALT (SGPT) 60 12 - 78 U/L    Alk.  phosphatase 114 45 - 117 U/L    Protein, total 6.4 6.4 - 8.2 g/dL    Albumin 2.6 (L) 3.5 - 5.0 g/dL    Globulin 3.8 2.0 - 4.0 g/dL    A-G Ratio 0.7 (L) 1.1 - 2.2     TROPONIN-HIGH SENSITIVITY    Collection Time: 11/27/22  2:25 PM   Result Value Ref Range    Troponin-High Sensitivity 19 0 - 76 ng/L   CBC WITH AUTOMATED DIFF    Collection Time: 11/27/22  2:26 PM   Result Value Ref Range    WBC 2.2 (L) 4.1 - 11.1 K/uL    RBC 4.44 4.10 - 5.70 M/uL    HGB 13.0 12.1 - 17.0 g/dL    HCT 38.0 36.6 - 50.3 %    MCV 85.6 80.0 - 99.0 FL    MCH 29.3 26.0 - 34.0 PG    MCHC 34.2 30.0 - 36.5 g/dL    RDW 14.3 11.5 - 14.5 %    PLATELET 861 (L) 274 - 400 K/uL    MPV 10.1 8.9 - 12.9 FL    NRBC 0.0 0.0  WBC    ABSOLUTE NRBC 0.00 0.00 - 0.01 K/uL    NEUTROPHILS 47 32 - 75 %    BAND NEUTROPHILS 15 (H) 0 - 6 %    LYMPHOCYTES 5 (L) 12 - 49 %    MONOCYTES 8 5 - 13 %    EOSINOPHILS 0 0 - 7 %    BASOPHILS 0 0 - 1 %    METAMYELOCYTES 17 (H) 0 %    MYELOCYTES 7 (H) 0 %    OTHER CELL 1 %    IMMATURE GRANULOCYTES 0 %    ABS. NEUTROPHILS 1.4 (L) 1.8 - 8.0 K/UL    ABS. LYMPHOCYTES 0.1 (L) 0.8 - 3.5 K/UL    ABS. MONOCYTES 0.2 0.0 - 1.0 K/UL    ABS. EOSINOPHILS 0.0 0.0 - 0.4 K/UL    ABS. BASOPHILS 0.0 0.0 - 0.1 K/UL    ABS. IMM.  GRANS. 0.0 K/UL    DF Manual      RBC COMMENTS Spherocytes  1+       PTT    Collection Time: 11/27/22  2:28 PM   Result Value Ref Range    aPTT 30.5 21.2 - 34.1 sec    aPTT, therapeutic range   82 - 109 sec   NT-PRO BNP    Collection Time: 11/27/22  2:28 PM   Result Value Ref Range    NT pro- (H) <125 pg/mL   PROCALCITONIN    Collection Time: 11/27/22  2:28 PM   Result Value Ref Range    Procalcitonin 18.89 (H) 0 ng/mL   MAGNESIUM    Collection Time: 11/27/22  2:28 PM   Result Value Ref Range    Magnesium 2.2 1.6 - 2.4 mg/dL   PROTHROMBIN TIME + INR    Collection Time: 11/27/22  2:28 PM   Result Value Ref Range    Prothrombin time 16.2 (H) 11.9 - 14.6 sec    INR 1.3 (H) 0.9 - 1.1     BLOOD GAS, VENOUS    Collection Time: 11/27/22  2:36 PM   Result Value Ref Range    VENOUS PH 7.354 7.32 - 7.42      VENOUS PCO2 35.2 (L) 41 - 51 mmHg    VENOUS PO2 34 25 - 40 mmHg    VENOUS BICARBONATE 19 (L) 24 - 25 mmol/L    VENOUS BASE DEFICIT 5.6 mmol/L    O2 METHOD Room air      FIO2 21.0 %    Sample source Venous      SITE Left Brachial      Performed by Soco Avila     TEMPERATURE 98.6     MRSA SCREEN - PCR (NASAL)    Collection Time: 11/27/22  3:07 PM   Result Value Ref Range    MRSA by PCR, Nasal DETECTED (A) Not Detected     BLOOD GAS, ARTERIAL    Collection Time: 11/27/22  3:14 PM   Result Value Ref Range    pH 7.53 (H) 7.35 - 7.45      PCO2 22 (L) 35 - 45 mmHg    PO2 73 (L) 80 - 100 mmHg    O2 SATURATION 95 95 - 99 %    BICARBONATE 18 (L) 22 - 26 mmol/L    BASE DEFICIT 3.0 mmol/L    O2 METHOD Nasal Cannula      O2 FLOW RATE 2.00 L/min    Sample source Arterial      SITE Right Radial      DONA'S TEST YES      Carboxy-Hgb 0.3 (L) 1 - 2 %    Methemoglobin 0.4 0 - 1.4 %    Oxyhemoglobin 94.7 (L) 95 - 99 %    Performed by Isabel Servin     TEMPERATURE 101.6     LACTIC ACID    Collection Time: 11/27/22  4:15 PM   Result Value Ref Range    Lactic acid 5.0 (HH) 0.4 - 2.0 mmol/L        XR CHEST PORT   Final Result   1. Diffuse bilateral airspace disease           Vitals:    11/27/22 1651 11/27/22 1701 11/27/22 1711 11/27/22 1731   BP: 135/80 130/75 126/82 136/82   Pulse: (!) 127 (!) 127 (!) 127 (!) 128   Resp: 29 (!) 34 (!) 33 30   Temp:       SpO2: 94% 94% 95% 95%   Weight:       Height:            Physical Exam:  Physical Exam  Constitutional:       Appearance: He is obese. He is ill-appearing. HENT:      Nose: Congestion and rhinorrhea present. Cardiovascular:      Rate and Rhythm: Tachycardia present. Pulmonary:      Effort: Respiratory distress present. Breath sounds: Wheezing present. Abdominal:      General: There is distension. Skin:     Coloration: Skin is pale. Neurological:      Motor: Weakness present.             Assessment/Plan:   Active Problems:    Shortness of breath (11/27/2022)      Pneumonia (11/27/2022)      History of asthma (11/27/2022)      Influenza A (11/27/2022)      Severe sepsis ([facility] [address] (11/27/2022)     Severe sepsis  Bilateral pneumonia  Influenza A  Respiratory failure with hypoxia  Asthma history  -Chest x-ray revealing bilateral airspace disease  -Presents tachypneic, tachycardic with elevated lactic acid  -Consult pulmonary  -Oxygen as needed to maintain saturations greater than 92%, will likely need BiPAP support  -Started on fluid resuscitation in ED  -DuoNebs, continue PTA inhalers  -IV steroids, Tamiflu  -IV cefepime, vancomycin, Levaquin  -ID consulted    Non-Hodgkin's lymphoma  -Status post chemotherapy treatment  -Currently receiving radiation treatment  -Immunosuppressed neutropenic    History of PE and DVT  -Continue Xarelto    Hypertension  -Not currently on antihypertensives    DVT Prophylaxis: On Xarelto  Code Status: Full code  POA/NOK: Wife    This care involved high complexity medical decision making: I personally:  Reviewed the flowsheet and previous days notes  Reviewed and summarized records or history from previous days note or discussions with staff, family  High Risk Drug therapy requiring intensive monitoring for toxicity: eg steroids, pressors, antibiotics  Reviewed and/or ordered Clinical lab tests  Reviewed images and/or ordered Radiology tests  Reviewed the patients ECG / Telemetry  Reviewed need for NiPPV   discussed my assessment/management with : Nursing, Hospitalist and Family for coordination of care   Total Time spent in direct and indirect care including assessment review of labs and coordination of services and consultations: Greater than 75 minutes    This is dictation was done by dragon, computer voice recognition software. Quite often unanticipated grammatical, syntax, homophones and other interpretive errors or inadvertently transcribed by the computer software. Please excuse errors that have escaped final proofreading. Thank you.      Signed By: Dusty Marley NP     November 27, 2022

## 2022-11-27 NOTE — ED TRIAGE NOTES
Arrives with c/o respiratory distress. Per EMS, not moving air in lower lobes prior to duoneb administration. Hx asthma and recent treatment for nonhodgkins lymphoma. Others in the home are flu +.

## 2022-11-27 NOTE — Clinical Note
Status[de-identified] INPATIENT [101]   Type of Bed: Telemetry [19]   Cardiac Monitoring Required?: Yes   Inpatient Hospitalization Certified Necessary for the Following Reasons: 3. Patient receiving treatment that can only be provided in an inpatient setting (further clarification in H&P documentation)   Admitting Diagnosis: Pneumonia [345501]   Admitting Diagnosis: History of asthma [496014]   Admitting Diagnosis: Shortness of breath [786.05. ICD-9-CM]   Admitting Diagnosis: Influenza A [267761]   Admitting Diagnosis: Severe sepsis [facility] [[id number]]   Admitting Physician: Cindy Finley   Attending Physician: Cindy Finley   Estimated Length of Stay: 5-7 Midnights   Discharge Plan[de-identified] Home with Office Follow-up

## 2022-11-27 NOTE — PROGRESS NOTES
TRANSFER - OUT REPORT:    Verbal report given to ICU RN(name) on Evan Lantigua  being transferred to ICU (unit) for routine progression of care       Report consisted of patients Situation, Background, Assessment and   Recommendations(SBAR). Information from the following report(s) SBAR, Kardex, ED Summary, MAR, and Med Rec Status was reviewed with the receiving nurse. Lines:   Peripheral IV 11/27/22 Right Antecubital (Active)   Site Assessment Clean, dry, & intact 11/27/22 1417   Phlebitis Assessment 0 11/27/22 1417   Infiltration Assessment 0 11/27/22 1417   Dressing Type Transparent;Tape 11/27/22 1417   Hub Color/Line Status Pink 11/27/22 1417       Peripheral IV 11/27/22 Left Antecubital (Active)   Site Assessment Clean, dry, & intact 11/27/22 1422   Phlebitis Assessment 0 11/27/22 1422   Infiltration Assessment 0 11/27/22 1422   Dressing Status Clean, dry, & intact 11/27/22 1422   Dressing Type Transparent 11/27/22 1422   Hub Color/Line Status Green;Flushed 11/27/22 1422        Opportunity for questions and clarification was provided.       Patient transported with:   Monitor  Registered Nurse  Tech

## 2022-11-27 NOTE — ED PROVIDER NOTES
EMERGENCY DEPARTMENT HISTORY AND PHYSICAL EXAM      Date: 11/27/2022  Patient Name: Katherin Kemp      History of Presenting Illness     Chief Complaint   Patient presents with    Respiratory Distress       History Provided By: Patient    Location/Duration/Severity/Modifying factors   Patient is a pleasant 16-year-old male with unfortunate history of non-Hodgkin's lymphoma, asthma DVT and PE presenting for fever, shortness of breath and coughing. He has been had about 2 days of symptoms, he was seen in our ER on Friday during the early morning hours, and he was diagnosed with influenza A, given his family history and exposure of recent influenza A. He states that he began getting worse yesterday with more shortness of breath, continued fevers, coughing only subcostal chest pain but only with coughing. Denies any current chest pain. He has been treating himself with Tylenol at home for fevers although has not taken anything today. He started receiving Tamiflu during his ER visit on Friday which was the day of his presentation. States he is not currently getting any active chemotherapy although he was getting radiation to the right groin or hip region, most recently was Monday. States has been several months since he had any chemotherapy looks like previously was on CHOP. Patient states he takes Xarelto for his DVT/PE history, he has not had a PE in \"a long time\", and states he has still been compliant. Respiratory Distress  Associated symptoms include a fever and cough. Pertinent negatives include no headaches, no rhinorrhea, no chest pain, no vomiting, no abdominal pain, no rash and no leg swelling. There are no other complaints, changes, or physical findings at this time.     PCP: Kevon Varghese MD    Current Facility-Administered Medications   Medication Dose Route Frequency Provider Last Rate Last Admin    vancomycin (VANCOCIN) 1500 mg in  ml infusion  1,500 mg IntraVENous Q12H Lisseth Selby NP        [START ON 11/29/2022] Vancomycin - please draw level 11/29 0800. Thanks!    Other Ruby , NP        zinc oxide-white petrolatum 20-75 % topical paste   Topical DAILY Jace Ho MD        sodium chloride (NS) flush 5-10 mL  5-10 mL IntraVENous PRN Lisseth Selby NP        0.9% sodium chloride infusion 3,129 mL  30 mL/kg IntraVENous CONTINUOUS Lisseth Selby NP   3,129 mL at 11/27/22 1425    oseltamivir (TAMIFLU) capsule 75 mg  75 mg Oral BID Lisseth Selby NP   75 mg at 11/28/22 0830    cefepime (MAXIPIME) 2 g in 0.9% sodium chloride 10 mL IV syringe  2 g IntraVENous Q8H Lisseth Selby NP   2 g at 11/28/22 0830    sodium chloride (NS) flush 5-40 mL  5-40 mL IntraVENous PRN Lisseth Selby NP        acetaminophen (TYLENOL) tablet 650 mg  650 mg Oral Q6H PRN Lisseth Selby NP   650 mg at 11/27/22 1944    Or    acetaminophen (TYLENOL) suppository 650 mg  650 mg Rectal Q6H PRN Lisseth Selby NP        polyethylene glycol (MIRALAX) packet 17 g  17 g Oral DAILY PRN Lisseth Selby NP        bisacodyL (DULCOLAX) tablet 5 mg  5 mg Oral DAILY PRN Lisseth Selby NP        ondansetron (ZOFRAN ODT) tablet 4 mg  4 mg Oral Q6H PRN Lisseth Selby NP        Or    ondansetron [facility]) injection 4 mg  4 mg IntraVENous Q6H PRN Lisseth Selby NP        levoFLOXacin (LEVAQUIN) 750 mg in D5W IVPB  750 mg IntraVENous Q24H Lisseth Selby  mL/hr at 11/27/22 1652 750 mg at 11/27/22 1652    albuterol-ipratropium (DUO-NEB) 2.5 MG-0.5 MG/3 ML  3 mL Nebulization Q6H RT Lisseth Selby NP   3 mL at 11/28/22 0816    sodium chloride (NS) flush 5-10 mL  5-10 mL IntraVENous PRN Lisseth Bal, NP        metoprolol (LOPRESSOR) injection 5 mg  5 mg IntraVENous Q3H PRN Lisseth Bal, NP   5 mg at 11/28/22 0643    morphine injection 2 mg  2 mg IntraVENous Q3H PRN Lisseth Bal, NP   2 mg at 11/28/22 0830    methylPREDNISolone (PF) (SOLU-MEDROL) injection 40 mg  40 mg IntraVENous Q8H [facility], NP   40 mg at 22 2270    guaiFENesin-codeine (ROBITUSSIN AC) 100-10 mg/5 mL solution 10 mL  10 mL Oral Q4H PRN [facility], NP   10 mL at 22 0858    Vancomycin - Pharmacy to Dose   Other Rx Dosing/Monitoring [facility], NP        cetirizine (ZYRTEC) tablet 10 mg  10 mg Oral DAILY Felicia , NP   10 mg at 22 0830    fluticasone propionate (FLONASE) 50 mcg/actuation nasal spray 1 Spray  1 Spray Both Nostrils PRN [facility], NP        montelukast (SINGULAIR) tablet 10 mg  10 mg Oral DAILY Felicia , NP   10 mg at 22 0830    budesonide-formoteroL (SYMBICORT) 160-4.5 mcg/actuation HFA inhaler 2 Puff  2 Puff Inhalation DAILY [facility], NP   2 Puff at 22 1025    rivaroxaban (XARELTO) tablet 20 mg  20 mg Oral DAILY , NP   20 mg at 22 0830    hydrOXYzine (VISTARIL) injection 50 mg  50 mg IntraMUSCular Q6H PRN Genevieve Olvera MD           Past History     Past Medical History:  Past Medical History:   Diagnosis Date    Asthma     DVT (deep venous thrombosis) [address] [address]     Hypertension     Lymphoma ([facility] [address]     pt states dx 2022    PE (pulmonary thromboembolism) ([address]     Thromboembolus ([facility] [address]     pt states of right leg 2022       Past Surgical History:  Past Surgical History:   Procedure Laterality Date    HX OTHER SURGICAL Right 2022    PICC line placed 3/2 and removed a week later.      IR FLUORO GUIDE PLC CVAD  3/30/2022    IR INSERT TUNL CVC W PORT OVER 5 YEARS  3/30/2022       Family History:  Family History   Problem Relation Age of Onset    Diabetes Mother     Hypertension Mother     No Known Problems Father     Myasthenia Gravis Maternal Grandmother     Cancer Maternal Grandfather        Social History:  Social History     Tobacco Use    Smoking status: Former     Years: 1.00     Types: Cigarettes     Quit date:      Years since quittin.9    Smokeless tobacco: Never Vaping Use    Vaping Use: Never used   Substance Use Topics    Alcohol use: Not Currently    Drug use: Not Currently     Types: Marijuana     Comment: daily       Allergies:  No Known Allergies      Review of Systems     Review of Systems   Constitutional:  Positive for chills, fatigue and fever. Negative for diaphoresis. HENT:  Negative for congestion and rhinorrhea. Eyes:  Negative for visual disturbance. Respiratory:  Positive for cough and shortness of breath. Cardiovascular:  Positive for palpitations. Negative for chest pain and leg swelling. Gastrointestinal:  Negative for abdominal pain, diarrhea, nausea and vomiting. Genitourinary:  Negative for dysuria, flank pain, frequency, hematuria and urgency. Musculoskeletal:  Negative for myalgias. Skin:  Negative for rash. Neurological:  Negative for headaches. Physical Exam     Physical Exam  Constitutional:       General: He is in acute distress. Appearance: He is obese. He is ill-appearing and toxic-appearing. Comments: Ill-appearing, in mild respiratory distress, very anxious. HENT:      Head: Normocephalic and atraumatic. Right Ear: External ear normal.      Left Ear: External ear normal.      Nose: Congestion and rhinorrhea present. Mouth/Throat:      Mouth: Mucous membranes are dry. Pharynx: Oropharynx is clear. No oropharyngeal exudate or posterior oropharyngeal erythema. Comments: Dry mucous membranes  Eyes:      Conjunctiva/sclera: Conjunctivae normal.      Pupils: Pupils are equal, round, and reactive to light. Cardiovascular:      Rate and Rhythm: Normal rate and regular rhythm. Pulses: Normal pulses. Heart sounds: Normal heart sounds. No murmur heard. No friction rub. Comments: PowerPort to the right upper chest, clean, dry and intact  Pulmonary:      Effort: Respiratory distress present. Breath sounds: Wheezing (Very minimal wheezes) present. No rhonchi or rales. Comments: Diminished air entry at the lung bases, there are scattered coarse breath sounds with occasional wheezes. He is overtly tachypneic in mild respiratory distress  Abdominal:      General: Abdomen is flat. Tenderness: There is no abdominal tenderness. There is no guarding or rebound. Musculoskeletal:         General: No swelling or tenderness. Normal range of motion. Cervical back: Normal range of motion and neck supple. Right lower leg: No edema. Left lower leg: No edema. Skin:     General: Skin is warm and dry. Capillary Refill: Capillary refill takes less than 2 seconds. Findings: No rash. Neurological:      General: No focal deficit present. Mental Status: He is alert. Motor: No weakness. Lab and Diagnostic Study Results     Labs -  Recent Results (from the past 24 hour(s))   EKG, 12 LEAD, INITIAL    Collection Time: 11/27/22  2:17 PM   Result Value Ref Range    Ventricular Rate 167 BPM    Atrial Rate 167 BPM    P-R Interval 124 ms    QRS Duration 64 ms    Q-T Interval 286 ms    QTC Calculation (Bezet) 477 ms    Calculated P Axis 65 degrees    Calculated R Axis 70 degrees    Calculated T Axis 76 degrees    Diagnosis       Sinus tachycardia  Otherwise normal ECG  When compared with ECG of 07-MAY-2022 17:38,  Vent.  rate has increased  BPM  T wave amplitude has decreased in Anterolateral leads  Confirmed by Dilip Bolton ([id number]) on 11/28/2022 10:59:18 AM     LACTIC ACID    Collection Time: 11/27/22  2:25 PM   Result Value Ref Range    Lactic acid 5.2 (HH) 0.4 - 2.0 mmol/L   METABOLIC PANEL, COMPREHENSIVE    Collection Time: 11/27/22  2:25 PM   Result Value Ref Range    Sodium 129 (L) 136 - 145 mmol/L    Potassium 3.8 3.5 - 5.1 mmol/L    Chloride 93 (L) 97 - 108 mmol/L    CO2 20 (L) 21 - 32 mmol/L    Anion gap 16 (H) 5 - 15 mmol/L    Glucose 185 (H) 65 - 100 mg/dL    BUN 20 6 - 20 mg/dL    Creatinine 1.50 (H) 0.70 - 1.30 mg/dL    BUN/Creatinine ratio 13 12 - 20      eGFR >60 >60 ml/min/1.73m2    Calcium 8.7 8.5 - 10.1 mg/dL    Bilirubin, total 3.4 (H) 0.2 - 1.0 mg/dL    AST (SGOT) 72 (H) 15 - 37 U/L    ALT (SGPT) 60 12 - 78 U/L    Alk. phosphatase 114 45 - 117 U/L    Protein, total 6.4 6.4 - 8.2 g/dL    Albumin 2.6 (L) 3.5 - 5.0 g/dL    Globulin 3.8 2.0 - 4.0 g/dL    A-G Ratio 0.7 (L) 1.1 - 2.2     TROPONIN-HIGH SENSITIVITY    Collection Time: 11/27/22  2:25 PM   Result Value Ref Range    Troponin-High Sensitivity 19 0 - 76 ng/L   CBC WITH AUTOMATED DIFF    Collection Time: 11/27/22  2:26 PM   Result Value Ref Range    WBC 2.2 (L) 4.1 - 11.1 K/uL    RBC 4.44 4.10 - 5.70 M/uL    HGB 13.0 12.1 - 17.0 g/dL    HCT 38.0 36.6 - 50.3 %    MCV 85.6 80.0 - 99.0 FL    MCH 29.3 26.0 - 34.0 PG    MCHC 34.2 30.0 - 36.5 g/dL    RDW 14.3 11.5 - 14.5 %    PLATELET 607 (L) 452 - 400 K/uL    MPV 10.1 8.9 - 12.9 FL    NRBC 0.0 0.0  WBC    ABSOLUTE NRBC 0.00 0.00 - 0.01 K/uL    NEUTROPHILS 47 32 - 75 %    BAND NEUTROPHILS 15 (H) 0 - 6 %    LYMPHOCYTES 5 (L) 12 - 49 %    MONOCYTES 8 5 - 13 %    EOSINOPHILS 0 0 - 7 %    BASOPHILS 0 0 - 1 %    METAMYELOCYTES 17 (H) 0 %    MYELOCYTES 7 (H) 0 %    OTHER CELL 1 %    IMMATURE GRANULOCYTES 0 %    ABS. NEUTROPHILS 1.4 (L) 1.8 - 8.0 K/UL    ABS. LYMPHOCYTES 0.1 (L) 0.8 - 3.5 K/UL    ABS. MONOCYTES 0.2 0.0 - 1.0 K/UL    ABS. EOSINOPHILS 0.0 0.0 - 0.4 K/UL    ABS. BASOPHILS 0.0 0.0 - 0.1 K/UL    ABS. IMM.  GRANS. 0.0 K/UL    DF Manual      RBC COMMENTS Spherocytes  1+       PTT    Collection Time: 11/27/22  2:28 PM   Result Value Ref Range    aPTT 30.5 21.2 - 34.1 sec    aPTT, therapeutic range   82 - 109 sec   NT-PRO BNP    Collection Time: 11/27/22  2:28 PM   Result Value Ref Range    NT pro- (H) <125 pg/mL   PROCALCITONIN    Collection Time: 11/27/22  2:28 PM   Result Value Ref Range    Procalcitonin 18.89 (H) 0 ng/mL   MAGNESIUM    Collection Time: 11/27/22  2:28 PM   Result Value Ref Range    Magnesium 2.2 1.6 - 2.4 mg/dL PROTHROMBIN TIME + INR    Collection Time: 11/27/22  2:28 PM   Result Value Ref Range    Prothrombin time 16.2 (H) 11.9 - 14.6 sec    INR 1.3 (H) 0.9 - 1.1     BLOOD GAS, VENOUS    Collection Time: 11/27/22  2:36 PM   Result Value Ref Range    VENOUS PH 7.354 7.32 - 7.42      VENOUS PCO2 35.2 (L) 41 - 51 mmHg    VENOUS PO2 34 25 - 40 mmHg    VENOUS BICARBONATE 19 (L) 24 - 25 mmol/L    VENOUS BASE DEFICIT 5.6 mmol/L    O2 METHOD Room air      FIO2 21.0 %    Sample source Venous      SITE Left Brachial      Performed by Yolanda Grijalva     TEMPERATURE 98.6     MRSA SCREEN - PCR (NASAL)    Collection Time: 11/27/22  3:07 PM   Result Value Ref Range    MRSA by PCR, Nasal DETECTED (A) Not Detected     BLOOD GAS, ARTERIAL    Collection Time: 11/27/22  3:14 PM   Result Value Ref Range    pH 7.53 (H) 7.35 - 7.45      PCO2 22 (L) 35 - 45 mmHg    PO2 73 (L) 80 - 100 mmHg    O2 SATURATION 95 95 - 99 %    BICARBONATE 18 (L) 22 - 26 mmol/L    BASE DEFICIT 3.0 mmol/L    O2 METHOD Nasal Cannula      O2 FLOW RATE 2.00 L/min    Sample source Arterial      SITE Right Radial      DONA'S TEST YES      Carboxy-Hgb 0.3 (L) 1 - 2 %    Methemoglobin 0.4 0 - 1.4 %    Oxyhemoglobin 94.7 (L) 95 - 99 %    Performed by Danette Mark     TEMPERATURE 101.6     LACTIC ACID    Collection Time: 11/27/22  4:15 PM   Result Value Ref Range    Lactic acid 5.0 (HH) 0.4 - 2.0 mmol/L   URINALYSIS W/ REFLEX CULTURE    Collection Time: 11/28/22 12:03 AM    Specimen: Urine   Result Value Ref Range    Color Dark Yellow      Appearance Turbid (A) Clear      Specific gravity 1.029 1.003 - 1.030      pH (UA) 5.0 5.0 - 8.0      Protein 100 (A) Negative mg/dL    Glucose 50 (A) Negative mg/dL    Ketone 20 (A) Negative mg/dL    Bilirubin Small (A) Negative      Blood Small (A) Negative      Urobilinogen 2.0 (H) 0.1 - 1.0 EU/dL    Nitrites Negative Negative      Leukocyte Esterase Negative Negative      UA:UC IF INDICATED Culture not indicated by UA result Culture not indicated by UA result      WBC 0-4 0 - 4 /hpf    RBC 0-5 0 - 5 /hpf    Bacteria Negative Negative /hpf    PRESUMPTIVE YEAST Occasional     LACTIC ACID    Collection Time: 11/28/22  4:16 AM   Result Value Ref Range    Lactic acid 3.4 (HH) 0.4 - 2.0 mmol/L   METABOLIC PANEL, BASIC    Collection Time: 11/28/22  4:16 AM   Result Value Ref Range    Sodium 135 (L) 136 - 145 mmol/L    Potassium 4.3 3.5 - 5.1 mmol/L    Chloride 106 97 - 108 mmol/L    CO2 20 (L) 21 - 32 mmol/L    Anion gap 9 5 - 15 mmol/L    Glucose 163 (H) 65 - 100 mg/dL    BUN 16 6 - 20 mg/dL    Creatinine 1.03 0.70 - 1.30 mg/dL    BUN/Creatinine ratio 16 12 - 20      eGFR >60 >60 ml/min/1.73m2    Calcium 8.8 8.5 - 10.1 mg/dL   CBC W/O DIFF    Collection Time: 11/28/22  4:16 AM   Result Value Ref Range    WBC 2.1 (L) 4.1 - 11.1 K/uL    RBC 3.65 (L) 4.10 - 5.70 M/uL    HGB 10.7 (L) 12.1 - 17.0 g/dL    HCT 31.7 (L) 36.6 - 50.3 %    MCV 86.8 80.0 - 99.0 FL    MCH 29.3 26.0 - 34.0 PG    MCHC 33.8 30.0 - 36.5 g/dL    RDW 14.6 (H) 11.5 - 14.5 %    PLATELET 429 (L) 423 - 400 K/uL    MPV 10.2 8.9 - 12.9 FL    NRBC 0.0 0.0  WBC    ABSOLUTE NRBC 0.00 0.00 - 0.01 K/uL   VANCOMYCIN, RANDOM    Collection Time: 11/28/22  4:16 AM   Result Value Ref Range    Vancomycin, random 3.3 ug/mL         Radiologic Studies -   XR CHEST PORT   Final Result   Worsening bilateral upper and midlung zone airspace disease, may be accentuated   by lower depth of inspiration. XR CHEST PORT   Final Result   1. Diffuse bilateral airspace disease            Medical Decision Making and ED Course   - I am the first and primary provider for this patient AND AM THE PRIMARY PROVIDER OF RECORD. - I reviewed the vital signs, available nursing notes, past medical history, past surgical history, family history and social history. - Initial assessment performed.  The patients presenting problems have been discussed, and the staff are in agreement with the care plan formulated and outlined with them. I have encouraged them to ask questions as they arise throughout their visit. Vital Signs-Reviewed the patient's vital signs. Patient Vitals for the past 12 hrs:   Temp Pulse Resp BP SpO2   11/28/22 1100 100.2 °F (37.9 °C) (!) 119 26 (!) 159/93 100 %   11/28/22 1010 -- (!) 125 (!) 32 (!) 152/92 100 %   11/28/22 0900 -- (!) 123 19 (!) 151/99 100 %   11/28/22 0817 -- -- -- -- 100 %   11/28/22 0800 -- (!) 123 22 (!) 133/90 100 %   11/28/22 0700 100.3 °F (37.9 °C) (!) 121 -- (!) 149/96 100 %   11/28/22 0600 -- (!) 122 -- (!) 147/88 100 %   11/28/22 0525 98.6 °F (37 °C) -- -- -- --   11/28/22 0500 -- (!) 124 21 (!) 146/95 100 %   11/28/22 0400 -- (!) 119 23 138/89 100 %   11/28/22 0300 -- (!) 117 20 (!) 141/86 100 %   11/28/22 0200 -- (!) 118 20 138/76 100 %   11/28/22 0153 -- -- -- -- 100 %   11/28/22 0100 -- (!) 116 24 138/81 100 %   11/28/22 0001 -- -- -- -- 100 %   11/28/22 0000 -- (!) 113 21 (!) 139/96 100 %   11/27/22 2348 99.3 °F (37.4 °C) -- -- -- --       EKG interpretation: See ED course for my interpretation of EKG(s). Records Reviewed: Nursing Notes, Old Medical Records, Previous Radiology Studies, and Previous Laboratory Studies        Provider Notes (Medical Decision Making):     MDM  Number of Diagnoses or Management Options  History of non-Hodgkin's lymphoma  Multifocal pneumonia  Severe sepsis [facility])  Diagnosis management comments: Patient is a 78-year-old male presenting with severe symptoms, shortness of breath, cough, fever with recent influenza A diagnosis. Patient triggers sepsis. We will start with sepsis bundle although at lactic acid is not back yet I will go ahead and order him antibiotics with broad coverage for CAP organisms. His x-ray is grossly abnormal especially as compared to a few days ago. There are new multifocal opacities.     Ddx: Sepsis, severe sepsis, SIRS, septic shock, pneumonia, UTI, ACS, CHF, pulmonary embolism, intra-abdominal process, cellulitis, influenza, COVID, etc.    Patient is not profoundly hypoxic he is very tachycardic, could be due to fever or sepsis. I think PE is less likely in his case given the patient's new x-ray findings and his baseline anticoagulation. We will give him IV fluid resuscitation, he is mildly hypoxic and is on a couple of liters and he looks overtly dry so he would probably benefit from IV fluid resuscitation. Patient given additional 2 L for a full 30 cc/kg of saline resuscitation. Suspect likely severe sepsis on top of influenza infection. Will be admitted to the hospitalist, for admission. ED Course:       ED Course as of 11/28/22 1129   Sun Nov 27, 2022   1447 EKG interpretation sinus tachycardia rate of 167 normal axis, normal intervals with a QTC that is minimally prolonged at 477. No ST elevation or ST depression. Overall sinus tachycardia no acute ischemic changes [AMIE]      ED Course User Index  [AMIE] Martell Caal, DO     ------------------------------------------------------------------------------------------------------------        Consultations:       Consultations: -  Hospitalist Consultant: Dr. Rashad Fitzgerald: We have asked for emergent assistance with regard to this patient. We have discussed the patients HPI, ROS, PE and results this far. They will come and evaluate the patient for admission. Procedures and Critical Care       Performed by: Halina Pool DO    Procedures             CRITICAL CARE NOTE :  2:40 PM  CRITICAL CARE TIME: I have spent 67 minutes of critical care time involved in lab review, consultations with specialist, family decision-making, and documentation. During this entire length of time I was immediately available to the patient. Critical Care:   The reason for providing this level of medical care for this critically ill patient was due a critical illness that impaired one or more vital organ systems such that there was a high probability of imminent or life threatening deterioration in the patients condition. This care involved high complexity decision making to assess, manipulate, and support vital system functions, to treat this vital organ system failure and to prevent further life threatening deterioration of the patients condition. Time is exclusive of separately billable procedures and teaching time. Beauty Sluder, DO      Bejorjey Sluder, DO        Disposition         Diagnosis:   1. Severe sepsis ([address]    2. Multifocal pneumonia    3. History of non-Hodgkin's lymphoma          Disposition: Admitted    Follow-up Information    None         Current Discharge Medication List        CONTINUE these medications which have NOT CHANGED    Details   acetaminophen (TYLENOL) 325 mg tablet Take 2 Tablets by mouth four (4) times daily as needed for Pain. Qty: 20 Tablet, Refills: 0      guaiFENesin (ROBITUSSIN) 100 mg/5 mL liquid Take 10 mL by mouth three (3) times daily as needed for Cough. Qty: 200 mL, Refills: 0      oxymetazoline (Afrin, oxymetazoline,) 0.05 % nasal spray 2 Sprays by Both Nostrils route two (2) times a day for 3 days. DO NOT TAKE MORE THAN 3-4 DAYS TO PREVENT BAD RUNNY NOSE AS SIDE EFFECT. Qty: 1 Each, Refills: 0      albuterol sulfate 90 mcg/actuation aebs Take 1-2 Puffs by inhalation every four (4) hours as needed for Wheezing or Shortness of Breath. Qty: 1 Each, Refills: 0      oseltamivir (Tamiflu) 75 mg capsule Take 1 Capsule by mouth two (2) times a day for 5 days. Qty: 10 Capsule, Refills: 0      diphenoxylate-atropine (LomotiL) 2.5-0.025 mg per tablet Take 1 Tablet by mouth four (4) times daily as needed for Diarrhea (Take after each loose stool. ). Max Daily Amount: 4 Tablets. Qty: 20 Tablet, Refills: 0    Associated Diagnoses: Enterocolitis      ondansetron (ZOFRAN ODT) 4 mg disintegrating tablet Take 1 Tablet by mouth every eight (8) hours as needed for Nausea or Vomiting.   Qty: 20 Tablet, Refills: 0      cetirizine (ZYRTEC) 10 mg tablet Take 1 Tablet by mouth daily. montelukast (SINGULAIR) 10 mg tablet Take 1 Tablet by mouth daily. fluticasone propionate (FLONASE) 50 mcg/actuation nasal spray 1 [place] by Both Nostrils route as needed. predniSONE (DELTASONE) 20 mg tablet Take 20 mg by mouth two (2) times a day. Qty: 10 Tablet, Refills: 0      Xarelto 20 mg tab tablet Take 20 mg by mouth daily. albuterol (ACCUNEB) 0.63 mg/3 mL nebulizer solution 3 mL by Nebulization route as needed. Symbicort 160-4.5 mcg/actuation HFAA Take 2 Puffs by inhalation daily. Diagnosis     Clinical Impression:   1. Severe sepsis ([address]    2. Multifocal pneumonia    3. History of non-Hodgkin's lymphoma        Attestations:    Adore Story, DO    Please note that this dictation was completed with Entrec, the computer voice recognition software. Quite often unanticipated grammatical, syntax, homophones, and other interpretive errors are inadvertently transcribed by the computer software. Please disregard these errors. Please excuse any errors that have escaped final proofreading. Thank you.

## 2022-11-27 NOTE — PROGRESS NOTES
Vancomycin Dosing Consult  Saurabh Choi is a 34 y.o. male with PNA. Pharmacy was consulted by Dr. Tre Garrett to dose and monitor vancomycin. Today is day 1.     Antibiotic regimen: Vancomycin + cefepime + levofloxacin    Temp (24hrs), Av °F (38.3 °C), Min:99.9 °F (37.7 °C), Max:101.6 °F (38.7 °C)    Recent Labs     22  1426 22  1425   WBC 2.2*  --    CREA  --  1.50*   BUN  --  20     Est CrCl: ~79 mL/min  Concomitant nephrotoxic drugs: None    Cultures:    Blood: Pending    MRSA Swab: Pending    Target range: Re-dose for trough <15 mcg/mL    Assessment/Plan:   Febrile, tachycardic, tachypneic, lactic acidosis, PCT elevated  SCr elevated relative to previous admission, will pulse dose for now  Initiate therapy with a loading dose of 2000 mg  Will check a random level tomorrow morning  Antimicrobial stop date TBD

## 2022-11-28 ENCOUNTER — APPOINTMENT (OUTPATIENT)
Dept: GENERAL RADIOLOGY | Age: 32
DRG: 721 | End: 2022-11-28
Attending: INTERNAL MEDICINE
Payer: MEDICAID

## 2022-11-28 LAB
ANION GAP SERPL CALC-SCNC: 9 MMOL/L (ref 5–15)
APPEARANCE UR: ABNORMAL
ATRIAL RATE: 167 BPM
BACTERIA URNS QL MICRO: NEGATIVE /HPF
BILIRUB UR QL: ABNORMAL
BUN SERPL-MCNC: 16 MG/DL (ref 6–20)
BUN/CREAT SERPL: 16 (ref 12–20)
CA-I BLD-MCNC: 8.8 MG/DL (ref 8.5–10.1)
CALCULATED P AXIS, ECG09: 65 DEGREES
CALCULATED R AXIS, ECG10: 70 DEGREES
CALCULATED T AXIS, ECG11: 76 DEGREES
CHLORIDE SERPL-SCNC: 106 MMOL/L (ref 97–108)
CO2 SERPL-SCNC: 20 MMOL/L (ref 21–32)
COLOR UR: ABNORMAL
CREAT SERPL-MCNC: 1.03 MG/DL (ref 0.7–1.3)
DIAGNOSIS, 93000: NORMAL
ERYTHROCYTE [DISTWIDTH] IN BLOOD BY AUTOMATED COUNT: 14.6 % (ref 11.5–14.5)
GLUCOSE SERPL-MCNC: 163 MG/DL (ref 65–100)
GLUCOSE UR STRIP.AUTO-MCNC: 50 MG/DL
HCT VFR BLD AUTO: 31.7 % (ref 36.6–50.3)
HGB BLD-MCNC: 10.7 G/DL (ref 12.1–17)
HGB UR QL STRIP: ABNORMAL
KETONES UR QL STRIP.AUTO: 20 MG/DL
LACTATE SERPL-SCNC: 3.3 MMOL/L (ref 0.4–2)
LACTATE SERPL-SCNC: 3.4 MMOL/L (ref 0.4–2)
LEUKOCYTE ESTERASE UR QL STRIP.AUTO: NEGATIVE
MCH RBC QN AUTO: 29.3 PG (ref 26–34)
MCHC RBC AUTO-ENTMCNC: 33.8 G/DL (ref 30–36.5)
MCV RBC AUTO: 86.8 FL (ref 80–99)
NITRITE UR QL STRIP.AUTO: NEGATIVE
NRBC # BLD: 0 K/UL (ref 0–0.01)
NRBC BLD-RTO: 0 PER 100 WBC
P-R INTERVAL, ECG05: 124 MS
PH UR STRIP: 5 [PH] (ref 5–8)
PLATELET # BLD AUTO: 107 K/UL (ref 150–400)
PMV BLD AUTO: 10.2 FL (ref 8.9–12.9)
POTASSIUM SERPL-SCNC: 4.3 MMOL/L (ref 3.5–5.1)
PROT UR STRIP-MCNC: 100 MG/DL
Q-T INTERVAL, ECG07: 286 MS
QRS DURATION, ECG06: 64 MS
QTC CALCULATION (BEZET), ECG08: 477 MS
RBC # BLD AUTO: 3.65 M/UL (ref 4.1–5.7)
RBC #/AREA URNS HPF: ABNORMAL /HPF (ref 0–5)
SODIUM SERPL-SCNC: 135 MMOL/L (ref 136–145)
SP GR UR REFRACTOMETRY: 1.03 (ref 1–1.03)
UA: UC IF INDICATED,UAUC: ABNORMAL
UROBILINOGEN UR QL STRIP.AUTO: 2 EU/DL (ref 0.1–1)
VANCOMYCIN SERPL-MCNC: 3.3 UG/ML
VENTRICULAR RATE, ECG03: 167 BPM
WBC # BLD AUTO: 2.1 K/UL (ref 4.1–11.1)
WBC URNS QL MICRO: ABNORMAL /HPF (ref 0–4)
YEAST URNS QL MICRO: ABNORMAL

## 2022-11-28 PROCEDURE — 94640 AIRWAY INHALATION TREATMENT: CPT

## 2022-11-28 PROCEDURE — 77010033678 HC OXYGEN DAILY

## 2022-11-28 PROCEDURE — 85027 COMPLETE CBC AUTOMATED: CPT

## 2022-11-28 PROCEDURE — 74011000250 HC RX REV CODE- 250: Performed by: NURSE PRACTITIONER

## 2022-11-28 PROCEDURE — 74011250637 HC RX REV CODE- 250/637: Performed by: INTERNAL MEDICINE

## 2022-11-28 PROCEDURE — 80202 ASSAY OF VANCOMYCIN: CPT

## 2022-11-28 PROCEDURE — 83605 ASSAY OF LACTIC ACID: CPT

## 2022-11-28 PROCEDURE — 87150 DNA/RNA AMPLIFIED PROBE: CPT

## 2022-11-28 PROCEDURE — 74011250637 HC RX REV CODE- 250/637: Performed by: NURSE PRACTITIONER

## 2022-11-28 PROCEDURE — 74011250636 HC RX REV CODE- 250/636: Performed by: NURSE PRACTITIONER

## 2022-11-28 PROCEDURE — 36415 COLL VENOUS BLD VENIPUNCTURE: CPT

## 2022-11-28 PROCEDURE — 74011250636 HC RX REV CODE- 250/636: Performed by: INTERNAL MEDICINE

## 2022-11-28 PROCEDURE — 81001 URINALYSIS AUTO W/SCOPE: CPT

## 2022-11-28 PROCEDURE — 65610000006 HC RM INTENSIVE CARE

## 2022-11-28 PROCEDURE — 99223 1ST HOSP IP/OBS HIGH 75: CPT | Performed by: INTERNAL MEDICINE

## 2022-11-28 PROCEDURE — 80048 BASIC METABOLIC PNL TOTAL CA: CPT

## 2022-11-28 PROCEDURE — 74011000250 HC RX REV CODE- 250: Performed by: INTERNAL MEDICINE

## 2022-11-28 PROCEDURE — 74011000258 HC RX REV CODE- 258: Performed by: INTERNAL MEDICINE

## 2022-11-28 PROCEDURE — 71045 X-RAY EXAM CHEST 1 VIEW: CPT

## 2022-11-28 PROCEDURE — 87086 URINE CULTURE/COLONY COUNT: CPT

## 2022-11-28 RX ORDER — BUDESONIDE 0.5 MG/2ML
500 INHALANT ORAL
Status: DISCONTINUED | OUTPATIENT
Start: 2022-11-28 | End: 2022-11-30

## 2022-11-28 RX ORDER — SODIUM CHLORIDE 9 MG/ML
40 INJECTION, SOLUTION INTRAVENOUS CONTINUOUS
Status: DISCONTINUED | OUTPATIENT
Start: 2022-11-28 | End: 2022-12-07

## 2022-11-28 RX ORDER — MUPIROCIN 20 MG/G
OINTMENT TOPICAL 2 TIMES DAILY
Status: DISPENSED | OUTPATIENT
Start: 2022-11-29 | End: 2022-12-03

## 2022-11-28 RX ORDER — VANCOMYCIN/0.9 % SOD CHLORIDE 1.5G/250ML
1500 PLASTIC BAG, INJECTION (ML) INTRAVENOUS EVERY 12 HOURS
Status: DISCONTINUED | OUTPATIENT
Start: 2022-11-28 | End: 2022-11-29

## 2022-11-28 RX ADMIN — METOPROLOL TARTRATE 5 MG: 1 INJECTION, SOLUTION INTRAVENOUS at 06:43

## 2022-11-28 RX ADMIN — SODIUM CHLORIDE 75 ML/HR: 9 INJECTION, SOLUTION INTRAVENOUS at 15:12

## 2022-11-28 RX ADMIN — IPRATROPIUM BROMIDE AND ALBUTEROL SULFATE 3 ML: .5; 2.5 SOLUTION RESPIRATORY (INHALATION) at 01:52

## 2022-11-28 RX ADMIN — GUAIFENESIN AND CODEINE PHOSPHATE 10 ML: 10; 100 LIQUID ORAL at 08:58

## 2022-11-28 RX ADMIN — SODIUM CHLORIDE, PRESERVATIVE FREE 2 G: 5 INJECTION INTRAVENOUS at 00:20

## 2022-11-28 RX ADMIN — BUDESONIDE 500 MCG: 0.5 INHALANT ORAL at 20:39

## 2022-11-28 RX ADMIN — METHYLPREDNISOLONE SODIUM SUCCINATE 40 MG: 40 INJECTION, POWDER, FOR SOLUTION INTRAMUSCULAR; INTRAVENOUS at 06:21

## 2022-11-28 RX ADMIN — MONTELUKAST 10 MG: 10 TABLET, FILM COATED ORAL at 08:30

## 2022-11-28 RX ADMIN — SODIUM CHLORIDE, PRESERVATIVE FREE 2 G: 5 INJECTION INTRAVENOUS at 08:30

## 2022-11-28 RX ADMIN — MORPHINE SULFATE 2 MG: 2 INJECTION, SOLUTION INTRAMUSCULAR; INTRAVENOUS at 22:55

## 2022-11-28 RX ADMIN — WHITE PETROLATUM,ZINC OXIDE: 20; 75 PASTE TOPICAL at 12:30

## 2022-11-28 RX ADMIN — MUPIROCIN: 20 OINTMENT TOPICAL at 23:58

## 2022-11-28 RX ADMIN — RIVAROXABAN 20 MG: 10 TABLET, FILM COATED ORAL at 08:30

## 2022-11-28 RX ADMIN — MORPHINE SULFATE 2 MG: 2 INJECTION, SOLUTION INTRAMUSCULAR; INTRAVENOUS at 08:30

## 2022-11-28 RX ADMIN — METHYLPREDNISOLONE SODIUM SUCCINATE 40 MG: 40 INJECTION, POWDER, FOR SOLUTION INTRAMUSCULAR; INTRAVENOUS at 13:57

## 2022-11-28 RX ADMIN — PIPERACILLIN AND TAZOBACTAM 4.5 G: 4; .5 INJECTION, POWDER, FOR SOLUTION INTRAVENOUS at 22:47

## 2022-11-28 RX ADMIN — MORPHINE SULFATE 2 MG: 2 INJECTION, SOLUTION INTRAMUSCULAR; INTRAVENOUS at 15:11

## 2022-11-28 RX ADMIN — LEVOFLOXACIN 750 MG: 5 INJECTION, SOLUTION INTRAVENOUS at 15:48

## 2022-11-28 RX ADMIN — ACETAMINOPHEN 650 MG: 325 TABLET ORAL at 15:10

## 2022-11-28 RX ADMIN — BUDESONIDE AND FORMOTEROL FUMARATE DIHYDRATE 2 PUFF: 160; 4.5 AEROSOL RESPIRATORY (INHALATION) at 10:25

## 2022-11-28 RX ADMIN — Medication 1500 MG: at 20:32

## 2022-11-28 RX ADMIN — IPRATROPIUM BROMIDE AND ALBUTEROL SULFATE 3 ML: .5; 2.5 SOLUTION RESPIRATORY (INHALATION) at 20:39

## 2022-11-28 RX ADMIN — METHYLPREDNISOLONE SODIUM SUCCINATE 40 MG: 40 INJECTION, POWDER, FOR SOLUTION INTRAMUSCULAR; INTRAVENOUS at 21:41

## 2022-11-28 RX ADMIN — OSELTAMIVIR PHOSPHATE 75 MG: 75 CAPSULE ORAL at 20:32

## 2022-11-28 RX ADMIN — VANCOMYCIN HYDROCHLORIDE 2000 MG: 10 INJECTION, POWDER, LYOPHILIZED, FOR SOLUTION INTRAVENOUS at 08:58

## 2022-11-28 RX ADMIN — SODIUM CHLORIDE, PRESERVATIVE FREE 10 ML: 5 INJECTION INTRAVENOUS at 13:57

## 2022-11-28 RX ADMIN — SODIUM CHLORIDE, PRESERVATIVE FREE 2 G: 5 INJECTION INTRAVENOUS at 15:42

## 2022-11-28 RX ADMIN — IPRATROPIUM BROMIDE AND ALBUTEROL SULFATE 3 ML: .5; 2.5 SOLUTION RESPIRATORY (INHALATION) at 08:16

## 2022-11-28 RX ADMIN — OSELTAMIVIR PHOSPHATE 75 MG: 75 CAPSULE ORAL at 08:30

## 2022-11-28 RX ADMIN — MORPHINE SULFATE 2 MG: 2 INJECTION, SOLUTION INTRAMUSCULAR; INTRAVENOUS at 00:39

## 2022-11-28 RX ADMIN — CETIRIZINE HYDROCHLORIDE 10 MG: 10 TABLET, FILM COATED ORAL at 08:30

## 2022-11-28 RX ADMIN — IPRATROPIUM BROMIDE AND ALBUTEROL SULFATE 3 ML: .5; 2.5 SOLUTION RESPIRATORY (INHALATION) at 14:24

## 2022-11-28 NOTE — PROGRESS NOTES
Bedside and Verbal shift change report given to Jerome Olivier (oncoming nurse) by Keshawn Welch (offgoing nurse). Report included the following information SBAR, Kardex, ED Summary, Procedure Summary, Intake/Output, MAR, Accordion, Recent Results, and Med Rec Status.

## 2022-11-28 NOTE — CONSULTS
Consult Date: 2022    Consults  Severe sepsis, pneumonia, influenza, neutropenic    Subjective   This is a 34year old male with history of  Asthma and Non-Hodgkin's lymphoma, brought to ED by EMS in respiratory distress with contact with influenza in the home. He had low grade temperatures, tachycardia and tachypnea. He is leukopenic but not neutropenic. Lactic acid elevated and elevated procal.  Urinalysis showed no pyuria or bacteria. CXR showed  worsening bilateral upper and midlung zone disease. Images reviewed by me. Blood cultures, urine culture and sputum culture sent. Patient has been started on Vancomycin, Levaquin and Cefepime. ID has been consulted for this reason. Blood cultures now growing GPC in clusters, both  sets. Patient seen in the ICU. He is awake and responsive. Chief complaint is SOB after finding out that he had influenza. Affirms productive cough with initially brown sputum but  now \"clear\". Also fever and chills. No chest pain. Patient has Portacath with occasional pain. States that he has not had chemotherapy in 6 months. Past Medical History:   Diagnosis Date    Asthma     DVT (deep venous thrombosis) ([address]     Hypertension     Lymphoma ([address]     pt states dx 2022    PE (pulmonary thromboembolism) ([address]     Thromboembolus ([address]     pt states of right leg 2022      Past Surgical History:   Procedure Laterality Date    HX OTHER SURGICAL Right 2022    PICC line placed 3/2 and removed a week later.      IR FLUORO GUIDE PLC CVAD  3/30/2022    IR INSERT TUNL CVC W PORT OVER 5 YEARS  3/30/2022     Family History   Problem Relation Age of Onset    Diabetes Mother     Hypertension Mother     No Known Problems Father     Myasthenia Gravis Maternal Grandmother     Cancer Maternal Grandfather       Social History     Tobacco Use    Smoking status: Former     Years: 1.00     Types: Cigarettes     Quit date:      Years since quittin.9    Smokeless tobacco: Never   Substance Use Topics    Alcohol use: Not Currently       Current Facility-Administered Medications   Medication Dose Route Frequency Provider Last Rate Last Admin    vancomycin (VANCOCIN) 1500 mg in  ml infusion  1,500 mg IntraVENous Q12H Paty Mcnulty NP        [START ON 11/29/2022] Vancomycin - please draw level 11/29 0800. Thanks!    Other Sara Kee NP        zinc oxide-white petrolatum 20-75 % topical paste   Topical DAILY Yoni Torres MD   Given at 11/28/22 1230    budesonide (PULMICORT) 500 mcg/2 ml nebulizer suspension  500 mcg Nebulization BID RT Alton Harris MD        0.9% sodium chloride infusion  75 mL/hr IntraVENous CONTINUOUS Yoni Torres MD 75 mL/hr at 11/28/22 1512 75 mL/hr at 11/28/22 1512    sodium chloride (NS) flush 5-10 mL  5-10 mL IntraVENous PRN Paty Mcnulty, NP        oseltamivir (TAMIFLU) capsule 75 mg  75 mg Oral BID Paty Mcnulty NP   75 mg at 11/28/22 0830    cefepime (MAXIPIME) 2 g in 0.9% sodium chloride 10 mL IV syringe  2 g IntraVENous Q8H Paty Mcnulty, NP   2 g at 11/28/22 1542    sodium chloride (NS) flush 5-40 mL  5-40 mL IntraVENous PRN Paty Wangaming, NP   10 mL at 11/28/22 1357    acetaminophen (TYLENOL) tablet 650 mg  650 mg Oral Q6H PRN Paty Flaming, NP   650 mg at 11/28/22 1510    Or    acetaminophen (TYLENOL) suppository 650 mg  650 mg Rectal Q6H PRN Paty Flaming, NP        polyethylene glycol (MIRALAX) packet 17 g  17 g Oral DAILY PRN Paty Flaming, NP        bisacodyL (DULCOLAX) tablet 5 mg  5 mg Oral DAILY PRN Paty Flaming, NP        ondansetron (ZOFRAN ODT) tablet 4 mg  4 mg Oral Q6H PRN Paty Flaming, NP        Or    ondansetron [facility]) injection 4 mg  4 mg IntraVENous Q6H PRN Paty Flaming, NP        levoFLOXacin (LEVAQUIN) 750 mg in D5W IVPB  750 mg IntraVENous Q24H Paty Flaming,  mL/hr at 11/28/22 1548 750 mg at 11/28/22 1548    albuterol-ipratropium (DUO-NEB) 2.5 MG-0.5 MG/3 ML  3 mL Nebulization Q6H RT Migue Bone, NP   3 mL at 11/28/22 1424    sodium chloride (NS) flush 5-10 mL  5-10 mL IntraVENous PRN Migue Romero, NP        metoprolol (LOPRESSOR) injection 5 mg  5 mg IntraVENous Q3H PRN Migue Bone, NP   5 mg at 11/28/22 0643    morphine injection 2 mg  2 mg IntraVENous Q3H PRN Migue Bone, NP   2 mg at 11/28/22 1511    methylPREDNISolone (PF) (SOLU-MEDROL) injection 40 mg  40 mg IntraVENous Q8H Migue Bone, NP   40 mg at 11/28/22 1357    guaiFENesin-codeine (ROBITUSSIN AC) 100-10 mg/5 mL solution 10 mL  10 mL Oral Q4H PRN Migue Bone, NP   10 mL at 11/28/22 0858    Vancomycin - Pharmacy to Dose   Other Rx Dosing/Monitoring Migue Romero, NP        cetirizine (ZYRTEC) tablet 10 mg  10 mg Oral DAILY Migue Bone, NP   10 mg at 11/28/22 0830    fluticasone propionate (FLONASE) 50 mcg/actuation nasal spray 1 Spray  1 Spray Both Nostrils PRN Migue Bone, NP        montelukast (SINGULAIR) tablet 10 mg  10 mg Oral DAILY Migue Bone, NP   10 mg at 11/28/22 0830    rivaroxaban (XARELTO) tablet 20 mg  20 mg Oral DAILY Migue Bone, NP   20 mg at 11/28/22 0830    hydrOXYzine (VISTARIL) injection 50 mg  50 mg IntraMUSCular Q6H PRN Art Olvera MD            Review of Systems   Constitutional:  Positive for chills and fever. HENT: Negative. Eyes: Negative. Respiratory:  Positive for cough, shortness of breath and wheezing. Negative for chest tightness. Cardiovascular:  Negative for chest pain and leg swelling. Gastrointestinal: Negative. Genitourinary: Negative. Musculoskeletal: Negative. Skin: Negative. Allergic/Immunologic: Positive for immunocompromised state. Neurological: Negative. Hematological: Negative. Psychiatric/Behavioral: Negative.        Objective     Vital signs for last 24 hours:  Visit Vitals  /73   Pulse (!) 106   Temp (!) 102.2 °F (39 °C)   Resp 18   Ht 5' 6\" (1.676 m) Wt 230 lb (104.3 kg)   SpO2 100%   BMI 37.12 kg/m²       Intake/Output this shift:  Current Shift: 11/28 0701 - 11/28 1900  In: 575 [I.V.:575]  Out: 400 [Urine:400]  Last 3 Shifts: 11/26 1901 - 11/28 0700  In: 100 [I.V.:100]  Out: 625 [Urine:625]    Data Review:   Recent Results (from the past 24 hour(s))   CULTURE, RESPIRATORY/SPUTUM/BRONCH W GRAM STAIN    Collection Time: 11/27/22 10:00 PM    Specimen: Sputum   Result Value Ref Range    Special Requests: No Special Requests      GRAM STAIN 3+ WBCs seen      GRAM STAIN 1+ Epithelial cells seen      GRAM STAIN 3+ Gram Positive Cocci      GRAM STAIN 1+ Gram variable rods      Culture result: PENDING    URINALYSIS W/ REFLEX CULTURE    Collection Time: 11/28/22 12:03 AM    Specimen: Urine   Result Value Ref Range    Color Dark Yellow      Appearance Turbid (A) Clear      Specific gravity 1.029 1.003 - 1.030      pH (UA) 5.0 5.0 - 8.0      Protein 100 (A) Negative mg/dL    Glucose 50 (A) Negative mg/dL    Ketone 20 (A) Negative mg/dL    Bilirubin Small (A) Negative      Blood Small (A) Negative      Urobilinogen 2.0 (H) 0.1 - 1.0 EU/dL    Nitrites Negative Negative      Leukocyte Esterase Negative Negative      UA:UC IF INDICATED Culture not indicated by UA result Culture not indicated by UA result      WBC 0-4 0 - 4 /hpf    RBC 0-5 0 - 5 /hpf    Bacteria Negative Negative /hpf    PRESUMPTIVE YEAST Occasional     LACTIC ACID    Collection Time: 11/28/22  4:16 AM   Result Value Ref Range    Lactic acid 3.4 (HH) 0.4 - 2.0 mmol/L   METABOLIC PANEL, BASIC    Collection Time: 11/28/22  4:16 AM   Result Value Ref Range    Sodium 135 (L) 136 - 145 mmol/L    Potassium 4.3 3.5 - 5.1 mmol/L    Chloride 106 97 - 108 mmol/L    CO2 20 (L) 21 - 32 mmol/L    Anion gap 9 5 - 15 mmol/L    Glucose 163 (H) 65 - 100 mg/dL    BUN 16 6 - 20 mg/dL    Creatinine 1.03 0.70 - 1.30 mg/dL    BUN/Creatinine ratio 16 12 - 20      eGFR >60 >60 ml/min/1.73m2    Calcium 8.8 8.5 - 10.1 mg/dL   CBC W/O DIFF    Collection Time: 11/28/22  4:16 AM   Result Value Ref Range    WBC 2.1 (L) 4.1 - 11.1 K/uL    RBC 3.65 (L) 4.10 - 5.70 M/uL    HGB 10.7 (L) 12.1 - 17.0 g/dL    HCT 31.7 (L) 36.6 - 50.3 %    MCV 86.8 80.0 - 99.0 FL    MCH 29.3 26.0 - 34.0 PG    MCHC 33.8 30.0 - 36.5 g/dL    RDW 14.6 (H) 11.5 - 14.5 %    PLATELET 120 (L) 848 - 400 K/uL    MPV 10.2 8.9 - 12.9 FL    NRBC 0.0 0.0  WBC    ABSOLUTE NRBC 0.00 0.00 - 0.01 K/uL   VANCOMYCIN, RANDOM    Collection Time: 11/28/22  4:16 AM   Result Value Ref Range    Vancomycin, random 3.3 ug/mL   LACTIC ACID    Collection Time: 11/28/22  8:47 AM   Result Value Ref Range    Lactic acid 3.3 (HH) 0.4 - 2.0 mmol/L     CXR (11/28)          Physical Exam  Vitals and nursing note reviewed. Constitutional:       General: He is not in acute distress. Appearance: He is ill-appearing. HENT:      Head: Normocephalic and atraumatic. Right Ear: External ear normal.      Left Ear: External ear normal.      Nose: Nose normal.      Comments: High flow nasal cannula     Mouth/Throat:      Pharynx: Oropharynx is clear. Eyes:      Pupils: Pupils are equal, round, and reactive to light. Cardiovascular:      Rate and Rhythm: Regular rhythm. Tachycardia present. Heart sounds: No murmur heard. Comments: Cpesk-K-Bukj right chest, site unremarkable with no erythema, ?mild tenderness  Pulmonary:      Effort: No respiratory distress. Breath sounds: Wheezing and rhonchi present. No rales. Chest:       Abdominal:      General: There is no distension. Palpations: Abdomen is soft. Tenderness: There is no abdominal tenderness. There is no right CVA tenderness, left CVA tenderness or guarding. Genitourinary:     Comments: No Bonilla catheter  Musculoskeletal:      Cervical back: Neck supple. Right lower leg: No edema. Left lower leg: No edema. Skin:     Findings: No rash. Neurological:      General: No focal deficit present.       Mental Status: He is alert and oriented to person, place, and time. Psychiatric:         Mood and Affect: Mood normal.         Behavior: Behavior normal.         Thought Content: Thought content normal.         Judgment: Judgment normal.       ASSESSMENT/PLAN    Severe sepsis with fever, leukopenia, elevated lactic acid and procal  Positive blood cultures with Gram positive cocci in clusters (both sets), possible CLABSI  Influenza A  Pneumonia, bilateral, possibly post-viral, sputum culture pending  Acute hypoxic respiratory failure, on HFNC  Asthma  Non-Hodgkins' Lymphoma  8. MRSA nasal colonization    Comment:  Influenza pneumonia usually accompanied by non-productive cough, therefore, concerned about post-viral pneumonia. 1.  Continue IV Vancomycin GPC in clusters in blood  2. Discontinue Levaquin since atypical pneumonia unlikely  3. Discontinue Cefepime because it lacks anaerobic coverage  4. Start IV Zosyn  5. Follow-up blood cultures, urine and sputum culture  6. Start Mupirocin nasal ointment  7. In am, repeat CBC, procal and lactic acid, check CRP  8. Continue Tamiflu  9. Droplet precautions    Herve Nuñez MD

## 2022-11-28 NOTE — PROGRESS NOTES
Hospitalist Progress Note         Soraida Buchanan MD          Daily Progress Note: 11/28/2022      Subjective: The patient is seen for follow  up. 35 yo male, hx of HTN, asthma, non-Hodgkin's lymphoma post chemotherapy, now on radiation, DVT, PE, here for shortness of breath. Pt was seen on 11/25 for similar, diagnosed with Flu A and sent home on Tamiflu. Workup thus far significant for CXR with evidence of bilateral pneumonia, labs suggestive of sepsis. Pt started on Vancomycin, Cefepime, Levaquin. --  The patient is seen for follow up. States that he feels far better today than he did yesterday with less shortness of breath. Currently on high-flow O2 with FiO2 of 80%, 50 L/min. Lactic acid level down to 3.4.     Problem List:  Problem List as of 11/28/2022 Date Reviewed: 8/15/2022            Codes Class Noted - Resolved    Shortness of breath ICD-10-CM: R06.02  ICD-9-CM: 786.05  11/27/2022 - Present        Pneumonia ICD-10-CM: J18.9  ICD-9-CM: 486  11/27/2022 - Present        History of asthma ICD-10-CM: Z87.09  ICD-9-CM: V12.69  11/27/2022 - Present        Influenza A ICD-10-CM: J10.1  ICD-9-CM: 487.1  11/27/2022 - Present        Severe sepsis ([facility] ICD-10-CM: A41.9, R65.20  ICD-9-CM: 038.9, 995.92  11/27/2022 - Present        Asthma ICD-10-CM: J45.909  ICD-9-CM: 493.90  5/7/2022 - Present        Acute respiratory failure with hypoxia ([facility] ICD-10-CM: J96.01  ICD-9-CM: 518.81  5/7/2022 - Present        Lymphoma ([facility] ICD-10-CM: C85.90  ICD-9-CM: 202.80  3/1/2022 - Present        Pelvic mass ICD-10-CM: R19.00  ICD-9-CM: 789.30  2/23/2022 - Present        Bilateral pulmonary embolism ([facility]) ICD-10-CM: I26.99  ICD-9-CM: 415.19  2/23/2022 - Present        Pulmonary embolism ([address] ICD-10-CM: I26.99  ICD-9-CM: 415.19  2/22/2022 - Present        Right inguinal pain ICD-10-CM: R10.31  ICD-9-CM: 789.03  2/17/2022 - Present        Right groin mass ICD-10-CM: R19.09  ICD-9-CM: 789.39 2/11/2022 - Present        Fall ICD-10-CM: W19. Shawna Garcia  ICD-9-CM: E888.9  2/11/2022 - Present           Medications reviewed  Current Facility-Administered Medications   Medication Dose Route Frequency    vancomycin (VANCOCIN) 1500 mg in  ml infusion  1,500 mg IntraVENous Q12H    [START ON 11/29/2022] Vancomycin - please draw level 11/29 0800. Thanks!    Other ONCE    zinc oxide-white petrolatum 20-75 % topical paste   Topical DAILY    budesonide (PULMICORT) 500 mcg/2 ml nebulizer suspension  500 mcg Nebulization BID RT    sodium chloride (NS) flush 5-10 mL  5-10 mL IntraVENous PRN    0.9% sodium chloride infusion 3,129 mL  30 mL/kg IntraVENous CONTINUOUS    oseltamivir (TAMIFLU) capsule 75 mg  75 mg Oral BID    cefepime (MAXIPIME) 2 g in 0.9% sodium chloride 10 mL IV syringe  2 g IntraVENous Q8H    sodium chloride (NS) flush 5-40 mL  5-40 mL IntraVENous PRN    acetaminophen (TYLENOL) tablet 650 mg  650 mg Oral Q6H PRN    Or    acetaminophen (TYLENOL) suppository 650 mg  650 mg Rectal Q6H PRN    polyethylene glycol (MIRALAX) packet 17 g  17 g Oral DAILY PRN    bisacodyL (DULCOLAX) tablet 5 mg  5 mg Oral DAILY PRN    ondansetron (ZOFRAN ODT) tablet 4 mg  4 mg Oral Q6H PRN    Or    ondansetron (ZOFRAN) injection 4 mg  4 mg IntraVENous Q6H PRN    levoFLOXacin (LEVAQUIN) 750 mg in D5W IVPB  750 mg IntraVENous Q24H    albuterol-ipratropium (DUO-NEB) 2.5 MG-0.5 MG/3 ML  3 mL Nebulization Q6H RT    sodium chloride (NS) flush 5-10 mL  5-10 mL IntraVENous PRN    metoprolol (LOPRESSOR) injection 5 mg  5 mg IntraVENous Q3H PRN    morphine injection 2 mg  2 mg IntraVENous Q3H PRN    methylPREDNISolone (PF) (SOLU-MEDROL) injection 40 mg  40 mg IntraVENous Q8H    guaiFENesin-codeine (ROBITUSSIN AC) 100-10 mg/5 mL solution 10 mL  10 mL Oral Q4H PRN    Vancomycin - Pharmacy to Dose   Other Rx Dosing/Monitoring    cetirizine (ZYRTEC) tablet 10 mg  10 mg Oral DAILY    fluticasone propionate (FLONASE) 50 mcg/actuation nasal spray 1 Spray  1 Spray Both Nostrils PRN    montelukast (SINGULAIR) tablet 10 mg  10 mg Oral DAILY    rivaroxaban (XARELTO) tablet 20 mg  20 mg Oral DAILY    hydrOXYzine (VISTARIL) injection 50 mg  50 mg IntraMUSCular Q6H PRN       Review of Systems:   A comprehensive review of systems was negative except for that written in the HPI. Objective:   Physical Exam:     Visit Vitals  BP (!) 159/93 (BP 1 Location: Right arm, BP Patient Position: At rest)   Pulse (!) 119   Temp 100.2 °F (37.9 °C)   Resp 26   Ht 5' 6\" (1.676 m)   Wt 104.3 kg (230 lb)   SpO2 100%   BMI 37.12 kg/m²    O2 Flow Rate (L/min): 50 l/min O2 Device: Hi flow nasal cannula    Temp (24hrs), Av.2 °F (37.9 °C), Min:98.6 °F (37 °C), Max:102.6 °F (39.2 °C)    701 -  1900  In: 500 [I.V.:500]  Out: 250 [Urine:250]   1901 -  0700  In: 100 [I.V.:100]  Out: 625 [Urine:625]    General:  Alert, cooperative, no distress, appears stated age. Lungs:   Clear to auscultation bilaterally. Chest wall:  No tenderness or deformity. Heart:  Regular rate and rhythm, S1, S2 normal, no murmur, click, rub or gallop. Abdomen:   Soft, non-tender. Bowel sounds normal. No masses,  No organomegaly. Extremities: Extremities normal, atraumatic, no cyanosis or edema. Pulses: 2+ and symmetric all extremities. Skin: Skin color, texture, turgor normal. No rashes or lesions   Neurologic: CNII-XII intact.  No gross sensory or motor deficits     Data Review:       Recent Days:  Recent Labs     22  04122  1426   WBC 2.1* 2.2*   HGB 10.7* 13.0   HCT 31.7* 38.0   * 145*     Recent Labs     22  0416 22  1428 22  1425   *  --  129*   K 4.3  --  3.8     --  93*   CO2 20*  --  20*   *  --  185*   BUN 16  --  20   CREA 1.03  --  1.50*   CA 8.8  --  8.7   MG  --  2.2  --    ALB  --   --  2.6*   TBILI  --   --  3.4*   ALT  --   --  60   INR  --  1.3*  --      Recent Labs     22  1514 22  5104 PH 7.53*  --    PCO2 22*  --    PO2 73*  --    HCO3 18*  --    FIO2  --  21.0       24 Hour Results:  Recent Results (from the past 24 hour(s))   EKG, 12 LEAD, INITIAL    Collection Time: 11/27/22  2:17 PM   Result Value Ref Range    Ventricular Rate 167 BPM    Atrial Rate 167 BPM    P-R Interval 124 ms    QRS Duration 64 ms    Q-T Interval 286 ms    QTC Calculation (Bezet) 477 ms    Calculated P Axis 65 degrees    Calculated R Axis 70 degrees    Calculated T Axis 76 degrees    Diagnosis       Sinus tachycardia  Otherwise normal ECG  When compared with ECG of 07-MAY-2022 17:38,  Vent. rate has increased  BPM  T wave amplitude has decreased in Anterolateral leads  Confirmed by Federico Martin ([id number]) on 11/28/2022 10:59:18 AM     LACTIC ACID    Collection Time: 11/27/22  2:25 PM   Result Value Ref Range    Lactic acid 5.2 (HH) 0.4 - 2.0 mmol/L   METABOLIC PANEL, COMPREHENSIVE    Collection Time: 11/27/22  2:25 PM   Result Value Ref Range    Sodium 129 (L) 136 - 145 mmol/L    Potassium 3.8 3.5 - 5.1 mmol/L    Chloride 93 (L) 97 - 108 mmol/L    CO2 20 (L) 21 - 32 mmol/L    Anion gap 16 (H) 5 - 15 mmol/L    Glucose 185 (H) 65 - 100 mg/dL    BUN 20 6 - 20 mg/dL    Creatinine 1.50 (H) 0.70 - 1.30 mg/dL    BUN/Creatinine ratio 13 12 - 20      eGFR >60 >60 ml/min/1.73m2    Calcium 8.7 8.5 - 10.1 mg/dL    Bilirubin, total 3.4 (H) 0.2 - 1.0 mg/dL    AST (SGOT) 72 (H) 15 - 37 U/L    ALT (SGPT) 60 12 - 78 U/L    Alk.  phosphatase 114 45 - 117 U/L    Protein, total 6.4 6.4 - 8.2 g/dL    Albumin 2.6 (L) 3.5 - 5.0 g/dL    Globulin 3.8 2.0 - 4.0 g/dL    A-G Ratio 0.7 (L) 1.1 - 2.2     TROPONIN-HIGH SENSITIVITY    Collection Time: 11/27/22  2:25 PM   Result Value Ref Range    Troponin-High Sensitivity 19 0 - 76 ng/L   CBC WITH AUTOMATED DIFF    Collection Time: 11/27/22  2:26 PM   Result Value Ref Range    WBC 2.2 (L) 4.1 - 11.1 K/uL    RBC 4.44 4.10 - 5.70 M/uL    HGB 13.0 12.1 - 17.0 g/dL    HCT 38.0 36.6 - 50.3 %    MCV 85.6 80.0 - 99.0 FL    MCH 29.3 26.0 - 34.0 PG    MCHC 34.2 30.0 - 36.5 g/dL    RDW 14.3 11.5 - 14.5 %    PLATELET 130 (L) 071 - 400 K/uL    MPV 10.1 8.9 - 12.9 FL    NRBC 0.0 0.0  WBC    ABSOLUTE NRBC 0.00 0.00 - 0.01 K/uL    NEUTROPHILS 47 32 - 75 %    BAND NEUTROPHILS 15 (H) 0 - 6 %    LYMPHOCYTES 5 (L) 12 - 49 %    MONOCYTES 8 5 - 13 %    EOSINOPHILS 0 0 - 7 %    BASOPHILS 0 0 - 1 %    METAMYELOCYTES 17 (H) 0 %    MYELOCYTES 7 (H) 0 %    OTHER CELL 1 %    IMMATURE GRANULOCYTES 0 %    ABS. NEUTROPHILS 1.4 (L) 1.8 - 8.0 K/UL    ABS. LYMPHOCYTES 0.1 (L) 0.8 - 3.5 K/UL    ABS. MONOCYTES 0.2 0.0 - 1.0 K/UL    ABS. EOSINOPHILS 0.0 0.0 - 0.4 K/UL    ABS. BASOPHILS 0.0 0.0 - 0.1 K/UL    ABS. IMM.  GRANS. 0.0 K/UL    DF Manual      RBC COMMENTS Spherocytes  1+       PTT    Collection Time: 11/27/22  2:28 PM   Result Value Ref Range    aPTT 30.5 21.2 - 34.1 sec    aPTT, therapeutic range   82 - 109 sec   NT-PRO BNP    Collection Time: 11/27/22  2:28 PM   Result Value Ref Range    NT pro- (H) <125 pg/mL   PROCALCITONIN    Collection Time: 11/27/22  2:28 PM   Result Value Ref Range    Procalcitonin 18.89 (H) 0 ng/mL   MAGNESIUM    Collection Time: 11/27/22  2:28 PM   Result Value Ref Range    Magnesium 2.2 1.6 - 2.4 mg/dL   PROTHROMBIN TIME + INR    Collection Time: 11/27/22  2:28 PM   Result Value Ref Range    Prothrombin time 16.2 (H) 11.9 - 14.6 sec    INR 1.3 (H) 0.9 - 1.1     BLOOD GAS, VENOUS    Collection Time: 11/27/22  2:36 PM   Result Value Ref Range    VENOUS PH 7.354 7.32 - 7.42      VENOUS PCO2 35.2 (L) 41 - 51 mmHg    VENOUS PO2 34 25 - 40 mmHg    VENOUS BICARBONATE 19 (L) 24 - 25 mmol/L    VENOUS BASE DEFICIT 5.6 mmol/L    O2 METHOD Room air      FIO2 21.0 %    Sample source Venous      SITE Left Brachial      Performed by Rick Massey     TEMPERATURE 98.6     MRSA SCREEN - PCR (NASAL)    Collection Time: 11/27/22  3:07 PM   Result Value Ref Range    MRSA by PCR, Nasal DETECTED (A) Not Detected BLOOD GAS, ARTERIAL    Collection Time: 11/27/22  3:14 PM   Result Value Ref Range    pH 7.53 (H) 7.35 - 7.45      PCO2 22 (L) 35 - 45 mmHg    PO2 73 (L) 80 - 100 mmHg    O2 SATURATION 95 95 - 99 %    BICARBONATE 18 (L) 22 - 26 mmol/L    BASE DEFICIT 3.0 mmol/L    O2 METHOD Nasal Cannula      O2 FLOW RATE 2.00 L/min    Sample source Arterial      SITE Right Radial      DONA'S TEST YES      Carboxy-Hgb 0.3 (L) 1 - 2 %    Methemoglobin 0.4 0 - 1.4 %    Oxyhemoglobin 94.7 (L) 95 - 99 %    Performed by Miller Gaster     TEMPERATURE 101.6     LACTIC ACID    Collection Time: 11/27/22  4:15 PM   Result Value Ref Range    Lactic acid 5.0 (HH) 0.4 - 2.0 mmol/L   URINALYSIS W/ REFLEX CULTURE    Collection Time: 11/28/22 12:03 AM    Specimen: Urine   Result Value Ref Range    Color Dark Yellow      Appearance Turbid (A) Clear      Specific gravity 1.029 1.003 - 1.030      pH (UA) 5.0 5.0 - 8.0      Protein 100 (A) Negative mg/dL    Glucose 50 (A) Negative mg/dL    Ketone 20 (A) Negative mg/dL    Bilirubin Small (A) Negative      Blood Small (A) Negative      Urobilinogen 2.0 (H) 0.1 - 1.0 EU/dL    Nitrites Negative Negative      Leukocyte Esterase Negative Negative      UA:UC IF INDICATED Culture not indicated by UA result Culture not indicated by UA result      WBC 0-4 0 - 4 /hpf    RBC 0-5 0 - 5 /hpf    Bacteria Negative Negative /hpf    PRESUMPTIVE YEAST Occasional     LACTIC ACID    Collection Time: 11/28/22  4:16 AM   Result Value Ref Range    Lactic acid 3.4 (HH) 0.4 - 2.0 mmol/L   METABOLIC PANEL, BASIC    Collection Time: 11/28/22  4:16 AM   Result Value Ref Range    Sodium 135 (L) 136 - 145 mmol/L    Potassium 4.3 3.5 - 5.1 mmol/L    Chloride 106 97 - 108 mmol/L    CO2 20 (L) 21 - 32 mmol/L    Anion gap 9 5 - 15 mmol/L    Glucose 163 (H) 65 - 100 mg/dL    BUN 16 6 - 20 mg/dL    Creatinine 1.03 0.70 - 1.30 mg/dL    BUN/Creatinine ratio 16 12 - 20      eGFR >60 >60 ml/min/1.73m2    Calcium 8.8 8.5 - 10.1 mg/dL CBC W/O DIFF    Collection Time: 11/28/22  4:16 AM   Result Value Ref Range    WBC 2.1 (L) 4.1 - 11.1 K/uL    RBC 3.65 (L) 4.10 - 5.70 M/uL    HGB 10.7 (L) 12.1 - 17.0 g/dL    HCT 31.7 (L) 36.6 - 50.3 %    MCV 86.8 80.0 - 99.0 FL    MCH 29.3 26.0 - 34.0 PG    MCHC 33.8 30.0 - 36.5 g/dL    RDW 14.6 (H) 11.5 - 14.5 %    PLATELET 114 (L) 831 - 400 K/uL    MPV 10.2 8.9 - 12.9 FL    NRBC 0.0 0.0  WBC    ABSOLUTE NRBC 0.00 0.00 - 0.01 K/uL   VANCOMYCIN, RANDOM    Collection Time: 11/28/22  4:16 AM   Result Value Ref Range    Vancomycin, random 3.3 ug/mL           Assessment/     Acute hypoxic respiratory failure    -Secondary to bilateral pneumonia    -Continue cefepime, vancomycin and Levaquin    -Wean off high flow oxygen as tolerated    Influenza a    -Continue on Tamiflu through November 29    History of non-Hodgkin's lymphoma    -Completed chemotherapy    -Immunocompromise status    Prior history of DVT and PE    -On Xarelto    Benign essential hypertension   -Fairly stable        Plan:  Continue supportive care  Treatment plan as outlined above  Care plan discussed with patient and mother at bedside  CODE STATUS addressed. He is full code  Continue present ICU care      Care Plan discussed with: Nurse    Total time spent with patient: 30 minutes.     Brandee Marsh MD

## 2022-11-28 NOTE — PROGRESS NOTES
IMPRESSION:   Acute hypoxic respiratory failure currently on nasal high flow have decreased to 65%  Exacerbation of asthma  Influenza A  Bilateral upper lobe pneumonia and ID acquired possible MRSA  Nasal MRSA smear positive  Severe sepsis  History of DVT/PE on Xarelto  hx of non-Hodgkin's lymphoma status postchemotherapy on radiation treatment  Gastroesophageal reflux disease        RECOMMENDATIONS/PLAN:   ICU monitoring  76-year-old male came in because of shortness of breath and dyspnea and critical condition nonrebreather mask 100% FiO2  Currently on nasal high flow 45 L 75% having decreased to 65%  Continue nebulized albuterol Atrovent we will switch Symbicort to budesonide and continue IV Solu-Medrol  Currently on Maxipime Levaquin and vancomycin  On Tamiflu to complete 11/29       [x] High complexity decision making was performed  [x] See my orders for details  HPI  76-year-old male came in because of shortness of breath and dyspnea, he has significant past medical history of asthma non-Hodgkin's lymphoma status postchemotherapy currently on radiation treatment also had a history of DVT and pulmonary embolism he was positive with influenza A and he was sent home with Tamiflu but his condition got worse came to the emergency room he is very short of breath 100% FiO2 nonrebreather mask electrolyte imbalance having chest discomfort diaphoretic he has not smoked in the past 4 days he smokes marijuana tachypneic tachycardic so admitted and critical care consult was called. PMH:  has a past medical history of Asthma, DVT (deep venous thrombosis) ([address] Hypertension, Lymphoma ([address] PE (pulmonary thromboembolism) ([address] and Thromboembolus ([address] PSH:   has a past surgical history that includes hx other surgical (Right, 03/02/2022); ir fluoro guide plc cvad (3/30/2022); and ir insert tunl cvc w port over 5 years (3/30/2022).      FHX: family history includes Cancer in his maternal grandfather; Diabetes in his mother; Hypertension in his mother; Myasthenia Gravis in his maternal grandmother; No Known Problems in his father. SHX:  reports that he quit smoking about 7 years ago. His smoking use included cigarettes. He has never used smokeless tobacco. He reports that he does not currently use alcohol. He reports that he does not currently use drugs after having used the following drugs: Marijuana. ALL: No Known Allergies     MEDS:   [x] Reviewed - As Below   [] Not reviewed    Current Facility-Administered Medications   Medication    vancomycin (VANCOCIN) 1500 mg in  ml infusion    [START ON 11/29/2022] Vancomycin - please draw level 11/29 0800.   Thanks!    zinc oxide-white petrolatum 20-75 % topical paste    sodium chloride (NS) flush 5-10 mL    0.9% sodium chloride infusion 3,129 mL    oseltamivir (TAMIFLU) capsule 75 mg    cefepime (MAXIPIME) 2 g in 0.9% sodium chloride 10 mL IV syringe    sodium chloride (NS) flush 5-40 mL    acetaminophen (TYLENOL) tablet 650 mg    Or    acetaminophen (TYLENOL) suppository 650 mg    polyethylene glycol (MIRALAX) packet 17 g    bisacodyL (DULCOLAX) tablet 5 mg    ondansetron (ZOFRAN ODT) tablet 4 mg    Or    ondansetron (ZOFRAN) injection 4 mg    levoFLOXacin (LEVAQUIN) 750 mg in D5W IVPB    albuterol-ipratropium (DUO-NEB) 2.5 MG-0.5 MG/3 ML    sodium chloride (NS) flush 5-10 mL    metoprolol (LOPRESSOR) injection 5 mg    morphine injection 2 mg    methylPREDNISolone (PF) (SOLU-MEDROL) injection 40 mg    guaiFENesin-codeine (ROBITUSSIN AC) 100-10 mg/5 mL solution 10 mL    Vancomycin - Pharmacy to Dose    cetirizine (ZYRTEC) tablet 10 mg    fluticasone propionate (FLONASE) 50 mcg/actuation nasal spray 1 [place]    montelukast (SINGULAIR) tablet 10 mg    budesonide-formoteroL (SYMBICORT) 160-4.5 mcg/actuation HFA inhaler 2 Puff    rivaroxaban (XARELTO) tablet 20 mg    hydrOXYzine (VISTARIL) injection 50 mg      MAR reviewed and pertinent medications noted or modified as needed Current Facility-Administered Medications   Medication    vancomycin (VANCOCIN) 1500 mg in  ml infusion    [START ON 11/29/2022] Vancomycin - please draw level 11/29 0800. Thanks!    zinc oxide-white petrolatum 20-75 % topical paste    sodium chloride (NS) flush 5-10 mL    0.9% sodium chloride infusion 3,129 mL    oseltamivir (TAMIFLU) capsule 75 mg    cefepime (MAXIPIME) 2 g in 0.9% sodium chloride 10 mL IV syringe    sodium chloride (NS) flush 5-40 mL    acetaminophen (TYLENOL) tablet 650 mg    Or    acetaminophen (TYLENOL) suppository 650 mg    polyethylene glycol (MIRALAX) packet 17 g    bisacodyL (DULCOLAX) tablet 5 mg    ondansetron (ZOFRAN ODT) tablet 4 mg    Or    ondansetron (ZOFRAN) injection 4 mg    levoFLOXacin (LEVAQUIN) 750 mg in D5W IVPB    albuterol-ipratropium (DUO-NEB) 2.5 MG-0.5 MG/3 ML    sodium chloride (NS) flush 5-10 mL    metoprolol (LOPRESSOR) injection 5 mg    morphine injection 2 mg    methylPREDNISolone (PF) (SOLU-MEDROL) injection 40 mg    guaiFENesin-codeine (ROBITUSSIN AC) 100-10 mg/5 mL solution 10 mL    Vancomycin - Pharmacy to Dose    cetirizine (ZYRTEC) tablet 10 mg    fluticasone propionate (FLONASE) 50 mcg/actuation nasal spray 1 [place]    montelukast (SINGULAIR) tablet 10 mg    budesonide-formoteroL (SYMBICORT) 160-4.5 mcg/actuation HFA inhaler 2 Puff    rivaroxaban (XARELTO) tablet 20 mg    hydrOXYzine (VISTARIL) injection 50 mg      PMH:  has a past medical history of Asthma, DVT (deep venous thrombosis) (H[facility]), Hypertension, Lymphoma ([address] PE (pulmonary thromboembolism) ([address] and Thromboembolus ([address] PSH:   has a past surgical history that includes hx other surgical (Right, 03/02/2022); ir fluoro guide plc cvad (3/30/2022); and ir insert tunl cvc w port over 5 years (3/30/2022).    FHX: family history includes Cancer in his maternal grandfather; Diabetes in his mother; Hypertension in his mother; Myasthenia Gravis in his maternal grandmother; No Known Problems in his father. SHX:  reports that he quit smoking about 7 years ago. His smoking use included cigarettes. He has never used smokeless tobacco. He reports that he does not currently use alcohol. He reports that he does not currently use drugs after having used the following drugs: Marijuana. ROS:A comprehensive review of systems was negative except for that written in the HPI. Hemodynamics:    CO:    CI:    CVP:    SVR:   PAP Systolic:    PAP Diastolic:    PVR:    QO14:        Ventilator Settings:      Mode Rate TV Press PEEP FiO2 PIP Min. Vent               80 %              Vital Signs: Telemetry:    normal sinus rhythm Intake/Output:   Visit Vitals  BP (!) 159/93 (BP 1 Location: Right arm, BP Patient Position: At rest)   Pulse (!) 119   Temp 100.2 °F (37.9 °C)   Resp 26   Ht 5' 6\" (1.676 m)   Wt 104.3 kg (230 lb)   SpO2 100%   BMI 37.12 kg/m²       Temp (24hrs), Av.2 °F (37.9 °C), Min:98.6 °F (37 °C), Max:102.6 °F (39.2 °C)        O2 Device: Hi flow nasal cannula O2 Flow Rate (L/min): 50 l/min       Wt Readings from Last 4 Encounters:   22 104.3 kg (230 lb)   22 104.3 kg (230 lb)   22 104.3 kg (230 lb)   22 116.1 kg (256 lb)          Intake/Output Summary (Last 24 hours) at 2022 1137  Last data filed at 2022 1017  Gross per 24 hour   Intake 600 ml   Output 875 ml   Net -275 ml         Last shift:      701 - 1900  In: 500 [I.V.:500]  Out: 250 [Urine:250]  Last 3 shifts: 1901 -  07  In: 100 [I.V.:100]  Out: 625 [Urine:625]       Physical Exam:     General: Awake alert mild distress no accessory muscle use currently on nasal high flow FiO2 75%  HEENT: NCAT, normal oral mucosa  Eyes: anicteric; conjunctiva clear extraocular movements intact  Neck: no nodes,  trach midline; no accessory MM use.   Chest: no deformity,   Cardiac: Rapid regular rhythm  Lungs: Severe diffuse wheezing in all lung areas  Abd: Soft nontender positive bowel sounds  Ext: no edema; no joint swelling; No clubbing  : clear urine  Neuro: Awake alert oriented moving all extremities  Psych-calm oriented  Skin: warm, dry, no cyanosis;   Pulses: Brachial and radial pulses intact  Capillary: Normal capillary refill      DATA:    MAR reviewed and pertinent medications noted or modified as needed  MEDS:   Current Facility-Administered Medications   Medication    vancomycin (VANCOCIN) 1500 mg in  ml infusion    [START ON 11/29/2022] Vancomycin - please draw level 11/29 0800.   Thanks!    zinc oxide-white petrolatum 20-75 % topical paste    sodium chloride (NS) flush 5-10 mL    0.9% sodium chloride infusion 3,129 mL    oseltamivir (TAMIFLU) capsule 75 mg    cefepime (MAXIPIME) 2 g in 0.9% sodium chloride 10 mL IV syringe    sodium chloride (NS) flush 5-40 mL    acetaminophen (TYLENOL) tablet 650 mg    Or    acetaminophen (TYLENOL) suppository 650 mg    polyethylene glycol (MIRALAX) packet 17 g    bisacodyL (DULCOLAX) tablet 5 mg    ondansetron (ZOFRAN ODT) tablet 4 mg    Or    ondansetron (ZOFRAN) injection 4 mg    levoFLOXacin (LEVAQUIN) 750 mg in D5W IVPB    albuterol-ipratropium (DUO-NEB) 2.5 MG-0.5 MG/3 ML    sodium chloride (NS) flush 5-10 mL    metoprolol (LOPRESSOR) injection 5 mg    morphine injection 2 mg    methylPREDNISolone (PF) (SOLU-MEDROL) injection 40 mg    guaiFENesin-codeine (ROBITUSSIN AC) 100-10 mg/5 mL solution 10 mL    Vancomycin - Pharmacy to Dose    cetirizine (ZYRTEC) tablet 10 mg    fluticasone propionate (FLONASE) 50 mcg/actuation nasal spray 1 [place]    montelukast (SINGULAIR) tablet 10 mg    budesonide-formoteroL (SYMBICORT) 160-4.5 mcg/actuation HFA inhaler 2 Puff    rivaroxaban (XARELTO) tablet 20 mg    hydrOXYzine (VISTARIL) injection 50 mg        Labs:    Recent Labs     11/28/22  0416 11/27/22  1428 11/27/22  1426   WBC 2.1*  --  2.2*   HGB 10.7*  --  13.0   *  --  145*   INR  --  1.3*  --    APTT  --  30.5  --        Recent Labs     11/28/22  0410 11/27/22  1615 11/27/22  1428 11/27/22  1425   *  --   --  129*   K 4.3  --   --  3.8     --   --  93*   CO2 20*  --   --  20*   *  --   --  185*   BUN 16  --   --  20   CREA 1.03  --   --  1.50*   CA 8.8  --   --  8.7   MG  --   --  2.2  --    LAC 3.4* 5.0*  --  5.2*   ALB  --   --   --  2.6*   ALT  --   --   --  60       Recent Labs     11/27/22  1514 11/27/22  1436   PH 7.53*  --    PCO2 22*  --    PO2 73*  --    HCO3 18*  --    FIO2  --  21.0     11/28 nasal high flow 45 L FiO2 75% oxygen saturation 100%  Nasal MRSA smear positive  Influenza A positive  Procalcitonin 18.8  Blood culture pending  Sputum culture pending  Urine culture pending    Troponin 19, 136  3/3/2022 Echo ejection fraction 65% left atrium 3.2 cm normal IVC  Lab Results   Component Value Date/Time    Culture result: No Growth (<1000 cfu/mL) 05/08/2022 01:50 AM    Culture result: Staphylococcus aureus colony count =>100 colonies (A) 03/19/2022 09:30 AM    Culture result: (A) 03/19/2022 09:19 AM     Staphylococcus aureus growing in 2 of 4 bottles drawn (SITE = LFA AND L HAND) REFER TO [id number] FOR SENSITIVITIES         Imaging:    Results from Hospital Encounter encounter on 11/27/22    XR CHEST PORT    Narrative  INDICATION: chf    EXAMINATION:  AP CHEST, PORTABLE    COMPARISON: 11/27/2022    FINDINGS: Single AP portable view of the chest demonstrates unchanged right IJ  Port-A-Cath. The cardiomediastinal silhouette is unchanged. Apparent increase in  extensive bilateral upper and midlung zone consolidation, with slightly  decreased depth of inspiration. No significant pleural effusion. No  pneumothorax. Impression  Worsening bilateral upper and midlung zone airspace disease, may be accentuated  by lower depth of inspiration. Results from [place] encounter on 11/22/22    CT ABD PELV W CONT    Narrative  EXAM:  CT ABD PELV W CONT    INDICATION: Abdominal pain. History of lymphoma.     COMPARISON: CT 2/11/2022. TECHNIQUE: Helical CT of the abdomen  and pelvis  following the uneventful  intravenous administration of nonionic contrast.  Coronal and sagittal reformats  are performed. CT dose reduction was achieved through use of a standardized  protocol tailored for this examination and automatic exposure control for dose  modulation. FINDINGS:  The visualized lung bases demonstrate no mass or consolidation. The heart size  is normal. There is no pericardial or pleural effusion. The liver, spleen, pancreas, and adrenal glands are normal. There are probable  gallstones without intra- or extra-hepatic biliary dilatation. The kidneys are symmetric without hydronephrosis. The proximal colon is fluid-filled, and there is a long segment of ileal wall  thickening. There are no dilated bowel loops. The appendix is normal.    Right inguinal posttreatment changes are noted with no lymphadenopathy on the  current study. There are no enlarged lymph nodes. There is no free fluid or  free air. The aorta is normal in caliber. The urinary bladder is normal.  There is no pelvic mass. The prostate is not  enlarged. The bony structures are age-appropriate. Impression  Findings of enterocolitis likely due to nonspecific infection or inflammation. 11/28 he states he feels a little better his oxygenation has improved have decreased to FiO2 65%. He states his discolored sputum is starting to clear up. Despite this his chest x-ray shows worsening extensive bilateral upper and mid neck infiltrates. Nasal MRSA smear is positive and he has been placed on vancomycin. All cultures are pending at this point.   He has severe exacerbation of asthma we will continue nebulized albuterol Atrovent add budesonide continue IV Solu-Medrol will hopefully taper it rapidly  Time of care 35 minutes  Alber Riggins MD

## 2022-11-28 NOTE — WOUND CARE
IP WOUND CONSULT    Saurabh Choi  MEDICAL RECORD NUMBER:  [id number]  AGE: 34 y.o. GENDER: male  : 1990  TODAY'S DATE:  2022    GENERAL     [] Follow-up   [x] New Consult    Saurabh Choi is a 34 y.o. male referred by:   [x] Physician  [] Nursing  [] Other:         PAST MEDICAL HISTORY    Past Medical History:   Diagnosis Date    Asthma     DVT (deep venous thrombosis) ([facility]     Hypertension     Lymphoma ([facility]     pt states dx 2022    PE (pulmonary thromboembolism) ([facility]     Thromboembolus ([facility]     pt states of right leg 2022        PAST SURGICAL HISTORY    Past Surgical History:   Procedure Laterality Date    HX OTHER SURGICAL Right 2022    PICC line placed 3/2 and removed a week later. IR FLUORO GUIDE PLC CVAD  3/30/2022    IR INSERT TUNL CVC W PORT OVER 5 YEARS  3/30/2022       FAMILY HISTORY    Family History   Problem Relation Age of Onset    Diabetes Mother     Hypertension Mother     No Known Problems Father     Myasthenia Gravis Maternal Grandmother     Cancer Maternal Grandfather          ALLERGIES    No Known Allergies    MEDICATIONS    No current facility-administered medications on file prior to encounter. Current Outpatient Medications on File Prior to Encounter   Medication Sig Dispense Refill    acetaminophen (TYLENOL) 325 mg tablet Take 2 Tablets by mouth four (4) times daily as needed for Pain. 20 Tablet 0    guaiFENesin (ROBITUSSIN) 100 mg/5 mL liquid Take 10 mL by mouth three (3) times daily as needed for Cough. 200 mL 0    oxymetazoline (Afrin, oxymetazoline,) 0.05 % nasal spray 2 Sprays by Both Nostrils route two (2) times a day for 3 days. DO NOT TAKE MORE THAN 3-4 DAYS TO PREVENT BAD RUNNY NOSE AS SIDE EFFECT. 1 Each 0    albuterol sulfate 90 mcg/actuation aebs Take 1-2 Puffs by inhalation every four (4) hours as needed for Wheezing or Shortness of Breath.  1 Each 0    oseltamivir (Tamiflu) 75 mg capsule Take 1 Capsule by mouth two (2) times a day for 5 days. 10 Capsule 0    diphenoxylate-atropine (LomotiL) 2.5-0.025 mg per tablet Take 1 Tablet by mouth four (4) times daily as needed for Diarrhea (Take after each loose stool. ). Max Daily Amount: 4 Tablets. 20 Tablet 0    ondansetron (ZOFRAN ODT) 4 mg disintegrating tablet Take 1 Tablet by mouth every eight (8) hours as needed for Nausea or Vomiting. 20 Tablet 0    cetirizine (ZYRTEC) 10 mg tablet Take 1 Tablet by mouth daily. montelukast (SINGULAIR) 10 mg tablet Take 1 Tablet by mouth daily. fluticasone propionate (FLONASE) 50 mcg/actuation nasal spray 1 [place] by Both Nostrils route as needed. predniSONE (DELTASONE) 20 mg tablet Take 20 mg by mouth two (2) times a day. 10 Tablet 0    Xarelto 20 mg tab tablet Take 20 mg by mouth daily. albuterol (ACCUNEB) 0.63 mg/3 mL nebulizer solution 3 mL by Nebulization route as needed. Symbicort 160-4.5 mcg/actuation HFAA Take 2 Puffs by inhalation daily.            [email or web address] Vitals  BP (!) 151/99   Pulse (!) 123   Temp 100.3 °F (37.9 °C)   Resp 19   Ht 5' 6\" (1.676 m)   Wt 104.3 kg (230 lb)   SpO2 100%   BMI 37.12 kg/m²       ASSESSMENT     Wound Identification & Type: Radiation burn to right groin  Dressing change: applied zinc paste   Verbal consent for picture: Yes    Contributing Factors: anticoagulation therapy, decreased mobility, shear force, and immunosuppression    Wound Groin Right exercoriated redness and bleeding 11/27/22 (Active)   Wound Image   11/28/22 0942   Wound Etiology Other (Comment) 11/28/22 0942   Dressing Status Other (Comment) 11/27/22 2300   Cleansed Cleansed with saline 11/28/22 0942   Dressing/Treatment Zinc paste 11/28/22 0942   Wound Assessment Denuded;Pink/red;Dry;Epithelialization 11/28/22 0942   Drainage Amount None 11/28/22 0942   Drainage Description Sanguineous 11/27/22 2300   Wound Odor None 11/28/22 0942   Phyllis-Wound/Incision Assessment Hypopigmented 11/28/22 0942   Edges Undefined edges 11/28/22 0768   Wound Thickness Description Partial thickness 11/28/22 0942   Number of days: 1       Incision 08/17/22 Groin Right (Active)   Number of days: 103          PLAN     Skin Care & Pressure Relief Recommendations  Minimize layers of linen  Turn/reposition approximately every 2 hours  Pillow wedges  Manage incontinence   Promote continence; Skin Protective lotion/cream to buttocks and sacrum daily and as needed with incontinence care  Offload heels pillows    [place] 17  Blood Glucose: 163 on 11/28/22                             Albumin: 2.6 on 11/27/22  WBCs: 2.1 on 11/28/22    Support Surface: Total Care Sport    Physician/Provider notified:   Recommendations: minor radiation burn to right groin. No current desquamation of tissue noted either moist or dry. Epithelialization noted and area appears to be well-healing. Per patient he has been applying a variety of topicals as was not prescribed anything specific from treating radiation physician. Confirmed that treatments are completed. Apply zinc paste topical daily and prn for soiling for wound healing and to prevent friction from skin-to-skin contact (\"dose bolus\" effect should not be a factor with radiation treatments completed and wound mostly healed. If treatments are resumed then refrain from application of zinc paste). Per patient, application of zinc paste is \"soothing\". Avoid tight fitting clothes that would cause irritation to area. Patient able to use the urinal.  Apply Optifoam Border Sacral foam dressing every three days to redistribute pressure from bony prominence and prevent pressure and friction injury to skin. RN is to gently peel back for shift skin assessment and re-secure if not soiled. Use foam wedge to turn q2h at 30 degree angle to offload sacrum. Float heels with 1-2 pillows lengthwise while in bed for offloading of the heels. Maintain HOB at 30 degrees or less, if not contraindicated, to reduce pressure to buttocks and sacrum. Raise foot of bed to help prevent friction and shear injury from sliding down in the bed. Will follow up Wednesday 11/30/22 for re-assessment of right groin.           Discharge Wound Care Needs: TBD    Teaching completed with:   [] Patient           [] Family member       [] Caregiver          [] Nursing  [] Other    Patient/Caregiver Teaching:  Level of patient/caregiver understanding able to:   [] Indicates understanding       [] Needs reinforcement  [] Unsuccessful      [] Verbal Understanding  [] Demonstrated understanding       [] No evidence of learning  [] Refused teaching         [] N/A       Electronically signed by Daysi Rutherford RN on 11/28/2022 at 10:09 AM

## 2022-11-28 NOTE — PROGRESS NOTES
OT evaluation order received and acknowledged. OT evaluation attempted at 1350 however per IDR pt currently not medically appropriate for OOB ADL/mobility at this time due to respiratory status. Will continue to follow and re-attempt OT eval at a later time. Thank you.

## 2022-11-28 NOTE — PROGRESS NOTES
PT eval order received and acknowledged. PT eval attempted at 1133am however per IDR pt currently not medically appropriate for mobility at this time due to respiratory status. Will continue to follow patient and attempt PT eval at a later time. Thank you.

## 2022-11-28 NOTE — PROGRESS NOTES
Pt stated he cannot breath on Bipap even though saturation is 100%. He was diaphoretic , anxious and very tachy. RN presume he might be claustrophobic and suggested RT initiate High Flow .

## 2022-11-28 NOTE — PROGRESS NOTES
*ATTENTION:  This note has been created by a medical student for educational purposes only. Please do not refer to the content of this note for clinical decision-making, billing, or other purposes. Please see attending physicians note to obtain clinical information on this patient. *                                     Hospitalist Progress Note         Timo Montes De Oca          Daily Progress Note: 11/28/2022      Subjective:     33 yo male, hx of HTN, asthma, non-Hodgkin's lymphoma post chemotherapy, now on radiation, DVT, PE, here for shortness of breath. Pt was seen on 11/25 for similar, diagnosed with Flu A and sent home on Tamiflu. Workup thus far significant for CXR with evidence of bilateral pneumonia, labs suggestive of sepsis. Pt started on Vancomycin, Cefepime, Levaquin. --  The patient is seen for follow up. States that he feels far better today than he did yesterday with less shortness of breath. Currently on high-flow O2 with FiO2 of 80%, 50 L/min. Lactic acid level down to 3.4.     Problem List:  Problem List as of 11/28/2022 Date Reviewed: 8/15/2022            Codes Class Noted - Resolved    Shortness of breath ICD-10-CM: R06.02  ICD-9-CM: 786.05  11/27/2022 - Present        Pneumonia ICD-10-CM: J18.9  ICD-9-CM: 486  11/27/2022 - Present        History of asthma ICD-10-CM: Z87.09  ICD-9-CM: V12.69  11/27/2022 - Present        Influenza A ICD-10-CM: J10.1  ICD-9-CM: 487.1  11/27/2022 - Present        Severe sepsis ([facility] [[email or web address] ICD-10-CM: A41.9, R65.20  ICD-9-CM: 038.9, 995.92  11/27/2022 - Present        Asthma ICD-10-CM: X99.967  ICD-9-CM: 493.90  5/7/2022 - Present        Acute respiratory failure with hypoxia ([facility] [[email or web address] 75.) ICD-10-CM: J96.01  ICD-9-CM: 518.81  5/7/2022 - Present        Lymphoma ([facility] [[email or web address] 75.) ICD-10-CM: C85.90  ICD-9-CM: 202.80  3/1/2022 - Present        Pelvic mass ICD-10-CM: R19.00  ICD-9-CM: 789.30  2/23/2022 - Present        Bilateral pulmonary embolism (HCC) ICD-10-CM: I26.99  ICD-9-CM: 415.19 2/23/2022 - Present        Pulmonary embolism ([facility] [address] ICD-10-CM: I26.99  ICD-9-CM: 415.19  2/22/2022 - Present        Right inguinal pain ICD-10-CM: R10.31  ICD-9-CM: 789.03  2/17/2022 - Present        Right groin mass ICD-10-CM: R19.09  ICD-9-CM: 789.39  2/11/2022 - Present        Fall ICD-10-CM: W19. Darrick Drain  ICD-9-CM: E888.9  2/11/2022 - Present           Medications reviewed  Current Facility-Administered Medications   Medication Dose Route Frequency    vancomycin (VANCOCIN) 1500 mg in  ml infusion  1,500 mg IntraVENous Q12H    [START ON 11/29/2022] Vancomycin - please draw level 11/29 0800. Thanks!    Other ONCE    zinc oxide-white petrolatum 20-75 % topical paste   Topical DAILY    sodium chloride (NS) flush 5-10 mL  5-10 mL IntraVENous PRN    0.9% sodium chloride infusion 3,129 mL  30 mL/kg IntraVENous CONTINUOUS    oseltamivir (TAMIFLU) capsule 75 mg  75 mg Oral BID    cefepime (MAXIPIME) 2 g in 0.9% sodium chloride 10 mL IV syringe  2 g IntraVENous Q8H    sodium chloride (NS) flush 5-40 mL  5-40 mL IntraVENous PRN    acetaminophen (TYLENOL) tablet 650 mg  650 mg Oral Q6H PRN    Or    acetaminophen (TYLENOL) suppository 650 mg  650 mg Rectal Q6H PRN    polyethylene glycol (MIRALAX) packet 17 g  17 g Oral DAILY PRN    bisacodyL (DULCOLAX) tablet 5 mg  5 mg Oral DAILY PRN    ondansetron (ZOFRAN ODT) tablet 4 mg  4 mg Oral Q6H PRN    Or    ondansetron (ZOFRAN) injection 4 mg  4 mg IntraVENous Q6H PRN    levoFLOXacin (LEVAQUIN) 750 mg in D5W IVPB  750 mg IntraVENous Q24H    albuterol-ipratropium (DUO-NEB) 2.5 MG-0.5 MG/3 ML  3 mL Nebulization Q6H RT    sodium chloride (NS) flush 5-10 mL  5-10 mL IntraVENous PRN    metoprolol (LOPRESSOR) injection 5 mg  5 mg IntraVENous Q3H PRN    morphine injection 2 mg  2 mg IntraVENous Q3H PRN    methylPREDNISolone (PF) (SOLU-MEDROL) injection 40 mg  40 mg IntraVENous Q8H    guaiFENesin-codeine (ROBITUSSIN AC) 100-10 mg/5 mL solution 10 mL  10 mL Oral Q4H PRN    Vancomycin - Pharmacy to Dose   Other Rx Dosing/Monitoring    cetirizine (ZYRTEC) tablet 10 mg  10 mg Oral DAILY    fluticasone propionate (FLONASE) 50 mcg/actuation nasal spray 1 Spray  1 Spray Both Nostrils PRN    montelukast (SINGULAIR) tablet 10 mg  10 mg Oral DAILY    budesonide-formoteroL (SYMBICORT) 160-4.5 mcg/actuation HFA inhaler 2 Puff  2 Puff Inhalation DAILY    rivaroxaban (XARELTO) tablet 20 mg  20 mg Oral DAILY    hydrOXYzine (VISTARIL) injection 50 mg  50 mg IntraMUSCular Q6H PRN       Review of Systems:   A comprehensive review of systems was negative except for that written in the HPI. Objective:   Physical Exam:     Visit Vitals  BP (!) 152/92   Pulse (!) 125   Temp 100.3 °F (37.9 °C)   Resp (!) 32   Ht 5' 6\" (1.676 m)   Wt 104.3 kg (230 lb)   SpO2 100%   BMI 37.12 kg/m²    O2 Flow Rate (L/min): 50 l/min O2 Device: Hi flow nasal cannula    Temp (24hrs), Av.2 °F (37.9 °C), Min:98.6 °F (37 °C), Max:102.6 °F (39.2 °C)     07 -  1900  In: 500 [I.V.:500]  Out: 250 [Urine:250]    190 -  0700  In: 100 [I.V.:100]  Out: 625 [Urine:625]    General:  Alert, cooperative, no distress, appears stated age. Lungs:   Clear to auscultation bilaterally. Chest wall:  No tenderness or deformity. Heart:  Regular rate and rhythm, S1, S2 normal, no murmur, click, rub or gallop. Abdomen:   Soft, non-tender. Bowel sounds normal. No masses,  No organomegaly. Extremities: Extremities normal, atraumatic, no cyanosis or edema. Pulses: 2+ and symmetric all extremities. Skin: Skin color, texture, turgor normal. No rashes or lesions   Neurologic: CNII-XII intact.  No gross sensory or motor deficits     Data Review:       Recent Days:  Recent Labs     22  0416 22  1426   WBC 2.1* 2.2*   HGB 10.7* 13.0   HCT 31.7* 38.0   * 145*     Recent Labs     22  0416 22  1428 22  1425   *  --  129*   K 4.3  --  3.8     --  93*   CO2 20*  --  20*   *  -- 185*   BUN 16  --  20   CREA 1.03  --  1.50*   CA 8.8  --  8.7   MG  --  2.2  --    ALB  --   --  2.6*   TBILI  --   --  3.4*   ALT  --   --  60   INR  --  1.3*  --      Recent Labs     11/27/22  1514 11/27/22  1436   PH 7.53*  --    PCO2 22*  --    PO2 73*  --    HCO3 18*  --    FIO2  --  21.0       24 Hour Results:  Recent Results (from the past 24 hour(s))   EKG, 12 LEAD, INITIAL    Collection Time: 11/27/22  2:17 PM   Result Value Ref Range    Ventricular Rate 167 BPM    Atrial Rate 167 BPM    P-R Interval 124 ms    QRS Duration 64 ms    Q-T Interval 286 ms    QTC Calculation (Bezet) 477 ms    Calculated P Axis 65 degrees    Calculated R Axis 70 degrees    Calculated T Axis 76 degrees    Diagnosis       Sinus tachycardia  Otherwise normal ECG  When compared with ECG of 07-MAY-2022 17:38,  Vent. rate has increased  BPM  T wave amplitude has decreased in Anterolateral leads  Confirmed by Sukhjinder Reading ([id number]) on 11/28/2022 10:59:18 AM     LACTIC ACID    Collection Time: 11/27/22  2:25 PM   Result Value Ref Range    Lactic acid 5.2 (HH) 0.4 - 2.0 mmol/L   METABOLIC PANEL, COMPREHENSIVE    Collection Time: 11/27/22  2:25 PM   Result Value Ref Range    Sodium 129 (L) 136 - 145 mmol/L    Potassium 3.8 3.5 - 5.1 mmol/L    Chloride 93 (L) 97 - 108 mmol/L    CO2 20 (L) 21 - 32 mmol/L    Anion gap 16 (H) 5 - 15 mmol/L    Glucose 185 (H) 65 - 100 mg/dL    BUN 20 6 - 20 mg/dL    Creatinine 1.50 (H) 0.70 - 1.30 mg/dL    BUN/Creatinine ratio 13 12 - 20      eGFR >60 >60 ml/min/1.73m2    Calcium 8.7 8.5 - 10.1 mg/dL    Bilirubin, total 3.4 (H) 0.2 - 1.0 mg/dL    AST (SGOT) 72 (H) 15 - 37 U/L    ALT (SGPT) 60 12 - 78 U/L    Alk.  phosphatase 114 45 - 117 U/L    Protein, total 6.4 6.4 - 8.2 g/dL    Albumin 2.6 (L) 3.5 - 5.0 g/dL    Globulin 3.8 2.0 - 4.0 g/dL    A-G Ratio 0.7 (L) 1.1 - 2.2     TROPONIN-HIGH SENSITIVITY    Collection Time: 11/27/22  2:25 PM   Result Value Ref Range    Troponin-High Sensitivity 19 0 - 76 ng/L CBC WITH AUTOMATED DIFF    Collection Time: 11/27/22  2:26 PM   Result Value Ref Range    WBC 2.2 (L) 4.1 - 11.1 K/uL    RBC 4.44 4.10 - 5.70 M/uL    HGB 13.0 12.1 - 17.0 g/dL    HCT 38.0 36.6 - 50.3 %    MCV 85.6 80.0 - 99.0 FL    MCH 29.3 26.0 - 34.0 PG    MCHC 34.2 30.0 - 36.5 g/dL    RDW 14.3 11.5 - 14.5 %    PLATELET 850 (L) 287 - 400 K/uL    MPV 10.1 8.9 - 12.9 FL    NRBC 0.0 0.0  WBC    ABSOLUTE NRBC 0.00 0.00 - 0.01 K/uL    NEUTROPHILS 47 32 - 75 %    BAND NEUTROPHILS 15 (H) 0 - 6 %    LYMPHOCYTES 5 (L) 12 - 49 %    MONOCYTES 8 5 - 13 %    EOSINOPHILS 0 0 - 7 %    BASOPHILS 0 0 - 1 %    METAMYELOCYTES 17 (H) 0 %    MYELOCYTES 7 (H) 0 %    OTHER CELL 1 %    IMMATURE GRANULOCYTES 0 %    ABS. NEUTROPHILS 1.4 (L) 1.8 - 8.0 K/UL    ABS. LYMPHOCYTES 0.1 (L) 0.8 - 3.5 K/UL    ABS. MONOCYTES 0.2 0.0 - 1.0 K/UL    ABS. EOSINOPHILS 0.0 0.0 - 0.4 K/UL    ABS. BASOPHILS 0.0 0.0 - 0.1 K/UL    ABS. IMM.  GRANS. 0.0 K/UL    DF Manual      RBC COMMENTS Spherocytes  1+       PTT    Collection Time: 11/27/22  2:28 PM   Result Value Ref Range    aPTT 30.5 21.2 - 34.1 sec    aPTT, therapeutic range   82 - 109 sec   NT-PRO BNP    Collection Time: 11/27/22  2:28 PM   Result Value Ref Range    NT pro- (H) <125 pg/mL   PROCALCITONIN    Collection Time: 11/27/22  2:28 PM   Result Value Ref Range    Procalcitonin 18.89 (H) 0 ng/mL   MAGNESIUM    Collection Time: 11/27/22  2:28 PM   Result Value Ref Range    Magnesium 2.2 1.6 - 2.4 mg/dL   PROTHROMBIN TIME + INR    Collection Time: 11/27/22  2:28 PM   Result Value Ref Range    Prothrombin time 16.2 (H) 11.9 - 14.6 sec    INR 1.3 (H) 0.9 - 1.1     BLOOD GAS, VENOUS    Collection Time: 11/27/22  2:36 PM   Result Value Ref Range    VENOUS PH 7.354 7.32 - 7.42      VENOUS PCO2 35.2 (L) 41 - 51 mmHg    VENOUS PO2 34 25 - 40 mmHg    VENOUS BICARBONATE 19 (L) 24 - 25 mmol/L    VENOUS BASE DEFICIT 5.6 mmol/L    O2 METHOD Room air      FIO2 21.0 %    Sample source Venous SITE Left Brachial      Performed by [facility]     TEMPERATURE 98.6     MRSA SCREEN - PCR (NASAL)    Collection Time: 11/27/22  3:07 PM   Result Value Ref Range    MRSA by PCR, Nasal DETECTED (A) Not Detected     BLOOD GAS, ARTERIAL    Collection Time: 11/27/22  3:14 PM   Result Value Ref Range    pH 7.53 (H) 7.35 - 7.45      PCO2 22 (L) 35 - 45 mmHg    PO2 73 (L) 80 - 100 mmHg    O2 SATURATION 95 95 - 99 %    BICARBONATE 18 (L) 22 - 26 mmol/L    BASE DEFICIT 3.0 mmol/L    O2 METHOD Nasal Cannula      O2 FLOW RATE 2.00 L/min    Sample source Arterial      SITE Right Radial      DONA'S TEST YES      Carboxy-Hgb 0.3 (L) 1 - 2 %    Methemoglobin 0.4 0 - 1.4 %    Oxyhemoglobin 94.7 (L) 95 - 99 %    Performed by Hubert Mariscal     TEMPERATURE 101.6     LACTIC ACID    Collection Time: 11/27/22  4:15 PM   Result Value Ref Range    Lactic acid 5.0 (HH) 0.4 - 2.0 mmol/L   URINALYSIS W/ REFLEX CULTURE    Collection Time: 11/28/22 12:03 AM    Specimen: Urine   Result Value Ref Range    Color Dark Yellow      Appearance Turbid (A) Clear      Specific gravity 1.029 1.003 - 1.030      pH (UA) 5.0 5.0 - 8.0      Protein 100 (A) Negative mg/dL    Glucose 50 (A) Negative mg/dL    Ketone 20 (A) Negative mg/dL    Bilirubin Small (A) Negative      Blood Small (A) Negative      Urobilinogen 2.0 (H) 0.1 - 1.0 EU/dL    Nitrites Negative Negative      Leukocyte Esterase Negative Negative      UA:UC IF INDICATED Culture not indicated by UA result Culture not indicated by UA result      WBC 0-4 0 - 4 /hpf    RBC 0-5 0 - 5 /hpf    Bacteria Negative Negative /hpf    PRESUMPTIVE YEAST Occasional     LACTIC ACID    Collection Time: 11/28/22  4:16 AM   Result Value Ref Range    Lactic acid 3.4 (HH) 0.4 - 2.0 mmol/L   METABOLIC PANEL, BASIC    Collection Time: 11/28/22  4:16 AM   Result Value Ref Range    Sodium 135 (L) 136 - 145 mmol/L    Potassium 4.3 3.5 - 5.1 mmol/L    Chloride 106 97 - 108 mmol/L    CO2 20 (L) 21 - 32 mmol/L    Anion gap 9 5 - 15 mmol/L    Glucose 163 (H) 65 - 100 mg/dL    BUN 16 6 - 20 mg/dL    Creatinine 1.03 0.70 - 1.30 mg/dL    BUN/Creatinine ratio 16 12 - 20      eGFR >60 >60 ml/min/1.73m2    Calcium 8.8 8.5 - 10.1 mg/dL   CBC W/O DIFF    Collection Time: 11/28/22  4:16 AM   Result Value Ref Range    WBC 2.1 (L) 4.1 - 11.1 K/uL    RBC 3.65 (L) 4.10 - 5.70 M/uL    HGB 10.7 (L) 12.1 - 17.0 g/dL    HCT 31.7 (L) 36.6 - 50.3 %    MCV 86.8 80.0 - 99.0 FL    MCH 29.3 26.0 - 34.0 PG    MCHC 33.8 30.0 - 36.5 g/dL    RDW 14.6 (H) 11.5 - 14.5 %    PLATELET 782 (L) 796 - 400 K/uL    MPV 10.2 8.9 - 12.9 FL    NRBC 0.0 0.0  WBC    ABSOLUTE NRBC 0.00 0.00 - 0.01 K/uL   VANCOMYCIN, RANDOM    Collection Time: 11/28/22  4:16 AM   Result Value Ref Range    Vancomycin, random 3.3 ug/mL           Assessment/     Bilateral Pneumonia; Sepsis; Influenza A; Hypoxic Respiratory Failure; Asthma  -- CXR with bilateral airspace disease, repeat today shows worsening disease but could be 2/2 increased depth of inspiration  -- Currently on antibiotics, Infectious Disease consulted for further recommendations in this regard  -- Currently on FiO2, taper O2 requirement as tolerated  -- Continue Duonebs, Inhalers, IV steroids, Tamiflu  -- Pulmonology Consulted, currently following    Non-Hodgkin's Lymphoma  -- Finished chemo, currently on radiation therapy    Prior PE and DVT  -- On Xarelto    Hypertension  -- Start Labetalol    Plan:    Plan as above  Continue monitoring in ICU for the time being  CODE STATUS: Full code    Care Plan discussed with: Patient/Family    Total time spent with patient: 30 minutes.     Mike Ochoa

## 2022-11-28 NOTE — PROGRESS NOTES
Vancomycin Dosing Consult  Clarence Rodriguez is a 34 y.o. male with PNA. Pharmacy was consulted by Dr. Rosa Garcia to dose and monitor vancomycin. Today is day 1. Antibiotic regimen: Vancomycin + cefepime + levofloxacin    Temp (24hrs), Av.2 °F (37.9 °C), Min:98.6 °F (37 °C), Max:102.6 °F (39.2 °C)    Recent Labs     22  0416 22  1426 22  1425   WBC 2.1* 2.2*  --    CREA 1.03  --  1.50*   BUN 16  --  20     Est CrCl: >100 mL/min  Concomitant nephrotoxic drugs: None    Cultures:    Blood: Pending   sputum: pending  : urine: pending    MRSA Swab: Detected     Target range: AUC:BETTE 400-600    Recent level history:  Date/Time Dose & Interval Measured Level (mcg/mL) Associated AUC/BETTE   416 2000 mg x 1 3.3      Assessment/Plan:   Febrile, still on high flow  SCr has returned to baseline  Level is ready for re-dosing and patient will be switched to AUC:BETTE dosing  Vancomycin 2000 mg x 1 followed by 1500 mg IV q12h was ordered. Predicted AUC:BETTE 501.   Level is ordered for  0800  Antimicrobial stop date TBD     Victoriano Olsen, PharmD, BCPS, BCCCP  Clinical Pharmacist   (available on [employer])

## 2022-11-28 NOTE — CONSULTS
IMPRESSION:   Acute hypoxic respiratory failure  Exacerbation of asthma  Influenza A  Bilateral upper lobe pneumonia  Severe sepsis  History of DVT/PE on Xarelto  hx of non-Hodgkin's lymphoma status postchemotherapy on radiation treatment  Gastroesophageal reflux disease  Additional workup outlined below  Pt is at high risk of sudden decline and decompensation with life threatening consequenses and continued end organ dysfunction and failure  Pt is critically ill. Time spent with pt and staff actively rendering care, managing pt and coordinating care as stated below;  55 minutes, exclusive of any procedures      RECOMMENDATIONS/PLAN:   ICU monitoring  60-year-old male came in because of shortness of breath and dyspnea and critical condition nonrebreather mask 100% FiO2  BIPAP for non invasive ventilatory life support to avoid worsening respiratory acidosis, hypercacarbic or hypoxic respiratory arrest  Intubate and place on vent if NIV fails  Influenza A positive start patient on Tamiflu  IV Solu-Medrol nebulizer treatment Singulair  Protonix for reflux  Patient is tachypneic and tachycardic heart rate around 140 we will give morphine we will start patient on Vistaril if needed I will give Cardizem  Agree with Empiric IV antibiotics pending culture results   Follow culture results he is on Levaquin vancomycin and Maxipime  CVP monitoring  IV vasopressors for circulatory shock refractory to fluids to maintain SBP> 90  High flow nasal Cannula oxygen as salvage oxygen delivery device to provide high concentration of oxygen to overcome refractory hypoxia;    Patient requiring restraints due to threat of injury to self, interference with medical devices  Transfuse prn to maintain Hgb > 7  Labs to follow electrolytes, renal function and and blood counts  Bronchial hygiene with respiratory therapy techniques, bronchodilators  Pt needs IV fluids with additives and Drug therapy requiring intensive monitoring for toxicity  Prescription drug management with home med reconciliation reviewed  DVT, SUP prophylaxis  Will be available to assist in medical management while in the CCU pending disposition     [x] High complexity decision making was performed  [x] See my orders for details  HPI  67-year-old male came in because of shortness of breath and dyspnea, he has significant past medical history of asthma non-Hodgkin's lymphoma status postchemotherapy currently on radiation treatment also had a history of DVT and pulmonary embolism he was positive with influenza A and he was sent home with Tamiflu but his condition got worse came to the emergency room he is very short of breath 100% FiO2 nonrebreather mask electrolyte imbalance having chest discomfort diaphoretic he has not smoked in the past 4 days he smokes marijuana tachypneic tachycardic so admitted and critical care consult was called. PMH:  has a past medical history of Asthma, DVT (deep venous thrombosis) ([address] Hypertension, Lymphoma ([address] PE (pulmonary thromboembolism) ([address] and Thromboembolus ([address] PSH:   has a past surgical history that includes hx other surgical (Right, 03/02/2022); ir fluoro guide plc cvad (3/30/2022); and ir insert tunl cvc w port over 5 years (3/30/2022). FHX: family history includes Cancer in his maternal grandfather; Diabetes in his mother; Hypertension in his mother; Myasthenia Gravis in his maternal grandmother; No Known Problems in his father. SHX:  reports that he quit smoking about 7 years ago. His smoking use included cigarettes. He has never used smokeless tobacco. He reports that he does not currently use alcohol. He reports that he does not currently use drugs after having used the following drugs: Marijuana.     ALL: No Known Allergies     MEDS:   [x] Reviewed - As Below   [] Not reviewed    Current Facility-Administered Medications   Medication    sodium chloride (NS) flush 5-10 mL    0.9% sodium chloride infusion 3,129 mL    oseltamivir (TAMIFLU) capsule 75 mg    cefepime (MAXIPIME) 2 g in 0.9% sodium chloride 10 mL IV syringe    sodium chloride (NS) flush 5-40 mL    acetaminophen (TYLENOL) tablet 650 mg    Or    acetaminophen (TYLENOL) suppository 650 mg    polyethylene glycol (MIRALAX) packet 17 g    bisacodyL (DULCOLAX) tablet 5 mg    ondansetron (ZOFRAN ODT) tablet 4 mg    Or    ondansetron (ZOFRAN) injection 4 mg    levoFLOXacin (LEVAQUIN) 750 mg in D5W IVPB    albuterol-ipratropium (DUO-NEB) 2.5 MG-0.5 MG/3 ML    sodium chloride (NS) flush 5-10 mL    metoprolol (LOPRESSOR) injection 5 mg    morphine injection 2 mg    methylPREDNISolone (PF) (SOLU-MEDROL) injection 40 mg    guaiFENesin-codeine (ROBITUSSIN AC) 100-10 mg/5 mL solution 10 mL    Vancomycin - Pharmacy to Dose    [START ON 11/28/2022] Vancomycin - Random level to be drawn 11/28 @ 0400    [START ON 11/28/2022] cetirizine (ZYRTEC) tablet 10 mg    fluticasone propionate (FLONASE) 50 mcg/actuation nasal spray 1 Spray    [START ON 11/28/2022] montelukast (SINGULAIR) tablet 10 mg    [START ON 11/28/2022] budesonide-formoteroL (SYMBICORT) 160-4.5 mcg/actuation HFA inhaler 2 Puff    [START ON 11/28/2022] rivaroxaban (XARELTO) tablet 20 mg      MAR reviewed and pertinent medications noted or modified as needed   Current Facility-Administered Medications   Medication    sodium chloride (NS) flush 5-10 mL    0.9% sodium chloride infusion 3,129 mL    oseltamivir (TAMIFLU) capsule 75 mg    cefepime (MAXIPIME) 2 g in 0.9% sodium chloride 10 mL IV syringe    sodium chloride (NS) flush 5-40 mL    acetaminophen (TYLENOL) tablet 650 mg    Or    acetaminophen (TYLENOL) suppository 650 mg    polyethylene glycol (MIRALAX) packet 17 g    bisacodyL (DULCOLAX) tablet 5 mg    ondansetron (ZOFRAN ODT) tablet 4 mg    Or    ondansetron (ZOFRAN) injection 4 mg    levoFLOXacin (LEVAQUIN) 750 mg in D5W IVPB    albuterol-ipratropium (DUO-NEB) 2.5 MG-0.5 MG/3 ML    sodium chloride (NS) flush 5-10 mL    metoprolol (LOPRESSOR) injection 5 mg    morphine injection 2 mg    methylPREDNISolone (PF) (SOLU-MEDROL) injection 40 mg    guaiFENesin-codeine (ROBITUSSIN AC) 100-10 mg/5 mL solution 10 mL    Vancomycin - Pharmacy to Dose    [START ON 11/28/2022] Vancomycin - Random level to be drawn 11/28 @ 0400    [START ON 11/28/2022] cetirizine (ZYRTEC) tablet 10 mg    fluticasone propionate (FLONASE) 50 mcg/actuation nasal spray 1 Spray    [START ON 11/28/2022] montelukast (SINGULAIR) tablet 10 mg    [START ON 11/28/2022] budesonide-formoteroL (SYMBICORT) 160-4.5 mcg/actuation HFA inhaler 2 Puff    [START ON 11/28/2022] rivaroxaban (XARELTO) tablet 20 mg      PMH:  has a past medical history of Asthma, DVT (deep venous thrombosis) ([address] Hypertension, Lymphoma ([address] PE (pulmonary thromboembolism) ([address] and Thromboembolus ([address] PSH:   has a past surgical history that includes hx other surgical (Right, 03/02/2022); ir fluoro guide plc cvad (3/30/2022); and ir insert tunl cvc w port over 5 years (3/30/2022). FHX: family history includes Cancer in his maternal grandfather; Diabetes in his mother; Hypertension in his mother; Myasthenia Gravis in his maternal grandmother; No Known Problems in his father. SHX:  reports that he quit smoking about 7 years ago. His smoking use included cigarettes. He has never used smokeless tobacco. He reports that he does not currently use alcohol. He reports that he does not currently use drugs after having used the following drugs: Marijuana. ROS:A comprehensive review of systems was negative except for that written in the HPI. Hemodynamics:    CO:    CI:    CVP:    SVR:   PAP Systolic:    PAP Diastolic:    PVR:    MR81:        Ventilator Settings:      Mode Rate TV Press PEEP FiO2 PIP Min.  Vent                              Vital Signs: Telemetry:    normal sinus rhythm Intake/Output:   Visit Vitals  BP (!) 149/91   Pulse (!) 116   Temp 99.9 °F (37.7 °C)   Resp 28   Ht 5' 6\" (1.676 m)   Wt 104.3 kg (230 lb)   SpO2 96%   BMI 37.12 kg/m²       Temp (24hrs), Av °F (38.3 °C), Min:99.9 °F (37.7 °C), Max:101.6 °F (38.7 °C)        O2 Device: None (Room air)         Wt Readings from Last 4 Encounters:   22 104.3 kg (230 lb)   22 104.3 kg (230 lb)   22 104.3 kg (230 lb)   22 116.1 kg (256 lb)          Intake/Output Summary (Last 24 hours) at 2022 1935  Last data filed at 2022 1548  Gross per 24 hour   Intake 100 ml   Output --   Net 100 ml       Last shift:      No intake/output data recorded. Last 3 shifts:  0701 -  1900  In: 100 [I.V.:100]  Out: -        Physical Exam:     General: In severe respiratory distress on 100% nonrebreather mask diaphoretic tachypneic tachycardic  HEENT: NCAT, poor dentition, lips and mucosa dry  Eyes: anicteric; conjunctiva clear  Neck: no nodes,  trach midline; no accessory MM use. Chest: no deformity,   Cardiac: R regular; no murmur;   Lungs: distant breath sounds;  bilateral rhonchi anteriorly and expiratory wheeze  Abd: soft, NT, hypoactive BS  Ext: no edema; no joint swelling;  No clubbing  : NO munguia, clear urine  Neuro: Alert awake diaphoretic moving extremities  Psych- unable to assess  Skin: warm, dry, no cyanosis;   Pulses: 1-2+ Bilateral pedal, radial  Capillary: brisk; pale      DATA:    MAR reviewed and pertinent medications noted or modified as needed  MEDS:   Current Facility-Administered Medications   Medication    sodium chloride (NS) flush 5-10 mL    0.9% sodium chloride infusion 3,129 mL    oseltamivir (TAMIFLU) capsule 75 mg    cefepime (MAXIPIME) 2 g in 0.9% sodium chloride 10 mL IV syringe    sodium chloride (NS) flush 5-40 mL    acetaminophen (TYLENOL) tablet 650 mg    Or    acetaminophen (TYLENOL) suppository 650 mg    polyethylene glycol (MIRALAX) packet 17 g    bisacodyL (DULCOLAX) tablet 5 mg    ondansetron (ZOFRAN ODT) tablet 4 mg    Or    ondansetron (ZOFRAN) injection 4 mg levoFLOXacin (LEVAQUIN) 750 mg in D5W IVPB    albuterol-ipratropium (DUO-NEB) 2.5 MG-0.5 MG/3 ML    sodium chloride (NS) flush 5-10 mL    metoprolol (LOPRESSOR) injection 5 mg    morphine injection 2 mg    methylPREDNISolone (PF) (SOLU-MEDROL) injection 40 mg    guaiFENesin-codeine (ROBITUSSIN AC) 100-10 mg/5 mL solution 10 mL    Vancomycin - Pharmacy to Dose    [START ON 11/28/2022] Vancomycin - Random level to be drawn 11/28 @ 0400    [START ON 11/28/2022] cetirizine (ZYRTEC) tablet 10 mg    fluticasone propionate (FLONASE) 50 mcg/actuation nasal spray 1 Spray    [START ON 11/28/2022] montelukast (SINGULAIR) tablet 10 mg    [START ON 11/28/2022] budesonide-formoteroL (SYMBICORT) 160-4.5 mcg/actuation HFA inhaler 2 Puff    [START ON 11/28/2022] rivaroxaban (XARELTO) tablet 20 mg        Labs:    Recent Labs     11/27/22  1428 11/27/22  1426   WBC  --  2.2*   HGB  --  13.0   PLT  --  145*   INR 1.3*  --    APTT 30.5  --      Recent Labs     11/27/22  1615 11/27/22  1428 11/27/22  1425   NA  --   --  129*   K  --   --  3.8   CL  --   --  93*   CO2  --   --  20*   GLU  --   --  185*   BUN  --   --  20   CREA  --   --  1.50*   CA  --   --  8.7   MG  --  2.2  --    LAC 5.0*  --  5.2*   ALB  --   --  2.6*   ALT  --   --  60     Recent Labs     11/27/22  1514 11/27/22  1436   PH 7.53*  --    PCO2 22*  --    PO2 73*  --    HCO3 18*  --    FIO2  --  21.0     No results for input(s): CPK, CKNDX, TROIQ in the last 72 hours.     No lab exists for component: CPKMB  No results found for: BNPP, BNP   Lab Results   Component Value Date/Time    Culture result: No Growth (<1000 cfu/mL) 05/08/2022 01:50 AM    Culture result: Staphylococcus aureus colony count =>100 colonies (A) 03/19/2022 09:30 AM    Culture result: (A) 03/19/2022 09:19 AM     Staphylococcus aureus growing in 2 of 4 bottles drawn (SITE = LFA AND L HAND) REFER TO [id number] FOR SENSITIVITIES     No results found for: TSH, TSHEXT     Imaging:    Results from [facility] Encounter encounter on 11/27/22    XR CHEST PORT    Narrative  INDICATION:  Eval for Infiltrate    Exam: Portable chest 1430. Comparison: 11/25/2022. Findings: Cardiomediastinal silhouette is within normal limits. Pulmonary  vasculature is not engorged. Diffuse bilateral airspace disease with an upper  lobe predominance. Port unchanged. No pneumothorax or effusion. Impression  1. Diffuse bilateral airspace disease      Results from [place] encounter on 11/22/22    CT ABD PELV W CONT    Narrative  EXAM:  CT ABD PELV W CONT    INDICATION: Abdominal pain. History of lymphoma. COMPARISON: CT 2/11/2022. TECHNIQUE: Helical CT of the abdomen  and pelvis  following the uneventful  intravenous administration of nonionic contrast.  Coronal and sagittal reformats  are performed. CT dose reduction was achieved through use of a standardized  protocol tailored for this examination and automatic exposure control for dose  modulation. FINDINGS:  The visualized lung bases demonstrate no mass or consolidation. The heart size  is normal. There is no pericardial or pleural effusion. The liver, spleen, pancreas, and adrenal glands are normal. There are probable  gallstones without intra- or extra-hepatic biliary dilatation. The kidneys are symmetric without hydronephrosis. The proximal colon is fluid-filled, and there is a long segment of ileal wall  thickening. There are no dilated bowel loops. The appendix is normal.    Right inguinal posttreatment changes are noted with no lymphadenopathy on the  current study. There are no enlarged lymph nodes. There is no free fluid or  free air. The aorta is normal in caliber. The urinary bladder is normal.  There is no pelvic mass. The prostate is not  enlarged. The bony structures are age-appropriate. Impression  Findings of enterocolitis likely due to nonspecific infection or inflammation.       This care involved high complexity decision making which includes independently reviewing the patient's past medical records, current laboratory results, medication profiles that were immediately available to me and actual Xray images at the bedside in order to assess, support vital system function, and to treat this degree of vital organ system failure, and to prevent further life threatening deterioration of the patients condition. I was in direct communication with the nursing staff throughout this time. Medical Decision Making Today  Reviewed the flowsheet and previous days notes  Reviewed and summarized records or history from previous days note or discussions with staff, family  Parenteral controlled substances - Reviewed/ Adjusted / Daniel Donning / Started  High Risk Drug therapy requiring intensive monitoring for toxicity: eg steroids, pressors, antibiotics  Review and order of Clinical lab tests  Review and Order of Radiology tests  Review and Order of Medicine tests  Independent visualization of radiologic Images  Reviewed Ventilator / NiPPV  I have personally reviewed the patients ECG / Telemetry  Diagnostic endoscopies with identified risk factors    I have provided total of 55 minutes of critical care time rendering care exclusive of any procedures. During this entire length of time the patient's condition was unstable, unpredictable and critically ill in the CCU/ ICU. I was immediately available to the patient whose care required several interactions with nursing, multidisciplinary team members leading to multiple interventions with fluid resuscitation and medication adjustments to optimize respiratory support, hemodynamic treatment, medication changes based on repeat labs results, reviews, exams and assessments. The reason for providing this level of medical care was due to a critical illness that impaired one or more vital organ systems, such that there was a high probability of sudden or life threatening deterioration in the patient's condition.

## 2022-11-28 NOTE — PROGRESS NOTES
Reason for Admission:   Pneumonia, History of asthma, Shortness of breath, Influenza A, Severe sepsis (HCC)               RUR Score:     22% HIGH      PCP: First and Last name:  Leticia Rodrigues MD     Name of Practice:   Are you a current patient: Yes/No:   Approximate date of last visit: Oct 2022   Can you do a virtual visit with your PCP:              Resources/supports as identified by patient/family:                   Top Challenges facing patient (as identified by patient/family and CM): Finances/Medication cost?   No issue w/affording medications. Transportation? Family provides transportation              Support system or lack thereof? Family support in place                     Living arrangements? Lives w/his girlfriend and kids               Self-care/ADLs/Cognition? Independent w/all ADL's & IADL's          Current Advanced Directive/Advance Care Plan:  Full Code      Healthcare Decision Maker:   Click here to complete HealthCare Decision Makers including selection of the Healthcare Decision Maker Relationship (ie \"Primary\")      Primary Decision Maker: Adia Danielle - [phone]    Payor Source Payor: [facility] MEDICAID / Plan: Deonna FONSECA CCCP / Product Type: Managed Care Medicaid /                             Plan for utilizing home health:    No prior HH. TBD                 Transition of Care Plan:                  Lives w/his girlfriend and kids in a single level home. Per mother Niall Stewart will come stay with me [address], [place] when he gets out. \"  Last seen at PCP office approximately Oct 2022. [address] [facility] ([place] [address]) to  all medications. Independent w/all ADL's & IADL's and drives. No home O2. DME consists of a Nebulizer machine. No prior HH or IRF. Patient's insurance doesn't have a SNF benefit. Mother to transport him home at time of discharge.        Care Management Interventions  PCP Verified by CM: Yes (Oct 2022)  Mode of Transport at Discharge:  Other (see comment) (Family )  Transition of Care Consult (CM Consult): Discharge Planning  Discharge Durable Medical Equipment: No (Nebulizer machine)  Support Systems: Parent(s)  Confirm Follow Up Transport: Family  [facility]  [facility]

## 2022-11-29 ENCOUNTER — APPOINTMENT (OUTPATIENT)
Dept: GENERAL RADIOLOGY | Age: 32
DRG: 721 | End: 2022-11-29
Attending: INTERNAL MEDICINE
Payer: MEDICAID

## 2022-11-29 LAB
ACC. NO. FROM MICRO ORDER, ACCP: ABNORMAL
ACINETOBACTER CALCOACETICUS-BAUMANII COMPLEX, ACBCX: NOT DETECTED
ALBUMIN SERPL-MCNC: 1.8 G/DL (ref 3.5–5)
ANION GAP SERPL CALC-SCNC: 10 MMOL/L (ref 5–15)
ARTERIAL PATENCY WRIST A: YES
BACTERIA SPEC CULT: NORMAL
BACTEROIDES FRAGILIS, BFRA: NOT DETECTED
BASE EXCESS BLDA CALC-SCNC: 0.2 MMOL/L (ref 0–3)
BASOPHILS # BLD: 0 K/UL (ref 0–0.1)
BASOPHILS NFR BLD: 0 % (ref 0–1)
BDY SITE: ABNORMAL
BIOFIRE COMMENT, BCIDPF: ABNORMAL
BODY TEMPERATURE: 99.5
BUN SERPL-MCNC: 26 MG/DL (ref 6–20)
BUN/CREAT SERPL: 25 (ref 12–20)
C GLABRATA DNA VAG QL NAA+PROBE: NOT DETECTED
CA-I BLD-MCNC: 9.6 MG/DL (ref 8.5–10.1)
CANDIDA ALBICANS: NOT DETECTED
CANDIDA AURIS, CAAU: NOT DETECTED
CANDIDA KRUSEI, CKRP: NOT DETECTED
CANDIDA PARAPSILOSIS, CPAUP: NOT DETECTED
CANDIDA TROPICALIS, CTROP: NOT DETECTED
CHLORIDE SERPL-SCNC: 105 MMOL/L (ref 97–108)
CO2 SERPL-SCNC: 22 MMOL/L (ref 21–32)
COHGB MFR BLD: 0.3 % (ref 1–2)
CREAT SERPL-MCNC: 1.06 MG/DL (ref 0.7–1.3)
CRP SERPL-MCNC: 18.5 MG/DL (ref 0–0.6)
CRYPTO NEOFORMANS/GATTII, CRYNEG: NOT DETECTED
DIFFERENTIAL METHOD BLD: ABNORMAL
ENTEROBACTER CLOACAE COMPLEX, ECCP: NOT DETECTED
ENTEROBACTERALES SP. , ENBLS: NOT DETECTED
ENTEROCOCCUS FAECALIS, ENFA: NOT DETECTED
ENTEROCOCCUS FAECIUM, ENFAM: NOT DETECTED
EOSINOPHIL # BLD: 0 K/UL (ref 0–0.4)
EOSINOPHIL NFR BLD: 0 % (ref 0–7)
ERYTHROCYTE [DISTWIDTH] IN BLOOD BY AUTOMATED COUNT: 15.2 % (ref 11.5–14.5)
ESCHERICHIA COLI: NOT DETECTED
FIO2 ON VENT: 30 %
GAS FLOW.O2 O2 DELIVERY SYS: 45 L/MIN
GLUCOSE SERPL-MCNC: 153 MG/DL (ref 65–100)
HAEMOPHILUS INFLUENZAE, HMI: NOT DETECTED
HCO3 BLDA-SCNC: 23 MMOL/L (ref 22–26)
HCT VFR BLD AUTO: 30.2 % (ref 36.6–50.3)
HGB BLD-MCNC: 10.2 G/DL (ref 12.1–17)
IMM GRANULOCYTES # BLD AUTO: 0 K/UL
IMM GRANULOCYTES NFR BLD AUTO: 0 %
INR PPP: 2 (ref 0.9–1.1)
KLEBSIELLA AEROGENES, KLAE: NOT DETECTED
KLEBSIELLA OXYTOCA: NOT DETECTED
KLEBSIELLA PNEUMONIAE GROUP, KPPG: NOT DETECTED
LACTATE SERPL-SCNC: 2.2 MMOL/L (ref 0.4–2)
LISTERIA MONOCYTOGENES, LMONP: NOT DETECTED
LYMPHOCYTES # BLD: 0.2 K/UL (ref 0.8–3.5)
LYMPHOCYTES NFR BLD: 3 % (ref 12–49)
MCH RBC QN AUTO: 29.7 PG (ref 26–34)
MCHC RBC AUTO-ENTMCNC: 33.8 G/DL (ref 30–36.5)
MCV RBC AUTO: 88 FL (ref 80–99)
MECA/C AND MREJ (METHICILLIN RESISTANT GENE), MECAC: DETECTED
METAMYELOCYTES NFR BLD MANUAL: 2 %
METHGB MFR BLD: 0.4 % (ref 0–1.4)
MONOCYTES # BLD: 0.9 K/UL (ref 0–1)
MONOCYTES NFR BLD: 15 % (ref 5–13)
NEISSERIA MENINGITIDIS, NMNI: NOT DETECTED
NEUTS BAND NFR BLD MANUAL: 3 % (ref 0–6)
NEUTS SEG # BLD: 4.9 K/UL (ref 1.8–8)
NEUTS SEG NFR BLD: 77 % (ref 32–75)
NRBC # BLD: 0.02 K/UL (ref 0–0.01)
NRBC BLD-RTO: 0.3 PER 100 WBC
OXYHGB MFR BLD: 95.2 % (ref 95–99)
PCO2 BLDA: 32 MMHG (ref 35–45)
PERFORMED BY, TECHID: ABNORMAL
PH BLDA: 7.48 [PH] (ref 7.35–7.45)
PHOSPHATE SERPL-MCNC: 1.3 MG/DL (ref 2.6–4.7)
PLATELET # BLD AUTO: 146 K/UL (ref 150–400)
PMV BLD AUTO: 11 FL (ref 8.9–12.9)
PO2 BLDA: 78 MMHG (ref 80–100)
POTASSIUM SERPL-SCNC: 3.8 MMOL/L (ref 3.5–5.1)
PROCALCITONIN SERPL-MCNC: 25.52 NG/ML
PROTEUS, PRP: NOT DETECTED
PROTHROMBIN TIME: 22.5 SEC (ref 11.9–14.6)
PSEUDOMONAS AERUGINOSA: NOT DETECTED
RBC # BLD AUTO: 3.43 M/UL (ref 4.1–5.7)
RBC MORPH BLD: ABNORMAL
RESISTANT GENE SPACE, REGENE: ABNORMAL
SALMONELLA, SALMO: NOT DETECTED
SAO2 % BLD: 96 % (ref 95–99)
SAO2% DEVICE SAO2% SENSOR NAME: ABNORMAL
SERRATIA MARCESCENS: NOT DETECTED
SODIUM SERPL-SCNC: 137 MMOL/L (ref 136–145)
SPECIAL REQUESTS,SREQ: NORMAL
SPECIMEN SITE: ABNORMAL
STAPH EPIDERMIDIS, STEP: NOT DETECTED
STAPH LUGDUNENSIS, STALUG: NOT DETECTED
STAPHYLOCOCCUS AUREUS: DETECTED
STAPHYLOCOCCUS, STAPP: DETECTED
STENO MALTOPHILIA, STMA: NOT DETECTED
STREPTOCOCCUS , STPSP: NOT DETECTED
STREPTOCOCCUS AGALACTIAE (GROUP B): NOT DETECTED
STREPTOCOCCUS PNEUMONIAE , SPNP: NOT DETECTED
STREPTOCOCCUS PYOGENES (GROUP A), SPYOP: NOT DETECTED
VANCOMYCIN SERPL-MCNC: 6.8 UG/ML
WBC # BLD AUTO: 6.1 K/UL (ref 4.1–11.1)

## 2022-11-29 PROCEDURE — 74011250636 HC RX REV CODE- 250/636: Performed by: INTERNAL MEDICINE

## 2022-11-29 PROCEDURE — 83605 ASSAY OF LACTIC ACID: CPT

## 2022-11-29 PROCEDURE — 74011250637 HC RX REV CODE- 250/637: Performed by: NURSE PRACTITIONER

## 2022-11-29 PROCEDURE — 84145 PROCALCITONIN (PCT): CPT

## 2022-11-29 PROCEDURE — 74011250637 HC RX REV CODE- 250/637: Performed by: INTERNAL MEDICINE

## 2022-11-29 PROCEDURE — 86140 C-REACTIVE PROTEIN: CPT

## 2022-11-29 PROCEDURE — 85025 COMPLETE CBC W/AUTO DIFF WBC: CPT

## 2022-11-29 PROCEDURE — 77010033678 HC OXYGEN DAILY

## 2022-11-29 PROCEDURE — 80202 ASSAY OF VANCOMYCIN: CPT

## 2022-11-29 PROCEDURE — 80069 RENAL FUNCTION PANEL: CPT

## 2022-11-29 PROCEDURE — 74011000250 HC RX REV CODE- 250: Performed by: NURSE PRACTITIONER

## 2022-11-29 PROCEDURE — 74011000258 HC RX REV CODE- 258: Performed by: INTERNAL MEDICINE

## 2022-11-29 PROCEDURE — 74011000250 HC RX REV CODE- 250: Performed by: INTERNAL MEDICINE

## 2022-11-29 PROCEDURE — 36600 WITHDRAWAL OF ARTERIAL BLOOD: CPT

## 2022-11-29 PROCEDURE — 36415 COLL VENOUS BLD VENIPUNCTURE: CPT

## 2022-11-29 PROCEDURE — 65660000001 HC RM ICU INTERMED STEPDOWN

## 2022-11-29 PROCEDURE — 94640 AIRWAY INHALATION TREATMENT: CPT

## 2022-11-29 PROCEDURE — 99233 SBSQ HOSP IP/OBS HIGH 50: CPT | Performed by: INTERNAL MEDICINE

## 2022-11-29 PROCEDURE — 82803 BLOOD GASES ANY COMBINATION: CPT

## 2022-11-29 PROCEDURE — 71045 X-RAY EXAM CHEST 1 VIEW: CPT

## 2022-11-29 PROCEDURE — 74011250636 HC RX REV CODE- 250/636: Performed by: NURSE PRACTITIONER

## 2022-11-29 PROCEDURE — 85610 PROTHROMBIN TIME: CPT

## 2022-11-29 RX ORDER — CELECOXIB 100 MG/1
200 CAPSULE ORAL 2 TIMES DAILY
Status: DISCONTINUED | OUTPATIENT
Start: 2022-11-29 | End: 2022-12-02

## 2022-11-29 RX ORDER — MORPHINE SULFATE 2 MG/ML
2 INJECTION, SOLUTION INTRAMUSCULAR; INTRAVENOUS ONCE
Status: COMPLETED | OUTPATIENT
Start: 2022-11-29 | End: 2022-11-29

## 2022-11-29 RX ORDER — FUROSEMIDE 10 MG/ML
20 INJECTION INTRAMUSCULAR; INTRAVENOUS ONCE
Status: COMPLETED | OUTPATIENT
Start: 2022-11-29 | End: 2022-11-29

## 2022-11-29 RX ORDER — IPRATROPIUM BROMIDE AND ALBUTEROL SULFATE 2.5; .5 MG/3ML; MG/3ML
3 SOLUTION RESPIRATORY (INHALATION)
Status: DISCONTINUED | OUTPATIENT
Start: 2022-11-29 | End: 2022-11-30

## 2022-11-29 RX ORDER — MORPHINE SULFATE 2 MG/ML
1 INJECTION, SOLUTION INTRAMUSCULAR; INTRAVENOUS ONCE
Status: COMPLETED | OUTPATIENT
Start: 2022-11-29 | End: 2022-11-29

## 2022-11-29 RX ORDER — VANCOMYCIN/0.9 % SOD CHLORIDE 1.5G/250ML
1500 PLASTIC BAG, INJECTION (ML) INTRAVENOUS EVERY 8 HOURS
Status: DISCONTINUED | OUTPATIENT
Start: 2022-11-29 | End: 2022-12-08 | Stop reason: HOSPADM

## 2022-11-29 RX ADMIN — CELECOXIB 200 MG: 200 CAPSULE ORAL at 20:48

## 2022-11-29 RX ADMIN — Medication 1500 MG: at 09:12

## 2022-11-29 RX ADMIN — PIPERACILLIN AND TAZOBACTAM 3.38 G: 3; .375 INJECTION, POWDER, FOR SOLUTION INTRAVENOUS at 04:28

## 2022-11-29 RX ADMIN — MORPHINE SULFATE 2 MG: 2 INJECTION, SOLUTION INTRAMUSCULAR; INTRAVENOUS at 04:28

## 2022-11-29 RX ADMIN — WHITE PETROLATUM,ZINC OXIDE: 20; 75 PASTE TOPICAL at 08:28

## 2022-11-29 RX ADMIN — RIVAROXABAN 20 MG: 10 TABLET, FILM COATED ORAL at 08:27

## 2022-11-29 RX ADMIN — METHYLPREDNISOLONE SODIUM SUCCINATE 40 MG: 40 INJECTION, POWDER, FOR SOLUTION INTRAMUSCULAR; INTRAVENOUS at 05:03

## 2022-11-29 RX ADMIN — IPRATROPIUM BROMIDE AND ALBUTEROL SULFATE 3 ML: .5; 2.5 SOLUTION RESPIRATORY (INHALATION) at 20:35

## 2022-11-29 RX ADMIN — IPRATROPIUM BROMIDE AND ALBUTEROL SULFATE 3 ML: .5; 2.5 SOLUTION RESPIRATORY (INHALATION) at 13:52

## 2022-11-29 RX ADMIN — MUPIROCIN: 20 OINTMENT TOPICAL at 20:48

## 2022-11-29 RX ADMIN — MORPHINE SULFATE 1 MG: 2 INJECTION, SOLUTION INTRAMUSCULAR; INTRAVENOUS at 00:33

## 2022-11-29 RX ADMIN — BUDESONIDE 500 MCG: 0.5 INHALANT ORAL at 07:27

## 2022-11-29 RX ADMIN — OSELTAMIVIR PHOSPHATE 75 MG: 75 CAPSULE ORAL at 09:00

## 2022-11-29 RX ADMIN — PIPERACILLIN AND TAZOBACTAM 3.38 G: 3; .375 INJECTION, POWDER, FOR SOLUTION INTRAVENOUS at 13:20

## 2022-11-29 RX ADMIN — MORPHINE SULFATE 2 MG: 2 INJECTION, SOLUTION INTRAMUSCULAR; INTRAVENOUS at 08:16

## 2022-11-29 RX ADMIN — POTASSIUM PHOSPHATE, MONOBASIC POTASSIUM PHOSPHATE, DIBASIC: 224; 236 INJECTION, SOLUTION, CONCENTRATE INTRAVENOUS at 09:20

## 2022-11-29 RX ADMIN — MORPHINE SULFATE 2 MG: 2 INJECTION, SOLUTION INTRAMUSCULAR; INTRAVENOUS at 23:20

## 2022-11-29 RX ADMIN — BUDESONIDE 500 MCG: 0.5 INHALANT ORAL at 20:35

## 2022-11-29 RX ADMIN — FUROSEMIDE 20 MG: 10 INJECTION, SOLUTION INTRAMUSCULAR; INTRAVENOUS at 00:34

## 2022-11-29 RX ADMIN — Medication 1500 MG: at 18:08

## 2022-11-29 RX ADMIN — IPRATROPIUM BROMIDE AND ALBUTEROL SULFATE 3 ML: .5; 2.5 SOLUTION RESPIRATORY (INHALATION) at 07:27

## 2022-11-29 RX ADMIN — MUPIROCIN: 20 OINTMENT TOPICAL at 08:27

## 2022-11-29 RX ADMIN — SODIUM CHLORIDE 75 ML/HR: 9 INJECTION, SOLUTION INTRAVENOUS at 16:00

## 2022-11-29 RX ADMIN — MONTELUKAST 10 MG: 10 TABLET, FILM COATED ORAL at 08:27

## 2022-11-29 RX ADMIN — IPRATROPIUM BROMIDE AND ALBUTEROL SULFATE 3 ML: .5; 2.5 SOLUTION RESPIRATORY (INHALATION) at 00:59

## 2022-11-29 RX ADMIN — CELECOXIB 200 MG: 200 CAPSULE ORAL at 11:38

## 2022-11-29 RX ADMIN — OSELTAMIVIR PHOSPHATE 75 MG: 75 CAPSULE ORAL at 20:48

## 2022-11-29 RX ADMIN — CETIRIZINE HYDROCHLORIDE 10 MG: 10 TABLET, FILM COATED ORAL at 08:27

## 2022-11-29 RX ADMIN — PIPERACILLIN AND TAZOBACTAM 3.38 G: 3; .375 INJECTION, POWDER, FOR SOLUTION INTRAVENOUS at 20:48

## 2022-11-29 NOTE — PROGRESS NOTES
Progress Note    Patient: Yeison Howard MRN: [id number]  SSN: [id number]    YOB: 1990  Age: 34 y.o. Sex: male      Admit Date: 11/27/2022    LOS: 2 days     Subjective:   Patient with NHL followed for severe sepsis with positive blood cultures for GPC in clusters, Influenza A and possible pneumonia with sputum growing S. Aureus. He is afebrile with normal WBC but CRP and procal markedly elevated. Currently on IV Zosyn and Vancomycin. CXR today appears worsening. Patient sitting up in bedside chair on nasal cannula. No new complaints but inquiring about taking his Poratacath out since he feels it is not being flushed regularly and may not be needed. He is followed by Dr. Karyna Mchugh. Objective:     Vitals:    11/29/22 0600 11/29/22 0700 11/29/22 0727 11/29/22 0800   BP: 128/86 139/82  (!) 148/111   Pulse: (!) 106 (!) 101  (!) 114   Resp: (!) 33 26  22   Temp:  98.6 °F (37 °C)     SpO2: 99% 98% 99% 97%   Weight:       Height:            Intake and Output:  Current Shift: No intake/output data recorded. Last three shifts: 11/27 1901 - 11/29 0700  In: 2428.8 [P.O.:100; I.V.:2328.8]  Out: 2040 [Urine:2040]    Physical Exam:   Vitals and nursing note reviewed. Constitutional:       General: He is not in acute distress. Appearance: He is ill-appearing. HENT:  Nasal cannula     Mouth/Throat:      Pharynx: Oropharynx is clear. Eyes:      Pupils: Pupils are equal, round, and reactive to light. Cardiovascular:      Rate and Rhythm: Regular rhythm. Tachycardia present. Heart sounds: No murmur heard. Comments: [id number] right chest, site unremarkable with no erythema, ?mild tenderness  Pulmonary:      Effort: No respiratory distress. Breath sounds: Wheezing and rhonchi present. No rales. Chest:      Abdominal:      General: There is no distension. Palpations: Abdomen is soft. Tenderness: There is no abdominal tenderness.  There is no right CVA tenderness, left CVA tenderness or guarding. Genitourinary:     Comments: No Bonilla catheter  Musculoskeletal:      Cervical back: Neck supple. Right lower leg: No edema. Left lower leg: No edema. Skin:     Findings: No rash. Neurological:      General: No focal deficit present. Mental Status: He is alert and oriented to person, place, and time. Psychiatric:    normal behavior      Lab/Data Review:     WBC 6,100  , 000  Lactic 2.2    CRP 18.50  Procal 25.52 <18.89    MRSA Nasal PCR positive    Blood cultures (11/27) Probable Staphylococcus aureus  Sputum culture (11/27)  Staphylococcus aureus  Urine culture (11/27) No growth FINAL    CXR (11/29)      Assessment:     Active Problems:    Shortness of breath (11/27/2022)      Pneumonia (11/27/2022)      History of asthma (11/27/2022)      Influenza A (11/27/2022)      Severe sepsis ([address] (11/27/2022)    Severe sepsis with fever, leukopenia, elevated lactic acid and procal, CRP  Positive blood cultures with Gram positive cocci in clusters (both sets), possible CLABSI, Day #3 IV Vancomycin  Influenza A, Day #3 Tamiflu  Pneumonia, bilateral, presumably post-viral, secondary to Staphylococcus aureus, Day #3 IV Vancomycin  Acute hypoxic respiratory failure, on HFNC  Asthma  Non-Hodgkins' Lymphoma  MRSA nasal colonization, Day #2 Mupirocin       Comment:  S. Aureus in sputum supports post-viral pneumonia and source of bacteremia likely the lung rather than the Portacath, however, there may still be good reason to remove it if not likely to be utilized and not being flushed regularly. Plan:   1. Continue IV Vancomycin   2. Discontinue Zosyn  3. Follow-up blood cultures and sputum culture  4. Continue Mupirocin nasal ointment  5. In am, repeat CBC, procal and lactic acid, CRP  6. Continue Tamiflu  7.   Convey patient's inquiry regarding removing Portacath to Dr. Thierno Aguirre By: Mariana Pimentel MD     November 29, 2022

## 2022-11-29 NOTE — PROGRESS NOTES
Vancomycin Dosing Consult  Fercho Seaman is a 34 y.o. male with HAP and gram positive bacteremia. Pharmacy was consulted by Dr. Sidney Aleman to dose and monitor vancomycin. Today is day 3. Antibiotic regimen: Vancomycin + pip/tazo    Temp (24hrs), Av.1 °F (37.8 °C), Min:98.6 °F (37 °C), Max:102.2 °F (39 °C)    Recent Labs     22  0410 22  0416 22  1426 22  1425   WBC 6.1 2.1* 2.2*  --    CREA 1.06 1.03  --  1.50*   BUN 26* 16  --  20     Est CrCl: >100 mL/min  Concomitant nephrotoxic drugs: None    Cultures:    Blood: GPC in all 4 bottles   sputum: probable staph aureus  : urine: NGTD   blood culture ID panel: pending    MRSA Swab: Detected     Target range: AUC:BETTE 400-600    Recent level history:  Date/Time Dose & Interval Measured Level (mcg/mL) Associated AUC/BETTE   416 2000 mg x 1 3.3     0800 1500 mg IV q12h 6.8 357     Assessment/Plan:   Febrile to 102.2, blood cultures positive, sputum with probable staph aureus  AUC:BETTE is subtherapeutic. Vancomycin dose increased to 1500 mg IV q8h.   Predicted AUC:BETTE 534  Repeat level is ordered for  0900  Antimicrobial stop date TBD     Jamil Tolliver, PharmD, BCPS, BCCCP  Clinical Pharmacist   (available on [employer])

## 2022-11-29 NOTE — PROGRESS NOTES
Hospitalist Progress Note         Abraham Love MD          Daily Progress Note: 11/29/2022      Subjective: The patient is seen for follow  up. 33 yo male, hx of HTN, asthma, non-Hodgkin's lymphoma post chemotherapy, now on radiation, DVT, PE, here for shortness of breath. Pt was seen on 11/25 for similar, diagnosed with Flu A and sent home on Tamiflu. Workup thus far significant for CXR with evidence of bilateral pneumonia, labs suggestive of sepsis. Pt started on Vancomycin, Cefepime, Levaquin. --  The patient is seen for follow up. States that he feels far better today than he did yesterday with less shortness of breath. Currently on high-flow O2 with FiO2 of 30%, 50 L/min.  Lactic acid level down to 2.2    Problem List:  Problem List as of 11/29/2022 Date Reviewed: 8/15/2022            Codes Class Noted - Resolved    Shortness of breath ICD-10-CM: R06.02  ICD-9-CM: 786.05  11/27/2022 - Present        Pneumonia ICD-10-CM: J18.9  ICD-9-CM: 486  11/27/2022 - Present        History of asthma ICD-10-CM: Z87.09  ICD-9-CM: V12.69  11/27/2022 - Present        Influenza A ICD-10-CM: J10.1  ICD-9-CM: 487.1  11/27/2022 - Present        Severe sepsis ([facility] ICD-10-CM: A41.9, R65.20  ICD-9-CM: 038.9, 995.92  11/27/2022 - Present        Asthma ICD-10-CM: J45.909  ICD-9-CM: 493.90  5/7/2022 - Present        Acute respiratory failure with hypoxia ([facility] ICD-10-CM: J96.01  ICD-9-CM: 518.81  5/7/2022 - Present        Lymphoma ([facility] ICD-10-CM: C85.90  ICD-9-CM: 202.80  3/1/2022 - Present        Pelvic mass ICD-10-CM: R19.00  ICD-9-CM: 789.30  2/23/2022 - Present        Bilateral pulmonary embolism ([facility]) ICD-10-CM: I26.99  ICD-9-CM: 415.19  2/23/2022 - Present        Pulmonary embolism ([address] ICD-10-CM: I26.99  ICD-9-CM: 415.19  2/22/2022 - Present        Right inguinal pain ICD-10-CM: R10.31  ICD-9-CM: 789.03  2/17/2022 - Present        Right groin mass ICD-10-CM: R19.09  ICD-9-CM: 789.39 2/11/2022 - Present        Fall ICD-10-CM: W19. Kendell Alejandro  ICD-9-CM: E888.9  2/11/2022 - Present         Medications reviewed  Current Facility-Administered Medications   Medication Dose Route Frequency    potassium phosphate 30 mmol in 0.9% sodium chloride 250 mL infusion   IntraVENous ONCE    vancomycin (VANCOCIN) 1500 mg in  ml infusion  1,500 mg IntraVENous Q8H    [START ON 11/30/2022] Vancomycin - please draw level 11/30 0900. Thanks!    Other ONCE    albuterol-ipratropium (DUO-NEB) 2.5 MG-0.5 MG/3 ML  3 mL Nebulization Q6HWA RT    celecoxib (CELEBREX) capsule 200 mg  200 mg Oral BID    zinc oxide-white petrolatum 20-75 % topical paste   Topical DAILY    budesonide (PULMICORT) 500 mcg/2 ml nebulizer suspension  500 mcg Nebulization BID RT    0.9% sodium chloride infusion  75 mL/hr IntraVENous CONTINUOUS    mupirocin (BACTROBAN) 2 % ointment   Both Nostrils BID    piperacillin-tazobactam (ZOSYN) 3.375 g in 0.9% sodium chloride (MBP/ADV) 100 mL MBP  3.375 g IntraVENous Q8H    sodium chloride (NS) flush 5-10 mL  5-10 mL IntraVENous PRN    oseltamivir (TAMIFLU) capsule 75 mg  75 mg Oral BID    sodium chloride (NS) flush 5-40 mL  5-40 mL IntraVENous PRN    acetaminophen (TYLENOL) tablet 650 mg  650 mg Oral Q6H PRN    Or    acetaminophen (TYLENOL) suppository 650 mg  650 mg Rectal Q6H PRN    polyethylene glycol (MIRALAX) packet 17 g  17 g Oral DAILY PRN    bisacodyL (DULCOLAX) tablet 5 mg  5 mg Oral DAILY PRN    ondansetron (ZOFRAN ODT) tablet 4 mg  4 mg Oral Q6H PRN    Or    ondansetron (ZOFRAN) injection 4 mg  4 mg IntraVENous Q6H PRN    sodium chloride (NS) flush 5-10 mL  5-10 mL IntraVENous PRN    metoprolol (LOPRESSOR) injection 5 mg  5 mg IntraVENous Q3H PRN    morphine injection 2 mg  2 mg IntraVENous Q3H PRN    guaiFENesin-codeine (ROBITUSSIN AC) 100-10 mg/5 mL solution 10 mL  10 mL Oral Q4H PRN    Vancomycin - Pharmacy to Dose   Other Rx Dosing/Monitoring    cetirizine (ZYRTEC) tablet 10 mg  10 mg Oral DAILY fluticasone propionate (FLONASE) 50 mcg/actuation nasal spray 1 Spray  1 Spray Both Nostrils PRN    montelukast (SINGULAIR) tablet 10 mg  10 mg Oral DAILY    rivaroxaban (XARELTO) tablet 20 mg  20 mg Oral DAILY    hydrOXYzine (VISTARIL) injection 50 mg  50 mg IntraMUSCular Q6H PRN       Review of Systems:   A comprehensive review of systems was negative except for that written in the HPI. Objective:   Physical Exam:     Visit Vitals  /87   Pulse (!) 105   Temp 98.2 °F (36.8 °C)   Resp 22   Ht 5' 6\" (1.676 m)   Wt 108 kg (238 lb 1.6 oz)   SpO2 96%   BMI 38.43 kg/m²    O2 Flow Rate (L/min): 4 l/min O2 Device: Nasal cannula    Temp (24hrs), Av.8 °F (37.7 °C), Min:98.2 °F (36.8 °C), Max:102.2 °F (39 °C)    No intake/output data recorded. 1901 -  0700  In: 2428.8 [P.O.:100; I.V.:2328.8]  Out:  [Urine:]    General:  Alert, cooperative, no distress, appears stated age. Lungs:   Clear to auscultation bilaterally. Chest wall:  No tenderness or deformity. Heart:  Regular rate and rhythm, S1, S2 normal, no murmur, click, rub or gallop. Abdomen:   Soft, non-tender. Bowel sounds normal. No masses,  No organomegaly. Extremities: Extremities normal, atraumatic, no cyanosis or edema. Pulses: 2+ and symmetric all extremities. Skin: Skin color, texture, turgor normal. No rashes or lesions   Neurologic: CNII-XII intact.  No gross sensory or motor deficits     Data Review:       Recent Days:  Recent Labs     22  1426   WBC 6.1 2.1* 2.2*   HGB 10.2* 10.7* 13.0   HCT 30.2* 31.7* 38.0   * 107* 145*       Recent Labs     22  04122  1428 22  1425    135*  --  129*   K 3.8 4.3  --  3.8    106  --  93*   CO2 22 20*  --  20*   * 163*  --  185*   BUN 26* 16  --  20   CREA 1.06 1.03  --  1.50*   CA 9.6 8.8  --  8.7   MG  --   --  2.2  --    PHOS 1.3*  --   --   --    ALB 1.8*  --   --  2.6*   TBILI --   --   --  3.4*   ALT  --   --   --  60   INR 2.0*  --  1.3*  --        Recent Labs     11/29/22  0441 11/27/22  1514 11/27/22  1436   PH 7.48* 7.53*  --    PCO2 32* 22*  --    PO2 78* 73*  --    HCO3 23 18*  --    FIO2 30  --  21.0         24 Hour Results:  Recent Results (from the past 24 hour(s))   BLOOD CULTURE ID PANEL    Collection Time: 11/28/22  2:20 PM    Specimen: Blood   Result Value Ref Range    Acc. no. from Micro Order Blood Culture Bottle      Enterococcus faecalis Not detected Not detected    Enterococcus faecium Not detected Not detected    Listeria monocytogenes Not detected Not detected    Staphylococcus Detected (A) Not detected      Staphylococcus aureus Detected (A) Not detected      Staph epidermidis Not detected Not detected    Staph lugdunensis Not detected Not detected    Streptococcus Not detected Not detected    Streptococcus agalactiae (Group B) Not detected Not detected    Streptococcus pneumoniae Not detected Not detected    Streptococcus pyogenes (Group A) Not detected Not detected    Acinetobacter calcoaceticus-baumanii complex Not detected Not detected    Bacteroides fragilis Not detected Not detected    Enterobacterales species Not detected Not detected    Enterobacter cloacae complex Not detected Not detected    Escherichia coli Not detected Not detected    Klebsiella aerogenes Not detected Not detected    Klebsiella oxytoca Not detected Not detected    Klebsiella pneumoniae group Not detected Not detected    Proteus Not detected Not detected    Salmonella Not detected Not detected    Serratia marcescens Not detected Not detected    Haemophilus influenzae Not detected Not detected    Neisseria meningitidis Not detected Not detected    Pseudomonas aeruginosa Not detected Not detected    Steno maltophilia Not detected Not detected    Candida albicans Not detected Not detected    Candida auris Not detected Not detected    Candida glabrata Not detected Not detected    Candida krusei Not detected Not detected    Candida parapsilosis Not detected Not detected    Candida tropicalis Not detected Not detected    Crypto neoformans/gattii Not detected Not detected    RESISTANT GENES:        mecA/C and MREJ (Methicillin resistant gene) Detected (A) Not detected      Comment       False positive results may rarely occur. Correlate with   RENAL FUNCTION PANEL    Collection Time: 11/29/22  4:10 AM   Result Value Ref Range    Sodium 137 136 - 145 mmol/L    Potassium 3.8 3.5 - 5.1 mmol/L    Chloride 105 97 - 108 mmol/L    CO2 22 21 - 32 mmol/L    Anion gap 10 5 - 15 mmol/L    Glucose 153 (H) 65 - 100 mg/dL    BUN 26 (H) 6 - 20 mg/dL    Creatinine 1.06 0.70 - 1.30 mg/dL    BUN/Creatinine ratio 25 (H) 12 - 20      eGFR >60 >60 ml/min/1.73m2    Calcium 9.6 8.5 - 10.1 mg/dL    Phosphorus 1.3 (L) 2.6 - 4.7 mg/dL    Albumin 1.8 (L) 3.5 - 5.0 g/dL   PROTHROMBIN TIME + INR    Collection Time: 11/29/22  4:10 AM   Result Value Ref Range    Prothrombin time 22.5 (H) 11.9 - 14.6 sec    INR 2.0 (H) 0.9 - 1.1     C REACTIVE PROTEIN, QT    Collection Time: 11/29/22  4:10 AM   Result Value Ref Range    C-Reactive protein 18.50 (H) 0.00 - 0.60 mg/dL   CBC WITH AUTOMATED DIFF    Collection Time: 11/29/22  4:10 AM   Result Value Ref Range    WBC 6.1 4.1 - 11.1 K/uL    RBC 3.43 (L) 4.10 - 5.70 M/uL    HGB 10.2 (L) 12.1 - 17.0 g/dL    HCT 30.2 (L) 36.6 - 50.3 %    MCV 88.0 80.0 - 99.0 FL    MCH 29.7 26.0 - 34.0 PG    MCHC 33.8 30.0 - 36.5 g/dL    RDW 15.2 (H) 11.5 - 14.5 %    PLATELET 688 (L) 354 - 400 K/uL    MPV 11.0 8.9 - 12.9 FL    NRBC 0.3 (H) 0.0  WBC    ABSOLUTE NRBC 0.02 (H) 0.00 - 0.01 K/uL    NEUTROPHILS 77 (H) 32 - 75 %    BAND NEUTROPHILS 3 0 - 6 %    LYMPHOCYTES 3 (L) 12 - 49 %    MONOCYTES 15 (H) 5 - 13 %    EOSINOPHILS 0 0 - 7 %    BASOPHILS 0 0 - 1 %    METAMYELOCYTES 2 (H) 0 %    IMMATURE GRANULOCYTES 0 %    ABS. NEUTROPHILS 4.9 1.8 - 8.0 K/UL    ABS. LYMPHOCYTES 0.2 (L) 0.8 - 3.5 K/UL    ABS. MONOCYTES 0.9 0.0 - 1.0 K/UL    ABS. EOSINOPHILS 0.0 0.0 - 0.4 K/UL    ABS. BASOPHILS 0.0 0.0 - 0.1 K/UL    ABS. IMM. GRANS. 0.0 K/UL    DF Manual      RBC COMMENTS Normocytic, Normochromic     PROCALCITONIN    Collection Time: 11/29/22  4:10 AM   Result Value Ref Range    Procalcitonin 25.52 (H) 0 ng/mL   LACTIC ACID    Collection Time: 11/29/22  4:10 AM   Result Value Ref Range    Lactic acid 2.2 (HH) 0.4 - 2.0 mmol/L   BLOOD GAS, ARTERIAL    Collection Time: 11/29/22  4:41 AM   Result Value Ref Range    pH 7.48 (H) 7.35 - 7.45      PCO2 32 (L) 35 - 45 mmHg    PO2 78 (L) 80 - 100 mmHg    O2 SATURATION 96 95 - 99 %    BICARBONATE 23 22 - 26 mmol/L    BASE EXCESS 0.2 0 - 3 mmol/L    O2 METHOD High Flow Nasal Cannula      O2 FLOW RATE 45.00 L/min    FIO2 30 %    Sample source Arterial      SITE Right Radial      DONA'S TEST YES      Carboxy-Hgb 0.3 (L) 1 - 2 %    Methemoglobin 0.4 0 - 1.4 %    Oxyhemoglobin 95.2 95 - 99 %    Performed by Solis Calvin     TEMPERATURE 99.5     VANCOMYCIN, RANDOM    Collection Time: 11/29/22  8:00 AM   Result Value Ref Range    Vancomycin, random 6.8 ug/mL           Assessment/     Acute hypoxic respiratory failure    -Secondary to bilateral pneumonia    -Continue cefepime, vancomycin and Levaquin    -Wean off high flow oxygen as tolerated    Influenza A    -Continue on Tamiflu through November 29    Bilateral pneumonia    -Suspect community-acquired pneumonia    -Continue IV Zosyn and IV vancomycin  Add incentive spirometer for deep breathing  History of non-Hodgkin's lymphoma    -Completed chemotherapy    -Immunocompromise status    Prior history of DVT and PE    -On Xarelto    Benign essential hypertension   -Fairly stable    Hypophosphatemia     -Replete with 30 mmol of potassium phosphate now      Plan:  Continue supportive care  Treatment plan as outlined above  Add incentive spirometer for deep breathing  CODE STATUS addressed.   He is full code  Continue present ICU care      Care Plan discussed with: Nurse    Total time spent with patient: 30 minutes.     Mendel Homer, MD

## 2022-11-29 NOTE — PROGRESS NOTES
Notified by Lakisha Nicole RN that pt received dose of PRN pain medication prior to bath. During bath, pt accidentally rolled over onto cardiac monitoring cable resulting in sharp pain in L side that did not recede once pt rolled off of cable and given time to rest. Pt noted to become diaphoretic and SOB after incident. I listened to pt at bedside and auscultated fine crackles throughout all lung fields. NS gtt stopped and MD Alcocer notified of events. Additional dose of morphine ordered as well as dose of lasix. MD Alcocer okay for NS gtt to remain off at this time. RT Caden Steve also aware of events and will give pt additional breathing treatments. Will continue to monitor.

## 2022-11-29 NOTE — PROGRESS NOTES
IMPRESSION:   Acute hypoxic respiratory failure currently on nasal high flow have decreased to 30%  Exacerbation of asthma improved  Influenza A  Bilateral upper lobe pneumonia possible MRSA  Pleurisy  Hypophosphatemia to be repleted  Nasal MRSA smear positive  Severe sepsis  History of DVT/PE on Xarelto  hx of non-Hodgkin's lymphoma status postchemotherapy on radiation treatment  Gastroesophageal reflux disease        RECOMMENDATIONS/PLAN:   ICU monitoring  28-year-old male came in because of shortness of breath and dyspnea and critical condition nonrebreather mask 100% FiO2  Currently on nasal high flow 45 L 30% we will switch to nasal oxygen 4 L and taper  Questionable pleurisy from pneumonia we will add Celebrex  Continue nebulized albuterol Atrovent  budesonide and discontinue IV Solu-Medrol  Currently on Zosyn and vancomycin  On Tamiflu to complete 11/29       [x] High complexity decision making was performed  [x] See my orders for details  HPI  28-year-old male came in because of shortness of breath and dyspnea, he has significant past medical history of asthma non-Hodgkin's lymphoma status postchemotherapy currently on radiation treatment also had a history of DVT and pulmonary embolism he was positive with influenza A and he was sent home with Tamiflu but his condition got worse came to the emergency room he is very short of breath 100% FiO2 nonrebreather mask electrolyte imbalance having chest discomfort diaphoretic he has not smoked in the past 4 days he smokes marijuana tachypneic tachycardic so admitted and critical care consult was called. PMH:  has a past medical history of Asthma, DVT (deep venous thrombosis) ([address] Hypertension, Lymphoma ([address] PE (pulmonary thromboembolism) ([address] and Thromboembolus ([address] PSH:   has a past surgical history that includes hx other surgical (Right, 03/02/2022); ir fluoro guide plc cvad (3/30/2022); and ir insert tunl cvc w port over 5 years (3/30/2022). FHX: family history includes Cancer in his maternal grandfather; Diabetes in his mother; Hypertension in his mother; Myasthenia Gravis in his maternal grandmother; No Known Problems in his father. SHX:  reports that he quit smoking about 7 years ago. His smoking use included cigarettes. He has never used smokeless tobacco. He reports that he does not currently use alcohol. He reports that he does not currently use drugs after having used the following drugs: Marijuana. ALL: No Known Allergies     MEDS:   [x] Reviewed - As Below   [] Not reviewed    Current Facility-Administered Medications   Medication    potassium phosphate 30 mmol in 0.9% sodium chloride 250 mL infusion    vancomycin (VANCOCIN) 1500 mg in  ml infusion    [START ON 11/30/2022] Vancomycin - please draw level 11/30 0900.   Thanks!    zinc oxide-white petrolatum 20-75 % topical paste    budesonide (PULMICORT) 500 mcg/2 ml nebulizer suspension    0.9% sodium chloride infusion    mupirocin (BACTROBAN) 2 % ointment    piperacillin-tazobactam (ZOSYN) 3.375 g in 0.9% sodium chloride (MBP/ADV) 100 mL MBP    sodium chloride (NS) flush 5-10 mL    oseltamivir (TAMIFLU) capsule 75 mg    sodium chloride (NS) flush 5-40 mL    acetaminophen (TYLENOL) tablet 650 mg    Or    acetaminophen (TYLENOL) suppository 650 mg    polyethylene glycol (MIRALAX) packet 17 g    bisacodyL (DULCOLAX) tablet 5 mg    ondansetron (ZOFRAN ODT) tablet 4 mg    Or    ondansetron (ZOFRAN) injection 4 mg    albuterol-ipratropium (DUO-NEB) 2.5 MG-0.5 MG/3 ML    sodium chloride (NS) flush 5-10 mL    metoprolol (LOPRESSOR) injection 5 mg    morphine injection 2 mg    methylPREDNISolone (PF) (SOLU-MEDROL) injection 40 mg    guaiFENesin-codeine (ROBITUSSIN AC) 100-10 mg/5 mL solution 10 mL    Vancomycin - Pharmacy to Dose    cetirizine (ZYRTEC) tablet 10 mg    fluticasone propionate (FLONASE) 50 mcg/actuation nasal spray 1 [place]    montelukast (SINGULAIR) tablet 10 mg rivaroxaban (XARELTO) tablet 20 mg    hydrOXYzine (VISTARIL) injection 50 mg      MAR reviewed and pertinent medications noted or modified as needed   Current Facility-Administered Medications   Medication    potassium phosphate 30 mmol in 0.9% sodium chloride 250 mL infusion    vancomycin (VANCOCIN) 1500 mg in  ml infusion    [START ON 11/30/2022] Vancomycin - please draw level 11/30 0900. Thanks!    zinc oxide-white petrolatum 20-75 % topical paste    budesonide (PULMICORT) 500 mcg/2 ml nebulizer suspension    0.9% sodium chloride infusion    mupirocin (BACTROBAN) 2 % ointment    piperacillin-tazobactam (ZOSYN) 3.375 g in 0.9% sodium chloride (MBP/ADV) 100 mL MBP    sodium chloride (NS) flush 5-10 mL    oseltamivir (TAMIFLU) capsule 75 mg    sodium chloride (NS) flush 5-40 mL    acetaminophen (TYLENOL) tablet 650 mg    Or    acetaminophen (TYLENOL) suppository 650 mg    polyethylene glycol (MIRALAX) packet 17 g    bisacodyL (DULCOLAX) tablet 5 mg    ondansetron (ZOFRAN ODT) tablet 4 mg    Or    ondansetron (ZOFRAN) injection 4 mg    albuterol-ipratropium (DUO-NEB) 2.5 MG-0.5 MG/3 ML    sodium chloride (NS) flush 5-10 mL    metoprolol (LOPRESSOR) injection 5 mg    morphine injection 2 mg    methylPREDNISolone (PF) (SOLU-MEDROL) injection 40 mg    guaiFENesin-codeine (ROBITUSSIN AC) 100-10 mg/5 mL solution 10 mL    Vancomycin - Pharmacy to Dose    cetirizine (ZYRTEC) tablet 10 mg    fluticasone propionate (FLONASE) 50 mcg/actuation nasal spray 1 [place]    montelukast (SINGULAIR) tablet 10 mg    rivaroxaban (XARELTO) tablet 20 mg    hydrOXYzine (VISTARIL) injection 50 mg      PMH:  has a past medical history of Asthma, DVT (deep venous thrombosis) (H[facility]), Hypertension, Lymphoma ([address] PE (pulmonary thromboembolism) ([address] and Thromboembolus ([address]   PSH:   has a past surgical history that includes hx other surgical (Right, 03/02/2022); ir fluoro guide plc cvad (3/30/2022); and ir insert tunl cvc w port over 5 years (3/30/2022). FHX: family history includes Cancer in his maternal grandfather; Diabetes in his mother; Hypertension in his mother; Myasthenia Gravis in his maternal grandmother; No Known Problems in his father. SHX:  reports that he quit smoking about 7 years ago. His smoking use included cigarettes. He has never used smokeless tobacco. He reports that he does not currently use alcohol. He reports that he does not currently use drugs after having used the following drugs: Marijuana. ROS:A comprehensive review of systems was negative except for that written in the HPI. Hemodynamics:    CO:    CI:    CVP:    SVR:   PAP Systolic:    PAP Diastolic:    PVR:    VD48:        Ventilator Settings:      Mode Rate TV Press PEEP FiO2 PIP Min. Vent               30 %              Vital Signs: Telemetry:    normal sinus rhythm Intake/Output:   Visit Vitals  /87   Pulse (!) 105   Temp 98.6 °F (37 °C)   Resp 22   Ht 5' 6\" (1.676 m)   Wt 108 kg (238 lb 1.6 oz)   SpO2 98%   BMI 38.43 kg/m²       Temp (24hrs), Av.1 °F (37.8 °C), Min:98.6 °F (37 °C), Max:102.2 °F (39 °C)        O2 Device: Heated, Hi flow nasal cannula O2 Flow Rate (L/min): 40 l/min       Wt Readings from Last 4 Encounters:   22 108 kg (238 lb 1.6 oz)   22 104.3 kg (230 lb)   22 104.3 kg (230 lb)   22 116.1 kg (256 lb)          Intake/Output Summary (Last 24 hours) at 2022 1033  Last data filed at 2022 0500  Gross per 24 hour   Intake 1928.75 ml   Output 1165 ml   Net 763.75 ml         Last shift:      No intake/output data recorded. Last 3 shifts: 1901 -  0700  In: 2428.8 [P.O.:100;  I.V.:2328.8]  Out:  [Urine:]       Physical Exam:     General: Awake alert mild distress no accessory muscle use currently on nasal high flow FiO2 30%  HEENT: NCAT, normal oral mucosa  Eyes: anicteric; conjunctiva clear extraocular movements intact  Neck: no nodes,  trach midline; no accessory MM use.  Chest: no deformity,   Cardiac: Rapid regular rhythm  Lungs: Coarse exhalation but no wheezing today does have rales posterior upper and mid lungs  Abd: Soft nontender positive bowel sounds  Ext: no edema; no joint swelling; No clubbing  : clear urine  Neuro: Awake alert oriented moving all extremities  Psych-calm oriented  Skin: warm, dry, no cyanosis;   Pulses: Brachial and radial pulses intact  Capillary: Normal capillary refill      DATA:    MAR reviewed and pertinent medications noted or modified as needed  MEDS:   Current Facility-Administered Medications   Medication    potassium phosphate 30 mmol in 0.9% sodium chloride 250 mL infusion    vancomycin (VANCOCIN) 1500 mg in  ml infusion    [START ON 11/30/2022] Vancomycin - please draw level 11/30 0900.   Thanks!    zinc oxide-white petrolatum 20-75 % topical paste    budesonide (PULMICORT) 500 mcg/2 ml nebulizer suspension    0.9% sodium chloride infusion    mupirocin (BACTROBAN) 2 % ointment    piperacillin-tazobactam (ZOSYN) 3.375 g in 0.9% sodium chloride (MBP/ADV) 100 mL MBP    sodium chloride (NS) flush 5-10 mL    oseltamivir (TAMIFLU) capsule 75 mg    sodium chloride (NS) flush 5-40 mL    acetaminophen (TYLENOL) tablet 650 mg    Or    acetaminophen (TYLENOL) suppository 650 mg    polyethylene glycol (MIRALAX) packet 17 g    bisacodyL (DULCOLAX) tablet 5 mg    ondansetron (ZOFRAN ODT) tablet 4 mg    Or    ondansetron (ZOFRAN) injection 4 mg    albuterol-ipratropium (DUO-NEB) 2.5 MG-0.5 MG/3 ML    sodium chloride (NS) flush 5-10 mL    metoprolol (LOPRESSOR) injection 5 mg    morphine injection 2 mg    methylPREDNISolone (PF) (SOLU-MEDROL) injection 40 mg    guaiFENesin-codeine (ROBITUSSIN AC) 100-10 mg/5 mL solution 10 mL    Vancomycin - Pharmacy to Dose    cetirizine (ZYRTEC) tablet 10 mg    fluticasone propionate (FLONASE) 50 mcg/actuation nasal spray 1 [place]    montelukast (SINGULAIR) tablet 10 mg    rivaroxaban (XARELTO) tablet 20 mg hydrOXYzine (VISTARIL) injection 50 mg        Labs:    Recent Labs     11/29/22  0410 11/28/22  0416 11/27/22  1428 11/27/22  1426   WBC 6.1 2.1*  --  2.2*   HGB 10.2* 10.7*  --  13.0   * 107*  --  145*   INR 2.0*  --  1.3*  --    APTT  --   --  30.5  --        Recent Labs     11/29/22  0410 11/28/22  0847 11/28/22  0416 11/27/22  1615 11/27/22  1428 11/27/22  1425     --  135*  --   --  129*   K 3.8  --  4.3  --   --  3.8     --  106  --   --  93*   CO2 22  --  20*  --   --  20*   *  --  163*  --   --  185*   BUN 26*  --  16  --   --  20   CREA 1.06  --  1.03  --   --  1.50*   CA 9.6  --  8.8  --   --  8.7   MG  --   --   --   --  2.2  --    PHOS 1.3*  --   --   --   --   --    LAC 2.2* 3.3* 3.4*   < >  --  5.2*   ALB 1.8*  --   --   --   --  2.6*   ALT  --   --   --   --   --  60    < > = values in this interval not displayed.        Recent Labs     11/29/22  0441 11/27/22  1514 11/27/22  1436   PH 7.48* 7.53*  --    PCO2 32* 22*  --    PO2 78* 73*  --    HCO3 23 18*  --    FIO2 30  --  21.0     11/29 nasal high flow 45 L FiO2 30%   11/28 nasal high flow 45 L FiO2 75% oxygen saturation 100%  Nasal MRSA smear positive  Influenza A positive  Procalcitonin 25.5, 18.8  CRP 18.5  Blood culture gram-positive cocci  Sputum culture 3+ polys gram-positive cocci probable staph aureus  Urine culture pending    Troponin 19, 136  3/3/2022 Echo ejection fraction 65% left atrium 3.2 cm normal IVC  Lab Results   Component Value Date/Time    Culture result:  11/27/2022 10:00 PM     Moderate Probable Staphylococcus aureus Sensitivity to follow    Culture result: Light Normal respiratory heri 11/27/2022 10:00 PM    Culture result:  11/27/2022 02:26 PM     Gram pos cocci in clusters growing in all 4 bottles drawn No Site Indicated    Culture result:  11/27/2022 02:26 PM     (NOTE) [facility] IN CLUSTERS4 OF 4 BOTTLES CALLED TO AND READ BACK BY [facility] RN AT 1913 PER DLU ON [id number] Imaging:    Results from Hospital Encounter encounter on 11/27/22    XR CHEST PORT    Narrative  INDICATION: chf    EXAMINATION:  AP CHEST, PORTABLE    COMPARISON: 11/27/2022    FINDINGS: Single AP portable view of the chest demonstrates unchanged right IJ  Port-A-Cath. The cardiomediastinal silhouette is unchanged. Apparent increase in  extensive bilateral upper and midlung zone consolidation, with slightly  decreased depth of inspiration. No significant pleural effusion. No  pneumothorax. Impression  Worsening bilateral upper and midlung zone airspace disease, may be accentuated  by lower depth of inspiration. Results from [place] encounter on 11/22/22    CT ABD PELV W CONT    Narrative  EXAM:  CT ABD PELV W CONT    INDICATION: Abdominal pain. History of lymphoma. COMPARISON: CT 2/11/2022. TECHNIQUE: Helical CT of the abdomen  and pelvis  following the uneventful  intravenous administration of nonionic contrast.  Coronal and sagittal reformats  are performed. CT dose reduction was achieved through use of a standardized  protocol tailored for this examination and automatic exposure control for dose  modulation. FINDINGS:  The visualized lung bases demonstrate no mass or consolidation. The heart size  is normal. There is no pericardial or pleural effusion. The liver, spleen, pancreas, and adrenal glands are normal. There are probable  gallstones without intra- or extra-hepatic biliary dilatation. The kidneys are symmetric without hydronephrosis. The proximal colon is fluid-filled, and there is a long segment of ileal wall  thickening. There are no dilated bowel loops. The appendix is normal.    Right inguinal posttreatment changes are noted with no lymphadenopathy on the  current study. There are no enlarged lymph nodes. There is no free fluid or  free air. The aorta is normal in caliber. The urinary bladder is normal.  There is no pelvic mass.  The prostate is not  enlarged. The bony structures are age-appropriate. Impression  Findings of enterocolitis likely due to nonspecific infection or inflammation. 11/28 he states he feels a little better his oxygenation has improved have decreased to FiO2 65%. He states his discolored sputum is starting to clear up. Despite this his chest x-ray shows worsening extensive bilateral upper and mid neck infiltrates. Nasal MRSA smear is positive and he has been placed on vancomycin. All cultures are pending at this point. He has severe exacerbation of asthma we will continue nebulized albuterol Atrovent add budesonide continue IV Solu-Medrol will hopefully taper it rapidly  11/29 left chest discomfort he states after drinking cold liquids today it may have some pleuritic component. Pleurisy not unexpected with his extensive pneumonia we will add Celebrex. No wheezing today we will discontinue Solu-Medrol.   Blood has gram-positive cocci sputum has 3+ polys with possible staph aureus may have MRSA septicemia and pneumonia currently on vancomycin would maintain it  Time of care 35 minutes  Joan Lamar MD

## 2022-11-29 NOTE — PROGRESS NOTES
OT evaluation order received and acknowledged. OT evaluation attempted at 1125 however pt still not feeling well, now with MRSA infection and per IDR requesting to hold OT/PT at this time. Will continue to follow and re-attempt OT eval at a later time. Thank you.

## 2022-11-29 NOTE — PROGRESS NOTES
Bedside and Verbal shift change report given to [facility] (oncoming nurse) by Ryan Cooley (offgoing nurse). Report included the following information SBAR, Kardex, Intake/Output, MAR, Recent Results, and Cardiac Rhythm Sinus Tachycardia .

## 2022-11-29 NOTE — PROGRESS NOTES
Attempted to see pt for PT evaluation; however, pt still not feeling well, now with MRSA infection in his blood. Will continue to hold PT evaluation until pt is medically appropriate. Thank you!

## 2022-11-30 LAB
ALBUMIN SERPL-MCNC: 1.8 G/DL (ref 3.5–5)
ANION GAP SERPL CALC-SCNC: 6 MMOL/L (ref 5–15)
BACTERIA SPEC CULT: NORMAL
BASOPHILS # BLD: 0 K/UL (ref 0–0.1)
BASOPHILS NFR BLD: 0 % (ref 0–1)
BUN SERPL-MCNC: 29 MG/DL (ref 6–20)
BUN/CREAT SERPL: 38 (ref 12–20)
CA-I BLD-MCNC: 8.8 MG/DL (ref 8.5–10.1)
CHLORIDE SERPL-SCNC: 109 MMOL/L (ref 97–108)
CO2 SERPL-SCNC: 25 MMOL/L (ref 21–32)
CREAT SERPL-MCNC: 0.77 MG/DL (ref 0.7–1.3)
CRP SERPL-MCNC: 9.57 MG/DL (ref 0–0.6)
DIFFERENTIAL METHOD BLD: ABNORMAL
EOSINOPHIL # BLD: 0 K/UL (ref 0–0.4)
EOSINOPHIL NFR BLD: 0 % (ref 0–7)
ERYTHROCYTE [DISTWIDTH] IN BLOOD BY AUTOMATED COUNT: 15.8 % (ref 11.5–14.5)
GLUCOSE SERPL-MCNC: 135 MG/DL (ref 65–100)
GRAM STN SPEC: NORMAL
HCT VFR BLD AUTO: 30.1 % (ref 36.6–50.3)
HGB BLD-MCNC: 10.2 G/DL (ref 12.1–17)
IMM GRANULOCYTES # BLD AUTO: 0 K/UL
IMM GRANULOCYTES NFR BLD AUTO: 0 %
LACTATE SERPL-SCNC: 1.5 MMOL/L (ref 0.4–2)
LYMPHOCYTES # BLD: 0 K/UL (ref 0.8–3.5)
LYMPHOCYTES NFR BLD: 0 % (ref 12–49)
MCH RBC QN AUTO: 29.7 PG (ref 26–34)
MCHC RBC AUTO-ENTMCNC: 33.9 G/DL (ref 30–36.5)
MCV RBC AUTO: 87.8 FL (ref 80–99)
METAMYELOCYTES NFR BLD MANUAL: 8 %
MONOCYTES # BLD: 0.2 K/UL (ref 0–1)
MONOCYTES NFR BLD: 2 % (ref 5–13)
MYELOCYTES NFR BLD MANUAL: 3 %
NEUTS BAND NFR BLD MANUAL: 4 % (ref 0–6)
NEUTS SEG # BLD: 8.1 K/UL (ref 1.8–8)
NEUTS SEG NFR BLD: 83 % (ref 32–75)
NRBC # BLD: 0.03 K/UL (ref 0–0.01)
NRBC BLD-RTO: 0.3 PER 100 WBC
PHOSPHATE SERPL-MCNC: 1.7 MG/DL (ref 2.6–4.7)
PLATELET # BLD AUTO: 156 K/UL (ref 150–400)
PMV BLD AUTO: 10.6 FL (ref 8.9–12.9)
POTASSIUM SERPL-SCNC: 4.1 MMOL/L (ref 3.5–5.1)
PROCALCITONIN SERPL-MCNC: 11.81 NG/ML
RBC # BLD AUTO: 3.43 M/UL (ref 4.1–5.7)
RBC MORPH BLD: ABNORMAL
SODIUM SERPL-SCNC: 140 MMOL/L (ref 136–145)
SPECIAL REQUESTS,SREQ: NORMAL
VANCOMYCIN SERPL-MCNC: 14.7 UG/ML
WBC # BLD AUTO: 9.3 K/UL (ref 4.1–11.1)
WBC MORPH BLD: ABNORMAL

## 2022-11-30 PROCEDURE — 74011250637 HC RX REV CODE- 250/637: Performed by: NURSE PRACTITIONER

## 2022-11-30 PROCEDURE — 86140 C-REACTIVE PROTEIN: CPT

## 2022-11-30 PROCEDURE — 77010033678 HC OXYGEN DAILY

## 2022-11-30 PROCEDURE — 65660000001 HC RM ICU INTERMED STEPDOWN

## 2022-11-30 PROCEDURE — 80202 ASSAY OF VANCOMYCIN: CPT

## 2022-11-30 PROCEDURE — 94761 N-INVAS EAR/PLS OXIMETRY MLT: CPT

## 2022-11-30 PROCEDURE — 85025 COMPLETE CBC W/AUTO DIFF WBC: CPT

## 2022-11-30 PROCEDURE — 74011250636 HC RX REV CODE- 250/636: Performed by: INTERNAL MEDICINE

## 2022-11-30 PROCEDURE — 84145 PROCALCITONIN (PCT): CPT

## 2022-11-30 PROCEDURE — 99233 SBSQ HOSP IP/OBS HIGH 50: CPT | Performed by: INTERNAL MEDICINE

## 2022-11-30 PROCEDURE — 83605 ASSAY OF LACTIC ACID: CPT

## 2022-11-30 PROCEDURE — 74011250637 HC RX REV CODE- 250/637: Performed by: INTERNAL MEDICINE

## 2022-11-30 PROCEDURE — 74011000258 HC RX REV CODE- 258: Performed by: INTERNAL MEDICINE

## 2022-11-30 PROCEDURE — 80069 RENAL FUNCTION PANEL: CPT

## 2022-11-30 PROCEDURE — 74011000250 HC RX REV CODE- 250: Performed by: INTERNAL MEDICINE

## 2022-11-30 PROCEDURE — 74011250636 HC RX REV CODE- 250/636: Performed by: NURSE PRACTITIONER

## 2022-11-30 PROCEDURE — 36415 COLL VENOUS BLD VENIPUNCTURE: CPT

## 2022-11-30 PROCEDURE — 94640 AIRWAY INHALATION TREATMENT: CPT

## 2022-11-30 RX ORDER — BUDESONIDE AND FORMOTEROL FUMARATE DIHYDRATE 160; 4.5 UG/1; UG/1
2 AEROSOL RESPIRATORY (INHALATION)
Status: DISCONTINUED | OUTPATIENT
Start: 2022-11-30 | End: 2022-12-08 | Stop reason: HOSPADM

## 2022-11-30 RX ORDER — TRAMADOL HYDROCHLORIDE 50 MG/1
25 TABLET ORAL
Status: COMPLETED | OUTPATIENT
Start: 2022-11-30 | End: 2022-12-06

## 2022-11-30 RX ORDER — SODIUM,POTASSIUM PHOSPHATES 280-250MG
1 POWDER IN PACKET (EA) ORAL 4 TIMES DAILY
Status: DISCONTINUED | OUTPATIENT
Start: 2022-11-30 | End: 2022-11-30 | Stop reason: SDUPTHER

## 2022-11-30 RX ORDER — SODIUM,POTASSIUM PHOSPHATES 280-250MG
2 POWDER IN PACKET (EA) ORAL EVERY 4 HOURS
Status: DISPENSED | OUTPATIENT
Start: 2022-11-30 | End: 2022-12-01

## 2022-11-30 RX ORDER — ALBUTEROL SULFATE 90 UG/1
2 AEROSOL, METERED RESPIRATORY (INHALATION)
Status: DISCONTINUED | OUTPATIENT
Start: 2022-11-30 | End: 2022-12-08 | Stop reason: HOSPADM

## 2022-11-30 RX ORDER — IPRATROPIUM BROMIDE AND ALBUTEROL SULFATE 2.5; .5 MG/3ML; MG/3ML
3 SOLUTION RESPIRATORY (INHALATION)
Status: DISCONTINUED | OUTPATIENT
Start: 2022-11-30 | End: 2022-12-08 | Stop reason: HOSPADM

## 2022-11-30 RX ADMIN — MUPIROCIN: 20 OINTMENT TOPICAL at 09:44

## 2022-11-30 RX ADMIN — CELECOXIB 200 MG: 200 CAPSULE ORAL at 09:43

## 2022-11-30 RX ADMIN — ACETAMINOPHEN 650 MG: 325 TABLET ORAL at 17:24

## 2022-11-30 RX ADMIN — WHITE PETROLATUM,ZINC OXIDE: 20; 75 PASTE TOPICAL at 09:45

## 2022-11-30 RX ADMIN — IPRATROPIUM BROMIDE AND ALBUTEROL SULFATE 3 ML: .5; 2.5 SOLUTION RESPIRATORY (INHALATION) at 08:49

## 2022-11-30 RX ADMIN — PIPERACILLIN AND TAZOBACTAM 3.38 G: 3; .375 INJECTION, POWDER, FOR SOLUTION INTRAVENOUS at 05:37

## 2022-11-30 RX ADMIN — ALBUTEROL SULFATE 2 PUFF: 108 AEROSOL, METERED RESPIRATORY (INHALATION) at 19:30

## 2022-11-30 RX ADMIN — PIPERACILLIN AND TAZOBACTAM 3.38 G: 3; .375 INJECTION, POWDER, FOR SOLUTION INTRAVENOUS at 20:44

## 2022-11-30 RX ADMIN — BUDESONIDE AND FORMOTEROL FUMARATE DIHYDRATE 2 PUFF: 160; 4.5 AEROSOL RESPIRATORY (INHALATION) at 19:30

## 2022-11-30 RX ADMIN — POTASSIUM & SODIUM PHOSPHATES POWDER PACK 280-160-250 MG 1 PACKET: 280-160-250 PACK at 12:36

## 2022-11-30 RX ADMIN — RIVAROXABAN 20 MG: 10 TABLET, FILM COATED ORAL at 09:43

## 2022-11-30 RX ADMIN — Medication 1500 MG: at 15:06

## 2022-11-30 RX ADMIN — TRAMADOL HYDROCHLORIDE 25 MG: 50 TABLET, COATED ORAL at 21:58

## 2022-11-30 RX ADMIN — CELECOXIB 200 MG: 200 CAPSULE ORAL at 20:44

## 2022-11-30 RX ADMIN — Medication 1500 MG: at 03:03

## 2022-11-30 RX ADMIN — BUDESONIDE 500 MCG: 0.5 INHALANT ORAL at 08:49

## 2022-11-30 RX ADMIN — PIPERACILLIN AND TAZOBACTAM 3.38 G: 3; .375 INJECTION, POWDER, FOR SOLUTION INTRAVENOUS at 12:36

## 2022-11-30 RX ADMIN — MONTELUKAST 10 MG: 10 TABLET, FILM COATED ORAL at 09:43

## 2022-11-30 RX ADMIN — ALBUTEROL SULFATE 2 PUFF: 108 AEROSOL, METERED RESPIRATORY (INHALATION) at 14:22

## 2022-11-30 RX ADMIN — CETIRIZINE HYDROCHLORIDE 10 MG: 10 TABLET, FILM COATED ORAL at 09:43

## 2022-11-30 RX ADMIN — POTASSIUM & SODIUM PHOSPHATES POWDER PACK 280-160-250 MG 2 PACKET: 280-160-250 PACK at 15:23

## 2022-11-30 NOTE — PROGRESS NOTES
IMPRESSION:   Acute hypoxic respiratory failure improved now on 2 L nasal oxygen  Exacerbation of asthma improved  Influenza A  MRSA pneumonia remains on vancomycin  Staff aureus septicemia likely MRSA  Pleurisy improved on Celebrex  Hypophosphatemia to be repleted  Nasal MRSA smear positive  Severe sepsis  History of DVT/PE on Xarelto  hx of non-Hodgkin's lymphoma status postchemotherapy on radiation treatment  Gastroesophageal reflux disease        RECOMMENDATIONS/PLAN:     65-year-old male came in because of shortness of breath and dyspnea and critical condition nonrebreather mask 100% FiO2  Oxygen down to 2 L nasal  Pleuritic chest pain improved on Celebrex  Almost no wheezing will change to albuterol and Symbicort inhalers  Currently on Zosyn and vancomycin  Completed a course of Tamiflu       [x] High complexity decision making was performed  [x] See my orders for details  HPI  65-year-old male came in because of shortness of breath and dyspnea, he has significant past medical history of asthma non-Hodgkin's lymphoma status postchemotherapy currently on radiation treatment also had a history of DVT and pulmonary embolism he was positive with influenza A and he was sent home with Tamiflu but his condition got worse came to the emergency room he is very short of breath 100% FiO2 nonrebreather mask electrolyte imbalance having chest discomfort diaphoretic he has not smoked in the past 4 days he smokes marijuana tachypneic tachycardic so admitted and critical care consult was called. PMH:  has a past medical history of Asthma, DVT (deep venous thrombosis) ([address] Hypertension, Lymphoma ([address] PE (pulmonary thromboembolism) ([address] and Thromboembolus ([address] PSH:   has a past surgical history that includes hx other surgical (Right, 03/02/2022); ir fluoro guide plc cvad (3/30/2022); and ir insert tunl cvc w port over 5 years (3/30/2022).      FHX: family history includes Cancer in his maternal grandfather; Diabetes in his mother; Hypertension in his mother; Myasthenia Gravis in his maternal grandmother; No Known Problems in his father. SHX:  reports that he quit smoking about 7 years ago. His smoking use included cigarettes. He has never used smokeless tobacco. He reports that he does not currently use alcohol. He reports that he does not currently use drugs after having used the following drugs: Marijuana.     ALL: No Known Allergies     MEDS:   [x] Reviewed - As Below   [] Not reviewed    Current Facility-Administered Medications   Medication    potassium, sodium phosphates (NEUTRA-PHOS) packet 1 Packet    vancomycin (VANCOCIN) 1500 mg in  ml infusion    albuterol-ipratropium (DUO-NEB) 2.5 MG-0.5 MG/3 ML    celecoxib (CELEBREX) capsule 200 mg    zinc oxide-white petrolatum 20-75 % topical paste    budesonide (PULMICORT) 500 mcg/2 ml nebulizer suspension    0.9% sodium chloride infusion    mupirocin (BACTROBAN) 2 % ointment    piperacillin-tazobactam (ZOSYN) 3.375 g in 0.9% sodium chloride (MBP/ADV) 100 mL MBP    sodium chloride (NS) flush 5-10 mL    sodium chloride (NS) flush 5-40 mL    acetaminophen (TYLENOL) tablet 650 mg    Or    acetaminophen (TYLENOL) suppository 650 mg    polyethylene glycol (MIRALAX) packet 17 g    bisacodyL (DULCOLAX) tablet 5 mg    ondansetron (ZOFRAN ODT) tablet 4 mg    Or    ondansetron (ZOFRAN) injection 4 mg    sodium chloride (NS) flush 5-10 mL    metoprolol (LOPRESSOR) injection 5 mg    guaiFENesin-codeine (ROBITUSSIN AC) 100-10 mg/5 mL solution 10 mL    Vancomycin - Pharmacy to Dose    cetirizine (ZYRTEC) tablet 10 mg    fluticasone propionate (FLONASE) 50 mcg/actuation nasal spray 1 [place]    montelukast (SINGULAIR) tablet 10 mg    rivaroxaban (XARELTO) tablet 20 mg    hydrOXYzine (VISTARIL) injection 50 mg      MAR reviewed and pertinent medications noted or modified as needed   Current Facility-Administered Medications   Medication    potassium, sodium phosphates (NEUTRA-PHOS) packet 1 Packet    vancomycin (VANCOCIN) 1500 mg in  ml infusion    albuterol-ipratropium (DUO-NEB) 2.5 MG-0.5 MG/3 ML    celecoxib (CELEBREX) capsule 200 mg    zinc oxide-white petrolatum 20-75 % topical paste    budesonide (PULMICORT) 500 mcg/2 ml nebulizer suspension    0.9% sodium chloride infusion    mupirocin (BACTROBAN) 2 % ointment    piperacillin-tazobactam (ZOSYN) 3.375 g in 0.9% sodium chloride (MBP/ADV) 100 mL MBP    sodium chloride (NS) flush 5-10 mL    sodium chloride (NS) flush 5-40 mL    acetaminophen (TYLENOL) tablet 650 mg    Or    acetaminophen (TYLENOL) suppository 650 mg    polyethylene glycol (MIRALAX) packet 17 g    bisacodyL (DULCOLAX) tablet 5 mg    ondansetron (ZOFRAN ODT) tablet 4 mg    Or    ondansetron (ZOFRAN) injection 4 mg    sodium chloride (NS) flush 5-10 mL    metoprolol (LOPRESSOR) injection 5 mg    guaiFENesin-codeine (ROBITUSSIN AC) 100-10 mg/5 mL solution 10 mL    Vancomycin - Pharmacy to Dose    cetirizine (ZYRTEC) tablet 10 mg    fluticasone propionate (FLONASE) 50 mcg/actuation nasal spray 1 [place]    montelukast (SINGULAIR) tablet 10 mg    rivaroxaban (XARELTO) tablet 20 mg    hydrOXYzine (VISTARIL) injection 50 mg      PMH:  has a past medical history of Asthma, DVT (deep venous thrombosis) ([address] Hypertension, Lymphoma ([address] PE (pulmonary thromboembolism) ([address] and Thromboembolus ([address] PSH:   has a past surgical history that includes hx other surgical (Right, 03/02/2022); ir fluoro guide plc cvad (3/30/2022); and ir insert tunl cvc w port over 5 years (3/30/2022). FHX: family history includes Cancer in his maternal grandfather; Diabetes in his mother; Hypertension in his mother; Myasthenia Gravis in his maternal grandmother; No Known Problems in his father. SHX:  reports that he quit smoking about 7 years ago. His smoking use included cigarettes. He has never used smokeless tobacco. He reports that he does not currently use alcohol.  He reports that he does not currently use drugs after having used the following drugs: Marijuana. ROS:A comprehensive review of systems was negative except for that written in the HPI. Hemodynamics:    CO:    CI:    CVP:    SVR:   PAP Systolic:    PAP Diastolic:    PVR:    XQ00:        Ventilator Settings:      Mode Rate TV Press PEEP FiO2 PIP Min. Vent               30 %              Vital Signs: Telemetry:    normal sinus rhythm Intake/Output:   Visit Vitals  /89 (BP 1 Location: Right upper arm, BP Patient Position: Sitting)   Pulse 98   Temp 98.3 °F (36.8 °C)   Resp 20   Ht 5' 6\" (1.676 m)   Wt 108 kg (238 lb 1.6 oz)   SpO2 97%   BMI 38.43 kg/m²       Temp (24hrs), Av.2 °F (36.8 °C), Min:97.4 °F (36.3 °C), Max:99 °F (37.2 °C)        O2 Device: Nasal cannula O2 Flow Rate (L/min): 2 l/min       Wt Readings from Last 4 Encounters:   22 108 kg (238 lb 1.6 oz)   22 104.3 kg (230 lb)   22 104.3 kg (230 lb)   22 116.1 kg (256 lb)          Intake/Output Summary (Last 24 hours) at 2022 1322  Last data filed at 2022 0700  Gross per 24 hour   Intake 1613.33 ml   Output 1500 ml   Net 113.33 ml         Last shift:      No intake/output data recorded. Last 3 shifts:  1901 -  0700  In: 2842.1 [P.O.:180; I.V.:2662.1]  Out: 2515 [Urine:2515]       Physical Exam:     General: Awake alert no distress no accessory muscle use currently on 2 liter nasal oxygen saturation 97%  HEENT: NCAT, normal oral mucosa  Eyes: anicteric; conjunctiva clear extraocular movements intact  Neck: no nodes,  trach midline; no accessory MM use. Chest: no deformity,   Cardiac: Regular rate and rhythm  Lungs: Coarse exhalation but no wheezing today does have rales posterior upper and mid lungs  Abd: Soft nontender positive bowel sounds  Ext: no edema; no joint swelling;  No clubbing  : clear urine  Neuro: Awake alert oriented moving all extremities  Psych-calm oriented  Skin: warm, dry, no cyanosis; Pulses: Brachial and radial pulses intact  Capillary: Normal capillary refill      DATA:    MAR reviewed and pertinent medications noted or modified as needed  MEDS:   Current Facility-Administered Medications   Medication    potassium, sodium phosphates (NEUTRA-PHOS) packet 1 Packet    vancomycin (VANCOCIN) 1500 mg in  ml infusion    albuterol-ipratropium (DUO-NEB) 2.5 MG-0.5 MG/3 ML    celecoxib (CELEBREX) capsule 200 mg    zinc oxide-white petrolatum 20-75 % topical paste    budesonide (PULMICORT) 500 mcg/2 ml nebulizer suspension    0.9% sodium chloride infusion    mupirocin (BACTROBAN) 2 % ointment    piperacillin-tazobactam (ZOSYN) 3.375 g in 0.9% sodium chloride (MBP/ADV) 100 mL MBP    sodium chloride (NS) flush 5-10 mL    sodium chloride (NS) flush 5-40 mL    acetaminophen (TYLENOL) tablet 650 mg    Or    acetaminophen (TYLENOL) suppository 650 mg    polyethylene glycol (MIRALAX) packet 17 g    bisacodyL (DULCOLAX) tablet 5 mg    ondansetron (ZOFRAN ODT) tablet 4 mg    Or    ondansetron (ZOFRAN) injection 4 mg    sodium chloride (NS) flush 5-10 mL    metoprolol (LOPRESSOR) injection 5 mg    guaiFENesin-codeine (ROBITUSSIN AC) 100-10 mg/5 mL solution 10 mL    Vancomycin - Pharmacy to Dose    cetirizine (ZYRTEC) tablet 10 mg    fluticasone propionate (FLONASE) 50 mcg/actuation nasal spray 1 [place]    montelukast (SINGULAIR) tablet 10 mg    rivaroxaban (XARELTO) tablet 20 mg    hydrOXYzine (VISTARIL) injection 50 mg        Labs:    Recent Labs     11/30/22  0705 11/29/22  0410 11/28/22  0416 11/27/22  1428   WBC 9.3 6.1 2.1*  --    HGB 10.2* 10.2* 10.7*  --     146* 107*  --    INR  --  2.0*  --  1.3*   APTT  --   --   --  30.5       Recent Labs     11/30/22  0705 11/29/22  0410 11/28/22  0847 11/28/22  0416 11/27/22  1615 11/27/22  1428 11/27/22  1425    137  --  135*  --   --  129*   K 4.1 3.8  --  4.3  --   --  3.8   * 105  --  106  --   --  93*   CO2 25 22  --  20*  --   --  20* * 153*  --  163*  --   --  185*   BUN 29* 26*  --  16  --   --  20   CREA 0.77 1.06  --  1.03  --   --  1.50*   CA 8.8 9.6  --  8.8  --   --  8.7   MG  --   --   --   --   --  2.2  --    PHOS 1.7* 1.3*  --   --   --   --   --    LAC 1.5 2.2* 3.3* 3.4*   < >  --  5.2*   ALB 1.8* 1.8*  --   --   --   --  2.6*   ALT  --   --   --   --   --   --  60    < > = values in this interval not displayed. Recent Labs     11/29/22  0441 11/27/22  1514 11/27/22  1436   PH 7.48* 7.53*  --    PCO2 32* 22*  --    PO2 78* 73*  --    HCO3 23 18*  --    FIO2 30  --  21.0     11/29 nasal high flow 45 L FiO2 30%   11/28 nasal high flow 45 L FiO2 75% oxygen saturation 100%  Nasal MRSA smear positive  Influenza A positive  Procalcitonin 11.81, 25.5, 18.8  CRP 9.57, 18.5  Blood culture staff aureus methicillin testing pending  Sputum culture 3+ polys with MRSA  Urine culture no growth    Troponin 19, 136  3/3/2022 Echo ejection fraction 65% left atrium 3.2 cm normal IVC  Lab Results   Component Value Date/Time    Culture result: No Growth (<1000 cfu/mL) 11/28/2022 12:03 AM    Culture result:  11/27/2022 10:00 PM     Moderate * methicillin resistant staphylococcus aureus *    Culture result: Light Normal respiratory heri 11/27/2022 10:00 PM         Imaging:    Results from Hospital Encounter encounter on 11/27/22    XR CHEST PORT    Narrative  EXAM: XR CHEST PORT    DATE: 11/29/2022 4:05 AM    INDICATION: Community-acquired pneumonia    COMPARISON: Chest radiograph November 28, 2022    FINDINGS: AP portable chest radiograph. There is a right-sided Port-A-Cath in  place. The heart size is normal. Lungs are adequately expanded. There is been no  significant change in bilateral, diffuse airspace infiltrates. No definite  effusion or pneumothorax is seen. Impression  No significant interval change in severe, diffuse airspace infiltrates.       Results from [place] encounter on 11/22/22    CT ABD PELV W CONT    Narrative  EXAM:  CT ABD PELV W CONT    INDICATION: Abdominal pain. History of lymphoma. COMPARISON: CT 2/11/2022. TECHNIQUE: Helical CT of the abdomen  and pelvis  following the uneventful  intravenous administration of nonionic contrast.  Coronal and sagittal reformats  are performed. CT dose reduction was achieved through use of a standardized  protocol tailored for this examination and automatic exposure control for dose  modulation. FINDINGS:  The visualized lung bases demonstrate no mass or consolidation. The heart size  is normal. There is no pericardial or pleural effusion. The liver, spleen, pancreas, and adrenal glands are normal. There are probable  gallstones without intra- or extra-hepatic biliary dilatation. The kidneys are symmetric without hydronephrosis. The proximal colon is fluid-filled, and there is a long segment of ileal wall  thickening. There are no dilated bowel loops. The appendix is normal.    Right inguinal posttreatment changes are noted with no lymphadenopathy on the  current study. There are no enlarged lymph nodes. There is no free fluid or  free air. The aorta is normal in caliber. The urinary bladder is normal.  There is no pelvic mass. The prostate is not  enlarged. The bony structures are age-appropriate. Impression  Findings of enterocolitis likely due to nonspecific infection or inflammation. 11/28 he states he feels a little better his oxygenation has improved have decreased to FiO2 65%. He states his discolored sputum is starting to clear up. Despite this his chest x-ray shows worsening extensive bilateral upper and mid neck infiltrates. Nasal MRSA smear is positive and he has been placed on vancomycin. All cultures are pending at this point.   He has severe exacerbation of asthma we will continue nebulized albuterol Atrovent add budesonide continue IV Solu-Medrol will hopefully taper it rapidly  11/29 left chest discomfort he states after drinking cold liquids today it may have some pleuritic component. Pleurisy not unexpected with his extensive pneumonia we will add Celebrex. No wheezing today we will discontinue Solu-Medrol. Blood has gram-positive cocci sputum has 3+ polys with possible staph aureus may have MRSA septicemia and pneumonia currently on vancomycin would maintain it  11/30 sputum is growing MRSA. Blood cultures with staph aureus oxacillin sensitivity pending most likely MRSA pneumonia with septicemia improving. Phosphorus remains low and needs to be repleted. CRP and procalcitonin improving and clinically he is much improved oxygen is down to 2 L. Maintain vancomycin and Zosyn.   Almost no wheezing will switch to inhalers  Catalina Pinedo MD

## 2022-11-30 NOTE — PROGRESS NOTES
TRANSFER - OUT REPORT:    Verbal report given to Laura Clinton on Nadkennedy Slight  being transferred to [facility] for routine progression of care       Report consisted of patients Situation, Background, Assessment and   Recommendations(SBAR). Information from the following report(s) SBAR, Kardex, Intake/Output, MAR, Recent Results, and Cardiac Rhythm Sinus Rhythm and Sinus tachy when in pain,  was reviewed with the receiving nurse. Lines:   Venous Access Device Tunneled port 03/30/22 Upper chest (subclavicular area, right (Active)   Central Line Being Utilized No 11/29/22 1600   Criteria for Appropriate Use Irritant/vesicant medication 11/29/22 0700   Site Assessment Clean, dry, & intact 11/29/22 1600   Access Medial Site Assessment Other (Comment) 11/29/22 0300       Peripheral IV 11/27/22 Right Antecubital (Active)   Site Assessment Clean, dry, & intact 11/29/22 1600   Phlebitis Assessment 0 11/29/22 1600   Infiltration Assessment 0 11/29/22 1600   Dressing Status Clean, dry, & intact 11/29/22 1600   Dressing Type Transparent 11/29/22 1600   Hub Color/Line Status Infusing 11/29/22 1200   Action Taken Open ports on tubing capped 11/29/22 0300   Alcohol Cap Used Yes 11/29/22 0700       Peripheral IV 11/27/22 Left Antecubital (Active)   Site Assessment Clean, dry, & intact 11/29/22 1600   Phlebitis Assessment 0 11/29/22 1600   Infiltration Assessment 0 11/29/22 1600   Dressing Status Clean, dry, & intact 11/29/22 1600   Dressing Type Transparent 11/29/22 1600   Hub Color/Line Status Infusing 11/29/22 0700   Action Taken Open ports on tubing capped 11/29/22 0300   Alcohol Cap Used Yes 11/29/22 0700        Opportunity for questions and clarification was provided. Patient transported to room 472 at 2025 with:    Monitor  O2 @ 4 liters  Registered Nurse   IV pump with infusing Vancomycin  Patient is vitally stable. Patient belongings kept at bedside with the patient.  Patient already notified his family regarding the transfer and the room number.    Nurse Kiah Files at bedside, aware of the patient arrival.

## 2022-11-30 NOTE — PROGRESS NOTES
Hospitalist Progress Note         Alex Reinoso MD          Daily Progress Note: 11/30/2022      Subjective: The patient is seen for follow  up. 33 yo male, hx of HTN, asthma, non-Hodgkin's lymphoma post chemotherapy, now on radiation, DVT, PE, here for shortness of breath. Pt was seen on 11/25 for similar, diagnosed with Flu A and sent home on Tamiflu. Workup thus far significant for CXR with evidence of bilateral pneumonia, labs suggestive of sepsis. Pt started on Vancomycin, Cefepime, Levaquin. --  The patient is seen for follow up. States that he feels far better today than he did yesterday with less shortness of breath.  Currently on 2L nasal oxygen    Problem List:  Problem List as of 11/30/2022 Date Reviewed: 8/15/2022            Codes Class Noted - Resolved    Shortness of breath ICD-10-CM: R06.02  ICD-9-CM: 786.05  11/27/2022 - Present        Pneumonia ICD-10-CM: J18.9  ICD-9-CM: 486  11/27/2022 - Present        History of asthma ICD-10-CM: Z87.09  ICD-9-CM: V12.69  11/27/2022 - Present        Influenza A ICD-10-CM: J10.1  ICD-9-CM: 487.1  11/27/2022 - Present        Severe sepsis ([facility] ICD-10-CM: A41.9, R65.20  ICD-9-CM: 038.9, 995.92  11/27/2022 - Present        Asthma ICD-10-CM: J45.909  ICD-9-CM: 493.90  5/7/2022 - Present        Acute respiratory failure with hypoxia ([facility] ICD-10-CM: J96.01  ICD-9-CM: 518.81  5/7/2022 - Present        Lymphoma ([facility] ICD-10-CM: C85.90  ICD-9-CM: 202.80  3/1/2022 - Present        Pelvic mass ICD-10-CM: R19.00  ICD-9-CM: 789.30  2/23/2022 - Present        Bilateral pulmonary embolism (HCC) ICD-10-CM: I26.99  ICD-9-CM: 415.19  2/23/2022 - Present        Pulmonary embolism ([address] ICD-10-CM: I26.99  ICD-9-CM: 415.19  2/22/2022 - Present        Right inguinal pain ICD-10-CM: R10.31  ICD-9-CM: 789.03  2/17/2022 - Present        Right groin mass ICD-10-CM: R19.09  ICD-9-CM: 789.39  2/11/2022 - Present        Fall ICD-10-CM: C97.RGTA  ICD-9-CM: E888.9  2/11/2022 - Present       Medications reviewed  Current Facility-Administered Medications   Medication Dose Route Frequency    vancomycin (VANCOCIN) 1500 mg in  ml infusion  1,500 mg IntraVENous Q8H    Vancomycin - please draw level 11/30 0900. Thanks!    Other ONCE    albuterol-ipratropium (DUO-NEB) 2.5 MG-0.5 MG/3 ML  3 mL Nebulization Q6HWA RT    celecoxib (CELEBREX) capsule 200 mg  200 mg Oral BID    zinc oxide-white petrolatum 20-75 % topical paste   Topical DAILY    budesonide (PULMICORT) 500 mcg/2 ml nebulizer suspension  500 mcg Nebulization BID RT    0.9% sodium chloride infusion  75 mL/hr IntraVENous CONTINUOUS    mupirocin (BACTROBAN) 2 % ointment   Both Nostrils BID    piperacillin-tazobactam (ZOSYN) 3.375 g in 0.9% sodium chloride (MBP/ADV) 100 mL MBP  3.375 g IntraVENous Q8H    sodium chloride (NS) flush 5-10 mL  5-10 mL IntraVENous PRN    sodium chloride (NS) flush 5-40 mL  5-40 mL IntraVENous PRN    acetaminophen (TYLENOL) tablet 650 mg  650 mg Oral Q6H PRN    Or    acetaminophen (TYLENOL) suppository 650 mg  650 mg Rectal Q6H PRN    polyethylene glycol (MIRALAX) packet 17 g  17 g Oral DAILY PRN    bisacodyL (DULCOLAX) tablet 5 mg  5 mg Oral DAILY PRN    ondansetron (ZOFRAN ODT) tablet 4 mg  4 mg Oral Q6H PRN    Or    ondansetron (ZOFRAN) injection 4 mg  4 mg IntraVENous Q6H PRN    sodium chloride (NS) flush 5-10 mL  5-10 mL IntraVENous PRN    metoprolol (LOPRESSOR) injection 5 mg  5 mg IntraVENous Q3H PRN    guaiFENesin-codeine (ROBITUSSIN AC) 100-10 mg/5 mL solution 10 mL  10 mL Oral Q4H PRN    Vancomycin - Pharmacy to Dose   Other Rx Dosing/Monitoring    cetirizine (ZYRTEC) tablet 10 mg  10 mg Oral DAILY    fluticasone propionate (FLONASE) 50 mcg/actuation nasal spray 1 Spray  1 Spray Both Nostrils PRN    montelukast (SINGULAIR) tablet 10 mg  10 mg Oral DAILY    rivaroxaban (XARELTO) tablet 20 mg  20 mg Oral DAILY    hydrOXYzine (VISTARIL) injection 50 mg  50 mg IntraMUSCular Q6H PRN       Review of Systems:   A comprehensive review of systems was negative except for that written in the HPI. Objective:   Physical Exam:     Visit Vitals  /89 (BP 1 Location: Right upper arm, BP Patient Position: Sitting)   Pulse 98   Temp 98.3 °F (36.8 °C)   Resp 20   Ht 5' 6\" (1.676 m)   Wt 108 kg (238 lb 1.6 oz)   SpO2 97%   BMI 38.43 kg/m²    O2 Flow Rate (L/min): 2 l/min O2 Device: Nasal cannula    Temp (24hrs), Av.1 °F (36.7 °C), Min:97.4 °F (36.3 °C), Max:99 °F (37.2 °C)    No intake/output data recorded.  1901 -  0700  In: 2842.1 [P.O.:180; I.V.:2662.1]  Out: 2515 [Urine:2515]    General:  Alert, cooperative, no distress, appears stated age. Lungs:   Clear to auscultation bilaterally. Chest wall:  No tenderness or deformity. Heart:  Regular rate and rhythm, S1, S2 normal, no murmur, click, rub or gallop. Abdomen:   Soft, non-tender. Bowel sounds normal. No masses,  No organomegaly. Extremities: Extremities normal, atraumatic, no cyanosis or edema. Pulses: 2+ and symmetric all extremities. Skin: Skin color, texture, turgor normal. No rashes or lesions   Neurologic: CNII-XII intact.  No gross sensory or motor deficits     Data Review:       Recent Days:  Recent Labs     22  0705 22   WBC 9.3 6.1 2.1*   HGB 10.2* 10.2* 10.7*   HCT 30.1* 30.2* 31.7*    146* 107*       Recent Labs     22  0705 22  0410 22  0416 22  1428 22  1425    137 135*  --  129*   K 4.1 3.8 4.3  --  3.8   * 105 106  --  93*   CO2 25 22 20*  --  20*   * 153* 163*  --  185*   BUN 29* 26* 16  --  20   CREA 0.77 1.06 1.03  --  1.50*   CA 8.8 9.6 8.8  --  8.7   MG  --   --   --  2.2  --    PHOS 1.7* 1.3*  --   --   --    ALB 1.8* 1.8*  --   --  2.6*   TBILI  --   --   --   --  3.4*   ALT  --   --   --   --  60   INR  --  2.0*  --  1.3*  --        Recent Labs     22  0441 22  8966 11/27/22  1436   PH 7.48* 7.53*  --    PCO2 32* 22*  --    PO2 78* 73*  --    HCO3 23 18*  --    FIO2 30  --  21.0         24 Hour Results:  Recent Results (from the past 24 hour(s))   C REACTIVE PROTEIN, QT    Collection Time: 11/30/22  7:05 AM   Result Value Ref Range    C-Reactive protein 9.57 (H) 0.00 - 0.60 mg/dL   PROCALCITONIN    Collection Time: 11/30/22  7:05 AM   Result Value Ref Range    Procalcitonin 11.81 (H) 0 ng/mL   LACTIC ACID    Collection Time: 11/30/22  7:05 AM   Result Value Ref Range    Lactic acid 1.5 0.4 - 2.0 mmol/L   CBC WITH AUTOMATED DIFF    Collection Time: 11/30/22  7:05 AM   Result Value Ref Range    WBC 9.3 4.1 - 11.1 K/uL    RBC 3.43 (L) 4.10 - 5.70 M/uL    HGB 10.2 (L) 12.1 - 17.0 g/dL    HCT 30.1 (L) 36.6 - 50.3 %    MCV 87.8 80.0 - 99.0 FL    MCH 29.7 26.0 - 34.0 PG    MCHC 33.9 30.0 - 36.5 g/dL    RDW 15.8 (H) 11.5 - 14.5 %    PLATELET 091 885 - 326 K/uL    MPV 10.6 8.9 - 12.9 FL    NRBC 0.3 (H) 0.0  WBC    ABSOLUTE NRBC 0.03 (H) 0.00 - 0.01 K/uL    NEUTROPHILS 83 (H) 32 - 75 %    BAND NEUTROPHILS 4 0 - 6 %    LYMPHOCYTES 0 (L) 12 - 49 %    MONOCYTES 2 (L) 5 - 13 %    EOSINOPHILS 0 0 - 7 %    BASOPHILS 0 0 - 1 %    METAMYELOCYTES 8 (H) 0 %    MYELOCYTES 3 (H) 0 %    IMMATURE GRANULOCYTES 0 %    ABS. NEUTROPHILS 8.1 (H) 1.8 - 8.0 K/UL    ABS. LYMPHOCYTES 0.0 (L) 0.8 - 3.5 K/UL    ABS. MONOCYTES 0.2 0.0 - 1.0 K/UL    ABS. EOSINOPHILS 0.0 0.0 - 0.4 K/UL    ABS. BASOPHILS 0.0 0.0 - 0.1 K/UL    ABS. IMM.  GRANS. 0.0 K/UL    DF Manual      RBC COMMENTS Anisocytosis  1+        WBC COMMENTS Toxic Granulation     RENAL FUNCTION PANEL    Collection Time: 11/30/22  7:05 AM   Result Value Ref Range    Sodium 140 136 - 145 mmol/L    Potassium 4.1 3.5 - 5.1 mmol/L    Chloride 109 (H) 97 - 108 mmol/L    CO2 25 21 - 32 mmol/L    Anion gap 6 5 - 15 mmol/L    Glucose 135 (H) 65 - 100 mg/dL    BUN 29 (H) 6 - 20 mg/dL    Creatinine 0.77 0.70 - 1.30 mg/dL    BUN/Creatinine ratio 38 (H) 12 - 20 eGFR >60 >60 ml/min/1.73m2    Calcium 8.8 8.5 - 10.1 mg/dL    Phosphorus 1.7 (L) 2.6 - 4.7 mg/dL    Albumin 1.8 (L) 3.5 - 5.0 g/dL   VANCOMYCIN, RANDOM    Collection Time: 11/30/22  9:40 AM   Result Value Ref Range    Vancomycin, random 14.7 ug/mL           Assessment/     Acute hypoxic respiratory failure    -Secondary to bilateral pneumonia    -Continue cefepime, vancomycin and Levaquin    -Wean off high flow oxygen as tolerated    Influenza A    -Continue on Tamiflu through November 29    Bilateral pneumonia    -Suspect community-acquired pneumonia    -Continue IV Zosyn and IV vancomycin  Add incentive spirometer for deep breathing  History of non-Hodgkin's lymphoma    -Completed chemotherapy    -Immunocompromise status    Prior history of DVT and PE    -On Xarelto    Benign essential hypertension   -Fairly stable    Hypophosphatemia     -Replete with K phos 1 tab QID x 8 doses      Plan:  Continue supportive care  Treatment plan as outlined above  Add incentive spirometer for deep breathing  PTOT eval      Care Plan discussed with: Nurse    Total time spent with patient: 30 minutes.     Dk Arias MD

## 2022-11-30 NOTE — PROGRESS NOTES
Vancomycin Dosing Consult  Malia Wright is a 34 y.o. male with HAP and gram positive bacteremia. Pharmacy was consulted by Dr. Aiden Whalen to dose and monitor vancomycin. Today is day 4. Antibiotic regimen: Vancomycin + pip/tazo    Temp (24hrs), Av.1 °F (37.8 °C), Min:98.6 °F (37 °C), Max:102.2 °F (39 °C)    Recent Labs     22  0410 22  0416 22  1426 22  1425   WBC 6.1 2.1* 2.2*  --    CREA 1.06 1.03  --  1.50*   BUN 26* 16  --  20     Est CrCl: >100 mL/min  Concomitant nephrotoxic drugs: None    Cultures:    Blood: GPC in all 4 bottles   sputum: probable staph aureus  : urine: NGTD   blood culture ID panel: pending    MRSA Swab: Detected     Target range: AUC:BETTE 400-600    Recent level history:  Date/Time Dose & Interval Measured Level (mcg/mL) Associated AUC/BETTE   416 2000 mg x 1 3.3     0800 1500 mg IV q12h 6.8 357    0940 1500 mg IV q8h 14.7 554     Assessment/Plan:   Afebrile, blood cultures positive, sputum with probable staph aureus. Scr improved,CRP, lactic acid and Procal are trending down  Level today is 14.7 ,AUC:BETTE is therapeutic ~554. Continue Vancomycin 1500 mg IV q8h.  Level on 12/3  Antimicrobial stop date TBD     Krista Paz, PharmD, BCPS, BCCCP  Clinical Pharmacist   (available on [employer])

## 2022-11-30 NOTE — PROGRESS NOTES
Hospitalist Progress Note         Sandy Robertson          Daily Progress Note: 11/30/2022      Subjective: The patient is seen for follow  up. 35 yo male, hx of HTN, asthma, non-Hodgkin's lymphoma post chemotherapy, now on radiation, DVT, PE, here for shortness of breath. Pt was seen on 11/25 for similar, diagnosed with Flu A and sent home on Tamiflu. Workup thus far significant for CXR with evidence of bilateral pneumonia, labs suggestive of sepsis. Pt started on Vancomycin, Cefepime, Levaquin. --  The patient is seen for follow up. States that he feels far better today than he did yesterday with less shortness of breath. Currently on 2L O2 NC. Lactic acid level down to 1.5. CRP and Procalcitonin downtrending.      Problem List:  Problem List as of 11/30/2022 Date Reviewed: 8/15/2022            Codes Class Noted - Resolved    Shortness of breath ICD-10-CM: R06.02  ICD-9-CM: 786.05  11/27/2022 - Present        Pneumonia ICD-10-CM: J18.9  ICD-9-CM: 486  11/27/2022 - Present        History of asthma ICD-10-CM: Z87.09  ICD-9-CM: V12.69  11/27/2022 - Present        Influenza A ICD-10-CM: J10.1  ICD-9-CM: 487.1  11/27/2022 - Present        Severe sepsis ([facility] ICD-10-CM: A41.9, R65.20  ICD-9-CM: 038.9, 995.92  11/27/2022 - Present        Asthma ICD-10-CM: J45.909  ICD-9-CM: 493.90  5/7/2022 - Present        Acute respiratory failure with hypoxia ([facility] ICD-10-CM: J96.01  ICD-9-CM: 518.81  5/7/2022 - Present        Lymphoma ([facility] ICD-10-CM: C85.90  ICD-9-CM: 202.80  3/1/2022 - Present        Pelvic mass ICD-10-CM: R19.00  ICD-9-CM: 789.30  2/23/2022 - Present        Bilateral pulmonary embolism (HCC) ICD-10-CM: I26.99  ICD-9-CM: 415.19  2/23/2022 - Present        Pulmonary embolism ([address] ICD-10-CM: I26.99  ICD-9-CM: 415.19  2/22/2022 - Present        Right inguinal pain ICD-10-CM: R10.31  ICD-9-CM: 789.03  2/17/2022 - Present        Right groin mass ICD-10-CM: R19.09  ICD-9-CM: 789.39 2/11/2022 - Present        Fall ICD-10-CM: W19. Kendell Alejandro  ICD-9-CM: E888.9  2/11/2022 - Present       Medications reviewed  Current Facility-Administered Medications   Medication Dose Route Frequency    vancomycin (VANCOCIN) 1500 mg in  ml infusion  1,500 mg IntraVENous Q8H    Vancomycin - please draw level 11/30 0900. Thanks!    Other ONCE    albuterol-ipratropium (DUO-NEB) 2.5 MG-0.5 MG/3 ML  3 mL Nebulization Q6HWA RT    celecoxib (CELEBREX) capsule 200 mg  200 mg Oral BID    zinc oxide-white petrolatum 20-75 % topical paste   Topical DAILY    budesonide (PULMICORT) 500 mcg/2 ml nebulizer suspension  500 mcg Nebulization BID RT    0.9% sodium chloride infusion  75 mL/hr IntraVENous CONTINUOUS    mupirocin (BACTROBAN) 2 % ointment   Both Nostrils BID    piperacillin-tazobactam (ZOSYN) 3.375 g in 0.9% sodium chloride (MBP/ADV) 100 mL MBP  3.375 g IntraVENous Q8H    sodium chloride (NS) flush 5-10 mL  5-10 mL IntraVENous PRN    sodium chloride (NS) flush 5-40 mL  5-40 mL IntraVENous PRN    acetaminophen (TYLENOL) tablet 650 mg  650 mg Oral Q6H PRN    Or    acetaminophen (TYLENOL) suppository 650 mg  650 mg Rectal Q6H PRN    polyethylene glycol (MIRALAX) packet 17 g  17 g Oral DAILY PRN    bisacodyL (DULCOLAX) tablet 5 mg  5 mg Oral DAILY PRN    ondansetron (ZOFRAN ODT) tablet 4 mg  4 mg Oral Q6H PRN    Or    ondansetron (ZOFRAN) injection 4 mg  4 mg IntraVENous Q6H PRN    sodium chloride (NS) flush 5-10 mL  5-10 mL IntraVENous PRN    metoprolol (LOPRESSOR) injection 5 mg  5 mg IntraVENous Q3H PRN    guaiFENesin-codeine (ROBITUSSIN AC) 100-10 mg/5 mL solution 10 mL  10 mL Oral Q4H PRN    Vancomycin - Pharmacy to Dose   Other Rx Dosing/Monitoring    cetirizine (ZYRTEC) tablet 10 mg  10 mg Oral DAILY    fluticasone propionate (FLONASE) 50 mcg/actuation nasal spray 1 Spray  1 Spray Both Nostrils PRN    montelukast (SINGULAIR) tablet 10 mg  10 mg Oral DAILY    rivaroxaban (XARELTO) tablet 20 mg  20 mg Oral DAILY hydrOXYzine (VISTARIL) injection 50 mg  50 mg IntraMUSCular Q6H PRN       Review of Systems:   A comprehensive review of systems was negative except for that written in the HPI. Objective:   Physical Exam:     Visit Vitals  /76 (BP 1 Location: Right upper arm, BP Patient Position: Sitting)   Pulse 90   Temp 99 °F (37.2 °C)   Resp 20   Ht 5' 6\" (1.676 m)   Wt 108 kg (238 lb 1.6 oz)   SpO2 99%   BMI 38.43 kg/m²    O2 Flow Rate (L/min): 2 l/min O2 Device: Nasal cannula    Temp (24hrs), Av.1 °F (36.7 °C), Min:97.4 °F (36.3 °C), Max:99 °F (37.2 °C)    No intake/output data recorded.  1901 -  0700  In: 2842.1 [P.O.:180; I.V.:2662.1]  Out: 2515 [Urine:2515]    General:  Alert, cooperative, no distress, appears stated age. Lungs:   Clear to auscultation bilaterally. Chest wall:  No tenderness or deformity. Heart:  Regular rate and rhythm, S1, S2 normal, no murmur, click, rub or gallop. Abdomen:   Soft, non-tender. Bowel sounds normal. No masses,  No organomegaly. Extremities: Extremities normal, atraumatic, no cyanosis or edema. Pulses: 2+ and symmetric all extremities. Skin: Skin color, texture, turgor normal. No rashes or lesions   Neurologic: CNII-XII intact.  No gross sensory or motor deficits     Data Review:       Recent Days:  Recent Labs     22  0705 22  04122  041   WBC 9.3 6.1 2.1*   HGB 10.2* 10.2* 10.7*   HCT 30.1* 30.2* 31.7*    146* 107*       Recent Labs     22  0705 22  0410 22  0416 22  1428 22  1425    137 135*  --  129*   K 4.1 3.8 4.3  --  3.8   * 105 106  --  93*   CO2 25 22 20*  --  20*   * 153* 163*  --  185*   BUN 29* 26* 16  --  20   CREA 0.77 1.06 1.03  --  1.50*   CA 8.8 9.6 8.8  --  8.7   MG  --   --   --  2.2  --    PHOS 1.7* 1.3*  --   --   --    ALB 1.8* 1.8*  --   --  2.6*   TBILI  --   --   --   --  3.4*   ALT  --   --   --   --  60   INR  --  2.0*  --  1.3*  --        Recent Labs     11/29/22  0441 11/27/22  1514 11/27/22  1436   PH 7.48* 7.53*  --    PCO2 32* 22*  --    PO2 78* 73*  --    HCO3 23 18*  --    FIO2 30  --  21.0         24 Hour Results:  Recent Results (from the past 24 hour(s))   C REACTIVE PROTEIN, QT    Collection Time: 11/30/22  7:05 AM   Result Value Ref Range    C-Reactive protein 9.57 (H) 0.00 - 0.60 mg/dL   PROCALCITONIN    Collection Time: 11/30/22  7:05 AM   Result Value Ref Range    Procalcitonin 11.81 (H) 0 ng/mL   LACTIC ACID    Collection Time: 11/30/22  7:05 AM   Result Value Ref Range    Lactic acid 1.5 0.4 - 2.0 mmol/L   CBC WITH AUTOMATED DIFF    Collection Time: 11/30/22  7:05 AM   Result Value Ref Range    WBC 9.3 4.1 - 11.1 K/uL    RBC 3.43 (L) 4.10 - 5.70 M/uL    HGB 10.2 (L) 12.1 - 17.0 g/dL    HCT 30.1 (L) 36.6 - 50.3 %    MCV 87.8 80.0 - 99.0 FL    MCH 29.7 26.0 - 34.0 PG    MCHC 33.9 30.0 - 36.5 g/dL    RDW 15.8 (H) 11.5 - 14.5 %    PLATELET 143 484 - 285 K/uL    MPV 10.6 8.9 - 12.9 FL    NRBC 0.3 (H) 0.0  WBC    ABSOLUTE NRBC 0.03 (H) 0.00 - 0.01 K/uL    NEUTROPHILS PENDING %    LYMPHOCYTES PENDING %    MONOCYTES PENDING %    EOSINOPHILS PENDING %    BASOPHILS PENDING %    IMMATURE GRANULOCYTES PENDING %    ABS. NEUTROPHILS PENDING K/UL    ABS. LYMPHOCYTES PENDING K/UL    ABS. MONOCYTES PENDING K/UL    ABS. EOSINOPHILS PENDING K/UL    ABS. BASOPHILS PENDING K/UL    ABS. IMM. GRANS.  PENDING K/UL    DF PENDING    RENAL FUNCTION PANEL    Collection Time: 11/30/22  7:05 AM   Result Value Ref Range    Sodium 140 136 - 145 mmol/L    Potassium 4.1 3.5 - 5.1 mmol/L    Chloride 109 (H) 97 - 108 mmol/L    CO2 25 21 - 32 mmol/L    Anion gap 6 5 - 15 mmol/L    Glucose 135 (H) 65 - 100 mg/dL    BUN 29 (H) 6 - 20 mg/dL    Creatinine 0.77 0.70 - 1.30 mg/dL    BUN/Creatinine ratio 38 (H) 12 - 20      eGFR >60 >60 ml/min/1.73m2    Calcium 8.8 8.5 - 10.1 mg/dL    Phosphorus 1.7 (L) 2.6 - 4.7 mg/dL    Albumin 1.8 (L) 3.5 - 5.0 g/dL           Assessment/ Acute hypoxic respiratory failure    -Secondary to bilateral pneumonia    -Continue cefepime, vancomycin and Levaquin    -Wean off high flow oxygen as tolerated    Influenza A    -D/c Tamiflu today    Bilateral pneumonia    -Suspect community-acquired pneumonia    -Continue IV vancomycin, d/c Zosyn per ID recs  Add incentive spirometer for deep breathing  History of non-Hodgkin's lymphoma    -Completed chemotherapy    -Immunocompromise status    Prior history of DVT and PE    -On Xarelto    Benign essential hypertension   -Fairly stable    Hypophosphatemia     -Replete with 30 mmol of potassium phosphate now      Plan:  Continue supportive care  Treatment plan as outlined above  Add incentive spirometer for deep breathing  CODE STATUS addressed. He is full code  Continue present ICU care      Care Plan discussed with: Nurse    Total time spent with patient: 30 minutes.     Timo Montes De Oca

## 2022-11-30 NOTE — PROGRESS NOTES
Progress Note    Patient: Evan Lantigua MRN: [id number]  SSN: [id number]    YOB: 1990  Age: 34 y.o. Sex: male      Admit Date: 11/27/2022    LOS: 3 days     Subjective:   Patient with NHL followed for severe sepsis with positive blood cultures and sputum culture for Staphylococcus aureus. He is afebrile with normal WBC and decreasing CRP and procal.  Currently on IV Vancomycin alone  He has been transferred out of the ICU. No CXR today. Objective:     Vitals:    11/30/22 0400 11/30/22 0750 11/30/22 0800 11/30/22 0849   BP:  132/76     Pulse: 79 90 90    Resp:  20     Temp:  99 °F (37.2 °C)     SpO2:  98%  99%   Weight:       Height:            Intake and Output:  Current Shift: No intake/output data recorded. Last three shifts: 11/28 1901 - 11/30 0700  In: 2842.1 [P.O.:180; I.V.:2662.1]  Out: 2515 [Urine:2515]    Physical Exam:   Vitals and nursing note reviewed. Constitutional:       General: He is not in acute distress. Appearance: He is ill-appearing. HENT:  Nasal cannula     Mouth/Throat:      Pharynx: Oropharynx is clear. Eyes:      Pupils: Pupils are equal, round, and reactive to light. Cardiovascular:      Rate and Rhythm: Regular rhythm. Tachycardia present. Heart sounds: No murmur heard. Comments: Iweoc-G-Yzox right chest, site unremarkable with no erythema, ?mild tenderness  Pulmonary:      Effort: No respiratory distress. Breath sounds: Wheezing and rhonchi present. No rales. Chest:      Abdominal:      General: There is no distension. Palpations: Abdomen is soft. Tenderness: There is no abdominal tenderness. There is no right CVA tenderness, left CVA tenderness or guarding. Genitourinary:     Comments: No Bonilla catheter  Musculoskeletal:      Cervical back: Neck supple. Right lower leg: No edema. Left lower leg: No edema. Skin:     Findings: No rash. Neurological:      General: No focal deficit present.       Mental Status: He is alert and oriented to person, place, and time. Psychiatric:    normal behavior      Lab/Data Review:     WBC 9,300  , 000  Lactic 1.5 <2.2    CRP 9.57 <18.50  Procal  11.81 <25.52 <18.89    MRSA Nasal PCR positive    Blood cultures (11/27) Probable Staphylococcus aureus  Sputum culture (11/27)  Staphylococcus aureus  Urine culture (11/27) No growth FINAL    CXR (11/29)      Assessment:     Active Problems:    Shortness of breath (11/27/2022)      Pneumonia (11/27/2022)      History of asthma (11/27/2022)      Influenza A (11/27/2022)      Severe sepsis ([facility] [address] (11/27/2022)  Severe sepsis with fever, leukopenia, elevated lactic acid and procal, CRP, resolved or resolving  Positive blood cultures with Staphylococcus aureus (both sets), Day #4 IV Vancomycin  Influenza A, Day #4 Tamiflu  Pneumonia, bilateral, presumably post-viral, secondary to Staphylococcus aureus, Day #4 IV Vancomycin  Acute hypoxic respiratory failure, on nasal cannuHFNC  Asthma  Non-Hodgkins' Lymphoma  MRSA nasal colonization, Day #3 Mupirocin       Comment:  S. Aureus in sputum supports post-viral pneumonia and source of bacteremia likely the lung rather than the Portacath, however, there may still be good reason to remove it if not likely to be utilized and not being flushed regularly. Plan:   1. Continue IV Vancomycin   2. Follow-up blood cultures and sputum culture  3. TTE to rule out endocarditis  4. Continue Mupirocin nasal ointment  5. In am, repeat procal, CRP, blood cultures  6. Continue Tamiflu  7.   Spoke with Dr. Alta Boyd and he would prefer that [address] remain in place because of high risk of relapse, but concurred with removal if unable to clear his bacteremia        Signed By: Maryellen James MD     November 30, 2022

## 2022-11-30 NOTE — PROGRESS NOTES
Patient DCP will be home with his mother. Previously, patient lived with his girlfriend and 2 kids. Per previous CM documentation, patient will stay with his mom for a brief period. Patient has been weaned down to 2 LPM oxygen.

## 2022-12-01 ENCOUNTER — APPOINTMENT (OUTPATIENT)
Dept: NON INVASIVE DIAGNOSTICS | Age: 32
DRG: 721 | End: 2022-12-01
Attending: INTERNAL MEDICINE
Payer: MEDICAID

## 2022-12-01 LAB
ALBUMIN SERPL-MCNC: 1.6 G/DL (ref 3.5–5)
ANION GAP SERPL CALC-SCNC: 11 MMOL/L (ref 5–15)
BACTERIA SPEC CULT: NORMAL
BASOPHILS # BLD: 0 K/UL (ref 0–0.1)
BASOPHILS NFR BLD: 0 % (ref 0–1)
BUN SERPL-MCNC: 16 MG/DL (ref 6–20)
BUN/CREAT SERPL: 24 (ref 12–20)
CA-I BLD-MCNC: 8.2 MG/DL (ref 8.5–10.1)
CHLORIDE SERPL-SCNC: 106 MMOL/L (ref 97–108)
CO2 SERPL-SCNC: 23 MMOL/L (ref 21–32)
CREAT SERPL-MCNC: 0.66 MG/DL (ref 0.7–1.3)
CRP SERPL-MCNC: 9.64 MG/DL (ref 0–0.6)
DIFFERENTIAL METHOD BLD: ABNORMAL
ECHO AO ROOT DIAM: 3.4 CM
ECHO AO ROOT INDEX: 1.55 CM/M2
ECHO AV AREA PEAK VELOCITY: 1.9 CM2
ECHO AV AREA VTI: 2.1 CM2
ECHO AV AREA/BSA PEAK VELOCITY: 0.9 CM2/M2
ECHO AV AREA/BSA VTI: 1 CM2/M2
ECHO AV MEAN GRADIENT: 6 MMHG
ECHO AV MEAN VELOCITY: 1.1 M/S
ECHO AV PEAK GRADIENT: 11 MMHG
ECHO AV PEAK VELOCITY: 1.7 M/S
ECHO AV VELOCITY RATIO: 0.59
ECHO AV VTI: 26 CM
ECHO EST RA PRESSURE: 8 MMHG
ECHO LA AREA 2C: 18 CM2
ECHO LA AREA 4C: 15.7 CM2
ECHO LA DIAMETER INDEX: 1.55 CM/M2
ECHO LA DIAMETER: 3.4 CM
ECHO LA MAJOR AXIS: 4.8 CM
ECHO LA MINOR AXIS: 4.9 CM
ECHO LA TO AORTIC ROOT RATIO: 1
ECHO LA VOL BP: 48 ML (ref 18–58)
ECHO LA VOL/BSA BIPLANE: 22 ML/M2 (ref 16–34)
ECHO LV E' LATERAL VELOCITY: 15 CM/S
ECHO LV E' SEPTAL VELOCITY: 10 CM/S
ECHO LV EDV A2C: 89 ML
ECHO LV EDV A4C: 137 ML
ECHO LV EDV INDEX A4C: 63 ML/M2
ECHO LV EDV NDEX A2C: 41 ML/M2
ECHO LV EJECTION FRACTION A2C: 53 %
ECHO LV EJECTION FRACTION A4C: 46 %
ECHO LV EJECTION FRACTION BIPLANE: 51 % (ref 55–100)
ECHO LV ESV A2C: 42 ML
ECHO LV ESV A4C: 74 ML
ECHO LV ESV INDEX A2C: 19 ML/M2
ECHO LV ESV INDEX A4C: 34 ML/M2
ECHO LV FRACTIONAL SHORTENING: 26 % (ref 28–44)
ECHO LV INTERNAL DIMENSION DIASTOLE INDEX: 1.92 CM/M2
ECHO LV INTERNAL DIMENSION DIASTOLIC: 4.2 CM (ref 4.2–5.9)
ECHO LV INTERNAL DIMENSION SYSTOLIC INDEX: 1.42 CM/M2
ECHO LV INTERNAL DIMENSION SYSTOLIC: 3.1 CM
ECHO LV IVSD: 1.2 CM (ref 0.6–1)
ECHO LV MASS 2D: 178.2 G (ref 88–224)
ECHO LV MASS INDEX 2D: 81.3 G/M2 (ref 49–115)
ECHO LV POSTERIOR WALL DIASTOLIC: 1.2 CM (ref 0.6–1)
ECHO LV RELATIVE WALL THICKNESS RATIO: 0.57
ECHO LVOT AREA: 3.1 CM2
ECHO LVOT AV VTI INDEX: 0.67
ECHO LVOT DIAM: 2 CM
ECHO LVOT MEAN GRADIENT: 2 MMHG
ECHO LVOT PEAK GRADIENT: 4 MMHG
ECHO LVOT PEAK VELOCITY: 1 M/S
ECHO LVOT STROKE VOLUME INDEX: 24.8 ML/M2
ECHO LVOT SV: 54.3 ML
ECHO LVOT VTI: 17.3 CM
ECHO MV A VELOCITY: 1.07 M/S
ECHO MV E DECELERATION TIME (DT): 111 MS
ECHO MV E VELOCITY: 1 M/S
ECHO MV E/A RATIO: 0.93
ECHO MV E/E' LATERAL: 6.67
ECHO MV E/E' RATIO (AVERAGED): 8.33
ECHO MV E/E' SEPTAL: 10
ECHO PULMONARY ARTERY END DIASTOLIC PRESSURE: 15 MMHG
ECHO PV REGURGITANT MAX VELOCITY: 1.9 M/S
ECHO RA AREA 4C: 19.6 CM2
ECHO RA END SYSTOLIC VOLUME APICAL 4 CHAMBER INDEX BSA: 23 ML/M2
ECHO RA VOLUME: 51 ML
ECHO RIGHT VENTRICULAR SYSTOLIC PRESSURE (RVSP): 29 MMHG
ECHO RV BASAL DIMENSION: 4.4 CM
ECHO RV MID DIMENSION: 3.9 CM
ECHO RV TAPSE: 2.5 CM (ref 1.7–?)
ECHO TV REGURGITANT MAX VELOCITY: 2.28 M/S
ECHO TV REGURGITANT PEAK GRADIENT: 21 MMHG
EOSINOPHIL # BLD: 0 K/UL (ref 0–0.4)
EOSINOPHIL NFR BLD: 0 % (ref 0–7)
ERYTHROCYTE [DISTWIDTH] IN BLOOD BY AUTOMATED COUNT: 15.8 % (ref 11.5–14.5)
GLUCOSE SERPL-MCNC: 110 MG/DL (ref 65–100)
HCT VFR BLD AUTO: 28.7 % (ref 36.6–50.3)
HGB BLD-MCNC: 9.7 G/DL (ref 12.1–17)
IMM GRANULOCYTES # BLD AUTO: 0 K/UL
IMM GRANULOCYTES NFR BLD AUTO: 0 %
LYMPHOCYTES # BLD: 0.2 K/UL (ref 0.8–3.5)
LYMPHOCYTES NFR BLD: 2 % (ref 12–49)
MCH RBC QN AUTO: 29.5 PG (ref 26–34)
MCHC RBC AUTO-ENTMCNC: 33.8 G/DL (ref 30–36.5)
MCV RBC AUTO: 87.2 FL (ref 80–99)
METAMYELOCYTES NFR BLD MANUAL: 7 %
MONOCYTES # BLD: 0.2 K/UL (ref 0–1)
MONOCYTES NFR BLD: 2 % (ref 5–13)
MYELOCYTES NFR BLD MANUAL: 7 %
NEUTS BAND NFR BLD MANUAL: 9 % (ref 0–6)
NEUTS SEG # BLD: 6.6 K/UL (ref 1.8–8)
NEUTS SEG NFR BLD: 73 % (ref 32–75)
NRBC # BLD: 0.04 K/UL (ref 0–0.01)
NRBC BLD-RTO: 0.5 PER 100 WBC
PHOSPHATE SERPL-MCNC: 2.2 MG/DL (ref 2.6–4.7)
PLATELET # BLD AUTO: 127 K/UL (ref 150–400)
PMV BLD AUTO: 11.2 FL (ref 8.9–12.9)
POTASSIUM SERPL-SCNC: 3.7 MMOL/L (ref 3.5–5.1)
PROCALCITONIN SERPL-MCNC: 5.2 NG/ML
RBC # BLD AUTO: 3.29 M/UL (ref 4.1–5.7)
RBC MORPH BLD: ABNORMAL
SODIUM SERPL-SCNC: 140 MMOL/L (ref 136–145)
SPECIAL REQUESTS,SREQ: NORMAL
WBC # BLD AUTO: 8.1 K/UL (ref 4.1–11.1)
WBC MORPH BLD: ABNORMAL

## 2022-12-01 PROCEDURE — 94761 N-INVAS EAR/PLS OXIMETRY MLT: CPT

## 2022-12-01 PROCEDURE — 84145 PROCALCITONIN (PCT): CPT

## 2022-12-01 PROCEDURE — 87186 SC STD MICRODIL/AGAR DIL: CPT

## 2022-12-01 PROCEDURE — 74011250637 HC RX REV CODE- 250/637: Performed by: INTERNAL MEDICINE

## 2022-12-01 PROCEDURE — 65660000001 HC RM ICU INTERMED STEPDOWN

## 2022-12-01 PROCEDURE — 94640 AIRWAY INHALATION TREATMENT: CPT

## 2022-12-01 PROCEDURE — 80069 RENAL FUNCTION PANEL: CPT

## 2022-12-01 PROCEDURE — 86140 C-REACTIVE PROTEIN: CPT

## 2022-12-01 PROCEDURE — 74011250636 HC RX REV CODE- 250/636: Performed by: INTERNAL MEDICINE

## 2022-12-01 PROCEDURE — 74011250636 HC RX REV CODE- 250/636: Performed by: NURSE PRACTITIONER

## 2022-12-01 PROCEDURE — 87077 CULTURE AEROBIC IDENTIFY: CPT

## 2022-12-01 PROCEDURE — 85025 COMPLETE CBC W/AUTO DIFF WBC: CPT

## 2022-12-01 PROCEDURE — 77010033678 HC OXYGEN DAILY

## 2022-12-01 PROCEDURE — 87040 BLOOD CULTURE FOR BACTERIA: CPT

## 2022-12-01 PROCEDURE — 74011000258 HC RX REV CODE- 258: Performed by: INTERNAL MEDICINE

## 2022-12-01 PROCEDURE — 93306 TTE W/DOPPLER COMPLETE: CPT

## 2022-12-01 PROCEDURE — 74011250637 HC RX REV CODE- 250/637: Performed by: NURSE PRACTITIONER

## 2022-12-01 PROCEDURE — 99233 SBSQ HOSP IP/OBS HIGH 50: CPT | Performed by: INTERNAL MEDICINE

## 2022-12-01 PROCEDURE — 36415 COLL VENOUS BLD VENIPUNCTURE: CPT

## 2022-12-01 PROCEDURE — 87150 DNA/RNA AMPLIFIED PROBE: CPT

## 2022-12-01 RX ORDER — VANCOMYCIN HYDROCHLORIDE 10 G/100ML
1500 INJECTION, POWDER, LYOPHILIZED, FOR SOLUTION INTRAVENOUS EVERY 8 HOURS
Qty: 63000 MG | Refills: 0 | Status: SHIPPED | OUTPATIENT
Start: 2022-12-01 | End: 2022-12-15

## 2022-12-01 RX ADMIN — BUDESONIDE AND FORMOTEROL FUMARATE DIHYDRATE 2 PUFF: 160; 4.5 AEROSOL RESPIRATORY (INHALATION) at 19:59

## 2022-12-01 RX ADMIN — BUDESONIDE AND FORMOTEROL FUMARATE DIHYDRATE 2 PUFF: 160; 4.5 AEROSOL RESPIRATORY (INHALATION) at 09:10

## 2022-12-01 RX ADMIN — RIVAROXABAN 20 MG: 10 TABLET, FILM COATED ORAL at 08:47

## 2022-12-01 RX ADMIN — ALBUTEROL SULFATE 2 PUFF: 108 AEROSOL, METERED RESPIRATORY (INHALATION) at 13:23

## 2022-12-01 RX ADMIN — Medication 1500 MG: at 15:00

## 2022-12-01 RX ADMIN — ACETAMINOPHEN 650 MG: 325 TABLET ORAL at 21:33

## 2022-12-01 RX ADMIN — ALBUTEROL SULFATE 2 PUFF: 108 AEROSOL, METERED RESPIRATORY (INHALATION) at 19:59

## 2022-12-01 RX ADMIN — Medication 1500 MG: at 08:00

## 2022-12-01 RX ADMIN — Medication 1500 MG: at 00:00

## 2022-12-01 RX ADMIN — MONTELUKAST 10 MG: 10 TABLET, FILM COATED ORAL at 08:47

## 2022-12-01 RX ADMIN — MUPIROCIN: 20 OINTMENT TOPICAL at 08:47

## 2022-12-01 RX ADMIN — PIPERACILLIN AND TAZOBACTAM 3.38 G: 3; .375 INJECTION, POWDER, FOR SOLUTION INTRAVENOUS at 13:54

## 2022-12-01 RX ADMIN — WHITE PETROLATUM,ZINC OXIDE: 20; 75 PASTE TOPICAL at 09:29

## 2022-12-01 RX ADMIN — CETIRIZINE HYDROCHLORIDE 10 MG: 10 TABLET, FILM COATED ORAL at 08:47

## 2022-12-01 RX ADMIN — PIPERACILLIN AND TAZOBACTAM 3.38 G: 3; .375 INJECTION, POWDER, FOR SOLUTION INTRAVENOUS at 21:21

## 2022-12-01 RX ADMIN — MUPIROCIN: 20 OINTMENT TOPICAL at 21:21

## 2022-12-01 RX ADMIN — CELECOXIB 200 MG: 200 CAPSULE ORAL at 08:47

## 2022-12-01 RX ADMIN — PIPERACILLIN AND TAZOBACTAM 3.38 G: 3; .375 INJECTION, POWDER, FOR SOLUTION INTRAVENOUS at 05:55

## 2022-12-01 RX ADMIN — ALBUTEROL SULFATE 2 PUFF: 108 AEROSOL, METERED RESPIRATORY (INHALATION) at 09:10

## 2022-12-01 RX ADMIN — CELECOXIB 200 MG: 200 CAPSULE ORAL at 21:22

## 2022-12-01 NOTE — PROGRESS NOTES
Problem: Risk for Spread of Infection  Goal: Prevent transmission of infectious organism to others  Description: Prevent the transmission of infectious organisms to other patients, staff members, and visitors. Outcome: Progressing Towards Goal     Problem: Patient Education:  Go to Education Activity  Goal: Patient/Family Education  Outcome: Progressing Towards Goal     Problem: Falls - Risk of  Goal: *Absence of Falls  Description: Document Kaylah Jamil Fall Risk and appropriate interventions in the flowsheet.   Outcome: Progressing Towards Goal  Note: Fall Risk Interventions:  Mobility Interventions: Assess mobility with egress test         Medication Interventions: Bed/chair exit alarm    Elimination Interventions: Call light in reach

## 2022-12-01 NOTE — PROGRESS NOTES
Hospitalist Progress Note         Mark Anthony Mosqueda MD          Daily Progress Note: 12/1/2022      Subjective: The patient is seen for follow  up. 35 yo male, hx of HTN, asthma, non-Hodgkin's lymphoma post chemotherapy, now on radiation, DVT, PE, here for shortness of breath. Pt was seen on 11/25 for similar, diagnosed with Flu A and sent home on Tamiflu. Workup thus far significant for CXR with evidence of bilateral pneumonia, labs suggestive of sepsis. Pt started on Vancomycin, Cefepime, Levaquin. --  The patient is seen for follow up. States that he feels far better today than he did yesterday with less shortness of breath. Currently on 1L nasal oxygen    Problem List:  Problem List as of 12/1/2022 Date Reviewed: 8/15/2022            Codes Class Noted - Resolved    Shortness of breath ICD-10-CM: R06.02  ICD-9-CM: 786.05  11/27/2022 - Present        Pneumonia ICD-10-CM: J18.9  ICD-9-CM: 486  11/27/2022 - Present        History of asthma ICD-10-CM: Z87.09  ICD-9-CM: V12.69  11/27/2022 - Present        Influenza A ICD-10-CM: J10.1  ICD-9-CM: 487.1  11/27/2022 - Present        Severe sepsis ([facility] ICD-10-CM: A41.9, R65.20  ICD-9-CM: 038.9, 995.92  11/27/2022 - Present        Asthma ICD-10-CM: J45.909  ICD-9-CM: 493.90  5/7/2022 - Present        Acute respiratory failure with hypoxia ([facility] ICD-10-CM: J96.01  ICD-9-CM: 518.81  5/7/2022 - Present        Lymphoma ([facility] ICD-10-CM: C85.90  ICD-9-CM: 202.80  3/1/2022 - Present        Pelvic mass ICD-10-CM: R19.00  ICD-9-CM: 789.30  2/23/2022 - Present        Bilateral pulmonary embolism (HCC) ICD-10-CM: I26.99  ICD-9-CM: 415.19  2/23/2022 - Present        Pulmonary embolism ([facility] ICD-10-CM: I26.99  ICD-9-CM: 415.19  2/22/2022 - Present        Right inguinal pain ICD-10-CM: R10.31  ICD-9-CM: 789.03  2/17/2022 - Present        Right groin mass ICD-10-CM: R19.09  ICD-9-CM: 789.39  2/11/2022 - Present        Fall ICD-10-CM: W19. [place]  ICD-9-CM: D987.3  2/11/2022 - Present       Medications reviewed  Current Facility-Administered Medications   Medication Dose Route Frequency    albuterol-ipratropium (DUO-NEB) 2.5 MG-0.5 MG/3 ML  3 mL Nebulization Q6H PRN    albuterol (PROVENTIL HFA, VENTOLIN HFA, PROAIR HFA) inhaler 2 Puff  2 Puff Inhalation Q6HWA RT    budesonide-formoteroL (SYMBICORT) 160-4.5 mcg/actuation HFA inhaler 2 Puff  2 Puff Inhalation BID RT    [START ON 12/3/2022] Vancomycin level on 12/3 @0400   Other ONCE    traMADoL (ULTRAM) tablet 25 mg  25 mg Oral Q6H PRN    vancomycin (VANCOCIN) 1500 mg in  ml infusion  1,500 mg IntraVENous Q8H    celecoxib (CELEBREX) capsule 200 mg  200 mg Oral BID    zinc oxide-white petrolatum 20-75 % topical paste   Topical DAILY    0.9% sodium chloride infusion  40 mL/hr IntraVENous CONTINUOUS    mupirocin (BACTROBAN) 2 % ointment   Both Nostrils BID    piperacillin-tazobactam (ZOSYN) 3.375 g in 0.9% sodium chloride (MBP/ADV) 100 mL MBP  3.375 g IntraVENous Q8H    sodium chloride (NS) flush 5-10 mL  5-10 mL IntraVENous PRN    sodium chloride (NS) flush 5-40 mL  5-40 mL IntraVENous PRN    acetaminophen (TYLENOL) tablet 650 mg  650 mg Oral Q6H PRN    Or    acetaminophen (TYLENOL) suppository 650 mg  650 mg Rectal Q6H PRN    polyethylene glycol (MIRALAX) packet 17 g  17 g Oral DAILY PRN    bisacodyL (DULCOLAX) tablet 5 mg  5 mg Oral DAILY PRN    ondansetron (ZOFRAN ODT) tablet 4 mg  4 mg Oral Q6H PRN    Or    ondansetron (ZOFRAN) injection 4 mg  4 mg IntraVENous Q6H PRN    sodium chloride (NS) flush 5-10 mL  5-10 mL IntraVENous PRN    metoprolol (LOPRESSOR) injection 5 mg  5 mg IntraVENous Q3H PRN    guaiFENesin-codeine (ROBITUSSIN AC) 100-10 mg/5 mL solution 10 mL  10 mL Oral Q4H PRN    Vancomycin - Pharmacy to Dose   Other Rx Dosing/Monitoring    cetirizine (ZYRTEC) tablet 10 mg  10 mg Oral DAILY    fluticasone propionate (FLONASE) 50 mcg/actuation nasal spray 1 Spray  1 Spray Both Nostrils PRN    montelukast (SINGULAIR) tablet 10 mg  10 mg Oral DAILY    rivaroxaban (XARELTO) tablet 20 mg  20 mg Oral DAILY    hydrOXYzine (VISTARIL) injection 50 mg  50 mg IntraMUSCular Q6H PRN       Review of Systems:   A comprehensive review of systems was negative except for that written in the HPI. Objective:   Physical Exam:     Visit Vitals  /81 (BP 1 Location: Left upper arm, BP Patient Position: Semi fowlers)   Pulse (!) 105   Temp 97.5 °F (36.4 °C)   Resp 25   Ht 5' 6\" (1.676 m)   Wt 112 kg (247 lb)   SpO2 94%   BMI 39.87 kg/m²    O2 Flow Rate (L/min): 1 l/min O2 Device: None (Room air)    Temp (24hrs), Av.5 °F (36.9 °C), Min:97.5 °F (36.4 °C), Max:99.7 °F (37.6 °C)    No intake/output data recorded.  1901 -  0700  In: -   Out: 1750 [TOECU:2703]    General:  Alert, cooperative, no distress, appears stated age. Lungs:   Clear to auscultation bilaterally. Chest wall:  No tenderness or deformity. Heart:  Regular rate and rhythm, S1, S2 normal, no murmur, click, rub or gallop. Abdomen:   Soft, non-tender. Bowel sounds normal. No masses,  No organomegaly. Extremities: Extremities normal, atraumatic, no cyanosis or edema. Pulses: 2+ and symmetric all extremities. Skin: Skin color, texture, turgor normal. No rashes or lesions   Neurologic: CNII-XII intact.  No gross sensory or motor deficits     Data Review:       Recent Days:  Recent Labs     22  1058 22  0705 22  0410   WBC 8.1 9.3 6.1   HGB 9.7* 10.2* 10.2*   HCT 28.7* 30.1* 30.2*   * 156 146*       Recent Labs     22  1058 22  0705 22  0410    140 137   K 3.7 4.1 3.8    109* 105   CO2 23 25 22   * 135* 153*   BUN 16 29* 26*   CREA 0.66* 0.77 1.06   CA 8.2* 8.8 9.6   PHOS 2.2* 1.7* 1.3*   ALB 1.6* 1.8* 1.8*   INR  --   --  2.0*       Recent Labs     22  0441   PH 7.48*   PCO2 32*   PO2 78*   HCO3 23   FIO2 30         24 Hour Results:  Recent Results (from the past 24 hour(s))   C REACTIVE PROTEIN, QT    Collection Time: 12/01/22 10:58 AM   Result Value Ref Range    C-Reactive protein 9.64 (H) 0.00 - 0.60 mg/dL   RENAL FUNCTION PANEL    Collection Time: 12/01/22 10:58 AM   Result Value Ref Range    Sodium 140 136 - 145 mmol/L    Potassium 3.7 3.5 - 5.1 mmol/L    Chloride 106 97 - 108 mmol/L    CO2 23 21 - 32 mmol/L    Anion gap 11 5 - 15 mmol/L    Glucose 110 (H) 65 - 100 mg/dL    BUN 16 6 - 20 mg/dL    Creatinine 0.66 (L) 0.70 - 1.30 mg/dL    BUN/Creatinine ratio 24 (H) 12 - 20      eGFR >60 >60 ml/min/1.73m2    Calcium 8.2 (L) 8.5 - 10.1 mg/dL    Phosphorus 2.2 (L) 2.6 - 4.7 mg/dL    Albumin 1.6 (L) 3.5 - 5.0 g/dL   CBC WITH AUTOMATED DIFF    Collection Time: 12/01/22 10:58 AM   Result Value Ref Range    WBC 8.1 4.1 - 11.1 K/uL    RBC 3.29 (L) 4.10 - 5.70 M/uL    HGB 9.7 (L) 12.1 - 17.0 g/dL    HCT 28.7 (L) 36.6 - 50.3 %    MCV 87.2 80.0 - 99.0 FL    MCH 29.5 26.0 - 34.0 PG    MCHC 33.8 30.0 - 36.5 g/dL    RDW 15.8 (H) 11.5 - 14.5 %    PLATELET 508 (L) 418 - 400 K/uL    MPV 11.2 8.9 - 12.9 FL    NRBC 0.5 (H) 0.0  WBC    ABSOLUTE NRBC 0.04 (H) 0.00 - 0.01 K/uL    NEUTROPHILS PENDING %    LYMPHOCYTES PENDING %    MONOCYTES PENDING %    EOSINOPHILS PENDING %    BASOPHILS PENDING %    IMMATURE GRANULOCYTES PENDING %    ABS. NEUTROPHILS PENDING K/UL    ABS. LYMPHOCYTES PENDING K/UL    ABS. MONOCYTES PENDING K/UL    ABS. EOSINOPHILS PENDING K/UL    ABS. BASOPHILS PENDING K/UL    ABS. IMM. GRANS.  PENDING K/UL    DF PENDING    ECHO ADULT COMPLETE    Collection Time: 12/01/22 11:00 AM   Result Value Ref Range    LV EDV A2C 89 mL    LV EDV A4C 137 mL    LV ESV A2C 42 mL    LV ESV A4C 74 mL    IVSd 1.2 (A) 0.6 - 1.0 cm    LVIDd 4.2 4.2 - 5.9 cm    LVIDs 3.1 cm    LVOT Diameter 2.0 cm    LVOT Mean Gradient 2 mmHg    LVOT VTI 17.3 cm    LVOT Peak Velocity 1.0 m/s    LVOT Peak Gradient 4 mmHg    LVPWd 1.2 (A) 0.6 - 1.0 cm    LV E' Lateral Velocity 15 cm/s    LV E' Septal Velocity 10 cm/s    LV Ejection Fraction A2C 53 %    LV Ejection Fraction A4C 46 %    EF BP 51 (A) 55 - 100 %    LVOT Area 3.1 cm2    LVOT SV 54.3 ml    LA Minor Axis 4.9 cm    LA Major Axis 4.8 cm    LA Area 2C 18.0 cm2    LA Area 4C 15.7 cm2    LA Volume BP 48 18 - 58 mL    LA Diameter 3.4 cm    RA Area 4C 19.6 cm2    RA Volume 51 ml    AV Mean Gradient 6 mmHg    AV VTI 26.0 cm    AV Mean Velocity 1.1 m/s    AV Peak Velocity 1.7 m/s    AV Peak Gradient 11 mmHg    AV Area by VTI 2.1 cm2    AV Area by Peak Velocity 1.9 cm2    Aortic Root 3.4 cm    MV E Wave Deceleration Time 111.0 ms    MV A Velocity 1.07 m/s    MV E Velocity 1.00 m/s    SC Max Velocity 1.9 m/s    Pulmonary Artery EDP 15 mmHg    RV Basal Dimension 4.4 cm    RV Mid Dimension 3.9 cm    TAPSE 2.5 1.7 cm    TR Max Velocity 2.28 m/s    TR Peak Gradient 21 mmHg    Fractional Shortening 2D 26 28 - 44 %    LV ESV Index A4C 34 mL/m2    LV EDV Index A4C 63 mL/m2    LV ESV Index A2C 19 mL/m2    LV EDV Index A2C 41 mL/m2    LVIDd Index 1.92 cm/m2    LVIDs Index 1.42 cm/m2    LV RWT Ratio 0.57     LV Mass 2D 178.2 88 - 224 g    LV Mass 2D Index 81.3 49 - 115 g/m2    MV E/A 0.93     E/E' Ratio (Averaged) 8.33     E/E' Lateral 6.67     E/E' Septal 10.00     LA Volume Index BP 22 16 - 34 ml/m2    LVOT Stroke Volume Index 24.8 mL/m2    LA Size Index 1.55 cm/m2    LA/AO Root Ratio 1.00     RA Volume Index A4C 23 mL/m2    Ao Root Index 1.55 cm/m2    AV Velocity Ratio 0.59     LVOT:AV VTI Index 0.67     LETICIA/BSA VTI 1.0 cm2/m2    LETICIA/BSA Peak Velocity 0.9 cm2/m2    Est. RA Pressure 8 mmHg    RVSP 29 mmHg           Assessment/     Acute hypoxic respiratory failure    -Secondary to bilateral pneumonia    -Continue cefepime, vancomycin and Levaquin    -Wean off high flow oxygen as tolerated    Influenza A    -Continue on Tamiflu through November 29    Bilateral pneumonia    -Suspect community-acquired pneumonia    -Continue IV Zosyn and IV vancomycin  Add incentive spirometer for deep breathing  History of non-Hodgkin's lymphoma    -Completed chemotherapy    -Immunocompromise status    Prior history of DVT and PE    -On Xarelto    Benign essential hypertension   -Fairly stable    Hypophosphatemia     -Replete with K phos 1 tab QID x 8 doses      Plan:  Continue supportive care  Treatment plan as outlined above  Add incentive spirometer for deep breathing  Significantly improved  PTOT eval      Care Plan discussed with: Nurse    Total time spent with patient: 30 minutes.     Twin Felipe MD

## 2022-12-01 NOTE — WOUND CARE
Wound Care Note:    Wound Care into see patient because of \"Re-assessment of wound to right groin\"    Skin Care & Pressure Relief Recommendations:  Minimize layers of linen  Pads under patient to optimize support surface and microclimate  Turn/reposition approximately every 2 hours. Pillow Wedges  Manage incontinence  Promote continence; Skin Protective lotion to buttocks and sacrum daily and as needed with incontinence care    Offload heels with pillows or offloading boots. Support surface: Gel    Radiation burn to right groin area much improved with epithelialization noted at wound margins. Wound is lighter pink in color. Continue with application of zinc paste daily. When cleansing, use Remedy Foaming cleanser and cream barrier bath wipes. No other wound care needs noted at this time. Re-consult [facility] if skin condition changes. Media Information  Document Information    Photographic Image:  Mobile Media Capture   Right Groin   12/01/2022 12:48   Attached To:    Hospital Encounter on 11/27/22     Source Information    Kristina Arevalo RN  [facility] Cardiac Telemetry

## 2022-12-01 NOTE — PROGRESS NOTES
PT consult received and PT screen completed. Pt states he has been able to get up and amb in room without difficulty. No balance or amb deficits identified at this time. Will d/c PT post screen, will be happy to reassess if pt's status changes.

## 2022-12-01 NOTE — PROGRESS NOTES
Progress Note    Patient: Fercho Seaman MRN: [id number]  SSN: [id number]    YOB: 1990  Age: 34 y.o. Sex: male      Admit Date: 11/27/2022    LOS: 4 days     Subjective:   Patient with NHL followed for severe sepsis with positive blood cultures and sputum culture for Staphylococcus aureus. He is afebrile with normal WBC and decreasing CRP and procal.  Currently on IV Vancomycin alone. TTE negative for vegetations. Patient sitting up in bedside chair. Informed him that Dr. Aden Waters would like to retain [facility] until next year. Objective:     Vitals:    12/01/22 0710 12/01/22 0747 12/01/22 0911 12/01/22 0916   BP:  134/84     Pulse:  (!) 103     Resp:  20     Temp:  98.7 °F (37.1 °C)     SpO2:  93% 96% 95%   Weight: 247 lb (112 kg)      Height:            Intake and Output:  Current Shift: No intake/output data recorded. Last three shifts: 11/29 1901 - 12/01 0700  In: -   Out: 1906 [Urine:1750]    Physical Exam:   Vitals and nursing note reviewed. Constitutional:       General: He is not in acute distress. Appearance: He is ill-appearing. HENT:  Nasal cannula     Mouth/Throat:      Pharynx: Oropharynx is clear. Eyes:      Pupils: Pupils are equal, round, and reactive to light. Cardiovascular:      Rate and Rhythm: Regular rhythm. Tachycardia present. Heart sounds: No murmur heard. Comments: Edltu-F-Avjm right chest, site unremarkable with no erythema, ?mild tenderness  Pulmonary:      Effort: No respiratory distress. Breath sounds: Wheezing and rhonchi present. No rales. Chest:      Abdominal:      General: There is no distension. Palpations: Abdomen is soft. Tenderness: There is no abdominal tenderness. There is no right CVA tenderness, left CVA tenderness or guarding. Genitourinary:     Comments: No Bonilla catheter  Musculoskeletal:      Cervical back: Neck supple. Right lower leg: No edema. Left lower leg: No edema.    Skin: Findings: No rash. Neurological:      General: No focal deficit present. Mental Status: He is alert and oriented to person, place, and time. Psychiatric:    normal behavior      Lab/Data Review:     WBC 8,100  , 000  Lactic 1.5 <2.2    CRP 9.64 <9.57 <18.50  Procal  5.20 <11.81 <25.52 <18.89    MRSA Nasal PCR positive    Blood cultures (11/27) Staphylococcus aureus MRSA  Sputum culture (11/27)  Staphylococcus aureus MRSA  Urine culture (11/27) No growth FINAL    CXR (11/29)      Assessment:     Active Problems:    Shortness of breath (11/27/2022)      Pneumonia (11/27/2022)      History of asthma (11/27/2022)      Influenza A (11/27/2022)      Severe sepsis ([facility] (11/27/2022)  Severe sepsis with fever, leukopenia, elevated lactic acid and procal, CRP, resolved or resolving  Positive blood cultures with Staphylococcus aureus MRSA (both sets), Day #5 IV Vancomycin  Influenza A, Day #5 Tamiflu  Pneumonia, bilateral,   post-viral, secondary to Staphylococcus aureus MRSA, Day #5 IV Vancomycin  Acute hypoxic respiratory failure, on nasal cannuHFNC  Asthma  Non-Hodgkins' Lymphoma  MRSA nasal colonization, Day #4 Mupirocin       Comment:  CRP and procal have decreased. Patient has clinically improved. No evidence of endocarditis. Plan:   1. Continue IV Vancomycin for 14 days after first negative blood culture; explained this to patient  2. Follow-up repeat blood cultures   3. Continue Mupirocin nasal ointment  4. In am, repeat procal, CRP, blood cultures  5. Tamiflu completed  6.   Consult Case Management for home IV antibiotics          Signed By: Concetta Davila MD     December 1, 2022

## 2022-12-01 NOTE — PROGRESS NOTES
IMPRESSION:   Acute hypoxic respiratory failure improved now on 1 L nasal oxygen  Exacerbation of asthma improved  Influenza A  MRSA pneumonia remains on vancomycin  MRSA septicemia  Pleurisy improved on Celebrex  Hypophosphatemia to be repleted  Nasal MRSA smear positive  Severe sepsis  History of DVT/PE on Xarelto  hx of non-Hodgkin's lymphoma status postchemotherapy on radiation treatment  Gastroesophageal reflux disease        RECOMMENDATIONS/PLAN:     69-year-old male came in because of shortness of breath and dyspnea and critical condition nonrebreather mask 100% FiO2  Oxygen down to 2 L nasal  Pleuritic chest pain improved on Celebrex  Almost no wheezing will change to albuterol and Symbicort inhalers  Currently on Zosyn and vancomycin  Completed a course of Tamiflu       [x] High complexity decision making was performed  [x] See my orders for details  HPI  69-year-old male came in because of shortness of breath and dyspnea, he has significant past medical history of asthma non-Hodgkin's lymphoma status postchemotherapy currently on radiation treatment also had a history of DVT and pulmonary embolism he was positive with influenza A and he was sent home with Tamiflu but his condition got worse came to the emergency room he is very short of breath 100% FiO2 nonrebreather mask electrolyte imbalance having chest discomfort diaphoretic he has not smoked in the past 4 days he smokes marijuana tachypneic tachycardic so admitted and critical care consult was called. PMH:  has a past medical history of Asthma, DVT (deep venous thrombosis) ([address] Hypertension, Lymphoma ([address] PE (pulmonary thromboembolism) ([address] and Thromboembolus ([address] PSH:   has a past surgical history that includes hx other surgical (Right, 03/02/2022); ir fluoro guide plc cvad (3/30/2022); and ir insert tunl cvc w port over 5 years (3/30/2022).      FHX: family history includes Cancer in his maternal grandfather; Diabetes in his mother; Hypertension in his mother; Myasthenia Gravis in his maternal grandmother; No Known Problems in his father. SHX:  reports that he quit smoking about 7 years ago. His smoking use included cigarettes. He has never used smokeless tobacco. He reports that he does not currently use alcohol. He reports that he does not currently use drugs after having used the following drugs: Marijuana.     ALL: No Known Allergies     MEDS:   [x] Reviewed - As Below   [] Not reviewed    Current Facility-Administered Medications   Medication    albuterol-ipratropium (DUO-NEB) 2.5 MG-0.5 MG/3 ML    albuterol (PROVENTIL HFA, VENTOLIN HFA, PROAIR HFA) inhaler 2 Puff    budesonide-formoteroL (SYMBICORT) 160-4.5 mcg/actuation HFA inhaler 2 Puff    [START ON 12/3/2022] Vancomycin level on 12/3 @0400    traMADoL (ULTRAM) tablet 25 mg    vancomycin (VANCOCIN) 1500 mg in  ml infusion    celecoxib (CELEBREX) capsule 200 mg    zinc oxide-white petrolatum 20-75 % topical paste    0.9% sodium chloride infusion    mupirocin (BACTROBAN) 2 % ointment    piperacillin-tazobactam (ZOSYN) 3.375 g in 0.9% sodium chloride (MBP/ADV) 100 mL MBP    sodium chloride (NS) flush 5-10 mL    sodium chloride (NS) flush 5-40 mL    acetaminophen (TYLENOL) tablet 650 mg    Or    acetaminophen (TYLENOL) suppository 650 mg    polyethylene glycol (MIRALAX) packet 17 g    bisacodyL (DULCOLAX) tablet 5 mg    ondansetron (ZOFRAN ODT) tablet 4 mg    Or    ondansetron (ZOFRAN) injection 4 mg    sodium chloride (NS) flush 5-10 mL    metoprolol (LOPRESSOR) injection 5 mg    guaiFENesin-codeine (ROBITUSSIN AC) 100-10 mg/5 mL solution 10 mL    Vancomycin - Pharmacy to Dose    cetirizine (ZYRTEC) tablet 10 mg    fluticasone propionate (FLONASE) 50 mcg/actuation nasal spray 1 [place]    montelukast (SINGULAIR) tablet 10 mg    rivaroxaban (XARELTO) tablet 20 mg    hydrOXYzine (VISTARIL) injection 50 mg      MAR reviewed and pertinent medications noted or modified as needed Current Facility-Administered Medications   Medication    albuterol-ipratropium (DUO-NEB) 2.5 MG-0.5 MG/3 ML    albuterol (PROVENTIL HFA, VENTOLIN HFA, PROAIR HFA) inhaler 2 Puff    budesonide-formoteroL (SYMBICORT) 160-4.5 mcg/actuation HFA inhaler 2 Puff    [START ON 12/3/2022] Vancomycin level on 12/3 @0400    traMADoL (ULTRAM) tablet 25 mg    vancomycin (VANCOCIN) 1500 mg in  ml infusion    celecoxib (CELEBREX) capsule 200 mg    zinc oxide-white petrolatum 20-75 % topical paste    0.9% sodium chloride infusion    mupirocin (BACTROBAN) 2 % ointment    piperacillin-tazobactam (ZOSYN) 3.375 g in 0.9% sodium chloride (MBP/ADV) 100 mL MBP    sodium chloride (NS) flush 5-10 mL    sodium chloride (NS) flush 5-40 mL    acetaminophen (TYLENOL) tablet 650 mg    Or    acetaminophen (TYLENOL) suppository 650 mg    polyethylene glycol (MIRALAX) packet 17 g    bisacodyL (DULCOLAX) tablet 5 mg    ondansetron (ZOFRAN ODT) tablet 4 mg    Or    ondansetron (ZOFRAN) injection 4 mg    sodium chloride (NS) flush 5-10 mL    metoprolol (LOPRESSOR) injection 5 mg    guaiFENesin-codeine (ROBITUSSIN AC) 100-10 mg/5 mL solution 10 mL    Vancomycin - Pharmacy to Dose    cetirizine (ZYRTEC) tablet 10 mg    fluticasone propionate (FLONASE) 50 mcg/actuation nasal spray 1 [place]    montelukast (SINGULAIR) tablet 10 mg    rivaroxaban (XARELTO) tablet 20 mg    hydrOXYzine (VISTARIL) injection 50 mg      PMH:  has a past medical history of Asthma, DVT (deep venous thrombosis) ([address] Hypertension, Lymphoma ([address] PE (pulmonary thromboembolism) ([address] and Thromboembolus ([address] PSH:   has a past surgical history that includes hx other surgical (Right, 03/02/2022); ir fluoro guide plc cvad (3/30/2022); and ir insert tunl cvc w port over 5 years (3/30/2022).    FHX: family history includes Cancer in his maternal grandfather; Diabetes in his mother; Hypertension in his mother; Myasthenia Gravis in his maternal grandmother; No Known Problems in his father. SHX:  reports that he quit smoking about 7 years ago. His smoking use included cigarettes. He has never used smokeless tobacco. He reports that he does not currently use alcohol. He reports that he does not currently use drugs after having used the following drugs: Marijuana. ROS:A comprehensive review of systems was negative except for that written in the HPI. Hemodynamics:    CO:    CI:    CVP:    SVR:   PAP Systolic:    PAP Diastolic:    PVR:    SI65:        Ventilator Settings:      Mode Rate TV Press PEEP FiO2 PIP Min. Vent               30 %              Vital Signs: Telemetry:    normal sinus rhythm Intake/Output:   Visit Vitals  /81 (BP 1 Location: Left upper arm, BP Patient Position: Semi fowlers)   Pulse (!) 105   Temp 97.5 °F (36.4 °C)   Resp 25   Ht 5' 6\" (1.676 m)   Wt 112 kg (247 lb)   SpO2 94%   BMI 39.87 kg/m²       Temp (24hrs), Av.4 °F (36.9 °C), Min:97.5 °F (36.4 °C), Max:99.7 °F (37.6 °C)        O2 Device: None (Room air) O2 Flow Rate (L/min): 1 l/min       Wt Readings from Last 4 Encounters:   22 112 kg (247 lb)   22 104.3 kg (230 lb)   22 104.3 kg (230 lb)   22 116.1 kg (256 lb)          Intake/Output Summary (Last 24 hours) at 2022 1115  Last data filed at 2022 0612  Gross per 24 hour   Intake --   Output 900 ml   Net -900 ml         Last shift:      No intake/output data recorded. Last 3 shifts:  1901 -  0700  In: -   Out: 4445 [Urine:1750]       Physical Exam:     General: Awake alert no distress no accessory muscle use currently on 1 liter nasal oxygen saturation 97%  HEENT: NCAT, normal oral mucosa  Eyes: anicteric; conjunctiva clear extraocular movements intact  Neck: no nodes,  trach midline; no accessory MM use.   Chest: no deformity,   Cardiac: Regular rate and rhythm  Lungs: Coarse exhalation with very mild wheezing no definite rales  Abd: Soft nontender positive bowel sounds  Ext: no edema; no joint swelling;  No clubbing  : clear urine  Neuro: Awake alert oriented moving all extremities  Psych-calm oriented  Skin: warm, dry, no cyanosis;   Pulses: Brachial and radial pulses intact  Capillary: Normal capillary refill      DATA:    MAR reviewed and pertinent medications noted or modified as needed  MEDS:   Current Facility-Administered Medications   Medication    albuterol-ipratropium (DUO-NEB) 2.5 MG-0.5 MG/3 ML    albuterol (PROVENTIL HFA, VENTOLIN HFA, PROAIR HFA) inhaler 2 Puff    budesonide-formoteroL (SYMBICORT) 160-4.5 mcg/actuation HFA inhaler 2 Puff    [START ON 12/3/2022] Vancomycin level on 12/3 @0400    traMADoL (ULTRAM) tablet 25 mg    vancomycin (VANCOCIN) 1500 mg in  ml infusion    celecoxib (CELEBREX) capsule 200 mg    zinc oxide-white petrolatum 20-75 % topical paste    0.9% sodium chloride infusion    mupirocin (BACTROBAN) 2 % ointment    piperacillin-tazobactam (ZOSYN) 3.375 g in 0.9% sodium chloride (MBP/ADV) 100 mL MBP    sodium chloride (NS) flush 5-10 mL    sodium chloride (NS) flush 5-40 mL    acetaminophen (TYLENOL) tablet 650 mg    Or    acetaminophen (TYLENOL) suppository 650 mg    polyethylene glycol (MIRALAX) packet 17 g    bisacodyL (DULCOLAX) tablet 5 mg    ondansetron (ZOFRAN ODT) tablet 4 mg    Or    ondansetron (ZOFRAN) injection 4 mg    sodium chloride (NS) flush 5-10 mL    metoprolol (LOPRESSOR) injection 5 mg    guaiFENesin-codeine (ROBITUSSIN AC) 100-10 mg/5 mL solution 10 mL    Vancomycin - Pharmacy to Dose    cetirizine (ZYRTEC) tablet 10 mg    fluticasone propionate (FLONASE) 50 mcg/actuation nasal spray 1 [place]    montelukast (SINGULAIR) tablet 10 mg    rivaroxaban (XARELTO) tablet 20 mg    hydrOXYzine (VISTARIL) injection 50 mg        Labs:    Recent Labs     11/30/22  0705 11/29/22  0410   WBC 9.3 6.1   HGB 10.2* 10.2*    146*   INR  --  2.0*       Recent Labs     11/30/22  0705 11/29/22  0410    137   K 4.1 3.8   * 105   CO2 25 22   * 153*   BUN 29* 26*   CREA 0.77 1.06   CA 8.8 9.6   PHOS 1.7* 1.3*   LAC 1.5 2.2*   ALB 1.8* 1.8*       Recent Labs     11/29/22  0441   PH 7.48*   PCO2 32*   PO2 78*   HCO3 23   FIO2 30     11/29 nasal high flow 45 L FiO2 30%   11/28 nasal high flow 45 L FiO2 75% oxygen saturation 100%  Nasal MRSA smear positive  Influenza A positive  Procalcitonin 11.81, 25.5, 18.8  CRP 9.57, 18.5  Blood culture MRSA  Sputum culture 3+ polys with MRSA  Urine culture no growth    Troponin 19, 136  3/3/2022 Echo ejection fraction 65% left atrium 3.2 cm normal IVC  Lab Results   Component Value Date/Time    Culture result: No Growth (<1000 cfu/mL) 11/28/2022 12:03 AM    Culture result:  11/27/2022 10:00 PM     Moderate * methicillin resistant staphylococcus aureus *    Culture result: Light Normal respiratory heri 11/27/2022 10:00 PM    Culture result:  11/27/2022 10:00 PM     (NOTE) MRSA CALLED TO Sudeep Medrano 135 ON 11/30/22. RF         Imaging:    Results from Hospital Encounter encounter on 11/27/22    XR CHEST PORT    Narrative  EXAM: XR CHEST PORT    DATE: 11/29/2022 4:05 AM    INDICATION: Community-acquired pneumonia    COMPARISON: Chest radiograph November 28, 2022    FINDINGS: AP portable chest radiograph. There is a right-sided Port-A-Cath in  place. The heart size is normal. Lungs are adequately expanded. There is been no  significant change in bilateral, diffuse airspace infiltrates. No definite  effusion or pneumothorax is seen. Impression  No significant interval change in severe, diffuse airspace infiltrates. Results from [place] encounter on 11/22/22    CT ABD PELV W CONT    Narrative  EXAM:  CT ABD PELV W CONT    INDICATION: Abdominal pain. History of lymphoma. COMPARISON: CT 2/11/2022. TECHNIQUE: Helical CT of the abdomen  and pelvis  following the uneventful  intravenous administration of nonionic contrast.  Coronal and sagittal reformats  are performed.  CT dose reduction was achieved through use of a standardized  protocol tailored for this examination and automatic exposure control for dose  modulation. FINDINGS:  The visualized lung bases demonstrate no mass or consolidation. The heart size  is normal. There is no pericardial or pleural effusion. The liver, spleen, pancreas, and adrenal glands are normal. There are probable  gallstones without intra- or extra-hepatic biliary dilatation. The kidneys are symmetric without hydronephrosis. The proximal colon is fluid-filled, and there is a long segment of ileal wall  thickening. There are no dilated bowel loops. The appendix is normal.    Right inguinal posttreatment changes are noted with no lymphadenopathy on the  current study. There are no enlarged lymph nodes. There is no free fluid or  free air. The aorta is normal in caliber. The urinary bladder is normal.  There is no pelvic mass. The prostate is not  enlarged. The bony structures are age-appropriate. Impression  Findings of enterocolitis likely due to nonspecific infection or inflammation. 11/28 he states he feels a little better his oxygenation has improved have decreased to FiO2 65%. He states his discolored sputum is starting to clear up. Despite this his chest x-ray shows worsening extensive bilateral upper and mid neck infiltrates. Nasal MRSA smear is positive and he has been placed on vancomycin. All cultures are pending at this point. He has severe exacerbation of asthma we will continue nebulized albuterol Atrovent add budesonide continue IV Solu-Medrol will hopefully taper it rapidly  11/29 left chest discomfort he states after drinking cold liquids today it may have some pleuritic component. Pleurisy not unexpected with his extensive pneumonia we will add Celebrex. No wheezing today we will discontinue Solu-Medrol.   Blood has gram-positive cocci sputum has 3+ polys with possible staph aureus may have MRSA septicemia and pneumonia currently on vancomycin would maintain it  11/30 sputum is growing MRSA. Blood cultures with staph aureus oxacillin sensitivity pending most likely MRSA pneumonia with septicemia improving. Phosphorus remains low and needs to be repleted. CRP and procalcitonin improving and clinically he is much improved oxygen is down to 2 L. Maintain vancomycin and Zosyn. Almost no wheezing will switch to inhalers  12/1 no change with switch to inhaled albuterol and Symbicort will maintain as is. Blood and sputum cultures show MRSA. He is markedly improved.   Continue to taper and discontinue oxygen  Joan Lamar MD

## 2022-12-01 NOTE — PROGRESS NOTES
0825: Chart reviewed. Per notes, patient on 2L O2 via NC, IV VANC and Tamiflu. When medically stable, plan is for patient to discharge home with this mother.     CM will continue to follow patient and recs of medical team.

## 2022-12-01 NOTE — PROGRESS NOTES
Hospitalist Progress Note         Mike Ochoa          Daily Progress Note: 12/1/2022      Subjective: The patient is seen for follow  up. 33 yo male, hx of HTN, asthma, non-Hodgkin's lymphoma post chemotherapy, now on radiation, DVT, PE, here for shortness of breath. Pt was seen on 11/25 for similar, diagnosed with Flu A and sent home on Tamiflu. Workup thus far significant for CXR with evidence of bilateral pneumonia, labs suggestive of sepsis. Pt started on Vancomycin, Cefepime, Levaquin. --  The patient is seen for follow up. States that he feels far better today than he did yesterday with less shortness of breath. Currently on 1L O2 NC. Problem List:  Problem List as of 12/1/2022 Date Reviewed: 8/15/2022            Codes Class Noted - Resolved    Shortness of breath ICD-10-CM: R06.02  ICD-9-CM: 786.05  11/27/2022 - Present        Pneumonia ICD-10-CM: J18.9  ICD-9-CM: 486  11/27/2022 - Present        History of asthma ICD-10-CM: Z87.09  ICD-9-CM: V12.69  11/27/2022 - Present        Influenza A ICD-10-CM: J10.1  ICD-9-CM: 487.1  11/27/2022 - Present        Severe sepsis ([address] ICD-10-CM: A41.9, R65.20  ICD-9-CM: 038.9, 995.92  11/27/2022 - Present        Asthma ICD-10-CM: J45.909  ICD-9-CM: 493.90  5/7/2022 - Present        Acute respiratory failure with hypoxia ([facility] ICD-10-CM: J96.01  ICD-9-CM: 518.81  5/7/2022 - Present        Lymphoma ([address] ICD-10-CM: C85.90  ICD-9-CM: 202.80  3/1/2022 - Present        Pelvic mass ICD-10-CM: R19.00  ICD-9-CM: 789.30  2/23/2022 - Present        Bilateral pulmonary embolism ([facility]) ICD-10-CM: I26.99  ICD-9-CM: 415.19  2/23/2022 - Present        Pulmonary embolism ([address] ICD-10-CM: I26.99  ICD-9-CM: 415.19  2/22/2022 - Present        Right inguinal pain ICD-10-CM: R10.31  ICD-9-CM: 789.03  2/17/2022 - Present        Right groin mass ICD-10-CM: R19.09  ICD-9-CM: 789.39  2/11/2022 - Present        Fall ICD-10-CM: W19. Candie Lunch  ICD-9-CM: S968.0 2/11/2022 - Present       Medications reviewed  Current Facility-Administered Medications   Medication Dose Route Frequency    albuterol-ipratropium (DUO-NEB) 2.5 MG-0.5 MG/3 ML  3 mL Nebulization Q6H PRN    albuterol (PROVENTIL HFA, VENTOLIN HFA, PROAIR HFA) inhaler 2 Puff  2 Puff Inhalation Q6HWA RT    budesonide-formoteroL (SYMBICORT) 160-4.5 mcg/actuation HFA inhaler 2 Puff  2 Puff Inhalation BID RT    [START ON 12/3/2022] Vancomycin level on 12/3 @0400   Other ONCE    traMADoL (ULTRAM) tablet 25 mg  25 mg Oral Q6H PRN    vancomycin (VANCOCIN) 1500 mg in  ml infusion  1,500 mg IntraVENous Q8H    celecoxib (CELEBREX) capsule 200 mg  200 mg Oral BID    zinc oxide-white petrolatum 20-75 % topical paste   Topical DAILY    0.9% sodium chloride infusion  40 mL/hr IntraVENous CONTINUOUS    mupirocin (BACTROBAN) 2 % ointment   Both Nostrils BID    piperacillin-tazobactam (ZOSYN) 3.375 g in 0.9% sodium chloride (MBP/ADV) 100 mL MBP  3.375 g IntraVENous Q8H    sodium chloride (NS) flush 5-10 mL  5-10 mL IntraVENous PRN    sodium chloride (NS) flush 5-40 mL  5-40 mL IntraVENous PRN    acetaminophen (TYLENOL) tablet 650 mg  650 mg Oral Q6H PRN    Or    acetaminophen (TYLENOL) suppository 650 mg  650 mg Rectal Q6H PRN    polyethylene glycol (MIRALAX) packet 17 g  17 g Oral DAILY PRN    bisacodyL (DULCOLAX) tablet 5 mg  5 mg Oral DAILY PRN    ondansetron (ZOFRAN ODT) tablet 4 mg  4 mg Oral Q6H PRN    Or    ondansetron (ZOFRAN) injection 4 mg  4 mg IntraVENous Q6H PRN    sodium chloride (NS) flush 5-10 mL  5-10 mL IntraVENous PRN    metoprolol (LOPRESSOR) injection 5 mg  5 mg IntraVENous Q3H PRN    guaiFENesin-codeine (ROBITUSSIN AC) 100-10 mg/5 mL solution 10 mL  10 mL Oral Q4H PRN    Vancomycin - Pharmacy to Dose   Other Rx Dosing/Monitoring    cetirizine (ZYRTEC) tablet 10 mg  10 mg Oral DAILY    fluticasone propionate (FLONASE) 50 mcg/actuation nasal spray 1 Spray  1 Spray Both Nostrils PRN    montelukast (SINGULAIR) tablet 10 mg  10 mg Oral DAILY    rivaroxaban (XARELTO) tablet 20 mg  20 mg Oral DAILY    hydrOXYzine (VISTARIL) injection 50 mg  50 mg IntraMUSCular Q6H PRN       Review of Systems:   A comprehensive review of systems was negative except for that written in the HPI. Objective:   Physical Exam:     Visit Vitals  /84 (BP 1 Location: Left upper arm, BP Patient Position: Lying right side)   Pulse (!) 103   Temp 98.7 °F (37.1 °C)   Resp 20   Ht 5' 6\" (1.676 m)   Wt 112 kg (247 lb)   SpO2 95%   BMI 39.87 kg/m²    O2 Flow Rate (L/min): 1 l/min O2 Device: None (Room air)    Temp (24hrs), Av.6 °F (37 °C), Min:97.7 °F (36.5 °C), Max:99.7 °F (37.6 °C)    No intake/output data recorded.  1901 -  0700  In: -   Out: 1750 [[facility]:[id number]]    General:  Alert, cooperative, no distress, appears stated age. Lungs:   Clear to auscultation bilaterally. Chest wall:  No tenderness or deformity. Heart:  Regular rate and rhythm, S1, S2 normal, no murmur, click, rub or gallop. Abdomen:   Soft, non-tender. Bowel sounds normal. No masses,  No organomegaly. Extremities: Extremities normal, atraumatic, no cyanosis or edema. Pulses: 2+ and symmetric all extremities. Skin: Skin color, texture, turgor normal. No rashes or lesions   Neurologic: CNII-XII intact.  No gross sensory or motor deficits     Data Review:       Recent Days:  Recent Labs     22  0705 220   WBC 9.3 6.1   HGB 10.2* 10.2*   HCT 30.1* 30.2*    146*       Recent Labs     22  0705 22  0410    137   K 4.1 3.8   * 105   CO2 25 22   * 153*   BUN 29* 26*   CREA 0.77 1.06   CA 8.8 9.6   PHOS 1.7* 1.3*   ALB 1.8* 1.8*   INR  --  2.0*       Recent Labs     22  0441   PH 7.48*   PCO2 32*   PO2 78*   HCO3 23   FIO2 30         24 Hour Results:  Recent Results (from the past 24 hour(s))   VANCOMYCIN, RANDOM    Collection Time: 22  9:40 AM   Result Value Ref Range    Vancomycin, random 14.7 ug/mL           Assessment/     Acute hypoxic respiratory failure    -Secondary to bilateral pneumonia    -Continue vancomycin     -Wean off oxygen requirement as tolerated    Influenza A    -Continue on Tamiflu through November 29    Bilateral pneumonia    -Suspect community-acquired pneumonia    -Continue IV vancomycin    -Add incentive spirometer for deep breathing    -History of non-Hodgkin's lymphoma    -Completed chemotherapy    -Immunocompromise status    Prior history of DVT and PE    -On Xarelto    Benign essential hypertension   -Fairly stable    Hypophosphatemia    -Replete with K phos 1 tab QID x 8 doses      Plan:  Continue supportive care  Treatment plan as outlined above  Add incentive spirometer for deep breathing  PTOT eval      Care Plan discussed with: Nurse    Total time spent with patient: 30 minutes.     Jessica Barnard

## 2022-12-02 LAB
ACC. NO. FROM MICRO ORDER, ACCP: ABNORMAL
ACINETOBACTER CALCOACETICUS-BAUMANII COMPLEX, ACBCX: NOT DETECTED
BACTEROIDES FRAGILIS, BFRA: NOT DETECTED
BASOPHILS # BLD: 0 K/UL (ref 0–0.1)
BASOPHILS NFR BLD: 0 % (ref 0–1)
BIOFIRE COMMENT, BCIDPF: ABNORMAL
C GLABRATA DNA VAG QL NAA+PROBE: NOT DETECTED
CANDIDA ALBICANS: NOT DETECTED
CANDIDA AURIS, CAAU: NOT DETECTED
CANDIDA KRUSEI, CKRP: NOT DETECTED
CANDIDA PARAPSILOSIS, CPAUP: NOT DETECTED
CANDIDA TROPICALIS, CTROP: NOT DETECTED
CRP SERPL-MCNC: 13.4 MG/DL (ref 0–0.6)
CRYPTO NEOFORMANS/GATTII, CRYNEG: NOT DETECTED
DIFFERENTIAL METHOD BLD: ABNORMAL
ENTEROBACTER CLOACAE COMPLEX, ECCP: NOT DETECTED
ENTEROBACTERALES SP. , ENBLS: NOT DETECTED
ENTEROCOCCUS FAECALIS, ENFA: NOT DETECTED
ENTEROCOCCUS FAECIUM, ENFAM: NOT DETECTED
EOSINOPHIL # BLD: 0 K/UL (ref 0–0.4)
EOSINOPHIL NFR BLD: 0 % (ref 0–7)
ERYTHROCYTE [DISTWIDTH] IN BLOOD BY AUTOMATED COUNT: 15.7 % (ref 11.5–14.5)
ESCHERICHIA COLI: NOT DETECTED
HAEMOPHILUS INFLUENZAE, HMI: NOT DETECTED
HCT VFR BLD AUTO: 28.6 % (ref 36.6–50.3)
HGB BLD-MCNC: 9.7 G/DL (ref 12.1–17)
IMM GRANULOCYTES # BLD AUTO: 0 K/UL
IMM GRANULOCYTES NFR BLD AUTO: 0 %
KLEBSIELLA AEROGENES, KLAE: NOT DETECTED
KLEBSIELLA OXYTOCA: NOT DETECTED
KLEBSIELLA PNEUMONIAE GROUP, KPPG: NOT DETECTED
LISTERIA MONOCYTOGENES, LMONP: NOT DETECTED
LYMPHOCYTES # BLD: 0.2 K/UL (ref 0.8–3.5)
LYMPHOCYTES NFR BLD: 3 % (ref 12–49)
MCH RBC QN AUTO: 29.4 PG (ref 26–34)
MCHC RBC AUTO-ENTMCNC: 33.9 G/DL (ref 30–36.5)
MCV RBC AUTO: 86.7 FL (ref 80–99)
MECA/C AND MREJ (METHICILLIN RESISTANT GENE), MECAC: DETECTED
METAMYELOCYTES NFR BLD MANUAL: 10 %
MONOCYTES # BLD: 0.3 K/UL (ref 0–1)
MONOCYTES NFR BLD: 4 % (ref 5–13)
MYELOCYTES NFR BLD MANUAL: 4 %
NEISSERIA MENINGITIDIS, NMNI: NOT DETECTED
NEUTS BAND NFR BLD MANUAL: 8 % (ref 0–6)
NEUTS SEG # BLD: 6 K/UL (ref 1.8–8)
NEUTS SEG NFR BLD: 70 % (ref 32–75)
NRBC # BLD: 0.02 K/UL (ref 0–0.01)
NRBC BLD-RTO: 0.3 PER 100 WBC
PLATELET # BLD AUTO: 104 K/UL (ref 150–400)
PMV BLD AUTO: 11.5 FL (ref 8.9–12.9)
PROCALCITONIN SERPL-MCNC: 2.91 NG/ML
PROMYELOCYTES NFR BLD MANUAL: 1 %
PROTEUS, PRP: NOT DETECTED
PSEUDOMONAS AERUGINOSA: NOT DETECTED
RBC # BLD AUTO: 3.3 M/UL (ref 4.1–5.7)
RBC MORPH BLD: ABNORMAL
RESISTANT GENE SPACE, REGENE: ABNORMAL
SALMONELLA, SALMO: NOT DETECTED
SERRATIA MARCESCENS: NOT DETECTED
STAPH EPIDERMIDIS, STEP: NOT DETECTED
STAPH LUGDUNENSIS, STALUG: NOT DETECTED
STAPHYLOCOCCUS AUREUS: DETECTED
STAPHYLOCOCCUS, STAPP: DETECTED
STENO MALTOPHILIA, STMA: NOT DETECTED
STREPTOCOCCUS , STPSP: NOT DETECTED
STREPTOCOCCUS AGALACTIAE (GROUP B): NOT DETECTED
STREPTOCOCCUS PNEUMONIAE , SPNP: NOT DETECTED
STREPTOCOCCUS PYOGENES (GROUP A), SPYOP: NOT DETECTED
WBC # BLD AUTO: 7.7 K/UL (ref 4.1–11.1)

## 2022-12-02 PROCEDURE — 65660000001 HC RM ICU INTERMED STEPDOWN

## 2022-12-02 PROCEDURE — 74011250637 HC RX REV CODE- 250/637: Performed by: NURSE PRACTITIONER

## 2022-12-02 PROCEDURE — 74011250636 HC RX REV CODE- 250/636: Performed by: INTERNAL MEDICINE

## 2022-12-02 PROCEDURE — 86140 C-REACTIVE PROTEIN: CPT

## 2022-12-02 PROCEDURE — 74011250637 HC RX REV CODE- 250/637: Performed by: INTERNAL MEDICINE

## 2022-12-02 PROCEDURE — 74011250636 HC RX REV CODE- 250/636: Performed by: NURSE PRACTITIONER

## 2022-12-02 PROCEDURE — 36415 COLL VENOUS BLD VENIPUNCTURE: CPT

## 2022-12-02 PROCEDURE — 85025 COMPLETE CBC W/AUTO DIFF WBC: CPT

## 2022-12-02 PROCEDURE — 99232 SBSQ HOSP IP/OBS MODERATE 35: CPT | Performed by: INTERNAL MEDICINE

## 2022-12-02 PROCEDURE — 84145 PROCALCITONIN (PCT): CPT

## 2022-12-02 PROCEDURE — 74011000258 HC RX REV CODE- 258: Performed by: INTERNAL MEDICINE

## 2022-12-02 PROCEDURE — 94640 AIRWAY INHALATION TREATMENT: CPT

## 2022-12-02 RX ADMIN — ALBUTEROL SULFATE 2 PUFF: 108 AEROSOL, METERED RESPIRATORY (INHALATION) at 20:18

## 2022-12-02 RX ADMIN — BUDESONIDE AND FORMOTEROL FUMARATE DIHYDRATE 2 PUFF: 160; 4.5 AEROSOL RESPIRATORY (INHALATION) at 20:18

## 2022-12-02 RX ADMIN — Medication 1500 MG: at 07:00

## 2022-12-02 RX ADMIN — MONTELUKAST 10 MG: 10 TABLET, FILM COATED ORAL at 09:05

## 2022-12-02 RX ADMIN — CEFTAROLINE FOSAMIL 600 MG: 600 POWDER, FOR SOLUTION INTRAVENOUS at 22:58

## 2022-12-02 RX ADMIN — MUPIROCIN: 20 OINTMENT TOPICAL at 09:05

## 2022-12-02 RX ADMIN — Medication 1500 MG: at 15:14

## 2022-12-02 RX ADMIN — ALBUTEROL SULFATE 2 PUFF: 108 AEROSOL, METERED RESPIRATORY (INHALATION) at 07:18

## 2022-12-02 RX ADMIN — WHITE PETROLATUM,ZINC OXIDE: 20; 75 PASTE TOPICAL at 09:00

## 2022-12-02 RX ADMIN — CETIRIZINE HYDROCHLORIDE 10 MG: 10 TABLET, FILM COATED ORAL at 09:05

## 2022-12-02 RX ADMIN — CELECOXIB 200 MG: 200 CAPSULE ORAL at 09:05

## 2022-12-02 RX ADMIN — CEFTAROLINE FOSAMIL 600 MG: 600 POWDER, FOR SOLUTION INTRAVENOUS at 11:43

## 2022-12-02 RX ADMIN — Medication 1500 MG: at 22:57

## 2022-12-02 RX ADMIN — BUDESONIDE AND FORMOTEROL FUMARATE DIHYDRATE 2 PUFF: 160; 4.5 AEROSOL RESPIRATORY (INHALATION) at 07:19

## 2022-12-02 RX ADMIN — RIVAROXABAN 20 MG: 10 TABLET, FILM COATED ORAL at 09:05

## 2022-12-02 RX ADMIN — ALBUTEROL SULFATE 2 PUFF: 108 AEROSOL, METERED RESPIRATORY (INHALATION) at 13:22

## 2022-12-02 RX ADMIN — Medication 1500 MG: at 00:01

## 2022-12-02 NOTE — PROGRESS NOTES
Hospitalist Progress Note         Mark Anthony Mosqueda MD          Daily Progress Note: 12/2/2022      Subjective: The patient is seen for follow  up. 35 yo male, hx of HTN, asthma, non-Hodgkin's lymphoma post chemotherapy, now on radiation, DVT, PE, here for shortness of breath. Pt was seen on 11/25 for similar, diagnosed with Flu A and sent home on Tamiflu. Workup thus far significant for CXR with evidence of bilateral pneumonia, labs suggestive of sepsis. Pt started on Vancomycin, Cefepime, Levaquin. --  The patient is seen for follow up. States that he feels far better today than he did yesterday with less shortness of breath. Currently on RA. T-max of 101.8 °F overnight.     Problem List:  Problem List as of 12/2/2022 Date Reviewed: 8/15/2022            Codes Class Noted - Resolved    Shortness of breath ICD-10-CM: R06.02  ICD-9-CM: 786.05  11/27/2022 - Present        Pneumonia ICD-10-CM: J18.9  ICD-9-CM: 486  11/27/2022 - Present        History of asthma ICD-10-CM: Z87.09  ICD-9-CM: V12.69  11/27/2022 - Present        Influenza A ICD-10-CM: J10.1  ICD-9-CM: 487.1  11/27/2022 - Present        Severe sepsis ([facility] ICD-10-CM: A41.9, R65.20  ICD-9-CM: 038.9, 995.92  11/27/2022 - Present        Asthma ICD-10-CM: J45.909  ICD-9-CM: 493.90  5/7/2022 - Present        Acute respiratory failure with hypoxia ([facility] ICD-10-CM: J96.01  ICD-9-CM: 518.81  5/7/2022 - Present        Lymphoma ([facility] ICD-10-CM: C85.90  ICD-9-CM: 202.80  3/1/2022 - Present        Pelvic mass ICD-10-CM: R19.00  ICD-9-CM: 789.30  2/23/2022 - Present        Bilateral pulmonary embolism (HCC) ICD-10-CM: I26.99  ICD-9-CM: 415.19  2/23/2022 - Present        Pulmonary embolism ([address] ICD-10-CM: I26.99  ICD-9-CM: 415.19  2/22/2022 - Present        Right inguinal pain ICD-10-CM: R10.31  ICD-9-CM: 789.03  2/17/2022 - Present        Right groin mass ICD-10-CM: R19.09  ICD-9-CM: 789.39  2/11/2022 - Present        Fall ICD-10-CM: X29.SLDV  ICD-9-CM: E888.9  2/11/2022 - Present       Medications reviewed  Current Facility-Administered Medications   Medication Dose Route Frequency    cefTARoline (TEFLARO) 600 mg in 0.9% sodium chloride (MBP/ADV) 50 mL MBP  600 mg IntraVENous Q12H    albuterol-ipratropium (DUO-NEB) 2.5 MG-0.5 MG/3 ML  3 mL Nebulization Q6H PRN    albuterol (PROVENTIL HFA, VENTOLIN HFA, PROAIR HFA) inhaler 2 Puff  2 Puff Inhalation Q6HWA RT    budesonide-formoteroL (SYMBICORT) 160-4.5 mcg/actuation HFA inhaler 2 Puff  2 Puff Inhalation BID RT    [START ON 12/3/2022] Vancomycin level on 12/3 @0400   Other ONCE    traMADoL (ULTRAM) tablet 25 mg  25 mg Oral Q6H PRN    vancomycin (VANCOCIN) 1500 mg in  ml infusion  1,500 mg IntraVENous Q8H    zinc oxide-white petrolatum 20-75 % topical paste   Topical DAILY    0.9% sodium chloride infusion  40 mL/hr IntraVENous CONTINUOUS    mupirocin (BACTROBAN) 2 % ointment   Both Nostrils BID    sodium chloride (NS) flush 5-10 mL  5-10 mL IntraVENous PRN    sodium chloride (NS) flush 5-40 mL  5-40 mL IntraVENous PRN    acetaminophen (TYLENOL) tablet 650 mg  650 mg Oral Q6H PRN    Or    acetaminophen (TYLENOL) suppository 650 mg  650 mg Rectal Q6H PRN    polyethylene glycol (MIRALAX) packet 17 g  17 g Oral DAILY PRN    bisacodyL (DULCOLAX) tablet 5 mg  5 mg Oral DAILY PRN    ondansetron (ZOFRAN ODT) tablet 4 mg  4 mg Oral Q6H PRN    Or    ondansetron (ZOFRAN) injection 4 mg  4 mg IntraVENous Q6H PRN    sodium chloride (NS) flush 5-10 mL  5-10 mL IntraVENous PRN    metoprolol (LOPRESSOR) injection 5 mg  5 mg IntraVENous Q3H PRN    guaiFENesin-codeine (ROBITUSSIN AC) 100-10 mg/5 mL solution 10 mL  10 mL Oral Q4H PRN    Vancomycin - Pharmacy to Dose   Other Rx Dosing/Monitoring    cetirizine (ZYRTEC) tablet 10 mg  10 mg Oral DAILY    fluticasone propionate (FLONASE) 50 mcg/actuation nasal spray 1 Spray  1 Spray Both Nostrils PRN    montelukast (SINGULAIR) tablet 10 mg  10 mg Oral DAILY rivaroxaban (XARELTO) tablet 20 mg  20 mg Oral DAILY    hydrOXYzine (VISTARIL) injection 50 mg  50 mg IntraMUSCular Q6H PRN       Review of Systems:   A comprehensive review of systems was negative except for that written in the HPI. Objective:   Physical Exam:     Visit Vitals  /87 (BP 1 Location: Left upper arm, BP Patient Position: Semi fowlers)   Pulse 94   Temp 100.1 °F (37.8 °C)   Resp 20   Ht 5' 6\" (1.676 m)   Wt 112 kg (247 lb)   SpO2 97%   BMI 39.87 kg/m²    O2 Flow Rate (L/min): 1 l/min O2 Device: None (Room air)    Temp (24hrs), Av.6 °F (37.6 °C), Min:98.1 °F (36.7 °C), Max:101.8 °F (38.8 °C)    No intake/output data recorded.  1901 -  0700  In: -   Out: 900 [Urine:900]    General:  Alert, cooperative, no distress, appears stated age. Lungs:   Clear to auscultation bilaterally. Chest wall:  No tenderness or deformity. Heart:  Regular rate and rhythm, S1, S2 normal, no murmur, click, rub or gallop. Abdomen:   Soft, non-tender. Bowel sounds normal. No masses,  No organomegaly. Extremities: Extremities normal, atraumatic, no cyanosis or edema. Pulses: 2+ and symmetric all extremities. Skin: Skin color, texture, turgor normal. No rashes or lesions   Neurologic: CNII-XII intact. No gross sensory or motor deficits     Data Review:       Recent Days:  Recent Labs     22  0921 22  1058 22  0705   WBC 7.7 8.1 9.3   HGB 9.7* 9.7* 10.2*   HCT 28.6* 28.7* 30.1*   * 127* 156       Recent Labs     22  1058 22  0705    140   K 3.7 4.1    109*   CO2 23 25   * 135*   BUN 16 29*   CREA 0.66* 0.77   CA 8.2* 8.8   PHOS 2.2* 1.7*   ALB 1.6* 1.8*       No results for input(s): PH, PCO2, PO2, HCO3, FIO2 in the last 72 hours.       24 Hour Results:  Recent Results (from the past 24 hour(s))   CBC WITH AUTOMATED DIFF    Collection Time: 22  9:21 AM   Result Value Ref Range    WBC 7.7 4.1 - 11.1 K/uL    RBC 3.30 (L) 4.10 - 5.70 M/uL HGB 9.7 (L) 12.1 - 17.0 g/dL    HCT 28.6 (L) 36.6 - 50.3 %    MCV 86.7 80.0 - 99.0 FL    MCH 29.4 26.0 - 34.0 PG    MCHC 33.9 30.0 - 36.5 g/dL    RDW 15.7 (H) 11.5 - 14.5 %    PLATELET 304 (L) 210 - 400 K/uL    MPV 11.5 8.9 - 12.9 FL    NRBC 0.3 (H) 0.0  WBC    ABSOLUTE NRBC 0.02 (H) 0.00 - 0.01 K/uL    NEUTROPHILS 70 32 - 75 %    BAND NEUTROPHILS 8 (H) 0 - 6 %    LYMPHOCYTES 3 (L) 12 - 49 %    MONOCYTES 4 (L) 5 - 13 %    EOSINOPHILS 0 0 - 7 %    BASOPHILS 0 0 - 1 %    METAMYELOCYTES 10 (H) 0 %    MYELOCYTES 4 (H) 0 %    PROMYELOCYTES 1 (H) 0 %    IMMATURE GRANULOCYTES 0 %    ABS. NEUTROPHILS 6.0 1.8 - 8.0 K/UL    ABS. LYMPHOCYTES 0.2 (L) 0.8 - 3.5 K/UL    ABS. MONOCYTES 0.3 0.0 - 1.0 K/UL    ABS. EOSINOPHILS 0.0 0.0 - 0.4 K/UL    ABS. BASOPHILS 0.0 0.0 - 0.1 K/UL    ABS. IMM.  GRANS. 0.0 K/UL    DF Manual      RBC COMMENTS Anisocytosis  1+       C REACTIVE PROTEIN, QT    Collection Time: 12/02/22  9:21 AM   Result Value Ref Range    C-Reactive protein 13.40 (H) 0.00 - 0.60 mg/dL   PROCALCITONIN    Collection Time: 12/02/22  9:21 AM   Result Value Ref Range    Procalcitonin 2.91 (H) 0 ng/mL           Assessment/     Acute hypoxic respiratory failure    -Secondary to bilateral pneumonia    -Continue cefepime, vancomycin and Levaquin    -Wean off high flow oxygen as tolerated    Influenza A    -Continue on Tamiflu through November 29    Bilateral pneumonia    -Suspect community-acquired pneumonia    -Continue IV Zosyn and IV vancomycin  Add incentive spirometer for deep breathing  History of non-Hodgkin's lymphoma    -Completed chemotherapy    -Immunocompromise status    Prior history of DVT and PE    -On Xarelto    Benign essential hypertension   -Fairly stable    Hypophosphatemia     -Replete with K phos 1 tab QID x 8 doses      Plan:  Continue supportive care  Treatment plan as outlined above  Add incentive spirometer for deep breathing  Significantly improved  Repeat blood cultures 12/1 remain positive for Gram positive cocci      Care Plan discussed with: Nurse    Total time spent with patient: 30 minutes.     Brandee Marsh MD

## 2022-12-02 NOTE — PROGRESS NOTES
IMPRESSION:   Acute hypoxic respiratory failure improved now on room air  Exacerbation of asthma improved  Influenza A  MRSA pneumonia remains on vancomycin  MRSA septicemia  Pleurisy improved on Celebrex we will discontinue  Hypophosphatemia labs pending  Nasal MRSA smear positive  Severe sepsis resolved  History of DVT/PE on Xarelto  hx of non-Hodgkin's lymphoma status postchemotherapy on radiation treatment  Gastroesophageal reflux disease        RECOMMENDATIONS/PLAN:     59-year-old male came in because of shortness of breath and dyspnea and critical condition nonrebreather mask 100% FiO2  Oxygen saturation well-maintained on room air  Pleuritic chest pain cleared on Celebrex will discontinue at this time  Almost no wheezing on albuterol and Symbicort inhalers  Currently on Teflaro and vancomycin  Completed a course of Tamiflu  Recurrent fever 101.8 overnight we will repeat chest x-ray WBC count and procalcitonin improved but CRP has risen echocardiogram does not show any vegetations       [x] High complexity decision making was performed  [x] See my orders for details  HPI  59-year-old male came in because of shortness of breath and dyspnea, he has significant past medical history of asthma non-Hodgkin's lymphoma status postchemotherapy currently on radiation treatment also had a history of DVT and pulmonary embolism he was positive with influenza A and he was sent home with Tamiflu but his condition got worse came to the emergency room he is very short of breath 100% FiO2 nonrebreather mask electrolyte imbalance having chest discomfort diaphoretic he has not smoked in the past 4 days he smokes marijuana tachypneic tachycardic so admitted and critical care consult was called. PMH:  has a past medical history of Asthma, DVT (deep venous thrombosis) ([facility] Hypertension, Lymphoma ([address] PE (pulmonary thromboembolism) ([address] and Thromboembolus ([address]     PSH:   has a past surgical history that includes hx other surgical (Right, 03/02/2022); ir fluoro guide plc cvad (3/30/2022); and ir insert tunl cvc w port over 5 years (3/30/2022). FHX: family history includes Cancer in his maternal grandfather; Diabetes in his mother; Hypertension in his mother; Myasthenia Gravis in his maternal grandmother; No Known Problems in his father. SHX:  reports that he quit smoking about 7 years ago. His smoking use included cigarettes. He has never used smokeless tobacco. He reports that he does not currently use alcohol. He reports that he does not currently use drugs after having used the following drugs: Marijuana.     ALL: No Known Allergies     MEDS:   [x] Reviewed - As Below   [] Not reviewed    Current Facility-Administered Medications   Medication    cefTARoline (TEFLARO) 600 mg in 0.9% sodium chloride (MBP/ADV) 50 mL MBP    albuterol-ipratropium (DUO-NEB) 2.5 MG-0.5 MG/3 ML    albuterol (PROVENTIL HFA, VENTOLIN HFA, PROAIR HFA) inhaler 2 Puff    budesonide-formoteroL (SYMBICORT) 160-4.5 mcg/actuation HFA inhaler 2 Puff    [START ON 12/3/2022] Vancomycin level on 12/3 @0400    traMADoL (ULTRAM) tablet 25 mg    vancomycin (VANCOCIN) 1500 mg in  ml infusion    celecoxib (CELEBREX) capsule 200 mg    zinc oxide-white petrolatum 20-75 % topical paste    0.9% sodium chloride infusion    mupirocin (BACTROBAN) 2 % ointment    sodium chloride (NS) flush 5-10 mL    sodium chloride (NS) flush 5-40 mL    acetaminophen (TYLENOL) tablet 650 mg    Or    acetaminophen (TYLENOL) suppository 650 mg    polyethylene glycol (MIRALAX) packet 17 g    bisacodyL (DULCOLAX) tablet 5 mg    ondansetron (ZOFRAN ODT) tablet 4 mg    Or    ondansetron (ZOFRAN) injection 4 mg    sodium chloride (NS) flush 5-10 mL    metoprolol (LOPRESSOR) injection 5 mg    guaiFENesin-codeine (ROBITUSSIN AC) 100-10 mg/5 mL solution 10 mL    Vancomycin - Pharmacy to Dose    cetirizine (ZYRTEC) tablet 10 mg    fluticasone propionate (FLONASE) 50 mcg/actuation nasal spray 1 Spray    montelukast (SINGULAIR) tablet 10 mg    rivaroxaban (XARELTO) tablet 20 mg    hydrOXYzine (VISTARIL) injection 50 mg      MAR reviewed and pertinent medications noted or modified as needed   Current Facility-Administered Medications   Medication    cefTARoline (TEFLARO) 600 mg in 0.9% sodium chloride (MBP/ADV) 50 mL MBP    albuterol-ipratropium (DUO-NEB) 2.5 MG-0.5 MG/3 ML    albuterol (PROVENTIL HFA, VENTOLIN HFA, PROAIR HFA) inhaler 2 Puff    budesonide-formoteroL (SYMBICORT) 160-4.5 mcg/actuation HFA inhaler 2 Puff    [START ON 12/3/2022] Vancomycin level on 12/3 @0400    traMADoL (ULTRAM) tablet 25 mg    vancomycin (VANCOCIN) 1500 mg in  ml infusion    celecoxib (CELEBREX) capsule 200 mg    zinc oxide-white petrolatum 20-75 % topical paste    0.9% sodium chloride infusion    mupirocin (BACTROBAN) 2 % ointment    sodium chloride (NS) flush 5-10 mL    sodium chloride (NS) flush 5-40 mL    acetaminophen (TYLENOL) tablet 650 mg    Or    acetaminophen (TYLENOL) suppository 650 mg    polyethylene glycol (MIRALAX) packet 17 g    bisacodyL (DULCOLAX) tablet 5 mg    ondansetron (ZOFRAN ODT) tablet 4 mg    Or    ondansetron (ZOFRAN) injection 4 mg    sodium chloride (NS) flush 5-10 mL    metoprolol (LOPRESSOR) injection 5 mg    guaiFENesin-codeine (ROBITUSSIN AC) 100-10 mg/5 mL solution 10 mL    Vancomycin - Pharmacy to Dose    cetirizine (ZYRTEC) tablet 10 mg    fluticasone propionate (FLONASE) 50 mcg/actuation nasal spray 1 [place]    montelukast (SINGULAIR) tablet 10 mg    rivaroxaban (XARELTO) tablet 20 mg    hydrOXYzine (VISTARIL) injection 50 mg      PMH:  has a past medical history of Asthma, DVT (deep venous thrombosis) ([address] Hypertension, Lymphoma ([address] PE (pulmonary thromboembolism) ([address] and Thromboembolus ([facility] PSH:   has a past surgical history that includes hx other surgical (Right, 03/02/2022); ir fluoro guide plc cvad (3/30/2022); and ir insert tunl cvc w port over 5 years (3/30/2022). FHX: family history includes Cancer in his maternal grandfather; Diabetes in his mother; Hypertension in his mother; Myasthenia Gravis in his maternal grandmother; No Known Problems in his father. SHX:  reports that he quit smoking about 7 years ago. His smoking use included cigarettes. He has never used smokeless tobacco. He reports that he does not currently use alcohol. He reports that he does not currently use drugs after having used the following drugs: Marijuana. ROS:A comprehensive review of systems was negative except for that written in the HPI. Hemodynamics:    CO:    CI:    CVP:    SVR:   PAP Systolic:    PAP Diastolic:    PVR:    EM55:        Ventilator Settings:      Mode Rate TV Press PEEP FiO2 PIP Min. Vent               30 %              Vital Signs: Telemetry:    normal sinus rhythm Intake/Output:   Visit Vitals  /87 (BP 1 Location: Left upper arm, BP Patient Position: Semi fowlers)   Pulse 94   Temp 100.1 °F (37.8 °C)   Resp 20   Ht 5' 6\" (1.676 m)   Wt 112 kg (247 lb)   SpO2 97%   BMI 39.87 kg/m²       Temp (24hrs), Av.6 °F (37.6 °C), Min:98.1 °F (36.7 °C), Max:101.8 °F (38.8 °C)        O2 Device: None (Room air) O2 Flow Rate (L/min): 1 l/min       Wt Readings from Last 4 Encounters:   22 112 kg (247 lb)   22 104.3 kg (230 lb)   22 104.3 kg (230 lb)   22 116.1 kg (256 lb)        No intake or output data in the 24 hours ending 22 1119      Last shift:      No intake/output data recorded. Last 3 shifts:  1901 -  0700  In: -   Out: 900 [Urine:900]       Physical Exam:     General: Awake alert no distress no accessory muscle use on room air saturation 94%  HEENT: NCAT, normal oral mucosa  Eyes: anicteric; conjunctiva clear extraocular movements intact  Neck: no nodes,  trach midline; no accessory MM use.   Chest: no deformity,   Cardiac: Regular rate and rhythm  Lungs: Coarse exhalation with rare wheezing  Abd: Soft nontender positive bowel sounds  Ext: no edema; no joint swelling;  No clubbing  : clear urine  Neuro: Awake alert oriented moving all extremities  Psych-calm oriented  Skin: warm, dry, no cyanosis;   Pulses: Brachial and radial pulses intact  Capillary: Normal capillary refill      DATA:    MAR reviewed and pertinent medications noted or modified as needed  MEDS:   Current Facility-Administered Medications   Medication    cefTARoline (TEFLARO) 600 mg in 0.9% sodium chloride (MBP/ADV) 50 mL MBP    albuterol-ipratropium (DUO-NEB) 2.5 MG-0.5 MG/3 ML    albuterol (PROVENTIL HFA, VENTOLIN HFA, PROAIR HFA) inhaler 2 Puff    budesonide-formoteroL (SYMBICORT) 160-4.5 mcg/actuation HFA inhaler 2 Puff    [START ON 12/3/2022] Vancomycin level on 12/3 @0400    traMADoL (ULTRAM) tablet 25 mg    vancomycin (VANCOCIN) 1500 mg in  ml infusion    celecoxib (CELEBREX) capsule 200 mg    zinc oxide-white petrolatum 20-75 % topical paste    0.9% sodium chloride infusion    mupirocin (BACTROBAN) 2 % ointment    sodium chloride (NS) flush 5-10 mL    sodium chloride (NS) flush 5-40 mL    acetaminophen (TYLENOL) tablet 650 mg    Or    acetaminophen (TYLENOL) suppository 650 mg    polyethylene glycol (MIRALAX) packet 17 g    bisacodyL (DULCOLAX) tablet 5 mg    ondansetron (ZOFRAN ODT) tablet 4 mg    Or    ondansetron (ZOFRAN) injection 4 mg    sodium chloride (NS) flush 5-10 mL    metoprolol (LOPRESSOR) injection 5 mg    guaiFENesin-codeine (ROBITUSSIN AC) 100-10 mg/5 mL solution 10 mL    Vancomycin - Pharmacy to Dose    cetirizine (ZYRTEC) tablet 10 mg    fluticasone propionate (FLONASE) 50 mcg/actuation nasal spray 1 [place]    montelukast (SINGULAIR) tablet 10 mg    rivaroxaban (XARELTO) tablet 20 mg    hydrOXYzine (VISTARIL) injection 50 mg        Labs:    Recent Labs     12/02/22  0921 12/01/22  1058 11/30/22  0705   WBC 7.7 8.1 9.3   HGB 9.7* 9.7* 10.2*   * 127* 156       Recent Labs     12/01/22  1058 11/30/22  0705    140   K 3.7 4.1  109*   CO2 23 25   * 135*   BUN 16 29*   CREA 0.66* 0.77   CA 8.2* 8.8   PHOS 2.2* 1.7*   LAC  --  1.5   ALB 1.6* 1.8*     12/2 room air oxygen saturation 94%  11/29 nasal high flow 45 L FiO2 30%   11/28 nasal high flow 45 L FiO2 75% oxygen saturation 100%  Nasal MRSA smear positive  Influenza A positive  Procalcitonin 2.91, 5.2, 11.81, 25.5, 18.8  CRP 13.4, 9.57, 18.5  Blood culture MRSA  Sputum culture 3+ polys with MRSA  Urine culture no growth    Troponin 19, 136  12/1 echo ejection fraction 50% mild mitral regurg left atrium 3.4 cm RVSP 29 no evidence of vegetation  3/3/2022 Echo ejection fraction 65% left atrium 3.2 cm normal IVC  Lab Results   Component Value Date/Time    Culture result:  12/01/2022 10:58 AM     Gram pos cocci in clusters growing in 1 of 2 bottles drawn Aerobic And Anaerobic Bottles CALLED TO AND READ BACK BY BHANU OCHOA  Dwight [id number]    Culture result:  12/01/2022 10:58 AM     GPCL GROWING IN BOTH BOTTLES CLT BROWN LPN  Dwight [id number]    Culture result: No Growth (<1000 cfu/mL) 11/28/2022 12:03 AM         Imaging:    Results from Hospital Encounter encounter on 11/27/22    XR CHEST PORT    Narrative  EXAM: XR CHEST PORT    DATE: 11/29/2022 4:05 AM    INDICATION: Community-acquired pneumonia    COMPARISON: Chest radiograph November 28, 2022    FINDINGS: AP portable chest radiograph. There is a right-sided Port-A-Cath in  place. The heart size is normal. Lungs are adequately expanded. There is been no  significant change in bilateral, diffuse airspace infiltrates. No definite  effusion or pneumothorax is seen. Impression  No significant interval change in severe, diffuse airspace infiltrates. Results from [place] encounter on 11/22/22    CT ABD PELV W CONT    Narrative  EXAM:  CT ABD PELV W CONT    INDICATION: Abdominal pain. History of lymphoma. COMPARISON: CT 2/11/2022.     TECHNIQUE: Helical CT of the abdomen  and pelvis  following the uneventful  intravenous administration of nonionic contrast.  Coronal and sagittal reformats  are performed. CT dose reduction was achieved through use of a standardized  protocol tailored for this examination and automatic exposure control for dose  modulation. FINDINGS:  The visualized lung bases demonstrate no mass or consolidation. The heart size  is normal. There is no pericardial or pleural effusion. The liver, spleen, pancreas, and adrenal glands are normal. There are probable  gallstones without intra- or extra-hepatic biliary dilatation. The kidneys are symmetric without hydronephrosis. The proximal colon is fluid-filled, and there is a long segment of ileal wall  thickening. There are no dilated bowel loops. The appendix is normal.    Right inguinal posttreatment changes are noted with no lymphadenopathy on the  current study. There are no enlarged lymph nodes. There is no free fluid or  free air. The aorta is normal in caliber. The urinary bladder is normal.  There is no pelvic mass. The prostate is not  enlarged. The bony structures are age-appropriate. Impression  Findings of enterocolitis likely due to nonspecific infection or inflammation. 11/28 he states he feels a little better his oxygenation has improved have decreased to FiO2 65%. He states his discolored sputum is starting to clear up. Despite this his chest x-ray shows worsening extensive bilateral upper and mid neck infiltrates. Nasal MRSA smear is positive and he has been placed on vancomycin. All cultures are pending at this point. He has severe exacerbation of asthma we will continue nebulized albuterol Atrovent add budesonide continue IV Solu-Medrol will hopefully taper it rapidly  11/29 left chest discomfort he states after drinking cold liquids today it may have some pleuritic component. Pleurisy not unexpected with his extensive pneumonia we will add Celebrex.   No wheezing today we will discontinue Solu-Medrol. Blood has gram-positive cocci sputum has 3+ polys with possible staph aureus may have MRSA septicemia and pneumonia currently on vancomycin would maintain it  11/30 sputum is growing MRSA. Blood cultures with staph aureus oxacillin sensitivity pending most likely MRSA pneumonia with septicemia improving. Phosphorus remains low and needs to be repleted. CRP and procalcitonin improving and clinically he is much improved oxygen is down to 2 L. Maintain vancomycin and Zosyn. Almost no wheezing will switch to inhalers  12/1 no change with switch to inhaled albuterol and Symbicort will maintain as is. Blood and sputum cultures show MRSA. He is markedly improved. Continue to taper and discontinue oxygen  12/2 recurrent fever overnight despite WBC count and procalcitonin both improved. CRP did rise slightly. He has no significant cough.   We will repeat chest x-ray  Leena Dobson MD

## 2022-12-02 NOTE — PROGRESS NOTES
Progress Note    Patient: Sin Barton MRN: [id number]  SSN: [id number]    YOB: 1990  Age: 34 y.o. Sex: male      Admit Date: 11/27/2022    LOS: 5 days     Subjective:   Patient with NHL followed for severe sepsis with positive blood cultures and sputum culture for MRSA. He is afebrile with normal WBC and decreasing CRP and procal, however, repeat blood cultures growing GPC in clusters. Currently on IV Vancomycin alone. TTE negative for vegetations. Patient resting comfortably with  no complaints. Objective:     Vitals:    12/01/22 2331 12/02/22 0328 12/02/22 0718 12/02/22 0720   BP: 133/76 132/74 124/74    Pulse: (!) 105 (!) 111 (!) 121    Resp: 18 18 18    Temp: 99.8 °F (37.7 °C) 98.4 °F (36.9 °C) 98.1 °F (36.7 °C)    SpO2:  95% 97% 94%   Weight:       Height:            Intake and Output:  Current Shift: No intake/output data recorded. Last three shifts: 11/30 1901 - 12/02 0700  In: -   Out: 900 [Urine:900]    Physical Exam:   Vitals and nursing note reviewed. Constitutional:       General: He is not in acute distress. Appearance: He is ill-appearing. HENT:   Mouth/Throat:      Pharynx: Oropharynx is clear. Eyes:      Pupils: Pupils are equal, round, and reactive to light. Cardiovascular:      Rate and Rhythm: Regular rhythm. Tachycardia present. Heart sounds: No murmur heard. Comments: Lcgns-K-Vouk right chest, site unremarkable with no erythema   Pulmonary:      Effort: No respiratory distress. Breath sounds: normal   Chest:  no erythema       Abdominal:      General: There is no distension. Palpations: Abdomen is soft. Tenderness: There is no abdominal tenderness. There is no right CVA tenderness, left CVA tenderness or guarding. Genitourinary:     Comments: No Bonilla catheter  Musculoskeletal:      Right lower leg: No edema. Left lower leg: No edema. Skin:     Findings: No rash.    Neurological: nonfocal, no confusion Psychiatric:    normal behavior      Lab/Data Review:     WBC 7,700  , 000  Lactic 1.5 <2.2    CRP 13.40 < 9.64 <9.57 <18.50  Procal  5.20 <11.81 <25.52 <18.89    MRSA Nasal PCR positive    Blood cultures (11/27) Staphylococcus aureus MRSA  Blood cultures (12/1) Gram positive cocci in clusters  Sputum culture (11/27)  Staphylococcus aureus MRSA  Urine culture (11/27) No growth FINAL    CXR (11/29)      Assessment:     Active Problems:    Shortness of breath (11/27/2022)      Pneumonia (11/27/2022)      History of asthma (11/27/2022)      Influenza A (11/27/2022)      Severe sepsis ([address] (11/27/2022)  Severe sepsis with fever, leukopenia, elevated lactic acid and procal, CRP, resolved or resolving  Persistent MRSA bacteremia  , Day #6 IV Vancomycin  Influenza A, status post Tamiflu  Pneumonia, bilateral,   post-viral, secondary to Staphylococcus aureus MRSA, Day #6 IV Vancomycin  Acute hypoxic respiratory failure, on nasal cannuHFNC  Portacath in situ  Asthma  Non-Hodgkins' Lymphoma  MRSA nasal colonization, Day #5 Mupirocin       Comment:  CRP and procal have decreased. Repeat blood cultures growing GPC in clusters, presumably MRSA. Plan:   1. Continue IV Vancomycin  2. Start Ceftaroline for persistent MRSA bacteremia  3. Follow-up repeat blood cultures   5. Continue Mupirocin nasal ointment  6. In am, repeat procal, CRP, blood cultures  7.   If unable to clear bacteremia, will have to remove Portacath;  discussed this possibility with patient         Signed By: Yves Patterson MD     December 2, 2022

## 2022-12-02 NOTE — PROGRESS NOTES
Hospitalist Progress Note         Nyra Cera          Daily Progress Note: 12/2/2022      Subjective: The patient is seen for follow  up. 33 yo male, hx of HTN, asthma, non-Hodgkin's lymphoma post chemotherapy, now on radiation, DVT, PE, here for shortness of breath. Pt was seen on 11/25 for similar, diagnosed with Flu A and sent home on Tamiflu. Workup thus far significant for CXR with evidence of bilateral pneumonia, labs suggestive of sepsis. Pt started on Vancomycin, Cefepime, Levaquin. --  The patient is seen for follow up. States that he feels far better today than he did yesterday with less shortness of breath. Now on Room Air. Problem List:  Problem List as of 12/2/2022 Date Reviewed: 8/15/2022            Codes Class Noted - Resolved    Shortness of breath ICD-10-CM: R06.02  ICD-9-CM: 786.05  11/27/2022 - Present        Pneumonia ICD-10-CM: J18.9  ICD-9-CM: 486  11/27/2022 - Present        History of asthma ICD-10-CM: Z87.09  ICD-9-CM: V12.69  11/27/2022 - Present        Influenza A ICD-10-CM: J10.1  ICD-9-CM: 487.1  11/27/2022 - Present        Severe sepsis ([facility] ICD-10-CM: A41.9, R65.20  ICD-9-CM: 038.9, 995.92  11/27/2022 - Present        Asthma ICD-10-CM: J45.909  ICD-9-CM: 493.90  5/7/2022 - Present        Acute respiratory failure with hypoxia ([facility] ICD-10-CM: J96.01  ICD-9-CM: 518.81  5/7/2022 - Present        Lymphoma ([facility] ICD-10-CM: C85.90  ICD-9-CM: 202.80  3/1/2022 - Present        Pelvic mass ICD-10-CM: R19.00  ICD-9-CM: 789.30  2/23/2022 - Present        Bilateral pulmonary embolism (H[facility]) ICD-10-CM: I26.99  ICD-9-CM: 415.19  2/23/2022 - Present        Pulmonary embolism ([facility] ICD-10-CM: I26.99  ICD-9-CM: 415.19  2/22/2022 - Present        Right inguinal pain ICD-10-CM: R10.31  ICD-9-CM: 789.03  2/17/2022 - Present        Right groin mass ICD-10-CM: R19.09  ICD-9-CM: 789.39  2/11/2022 - Present        Fall ICD-10-CM: W19. Lyndsey Louis  ICD-9-CM: W817.2  2/11/2022 - Present       Medications reviewed  Current Facility-Administered Medications   Medication Dose Route Frequency    cefTARoline (TEFLARO) 600 mg in 0.9% sodium chloride (MBP/ADV) 50 mL MBP  600 mg IntraVENous Q12H    albuterol-ipratropium (DUO-NEB) 2.5 MG-0.5 MG/3 ML  3 mL Nebulization Q6H PRN    albuterol (PROVENTIL HFA, VENTOLIN HFA, PROAIR HFA) inhaler 2 Puff  2 Puff Inhalation Q6HWA RT    budesonide-formoteroL (SYMBICORT) 160-4.5 mcg/actuation HFA inhaler 2 Puff  2 Puff Inhalation BID RT    [START ON 12/3/2022] Vancomycin level on 12/3 @0400   Other ONCE    traMADoL (ULTRAM) tablet 25 mg  25 mg Oral Q6H PRN    vancomycin (VANCOCIN) 1500 mg in  ml infusion  1,500 mg IntraVENous Q8H    celecoxib (CELEBREX) capsule 200 mg  200 mg Oral BID    zinc oxide-white petrolatum 20-75 % topical paste   Topical DAILY    0.9% sodium chloride infusion  40 mL/hr IntraVENous CONTINUOUS    mupirocin (BACTROBAN) 2 % ointment   Both Nostrils BID    sodium chloride (NS) flush 5-10 mL  5-10 mL IntraVENous PRN    sodium chloride (NS) flush 5-40 mL  5-40 mL IntraVENous PRN    acetaminophen (TYLENOL) tablet 650 mg  650 mg Oral Q6H PRN    Or    acetaminophen (TYLENOL) suppository 650 mg  650 mg Rectal Q6H PRN    polyethylene glycol (MIRALAX) packet 17 g  17 g Oral DAILY PRN    bisacodyL (DULCOLAX) tablet 5 mg  5 mg Oral DAILY PRN    ondansetron (ZOFRAN ODT) tablet 4 mg  4 mg Oral Q6H PRN    Or    ondansetron (ZOFRAN) injection 4 mg  4 mg IntraVENous Q6H PRN    sodium chloride (NS) flush 5-10 mL  5-10 mL IntraVENous PRN    metoprolol (LOPRESSOR) injection 5 mg  5 mg IntraVENous Q3H PRN    guaiFENesin-codeine (ROBITUSSIN AC) 100-10 mg/5 mL solution 10 mL  10 mL Oral Q4H PRN    Vancomycin - Pharmacy to Dose   Other Rx Dosing/Monitoring    cetirizine (ZYRTEC) tablet 10 mg  10 mg Oral DAILY    fluticasone propionate (FLONASE) 50 mcg/actuation nasal spray 1 Spray  1 Spray Both Nostrils PRN    montelukast (SINGULAIR) tablet 10 mg  10 mg Oral DAILY    rivaroxaban (XARELTO) tablet 20 mg  20 mg Oral DAILY    hydrOXYzine (VISTARIL) injection 50 mg  50 mg IntraMUSCular Q6H PRN       Review of Systems:   A comprehensive review of systems was negative except for that written in the HPI. Objective:   Physical Exam:     Visit Vitals  /87 (BP 1 Location: Left upper arm, BP Patient Position: Semi fowlers)   Pulse 94   Temp 100.1 °F (37.8 °C)   Resp 20   Ht 5' 6\" (1.676 m)   Wt 112 kg (247 lb)   SpO2 97%   BMI 39.87 kg/m²    O2 Flow Rate (L/min): 1 l/min O2 Device: None (Room air)    Temp (24hrs), Av.6 °F (37.6 °C), Min:98.1 °F (36.7 °C), Max:101.8 °F (38.8 °C)    No intake/output data recorded.  1901 -  0700  In: -   Out: 900 [Urine:900]    General:  Alert, cooperative, no distress, appears stated age. Lungs:   Clear to auscultation bilaterally. Chest wall:  No tenderness or deformity. Heart:  Regular rate and rhythm, S1, S2 normal, no murmur, click, rub or gallop. Abdomen:   Soft, non-tender. Bowel sounds normal. No masses,  No organomegaly. Extremities: Extremities normal, atraumatic, no cyanosis or edema. Pulses: 2+ and symmetric all extremities. Skin: Skin color, texture, turgor normal. No rashes or lesions   Neurologic: CNII-XII intact. No gross sensory or motor deficits     Data Review:       Recent Days:  Recent Labs     22  0921 22  1058 22  0705   WBC 7.7 8.1 9.3   HGB 9.7* 9.7* 10.2*   HCT 28.6* 28.7* 30.1*   * 127* 156       Recent Labs     22  1058 22  0705    140   K 3.7 4.1    109*   CO2 23 25   * 135*   BUN 16 29*   CREA 0.66* 0.77   CA 8.2* 8.8   PHOS 2.2* 1.7*   ALB 1.6* 1.8*       No results for input(s): PH, PCO2, PO2, HCO3, FIO2 in the last 72 hours.       24 Hour Results:  Recent Results (from the past 24 hour(s))   CBC WITH AUTOMATED DIFF    Collection Time: 22  9:21 AM   Result Value Ref Range    WBC 7.7 4.1 - 11.1 K/uL    RBC 3.30 (L) 4.10 - 5.70 M/uL    HGB 9.7 (L) 12.1 - 17.0 g/dL    HCT 28.6 (L) 36.6 - 50.3 %    MCV 86.7 80.0 - 99.0 FL    MCH 29.4 26.0 - 34.0 PG    MCHC 33.9 30.0 - 36.5 g/dL    RDW 15.7 (H) 11.5 - 14.5 %    PLATELET 736 (L) 854 - 400 K/uL    MPV 11.5 8.9 - 12.9 FL    NRBC 0.3 (H) 0.0  WBC    ABSOLUTE NRBC 0.02 (H) 0.00 - 0.01 K/uL    NEUTROPHILS PENDING %    LYMPHOCYTES PENDING %    MONOCYTES PENDING %    EOSINOPHILS PENDING %    BASOPHILS PENDING %    IMMATURE GRANULOCYTES PENDING %    ABS. NEUTROPHILS PENDING K/UL    ABS. LYMPHOCYTES PENDING K/UL    ABS. MONOCYTES PENDING K/UL    ABS. EOSINOPHILS PENDING K/UL    ABS. BASOPHILS PENDING K/UL    ABS. IMM. GRANS. PENDING K/UL    DF PENDING    C REACTIVE PROTEIN, QT    Collection Time: 12/02/22  9:21 AM   Result Value Ref Range    C-Reactive protein 13.40 (H) 0.00 - 0.60 mg/dL   PROCALCITONIN    Collection Time: 12/02/22  9:21 AM   Result Value Ref Range    Procalcitonin 2.91 (H) 0 ng/mL           Assessment/     Acute hypoxic respiratory failure    -Secondary to bilateral pneumonia    -Continue vancomycin     -Weaned off supplemental O2    Influenza A    -Continue on Tamiflu through November 29    Bilateral pneumonia    -Suspect community-acquired pneumonia    -Continue IV Zosyn and IV vancomycin  Add incentive spirometer for deep breathing  History of non-Hodgkin's lymphoma    -Completed chemotherapy    -Immunocompromise status    Prior history of DVT and PE    -On Xarelto    Benign essential hypertension   -Fairly stable    Hypophosphatemia     -Replete with K phos 1 tab QID x 8 doses      Plan:  Continue supportive care  Treatment plan as outlined above  Add incentive spirometer for deep breathing  Significantly improved  PTOT eval  Plan for discharge home tomorrow      Care Plan discussed with: Nurse    Total time spent with patient: 30 minutes.     [facility]

## 2022-12-02 NOTE — PROGRESS NOTES
Vancomycin Dosing Consult  Yeison Howard is a 34 y.o. male with HAP and gram positive bacteremia. Pharmacy was consulted by Dr. Laymond Dancer to dose and monitor vancomycin. Today is day 6.     Antibiotic regimen: Vancomycin + pip/tazo    Temp (24hrs), Av.6 °F (37.6 °C), Min:98.1 °F (36.7 °C), Max:101.8 °F (38.8 °C)    Recent Labs     22  0921 22  1058 22  0705   WBC 7.7 8.1 9.3   CREA  --  0.66* 0.77   BUN  --  16 29*     Est CrCl: >100 mL/min  Concomitant nephrotoxic drugs: None    Cultures:    Blood: MRSA, GPC  4/4 bottles (final)   Sputum: MRSA, normal heri (final)   Urine: NG (final)   Blood: GPC clusters 1/2 bottles (prelim)    MRSA Swab: Detected     Target range: AUC:BETTE 400-600    Recent level history:  Date/Time Dose & Interval Measured Level (mcg/mL) Associated AUC/BETTE    0416 2000 mg x 1 3.3 N/A    0800 1500 mg IV q12h 6.8 357    0940 1500 mg IV q8h 14.7 554     Assessment/Plan:   Febrile , elevated CRP and PCT  No SCr level today  Continue vancomycin 1,500 mg IV every 8 hours for a projected AUC/BETTE ratio of 545  Vancomycin level scheduled 12/3 0400  Antimicrobial stop date TBD

## 2022-12-03 ENCOUNTER — APPOINTMENT (OUTPATIENT)
Dept: GENERAL RADIOLOGY | Age: 32
DRG: 721 | End: 2022-12-03
Attending: INTERNAL MEDICINE
Payer: MEDICAID

## 2022-12-03 LAB
ALBUMIN SERPL-MCNC: 1.5 G/DL (ref 3.5–5)
ANION GAP SERPL CALC-SCNC: 8 MMOL/L (ref 5–15)
BASOPHILS # BLD: 0 K/UL (ref 0–0.1)
BASOPHILS NFR BLD: 0 % (ref 0–1)
BUN SERPL-MCNC: 11 MG/DL (ref 6–20)
BUN/CREAT SERPL: 18 (ref 12–20)
CA-I BLD-MCNC: 8.1 MG/DL (ref 8.5–10.1)
CHLORIDE SERPL-SCNC: 107 MMOL/L (ref 97–108)
CO2 SERPL-SCNC: 23 MMOL/L (ref 21–32)
CREAT SERPL-MCNC: 0.62 MG/DL (ref 0.7–1.3)
CRP SERPL-MCNC: 12.5 MG/DL (ref 0–0.6)
DIFFERENTIAL METHOD BLD: ABNORMAL
EOSINOPHIL # BLD: 0 K/UL (ref 0–0.4)
EOSINOPHIL NFR BLD: 0 % (ref 0–7)
ERYTHROCYTE [DISTWIDTH] IN BLOOD BY AUTOMATED COUNT: 15.6 % (ref 11.5–14.5)
GLUCOSE SERPL-MCNC: 131 MG/DL (ref 65–100)
HCT VFR BLD AUTO: 25.8 % (ref 36.6–50.3)
HGB BLD-MCNC: 8.8 G/DL (ref 12.1–17)
IMM GRANULOCYTES # BLD AUTO: 0 K/UL
IMM GRANULOCYTES NFR BLD AUTO: 0 %
LYMPHOCYTES # BLD: 0.2 K/UL (ref 0.8–3.5)
LYMPHOCYTES NFR BLD: 3 % (ref 12–49)
MCH RBC QN AUTO: 29.4 PG (ref 26–34)
MCHC RBC AUTO-ENTMCNC: 34.1 G/DL (ref 30–36.5)
MCV RBC AUTO: 86.3 FL (ref 80–99)
MONOCYTES # BLD: 0.4 K/UL (ref 0–1)
MONOCYTES NFR BLD: 5 % (ref 5–13)
MYELOCYTES NFR BLD MANUAL: 2 %
NEUTS BAND NFR BLD MANUAL: 4 % (ref 0–6)
NEUTS SEG # BLD: 7.2 K/UL (ref 1.8–8)
NEUTS SEG NFR BLD: 86 % (ref 32–75)
NRBC # BLD: 0.03 K/UL (ref 0–0.01)
NRBC BLD-RTO: 0.4 PER 100 WBC
PHOSPHATE SERPL-MCNC: 2.4 MG/DL (ref 2.6–4.7)
PLATELET # BLD AUTO: 104 K/UL (ref 150–400)
PMV BLD AUTO: 11.9 FL (ref 8.9–12.9)
POTASSIUM SERPL-SCNC: 3.3 MMOL/L (ref 3.5–5.1)
PROCALCITONIN SERPL-MCNC: 2.07 NG/ML
RBC # BLD AUTO: 2.99 M/UL (ref 4.1–5.7)
RBC MORPH BLD: ABNORMAL
SODIUM SERPL-SCNC: 138 MMOL/L (ref 136–145)
VANCOMYCIN SERPL-MCNC: 14.2 UG/ML
WBC # BLD AUTO: 8 K/UL (ref 4.1–11.1)

## 2022-12-03 PROCEDURE — 36415 COLL VENOUS BLD VENIPUNCTURE: CPT

## 2022-12-03 PROCEDURE — 94640 AIRWAY INHALATION TREATMENT: CPT

## 2022-12-03 PROCEDURE — 87147 CULTURE TYPE IMMUNOLOGIC: CPT

## 2022-12-03 PROCEDURE — 87040 BLOOD CULTURE FOR BACTERIA: CPT

## 2022-12-03 PROCEDURE — 74011250637 HC RX REV CODE- 250/637: Performed by: INTERNAL MEDICINE

## 2022-12-03 PROCEDURE — 86140 C-REACTIVE PROTEIN: CPT

## 2022-12-03 PROCEDURE — 80202 ASSAY OF VANCOMYCIN: CPT

## 2022-12-03 PROCEDURE — 65660000001 HC RM ICU INTERMED STEPDOWN

## 2022-12-03 PROCEDURE — 84145 PROCALCITONIN (PCT): CPT

## 2022-12-03 PROCEDURE — 74011000258 HC RX REV CODE- 258: Performed by: INTERNAL MEDICINE

## 2022-12-03 PROCEDURE — 85025 COMPLETE CBC W/AUTO DIFF WBC: CPT

## 2022-12-03 PROCEDURE — 74011250636 HC RX REV CODE- 250/636: Performed by: NURSE PRACTITIONER

## 2022-12-03 PROCEDURE — 74011250637 HC RX REV CODE- 250/637: Performed by: NURSE PRACTITIONER

## 2022-12-03 PROCEDURE — 71046 X-RAY EXAM CHEST 2 VIEWS: CPT

## 2022-12-03 PROCEDURE — 74011250636 HC RX REV CODE- 250/636: Performed by: INTERNAL MEDICINE

## 2022-12-03 PROCEDURE — 80069 RENAL FUNCTION PANEL: CPT

## 2022-12-03 PROCEDURE — 74011000250 HC RX REV CODE- 250: Performed by: NURSE PRACTITIONER

## 2022-12-03 RX ORDER — FLUTICASONE PROPIONATE 50 MCG
2 SPRAY, SUSPENSION (ML) NASAL DAILY
Status: DISCONTINUED | OUTPATIENT
Start: 2022-12-04 | End: 2022-12-08 | Stop reason: HOSPADM

## 2022-12-03 RX ADMIN — CEFTAROLINE FOSAMIL 600 MG: 600 POWDER, FOR SOLUTION INTRAVENOUS at 22:19

## 2022-12-03 RX ADMIN — SODIUM CHLORIDE 40 ML/HR: 9 INJECTION, SOLUTION INTRAVENOUS at 01:04

## 2022-12-03 RX ADMIN — WHITE PETROLATUM,ZINC OXIDE: 20; 75 PASTE TOPICAL at 08:23

## 2022-12-03 RX ADMIN — MONTELUKAST 10 MG: 10 TABLET, FILM COATED ORAL at 08:14

## 2022-12-03 RX ADMIN — ONDANSETRON 4 MG: 2 INJECTION INTRAMUSCULAR; INTRAVENOUS at 16:17

## 2022-12-03 RX ADMIN — ALBUTEROL SULFATE 2 PUFF: 108 AEROSOL, METERED RESPIRATORY (INHALATION) at 19:58

## 2022-12-03 RX ADMIN — CETIRIZINE HYDROCHLORIDE 10 MG: 10 TABLET, FILM COATED ORAL at 08:14

## 2022-12-03 RX ADMIN — ONDANSETRON 4 MG: 2 INJECTION INTRAMUSCULAR; INTRAVENOUS at 08:14

## 2022-12-03 RX ADMIN — SODIUM CHLORIDE, PRESERVATIVE FREE 10 ML: 5 INJECTION INTRAVENOUS at 22:20

## 2022-12-03 RX ADMIN — ONDANSETRON 4 MG: 2 INJECTION INTRAMUSCULAR; INTRAVENOUS at 01:04

## 2022-12-03 RX ADMIN — Medication 1500 MG: at 07:00

## 2022-12-03 RX ADMIN — Medication 1500 MG: at 22:12

## 2022-12-03 RX ADMIN — ONDANSETRON 4 MG: 2 INJECTION INTRAMUSCULAR; INTRAVENOUS at 22:18

## 2022-12-03 RX ADMIN — ACETAMINOPHEN 650 MG: 325 TABLET ORAL at 08:14

## 2022-12-03 RX ADMIN — ACETAMINOPHEN 650 MG: 325 TABLET ORAL at 19:51

## 2022-12-03 RX ADMIN — ALBUTEROL SULFATE 2 PUFF: 108 AEROSOL, METERED RESPIRATORY (INHALATION) at 08:22

## 2022-12-03 RX ADMIN — ALBUTEROL SULFATE 2 PUFF: 108 AEROSOL, METERED RESPIRATORY (INHALATION) at 14:26

## 2022-12-03 RX ADMIN — BUDESONIDE AND FORMOTEROL FUMARATE DIHYDRATE 2 PUFF: 160; 4.5 AEROSOL RESPIRATORY (INHALATION) at 19:58

## 2022-12-03 RX ADMIN — RIVAROXABAN 20 MG: 10 TABLET, FILM COATED ORAL at 08:14

## 2022-12-03 RX ADMIN — Medication 1500 MG: at 14:09

## 2022-12-03 RX ADMIN — CEFTAROLINE FOSAMIL 600 MG: 600 POWDER, FOR SOLUTION INTRAVENOUS at 12:16

## 2022-12-03 RX ADMIN — MUPIROCIN: 20 OINTMENT TOPICAL at 08:14

## 2022-12-03 RX ADMIN — BUDESONIDE AND FORMOTEROL FUMARATE DIHYDRATE 2 PUFF: 160; 4.5 AEROSOL RESPIRATORY (INHALATION) at 08:22

## 2022-12-03 NOTE — PROGRESS NOTES
Problem: Risk for Spread of Infection  Goal: Prevent transmission of infectious organism to others  Description: Prevent the transmission of infectious organisms to other patients, staff members, and visitors. Outcome: Progressing Towards Goal     Problem: Patient Education:  Go to Education Activity  Goal: Patient/Family Education  Outcome: Progressing Towards Goal     Problem: Falls - Risk of  Goal: *Absence of Falls  Description: Document Rachel Sandoval Fall Risk and appropriate interventions in the flowsheet.   Outcome: Progressing Towards Goal  Note: Fall Risk Interventions:  Mobility Interventions: Patient to call before getting OOB         Medication Interventions: Teach patient to arise slowly    Elimination Interventions: Urinal in reach, Call light in reach              Problem: Patient Education: Go to Patient Education Activity  Goal: Patient/Family Education  Outcome: Progressing Towards Goal     Problem: Patient Education: Go to Patient Education Activity  Goal: Patient/Family Education  Outcome: Progressing Towards Goal     Problem: Pneumonia: Day 1  Goal: Off Pathway (Use only if patient is Off Pathway)  Outcome: Progressing Towards Goal  Goal: Activity/Safety  Outcome: Progressing Towards Goal  Goal: Consults, if ordered  Outcome: Progressing Towards Goal  Goal: Diagnostic Test/Procedures  Outcome: Progressing Towards Goal  Goal: Nutrition/Diet  Outcome: Progressing Towards Goal  Goal: Medications  Outcome: Progressing Towards Goal  Goal: Respiratory  Outcome: Progressing Towards Goal  Goal: Treatments/Interventions/Procedures  Outcome: Progressing Towards Goal  Goal: Psychosocial  Outcome: Progressing Towards Goal  Goal: *Oxygen saturation within defined limits  Outcome: Progressing Towards Goal  Goal: *Influenza vaccine administered (October-March)  Outcome: Progressing Towards Goal  Goal: *Pneumoccocal vaccine administered  Outcome: Progressing Towards Goal  Goal: *Hemodynamically stable  Outcome: Progressing Towards Goal  Goal: *Demonstrates progressive activity  Outcome: Progressing Towards Goal  Goal: *Tolerating diet  Outcome: Progressing Towards Goal     Problem: Pneumonia: Day 2  Goal: Off Pathway (Use only if patient is Off Pathway)  Outcome: Progressing Towards Goal  Goal: Activity/Safety  Outcome: Progressing Towards Goal  Goal: Consults, if ordered  Outcome: Progressing Towards Goal  Goal: Diagnostic Test/Procedures  Outcome: Progressing Towards Goal  Goal: Nutrition/Diet  Outcome: Progressing Towards Goal  Goal: Discharge Planning  Outcome: Progressing Towards Goal  Goal: Medications  Outcome: Progressing Towards Goal  Goal: Respiratory  Outcome: Progressing Towards Goal  Goal: Treatments/Interventions/Procedures  Outcome: Progressing Towards Goal  Goal: Psychosocial  Outcome: Progressing Towards Goal  Goal: *Oxygen saturation within defined limits  Outcome: Progressing Towards Goal  Goal: *Hemodynamically stable  Outcome: Progressing Towards Goal  Goal: *Demonstrates progressive activity  Outcome: Progressing Towards Goal  Goal: *Tolerating diet  Outcome: Progressing Towards Goal  Goal: *Optimal pain control at patient's stated goal  Outcome: Progressing Towards Goal     Problem: Pneumonia: Day 3  Goal: Off Pathway (Use only if patient is Off Pathway)  Outcome: Progressing Towards Goal  Goal: Activity/Safety  Outcome: Progressing Towards Goal  Goal: Consults, if ordered  Outcome: Progressing Towards Goal  Goal: Diagnostic Test/Procedures  Outcome: Progressing Towards Goal  Goal: Nutrition/Diet  Outcome: Progressing Towards Goal  Goal: Discharge Planning  Outcome: Progressing Towards Goal  Goal: Medications  Outcome: Progressing Towards Goal  Goal: Respiratory  Outcome: Progressing Towards Goal  Goal: Treatments/Interventions/Procedures  Outcome: Progressing Towards Goal  Goal: Psychosocial  Outcome: Progressing Towards Goal  Goal: *Oxygen saturation within defined limits  Outcome: Progressing Towards Goal  Goal: *Hemodynamically stable  Outcome: Progressing Towards Goal  Goal: *Demonstrates progressive activity  Outcome: Progressing Towards Goal  Goal: *Tolerating diet  Outcome: Progressing Towards Goal  Goal: *Describes available resources and support systems  Outcome: Progressing Towards Goal  Goal: *Optimal pain control at patient's stated goal  Outcome: Progressing Towards Goal     Problem: Pneumonia: Day 4  Goal: Off Pathway (Use only if patient is Off Pathway)  Outcome: Progressing Towards Goal  Goal: Activity/Safety  Outcome: Progressing Towards Goal  Goal: Nutrition/Diet  Outcome: Progressing Towards Goal  Goal: Discharge Planning  Outcome: Progressing Towards Goal  Goal: Medications  Outcome: Progressing Towards Goal  Goal: Respiratory  Outcome: Progressing Towards Goal  Goal: Treatments/Interventions/Procedures  Outcome: Progressing Towards Goal  Goal: Psychosocial  Outcome: Progressing Towards Goal     Problem: Pneumonia: Discharge Outcomes  Goal: *Demonstrates progressive activity  Outcome: Progressing Towards Goal  Goal: *Describes follow-up/return visits to physicians  Outcome: Progressing Towards Goal  Goal: *Tolerating diet  Outcome: Progressing Towards Goal  Goal: *Verbalizes name, dosage, time, side effects, and number of days to continue medications  Outcome: Progressing Towards Goal  Goal: *Influenza immunization  Outcome: Progressing Towards Goal  Goal: *Pneumococcal immunization  Outcome: Progressing Towards Goal  Goal: *Respiratory status at baseline  Outcome: Progressing Towards Goal  Goal: *Vital signs within defined limits  Outcome: Progressing Towards Goal  Goal: *Describes available resources and support systems  Outcome: Progressing Towards Goal  Goal: *Optimal pain control at patient's stated goal  Outcome: Progressing Towards Goal

## 2022-12-03 NOTE — PROGRESS NOTES
Vancomycin Dosing Consult  Roberto Stoddard is a 34 y.o. male with HAP and MRSA bacteremia. Pharmacy was consulted by Dr. James Mcgill to dose and monitor vancomycin. Today is day 7.     Antibiotic regimen: Vancomycin + pip/tazo    Temp (24hrs), Av °F (37.2 °C), Min:98.5 °F (36.9 °C), Max:99.5 °F (37.5 °C)    Recent Labs     22  1309 22  0921 22  1058   WBC 8.0 7.7 8.1   CREA 0.62*  --  0.66*   BUN 11  --  16     Est CrCl: >100 mL/min  Concomitant nephrotoxic drugs: None    Cultures:    Blood: MRSA, GPC  4/ bottles (final)   Sputum: MRSA, normal heri (final)   Urine: NG (final)   blood - GPCs, probable MRSA  12/3 blood - pending    MRSA Swab: Detected     Target range: AUC:BETTE 400-600    Recent level history:  Date/Time Dose & Interval Measured Level (mcg/mL) Associated AUC/BETTE    0416 2000 mg x 1 3.3 N/A    0800 1500 mg IV q12h 6.8 357    0940 1500 mg IV q8h 14.7 554   12/3 1309 1500 mg IV q8h 14.2 492     Assessment/Plan:   Blood cultures from  positive, redrawn on 12/3  Ceftaroline added by ID  Renal function stable  AUC:BETTE is within goal.  Vancomycin 1500 mg IV q8h is ordered  Repeat surveillance trough is ordered for  1200  Antimicrobial stop date RAHEL Jalloh Or, PharmD, BCPS, BCCCP  Clinical Pharmacist   (available on [employer])

## 2022-12-03 NOTE — PROGRESS NOTES
Hospitalist Progress Note         Graciela Branch          Daily Progress Note: 12/3/2022      Subjective: The patient is seen for follow  up. 33 yo male, hx of HTN, asthma, non-Hodgkin's lymphoma post chemotherapy, now on radiation, DVT, PE, here for shortness of breath. Pt was seen on 11/25 for similar, diagnosed with Flu A and sent home on Tamiflu. Workup thus far significant for CXR with evidence of bilateral pneumonia, labs suggestive of sepsis. Pt started on Vancomycin, Cefepime, Levaquin. --  The patient is seen for follow up. States that he feels far better today than he did yesterday with less shortness of breath. Currently on RA. T-max of 99.5 °F overnight. Cultures positive for S. Aureus.     Problem List:  Problem List as of 12/3/2022 Date Reviewed: 8/15/2022            Codes Class Noted - Resolved    Shortness of breath ICD-10-CM: R06.02  ICD-9-CM: 786.05  11/27/2022 - Present        Pneumonia ICD-10-CM: J18.9  ICD-9-CM: 486  11/27/2022 - Present        History of asthma ICD-10-CM: Z87.09  ICD-9-CM: V12.69  11/27/2022 - Present        Influenza A ICD-10-CM: J10.1  ICD-9-CM: 487.1  11/27/2022 - Present        Severe sepsis ([facility] ICD-10-CM: A41.9, R65.20  ICD-9-CM: 038.9, 995.92  11/27/2022 - Present        Asthma ICD-10-CM: J45.909  ICD-9-CM: 493.90  5/7/2022 - Present        Acute respiratory failure with hypoxia ([facility] ICD-10-CM: J96.01  ICD-9-CM: 518.81  5/7/2022 - Present        Lymphoma ([facility] ICD-10-CM: C85.90  ICD-9-CM: 202.80  3/1/2022 - Present        Pelvic mass ICD-10-CM: R19.00  ICD-9-CM: 789.30  2/23/2022 - Present        Bilateral pulmonary embolism (HCC) ICD-10-CM: I26.99  ICD-9-CM: 415.19  2/23/2022 - Present        Pulmonary embolism ([address] ICD-10-CM: I26.99  ICD-9-CM: 415.19  2/22/2022 - Present        Right inguinal pain ICD-10-CM: R10.31  ICD-9-CM: 789.03  2/17/2022 - Present        Right groin mass ICD-10-CM: R19.09  ICD-9-CM: 789.39  2/11/2022 - Present Fall ICD-10-CM: W19. Veronique Reyes  ICD-9-CM: E888.9  2/11/2022 - Present       Medications reviewed  Current Facility-Administered Medications   Medication Dose Route Frequency    cefTARoline (TEFLARO) 600 mg in 0.9% sodium chloride (MBP/ADV) 50 mL MBP  600 mg IntraVENous Q12H    albuterol-ipratropium (DUO-NEB) 2.5 MG-0.5 MG/3 ML  3 mL Nebulization Q6H PRN    albuterol (PROVENTIL HFA, VENTOLIN HFA, PROAIR HFA) inhaler 2 Puff  2 Puff Inhalation Q6HWA RT    budesonide-formoteroL (SYMBICORT) 160-4.5 mcg/actuation HFA inhaler 2 Puff  2 Puff Inhalation BID RT    Vancomycin level on 12/3 @0400   Other ONCE    traMADoL (ULTRAM) tablet 25 mg  25 mg Oral Q6H PRN    vancomycin (VANCOCIN) 1500 mg in  ml infusion  1,500 mg IntraVENous Q8H    zinc oxide-white petrolatum 20-75 % topical paste   Topical DAILY    0.9% sodium chloride infusion  40 mL/hr IntraVENous CONTINUOUS    mupirocin (BACTROBAN) 2 % ointment   Both Nostrils BID    sodium chloride (NS) flush 5-10 mL  5-10 mL IntraVENous PRN    sodium chloride (NS) flush 5-40 mL  5-40 mL IntraVENous PRN    acetaminophen (TYLENOL) tablet 650 mg  650 mg Oral Q6H PRN    Or    acetaminophen (TYLENOL) suppository 650 mg  650 mg Rectal Q6H PRN    polyethylene glycol (MIRALAX) packet 17 g  17 g Oral DAILY PRN    bisacodyL (DULCOLAX) tablet 5 mg  5 mg Oral DAILY PRN    ondansetron (ZOFRAN ODT) tablet 4 mg  4 mg Oral Q6H PRN    Or    ondansetron (ZOFRAN) injection 4 mg  4 mg IntraVENous Q6H PRN    sodium chloride (NS) flush 5-10 mL  5-10 mL IntraVENous PRN    metoprolol (LOPRESSOR) injection 5 mg  5 mg IntraVENous Q3H PRN    guaiFENesin-codeine (ROBITUSSIN AC) 100-10 mg/5 mL solution 10 mL  10 mL Oral Q4H PRN    Vancomycin - Pharmacy to Dose   Other Rx Dosing/Monitoring    cetirizine (ZYRTEC) tablet 10 mg  10 mg Oral DAILY    fluticasone propionate (FLONASE) 50 mcg/actuation nasal spray 1 Spray  1 Spray Both Nostrils PRN    montelukast (SINGULAIR) tablet 10 mg  10 mg Oral DAILY rivaroxaban (XARELTO) tablet 20 mg  20 mg Oral DAILY    hydrOXYzine (VISTARIL) injection 50 mg  50 mg IntraMUSCular Q6H PRN       Review of Systems:   A comprehensive review of systems was negative except for that written in the HPI. Objective:   Physical Exam:     Visit Vitals  BP (!) 143/86 (BP 1 Location: Right upper arm, BP Patient Position: Lying;Supine)   Pulse 87   Temp 98.6 °F (37 °C)   Resp 18   Ht 5' 6\" (1.676 m)   Wt 110.7 kg (244 lb 1.6 oz)   SpO2 97%   BMI 39.40 kg/m²    O2 Flow Rate (L/min): 1 l/min O2 Device: None (Room air)    Temp (24hrs), Av.9 °F (37.2 °C), Min:98.3 °F (36.8 °C), Max:99.5 °F (37.5 °C)    No intake/output data recorded.  1901 -  0700  In: -   Out: 400 [Urine:400]    General:  Alert, cooperative, no distress, appears stated age. Lungs:   Clear to auscultation bilaterally. Chest wall:  No tenderness or deformity. Heart:  Regular rate and rhythm, S1, S2 normal, no murmur, click, rub or gallop. Abdomen:   Soft, non-tender. Bowel sounds normal. No masses,  No organomegaly. Extremities: Extremities normal, atraumatic, no cyanosis or edema. Pulses: 2+ and symmetric all extremities. Skin: Skin color, texture, turgor normal. No rashes or lesions   Neurologic: CNII-XII intact. No gross sensory or motor deficits     Data Review:       Recent Days:  Recent Labs     22  0921 22  1058   WBC 7.7 8.1   HGB 9.7* 9.7*   HCT 28.6* 28.7*   * 127*       Recent Labs     22  1058      K 3.7      CO2 23   *   BUN 16   CREA 0.66*   CA 8.2*   PHOS 2.2*   ALB 1.6*       No results for input(s): PH, PCO2, PO2, HCO3, FIO2 in the last 72 hours. 24 Hour Results:  No results found for this or any previous visit (from the past 24 hour(s)).           Assessment/     Acute hypoxic respiratory failure    -Secondary to bilateral pneumonia    -Continue cefepime, vancomycin and Levaquin    -Off supplemental O2    -Continue monitoring respiratory status    Influenza A    -Continue on Tamiflu through November 29    Bilateral pneumonia    -Suspect community-acquired pneumonia    -Continue IV vancomycin, start Ceftaroline    -Add incentive spirometer for deep breathing    -History of non-Hodgkin's lymphoma    -Completed chemotherapy    -Immunocompromise status    Prior history of DVT and PE    -On Xarelto    Benign essential hypertension   -Fairly stable    Hypophosphatemia     -Replete with K phos 1 tab QID x 8 doses      Plan:  Continue supportive care  Treatment plan as outlined above  Continue incentive spirometer for deep breathing  Significantly improved  Repeat blood cultures 12/1 remain positive for Gram positive cocci, ID following      Care Plan discussed with: Nurse    Total time spent with patient: 30 minutes.     Cira Moses

## 2022-12-03 NOTE — PROGRESS NOTES
Hospitalist Progress Note         Tadeo Ramos MD          Daily Progress Note: 12/3/2022      Subjective: The patient is seen for follow  up. 33 yo male, hx of HTN, asthma, non-Hodgkin's lymphoma post chemotherapy, now on radiation, DVT, PE, here for shortness of breath. Pt was seen on 11/25 for similar, diagnosed with Flu A and sent home on Tamiflu. Workup thus far significant for CXR with evidence of bilateral pneumonia, labs suggestive of sepsis. Pt started on Vancomycin, Cefepime, Levaquin. --  The patient is seen for follow up. States that he feels far better today than he did yesterday with less shortness of breath. Currently on RA. T-max of 100.1°F overnight.     Problem List:  Problem List as of 12/3/2022 Date Reviewed: 8/15/2022            Codes Class Noted - Resolved    Shortness of breath ICD-10-CM: R06.02  ICD-9-CM: 786.05  11/27/2022 - Present        Pneumonia ICD-10-CM: J18.9  ICD-9-CM: 486  11/27/2022 - Present        History of asthma ICD-10-CM: Z87.09  ICD-9-CM: V12.69  11/27/2022 - Present        Influenza A ICD-10-CM: J10.1  ICD-9-CM: 487.1  11/27/2022 - Present        Severe sepsis ([facility] ICD-10-CM: A41.9, R65.20  ICD-9-CM: 038.9, 995.92  11/27/2022 - Present        Asthma ICD-10-CM: J45.909  ICD-9-CM: 493.90  5/7/2022 - Present        Acute respiratory failure with hypoxia ([facility] ICD-10-CM: J96.01  ICD-9-CM: 518.81  5/7/2022 - Present        Lymphoma ([facility] ICD-10-CM: C85.90  ICD-9-CM: 202.80  3/1/2022 - Present        Pelvic mass ICD-10-CM: R19.00  ICD-9-CM: 789.30  2/23/2022 - Present        Bilateral pulmonary embolism (HCC) ICD-10-CM: I26.99  ICD-9-CM: 415.19  2/23/2022 - Present        Pulmonary embolism ([address] ICD-10-CM: I26.99  ICD-9-CM: 415.19  2/22/2022 - Present        Right inguinal pain ICD-10-CM: R10.31  ICD-9-CM: 789.03  2/17/2022 - Present        Right groin mass ICD-10-CM: R19.09  ICD-9-CM: 789.39  2/11/2022 - Present        Fall ICD-10-CM: F45.TWIK  ICD-9-CM: E888.9  2/11/2022 - Present       Medications reviewed  Current Facility-Administered Medications   Medication Dose Route Frequency    cefTARoline (TEFLARO) 600 mg in 0.9% sodium chloride (MBP/ADV) 50 mL MBP  600 mg IntraVENous Q12H    albuterol-ipratropium (DUO-NEB) 2.5 MG-0.5 MG/3 ML  3 mL Nebulization Q6H PRN    albuterol (PROVENTIL HFA, VENTOLIN HFA, PROAIR HFA) inhaler 2 Puff  2 Puff Inhalation Q6HWA RT    budesonide-formoteroL (SYMBICORT) 160-4.5 mcg/actuation HFA inhaler 2 Puff  2 Puff Inhalation BID RT    Vancomycin level on 12/3 @0400   Other ONCE    traMADoL (ULTRAM) tablet 25 mg  25 mg Oral Q6H PRN    vancomycin (VANCOCIN) 1500 mg in  ml infusion  1,500 mg IntraVENous Q8H    zinc oxide-white petrolatum 20-75 % topical paste   Topical DAILY    0.9% sodium chloride infusion  40 mL/hr IntraVENous CONTINUOUS    mupirocin (BACTROBAN) 2 % ointment   Both Nostrils BID    sodium chloride (NS) flush 5-10 mL  5-10 mL IntraVENous PRN    sodium chloride (NS) flush 5-40 mL  5-40 mL IntraVENous PRN    acetaminophen (TYLENOL) tablet 650 mg  650 mg Oral Q6H PRN    Or    acetaminophen (TYLENOL) suppository 650 mg  650 mg Rectal Q6H PRN    polyethylene glycol (MIRALAX) packet 17 g  17 g Oral DAILY PRN    bisacodyL (DULCOLAX) tablet 5 mg  5 mg Oral DAILY PRN    ondansetron (ZOFRAN ODT) tablet 4 mg  4 mg Oral Q6H PRN    Or    ondansetron (ZOFRAN) injection 4 mg  4 mg IntraVENous Q6H PRN    sodium chloride (NS) flush 5-10 mL  5-10 mL IntraVENous PRN    metoprolol (LOPRESSOR) injection 5 mg  5 mg IntraVENous Q3H PRN    guaiFENesin-codeine (ROBITUSSIN AC) 100-10 mg/5 mL solution 10 mL  10 mL Oral Q4H PRN    Vancomycin - Pharmacy to Dose   Other Rx Dosing/Monitoring    cetirizine (ZYRTEC) tablet 10 mg  10 mg Oral DAILY    fluticasone propionate (FLONASE) 50 mcg/actuation nasal spray 1 Spray  1 Spray Both Nostrils PRN    montelukast (SINGULAIR) tablet 10 mg  10 mg Oral DAILY    rivaroxaban (Orly Ellis) tablet 20 mg  20 mg Oral DAILY    hydrOXYzine (VISTARIL) injection 50 mg  50 mg IntraMUSCular Q6H PRN       Review of Systems:   A comprehensive review of systems was negative except for that written in the HPI. Objective:   Physical Exam:     Visit Vitals  /76 (BP 1 Location: Left upper arm, BP Patient Position: Lying right side)   Pulse 82   Temp 98.5 °F (36.9 °C)   Resp 20   Ht 5' 6\" (1.676 m)   Wt 110.7 kg (244 lb 1.6 oz)   SpO2 96%   BMI 39.40 kg/m²    O2 Flow Rate (L/min): 1 l/min O2 Device: None (Room air)    Temp (24hrs), Av.9 °F (37.2 °C), Min:98.3 °F (36.8 °C), Max:99.5 °F (37.5 °C)    No intake/output data recorded.  1901 -  0700  In: -   Out: 400 [Urine:400]    General:  Alert, cooperative, no distress, appears stated age. Lungs:   Clear to auscultation bilaterally. Chest wall:  No tenderness or deformity. Heart:  Regular rate and rhythm, S1, S2 normal, no murmur, click, rub or gallop. Abdomen:   Soft, non-tender. Bowel sounds normal. No masses,  No organomegaly. Extremities: Extremities normal, atraumatic, no cyanosis or edema. Pulses: 2+ and symmetric all extremities. Skin: Skin color, texture, turgor normal. No rashes or lesions   Neurologic: CNII-XII intact. No gross sensory or motor deficits     Data Review:       Recent Days:  Recent Labs     22  0921 22  1058   WBC 7.7 8.1   HGB 9.7* 9.7*   HCT 28.6* 28.7*   * 127*       Recent Labs     22  1058      K 3.7      CO2 23   *   BUN 16   CREA 0.66*   CA 8.2*   PHOS 2.2*   ALB 1.6*       No results for input(s): PH, PCO2, PO2, HCO3, FIO2 in the last 72 hours. 24 Hour Results:  No results found for this or any previous visit (from the past 24 hour(s)).           Assessment/     Acute hypoxic respiratory failure    -Secondary to bilateral pneumonia    -Continue cefepime, vancomycin and Levaquin    -Wean off high flow oxygen as tolerated    Influenza A    -Continue on Tamiflu through November 29    Bilateral pneumonia    -Suspect community-acquired pneumonia    -Continue IV Zosyn and IV vancomycin  Add incentive spirometer for deep breathing  History of non-Hodgkin's lymphoma    -Completed chemotherapy    -Immunocompromise status    Prior history of DVT and PE    -On Xarelto    Benign essential hypertension   -Fairly stable    Hypophosphatemia     -Replete with K phos 1 tab QID x 8 doses      Plan:  Continue supportive care  Treatment plan as outlined above  Add incentive spirometer for deep breathing  Significantly improved  Repeat blood cultures 12/1 remain positive for Gram positive cocci  Antibiotics changed to Teflaro and vancomycin      Care Plan discussed with: Nurse    Total time spent with patient: 30 minutes.     Mabel Ruffin MD

## 2022-12-03 NOTE — PROGRESS NOTES
IMPRESSION:   Acute hypoxic respiratory failure resolved  Exacerbation of asthma normal wheeze remains  Influenza A  MRSA pneumonia remains on vancomycin  MRSA septicemia  Pleurisy solved Celebrex discontinued  Hypophosphatemia   Nasal MRSA smear positive  Severe sepsis resolved  History of DVT/PE on Xarelto  hx of non-Hodgkin's lymphoma status postchemotherapy on radiation treatment  Gastroesophageal reflux disease        RECOMMENDATIONS/PLAN:     50-year-old male came in because of shortness of breath and dyspnea and critical condition nonrebreather mask 100% FiO2  Oxygen saturation well-maintained on room air  Pleuritic chest pain cleared Librax discontinued  Almost no wheezing on albuterol and Symbicort inhalers  Currently on Teflaro and vancomycin  Completed a course of Tamiflu  Recurrent fever 100.1 overnight  WBC count and procalcitonin improved but CRP has risen echocardiogram does not show any vegetations  12/3 chest x-ray clear       [x] High complexity decision making was performed  [x] See my orders for details  HPI  50-year-old male came in because of shortness of breath and dyspnea, he has significant past medical history of asthma non-Hodgkin's lymphoma status postchemotherapy currently on radiation treatment also had a history of DVT and pulmonary embolism he was positive with influenza A and he was sent home with Tamiflu but his condition got worse came to the emergency room he is very short of breath 100% FiO2 nonrebreather mask electrolyte imbalance having chest discomfort diaphoretic he has not smoked in the past 4 days he smokes marijuana tachypneic tachycardic so admitted and critical care consult was called. PMH:  has a past medical history of Asthma, DVT (deep venous thrombosis) ([address] Hypertension, Lymphoma ([address] PE (pulmonary thromboembolism) ([address] and Thromboembolus ([address]     PSH:   has a past surgical history that includes hx other surgical (Right, 03/02/2022); ir fluoro guide plc cvad (3/30/2022); and ir insert tunl cvc w port over 5 years (3/30/2022). FHX: family history includes Cancer in his maternal grandfather; Diabetes in his mother; Hypertension in his mother; Myasthenia Gravis in his maternal grandmother; No Known Problems in his father. SHX:  reports that he quit smoking about 7 years ago. His smoking use included cigarettes. He has never used smokeless tobacco. He reports that he does not currently use alcohol. He reports that he does not currently use drugs after having used the following drugs: Marijuana.     ALL: No Known Allergies     MEDS:   [x] Reviewed - As Below   [] Not reviewed    Current Facility-Administered Medications   Medication    cefTARoline (TEFLARO) 600 mg in 0.9% sodium chloride (MBP/ADV) 50 mL MBP    albuterol-ipratropium (DUO-NEB) 2.5 MG-0.5 MG/3 ML    albuterol (PROVENTIL HFA, VENTOLIN HFA, PROAIR HFA) inhaler 2 Puff    budesonide-formoteroL (SYMBICORT) 160-4.5 mcg/actuation HFA inhaler 2 Puff    Vancomycin level on 12/3 @0400    traMADoL (ULTRAM) tablet 25 mg    vancomycin (VANCOCIN) 1500 mg in  ml infusion    zinc oxide-white petrolatum 20-75 % topical paste    0.9% sodium chloride infusion    mupirocin (BACTROBAN) 2 % ointment    sodium chloride (NS) flush 5-10 mL    sodium chloride (NS) flush 5-40 mL    acetaminophen (TYLENOL) tablet 650 mg    Or    acetaminophen (TYLENOL) suppository 650 mg    polyethylene glycol (MIRALAX) packet 17 g    bisacodyL (DULCOLAX) tablet 5 mg    ondansetron (ZOFRAN ODT) tablet 4 mg    Or    ondansetron (ZOFRAN) injection 4 mg    sodium chloride (NS) flush 5-10 mL    metoprolol (LOPRESSOR) injection 5 mg    guaiFENesin-codeine (ROBITUSSIN AC) 100-10 mg/5 mL solution 10 mL    Vancomycin - Pharmacy to Dose    cetirizine (ZYRTEC) tablet 10 mg    fluticasone propionate (FLONASE) 50 mcg/actuation nasal spray 1 [place]    montelukast (SINGULAIR) tablet 10 mg    rivaroxaban (XARELTO) tablet 20 mg    hydrOXYzine (VISTARIL) injection 50 mg      MAR reviewed and pertinent medications noted or modified as needed   Current Facility-Administered Medications   Medication    cefTARoline (TEFLARO) 600 mg in 0.9% sodium chloride (MBP/ADV) 50 mL MBP    albuterol-ipratropium (DUO-NEB) 2.5 MG-0.5 MG/3 ML    albuterol (PROVENTIL HFA, VENTOLIN HFA, PROAIR HFA) inhaler 2 Puff    budesonide-formoteroL (SYMBICORT) 160-4.5 mcg/actuation HFA inhaler 2 Puff    Vancomycin level on 12/3 @0400    traMADoL (ULTRAM) tablet 25 mg    vancomycin (VANCOCIN) 1500 mg in  ml infusion    zinc oxide-white petrolatum 20-75 % topical paste    0.9% sodium chloride infusion    mupirocin (BACTROBAN) 2 % ointment    sodium chloride (NS) flush 5-10 mL    sodium chloride (NS) flush 5-40 mL    acetaminophen (TYLENOL) tablet 650 mg    Or    acetaminophen (TYLENOL) suppository 650 mg    polyethylene glycol (MIRALAX) packet 17 g    bisacodyL (DULCOLAX) tablet 5 mg    ondansetron (ZOFRAN ODT) tablet 4 mg    Or    ondansetron (ZOFRAN) injection 4 mg    sodium chloride (NS) flush 5-10 mL    metoprolol (LOPRESSOR) injection 5 mg    guaiFENesin-codeine (ROBITUSSIN AC) 100-10 mg/5 mL solution 10 mL    Vancomycin - Pharmacy to Dose    cetirizine (ZYRTEC) tablet 10 mg    fluticasone propionate (FLONASE) 50 mcg/actuation nasal spray 1 [place]    montelukast (SINGULAIR) tablet 10 mg    rivaroxaban (XARELTO) tablet 20 mg    hydrOXYzine (VISTARIL) injection 50 mg      PMH:  has a past medical history of Asthma, DVT (deep venous thrombosis) ([address] Hypertension, Lymphoma ([address] PE (pulmonary thromboembolism) ([address] and Thromboembolus ([address] PSH:   has a past surgical history that includes hx other surgical (Right, 03/02/2022); ir fluoro guide plc cvad (3/30/2022); and ir insert tunl cvc w port over 5 years (3/30/2022). FHX: family history includes Cancer in his maternal grandfather; Diabetes in his mother; Hypertension in his mother; Myasthenia Gravis in his maternal grandmother;  No Known Problems in his father. SHX:  reports that he quit smoking about 7 years ago. His smoking use included cigarettes. He has never used smokeless tobacco. He reports that he does not currently use alcohol. He reports that he does not currently use drugs after having used the following drugs: Marijuana. ROS:A comprehensive review of systems was negative except for that written in the HPI. Hemodynamics:    CO:    CI:    CVP:    SVR:   PAP Systolic:    PAP Diastolic:    PVR:    WH27:        Ventilator Settings:      Mode Rate TV Press PEEP FiO2 PIP Min. Vent               30 %              Vital Signs: Telemetry:    normal sinus rhythm Intake/Output:   Visit Vitals  BP (!) 143/86 (BP 1 Location: Right upper arm, BP Patient Position: Lying;Supine)   Pulse 87   Temp 98.6 °F (37 °C)   Resp 18   Ht 5' 6\" (1.676 m)   Wt 110.7 kg (244 lb 1.6 oz)   SpO2 97%   BMI 39.40 kg/m²       Temp (24hrs), Av.9 °F (37.2 °C), Min:98.3 °F (36.8 °C), Max:99.5 °F (37.5 °C)        O2 Device: None (Room air) O2 Flow Rate (L/min): 1 l/min       Wt Readings from Last 4 Encounters:   22 110.7 kg (244 lb 1.6 oz)   22 104.3 kg (230 lb)   22 104.3 kg (230 lb)   22 116.1 kg (256 lb)          Intake/Output Summary (Last 24 hours) at 12/3/2022 1115  Last data filed at 2022  Gross per 24 hour   Intake --   Output 400 ml   Net -400 ml         Last shift:      No intake/output data recorded. Last 3 shifts:  1901 -  0700  In: -   Out: 400 [Urine:400]       Physical Exam:     General: Awake alert no distress no accessory muscle use on room air saturation 97%  HEENT: NCAT, normal oral mucosa  Eyes: anicteric; conjunctiva clear extraocular movements intact  Neck: no nodes,  trach midline; no accessory MM use. Chest: no deformity,   Cardiac: Regular rate and rhythm  Lungs: Coarse exhalation with rare wheezing  Abd: Soft nontender positive bowel sounds  Ext: no edema; no joint swelling;  No clubbing  : clear urine  Neuro: Awake alert oriented moving all extremities  Psych-calm oriented  Skin: warm, dry, no cyanosis;   Pulses: Brachial and radial pulses intact  Capillary: Normal capillary refill      DATA:    MAR reviewed and pertinent medications noted or modified as needed  MEDS:   Current Facility-Administered Medications   Medication    cefTARoline (TEFLARO) 600 mg in 0.9% sodium chloride (MBP/ADV) 50 mL MBP    albuterol-ipratropium (DUO-NEB) 2.5 MG-0.5 MG/3 ML    albuterol (PROVENTIL HFA, VENTOLIN HFA, PROAIR HFA) inhaler 2 Puff    budesonide-formoteroL (SYMBICORT) 160-4.5 mcg/actuation HFA inhaler 2 Puff    Vancomycin level on 12/3 @0400    traMADoL (ULTRAM) tablet 25 mg    vancomycin (VANCOCIN) 1500 mg in  ml infusion    zinc oxide-white petrolatum 20-75 % topical paste    0.9% sodium chloride infusion    mupirocin (BACTROBAN) 2 % ointment    sodium chloride (NS) flush 5-10 mL    sodium chloride (NS) flush 5-40 mL    acetaminophen (TYLENOL) tablet 650 mg    Or    acetaminophen (TYLENOL) suppository 650 mg    polyethylene glycol (MIRALAX) packet 17 g    bisacodyL (DULCOLAX) tablet 5 mg    ondansetron (ZOFRAN ODT) tablet 4 mg    Or    ondansetron (ZOFRAN) injection 4 mg    sodium chloride (NS) flush 5-10 mL    metoprolol (LOPRESSOR) injection 5 mg    guaiFENesin-codeine (ROBITUSSIN AC) 100-10 mg/5 mL solution 10 mL    Vancomycin - Pharmacy to Dose    cetirizine (ZYRTEC) tablet 10 mg    fluticasone propionate (FLONASE) 50 mcg/actuation nasal spray 1 [place]    montelukast (SINGULAIR) tablet 10 mg    rivaroxaban (XARELTO) tablet 20 mg    hydrOXYzine (VISTARIL) injection 50 mg        Labs:    Recent Labs     12/02/22  0921 12/01/22  1058   WBC 7.7 8.1   HGB 9.7* 9.7*   * 127*       Recent Labs     12/01/22  1058      K 3.7      CO2 23   *   BUN 16   CREA 0.66*   CA 8.2*   PHOS 2.2*   ALB 1.6*     12/3 room air oxygen saturation 97%  11/29 nasal high flow 45 L FiO2 30% 11/28 nasal high flow 45 L FiO2 75% oxygen saturation 100%  Nasal MRSA smear positive  Influenza A positive  Procalcitonin 2.91, 5.2, 11.81, 25.5, 18.8  CRP 13.4, 9.57, 18.5  Blood culture MRSA  Sputum culture 3+ polys with MRSA  Urine culture no growth    Troponin 19, 136  12/1 echo ejection fraction 50% mild mitral regurg left atrium 3.4 cm RVSP 29 no evidence of vegetation  3/3/2022 Echo ejection fraction 65% left atrium 3.2 cm normal IVC  Lab Results   Component Value Date/Time    Culture result:  12/01/2022 10:58 AM     Probable Staphylococcus aureus growing in both bottles drawn No Site Indicated CHECKING FOR POSSIBLE METHICILLIN RESISTANCE    Culture result:  12/01/2022 10:58 AM     (NOTE) [facility] CALLED TO AND READ BACK BY BHANU OCHOA  Dwight [id number]    Culture result: No Growth (<1000 cfu/mL) 11/28/2022 12:03 AM         Imaging:    Results from Hospital Encounter encounter on 11/27/22    XR CHEST PA LAT    Narrative  EXAM: XR CHEST PA LAT    INDICATION: MRSA pneumonia    COMPARISON: Chest radiographs 11/29/2022    TECHNIQUE: PA and lateral chest views    FINDINGS:  Right pectoral infusion port. Cardiomediastinal silhouette is stably enlarged. Grossly unchanged widespread patchy consolidative airspace disease. No definite  pleural effusion or pneumothorax. Impression  Grossly unchanged widespread patchy consolidative airspace disease. Results from [place] encounter on 11/22/22    CT ABD PELV W CONT    Narrative  EXAM:  CT ABD PELV W CONT    INDICATION: Abdominal pain. History of lymphoma. COMPARISON: CT 2/11/2022. TECHNIQUE: Helical CT of the abdomen  and pelvis  following the uneventful  intravenous administration of nonionic contrast.  Coronal and sagittal reformats  are performed. CT dose reduction was achieved through use of a standardized  protocol tailored for this examination and automatic exposure control for dose  modulation.     FINDINGS:  The visualized lung bases demonstrate no mass or consolidation. The heart size  is normal. There is no pericardial or pleural effusion. The liver, spleen, pancreas, and adrenal glands are normal. There are probable  gallstones without intra- or extra-hepatic biliary dilatation. The kidneys are symmetric without hydronephrosis. The proximal colon is fluid-filled, and there is a long segment of ileal wall  thickening. There are no dilated bowel loops. The appendix is normal.    Right inguinal posttreatment changes are noted with no lymphadenopathy on the  current study. There are no enlarged lymph nodes. There is no free fluid or  free air. The aorta is normal in caliber. The urinary bladder is normal.  There is no pelvic mass. The prostate is not  enlarged. The bony structures are age-appropriate. Impression  Findings of enterocolitis likely due to nonspecific infection or inflammation. 11/28 he states he feels a little better his oxygenation has improved have decreased to FiO2 65%. He states his discolored sputum is starting to clear up. Despite this his chest x-ray shows worsening extensive bilateral upper and mid neck infiltrates. Nasal MRSA smear is positive and he has been placed on vancomycin. All cultures are pending at this point. He has severe exacerbation of asthma we will continue nebulized albuterol Atrovent add budesonide continue IV Solu-Medrol will hopefully taper it rapidly  11/29 left chest discomfort he states after drinking cold liquids today it may have some pleuritic component. Pleurisy not unexpected with his extensive pneumonia we will add Celebrex. No wheezing today we will discontinue Solu-Medrol. Blood has gram-positive cocci sputum has 3+ polys with possible staph aureus may have MRSA septicemia and pneumonia currently on vancomycin would maintain it  11/30 sputum is growing MRSA.   Blood cultures with staph aureus oxacillin sensitivity pending most likely MRSA pneumonia with septicemia improving. Phosphorus remains low and needs to be repleted. CRP and procalcitonin improving and clinically he is much improved oxygen is down to 2 L. Maintain vancomycin and Zosyn. Almost no wheezing will switch to inhalers  12/1 no change with switch to inhaled albuterol and Symbicort will maintain as is. Blood and sputum cultures show MRSA. He is markedly improved. Continue to taper and discontinue oxygen  12/2 recurrent fever overnight despite WBC count and procalcitonin both improved. CRP did rise slightly. He has no significant cough. We will repeat chest x-ray  12/3 respirations nonlabored remains on room air chest x-ray today is clear has minimal wheezing would continue Symbicort twice daily and albuterol as needed.   Stable respiratory wise will not follow actively  Roxana Jacobsen MD

## 2022-12-04 LAB
BACTERIA SPEC CULT: NORMAL
SPECIAL REQUESTS,SREQ: NORMAL

## 2022-12-04 PROCEDURE — 74011250636 HC RX REV CODE- 250/636: Performed by: INTERNAL MEDICINE

## 2022-12-04 PROCEDURE — 65660000001 HC RM ICU INTERMED STEPDOWN

## 2022-12-04 PROCEDURE — 74011250637 HC RX REV CODE- 250/637: Performed by: INTERNAL MEDICINE

## 2022-12-04 PROCEDURE — 94640 AIRWAY INHALATION TREATMENT: CPT

## 2022-12-04 PROCEDURE — 74011250636 HC RX REV CODE- 250/636: Performed by: NURSE PRACTITIONER

## 2022-12-04 PROCEDURE — 74011000258 HC RX REV CODE- 258: Performed by: INTERNAL MEDICINE

## 2022-12-04 PROCEDURE — 94761 N-INVAS EAR/PLS OXIMETRY MLT: CPT

## 2022-12-04 PROCEDURE — 74011250637 HC RX REV CODE- 250/637: Performed by: NURSE PRACTITIONER

## 2022-12-04 PROCEDURE — 74011000250 HC RX REV CODE- 250: Performed by: NURSE PRACTITIONER

## 2022-12-04 RX ADMIN — SODIUM CHLORIDE, PRESERVATIVE FREE 10 ML: 5 INJECTION INTRAVENOUS at 05:39

## 2022-12-04 RX ADMIN — Medication 1500 MG: at 15:10

## 2022-12-04 RX ADMIN — Medication 1500 MG: at 05:31

## 2022-12-04 RX ADMIN — ALBUTEROL SULFATE 2 PUFF: 108 AEROSOL, METERED RESPIRATORY (INHALATION) at 07:52

## 2022-12-04 RX ADMIN — CETIRIZINE HYDROCHLORIDE 10 MG: 10 TABLET, FILM COATED ORAL at 09:09

## 2022-12-04 RX ADMIN — MONTELUKAST 10 MG: 10 TABLET, FILM COATED ORAL at 09:09

## 2022-12-04 RX ADMIN — FLUTICASONE PROPIONATE 2 SPRAY: 50 SPRAY, METERED NASAL at 09:00

## 2022-12-04 RX ADMIN — ALBUTEROL SULFATE 2 PUFF: 108 AEROSOL, METERED RESPIRATORY (INHALATION) at 13:24

## 2022-12-04 RX ADMIN — WHITE PETROLATUM,ZINC OXIDE: 20; 75 PASTE TOPICAL at 09:00

## 2022-12-04 RX ADMIN — ALBUTEROL SULFATE 2 PUFF: 108 AEROSOL, METERED RESPIRATORY (INHALATION) at 20:50

## 2022-12-04 RX ADMIN — Medication 1500 MG: at 21:11

## 2022-12-04 RX ADMIN — RIVAROXABAN 20 MG: 10 TABLET, FILM COATED ORAL at 09:09

## 2022-12-04 RX ADMIN — ONDANSETRON 4 MG: 4 TABLET, ORALLY DISINTEGRATING ORAL at 09:09

## 2022-12-04 RX ADMIN — BUDESONIDE AND FORMOTEROL FUMARATE DIHYDRATE 2 PUFF: 160; 4.5 AEROSOL RESPIRATORY (INHALATION) at 20:50

## 2022-12-04 RX ADMIN — ONDANSETRON 4 MG: 4 TABLET, ORALLY DISINTEGRATING ORAL at 17:32

## 2022-12-04 RX ADMIN — BUDESONIDE AND FORMOTEROL FUMARATE DIHYDRATE 2 PUFF: 160; 4.5 AEROSOL RESPIRATORY (INHALATION) at 07:53

## 2022-12-04 RX ADMIN — CEFTAROLINE FOSAMIL 600 MG: 600 POWDER, FOR SOLUTION INTRAVENOUS at 11:41

## 2022-12-04 NOTE — PROGRESS NOTES
Problem: Risk for Spread of Infection  Goal: Prevent transmission of infectious organism to others  Description: Prevent the transmission of infectious organisms to other patients, staff members, and visitors. Outcome: Progressing Towards Goal     Problem: Patient Education:  Go to Education Activity  Goal: Patient/Family Education  Outcome: Progressing Towards Goal     Problem: Falls - Risk of  Goal: *Absence of Falls  Description: Document [place] Fall Risk and appropriate interventions in the flowsheet.   Outcome: Progressing Towards Goal  Note: Fall Risk Interventions:  Mobility Interventions: Patient to call before getting OOB         Medication Interventions: Teach patient to arise slowly    Elimination Interventions: Call light in reach              Problem: Patient Education: Go to Patient Education Activity  Goal: Patient/Family Education  Outcome: Progressing Towards Goal     Problem: Patient Education: Go to Patient Education Activity  Goal: Patient/Family Education  Outcome: Progressing Towards Goal     Problem: Pneumonia: Day 1  Goal: Off Pathway (Use only if patient is Off Pathway)  Outcome: Progressing Towards Goal  Goal: Activity/Safety  Outcome: Progressing Towards Goal  Goal: Consults, if ordered  Outcome: Progressing Towards Goal  Goal: Diagnostic Test/Procedures  Outcome: Progressing Towards Goal  Goal: Nutrition/Diet  Outcome: Progressing Towards Goal  Goal: Medications  Outcome: Progressing Towards Goal  Goal: Respiratory  Outcome: Progressing Towards Goal  Goal: Treatments/Interventions/Procedures  Outcome: Progressing Towards Goal  Goal: Psychosocial  Outcome: Progressing Towards Goal  Goal: *Oxygen saturation within defined limits  Outcome: Progressing Towards Goal  Goal: *Influenza vaccine administered (October-March)  Outcome: Progressing Towards Goal  Goal: *Pneumoccocal vaccine administered  Outcome: Progressing Towards Goal  Goal: *Hemodynamically stable  Outcome: Progressing Towards Goal  Goal: *Demonstrates progressive activity  Outcome: Progressing Towards Goal  Goal: *Tolerating diet  Outcome: Progressing Towards Goal     Problem: Pneumonia: Day 2  Goal: Off Pathway (Use only if patient is Off Pathway)  Outcome: Progressing Towards Goal  Goal: Activity/Safety  Outcome: Progressing Towards Goal  Goal: Consults, if ordered  Outcome: Progressing Towards Goal  Goal: Diagnostic Test/Procedures  Outcome: Progressing Towards Goal  Goal: Nutrition/Diet  Outcome: Progressing Towards Goal  Goal: Discharge Planning  Outcome: Progressing Towards Goal  Goal: Medications  Outcome: Progressing Towards Goal  Goal: Respiratory  Outcome: Progressing Towards Goal  Goal: Treatments/Interventions/Procedures  Outcome: Progressing Towards Goal  Goal: Psychosocial  Outcome: Progressing Towards Goal  Goal: *Oxygen saturation within defined limits  Outcome: Progressing Towards Goal  Goal: *Hemodynamically stable  Outcome: Progressing Towards Goal  Goal: *Demonstrates progressive activity  Outcome: Progressing Towards Goal  Goal: *Tolerating diet  Outcome: Progressing Towards Goal  Goal: *Optimal pain control at patient's stated goal  Outcome: Progressing Towards Goal     Problem: Pneumonia: Day 3  Goal: Off Pathway (Use only if patient is Off Pathway)  Outcome: Progressing Towards Goal  Goal: Activity/Safety  Outcome: Progressing Towards Goal  Goal: Consults, if ordered  Outcome: Progressing Towards Goal  Goal: Diagnostic Test/Procedures  Outcome: Progressing Towards Goal  Goal: Nutrition/Diet  Outcome: Progressing Towards Goal  Goal: Discharge Planning  Outcome: Progressing Towards Goal  Goal: Medications  Outcome: Progressing Towards Goal  Goal: Respiratory  Outcome: Progressing Towards Goal  Goal: Treatments/Interventions/Procedures  Outcome: Progressing Towards Goal  Goal: Psychosocial  Outcome: Progressing Towards Goal  Goal: *Oxygen saturation within defined limits  Outcome: Progressing Towards Goal  Goal: *Hemodynamically stable  Outcome: Progressing Towards Goal  Goal: *Demonstrates progressive activity  Outcome: Progressing Towards Goal  Goal: *Tolerating diet  Outcome: Progressing Towards Goal  Goal: *Describes available resources and support systems  Outcome: Progressing Towards Goal  Goal: *Optimal pain control at patient's stated goal  Outcome: Progressing Towards Goal     Problem: Pneumonia: Day 4  Goal: Off Pathway (Use only if patient is Off Pathway)  Outcome: Progressing Towards Goal  Goal: Activity/Safety  Outcome: Progressing Towards Goal  Goal: Nutrition/Diet  Outcome: Progressing Towards Goal  Goal: Discharge Planning  Outcome: Progressing Towards Goal  Goal: Medications  Outcome: Progressing Towards Goal  Goal: Respiratory  Outcome: Progressing Towards Goal  Goal: Treatments/Interventions/Procedures  Outcome: Progressing Towards Goal  Goal: Psychosocial  Outcome: Progressing Towards Goal     Problem: Pneumonia: Discharge Outcomes  Goal: *Demonstrates progressive activity  Outcome: Progressing Towards Goal  Goal: *Describes follow-up/return visits to physicians  Outcome: Progressing Towards Goal  Goal: *Tolerating diet  Outcome: Progressing Towards Goal  Goal: *Verbalizes name, dosage, time, side effects, and number of days to continue medications  Outcome: Progressing Towards Goal  Goal: *Influenza immunization  Outcome: Progressing Towards Goal  Goal: *Pneumococcal immunization  Outcome: Progressing Towards Goal  Goal: *Respiratory status at baseline  Outcome: Progressing Towards Goal  Goal: *Vital signs within defined limits  Outcome: Progressing Towards Goal  Goal: *Describes available resources and support systems  Outcome: Progressing Towards Goal  Goal: *Optimal pain control at patient's stated goal  Outcome: Progressing Towards Goal

## 2022-12-04 NOTE — PROGRESS NOTES
Problem: Risk for Spread of Infection  Goal: Prevent transmission of infectious organism to others  Description: Prevent the transmission of infectious organisms to other patients, staff members, and visitors. Outcome: Progressing Towards Goal     Problem: Patient Education:  Go to Education Activity  Goal: Patient/Family Education  Outcome: Progressing Towards Goal     Problem: Falls - Risk of  Goal: *Absence of Falls  Description: Document [place] Fall Risk and appropriate interventions in the flowsheet.   Outcome: Progressing Towards Goal  Note: Fall Risk Interventions:  Mobility Interventions: Patient to call before getting OOB         Medication Interventions: Teach patient to arise slowly    Elimination Interventions: Call light in reach

## 2022-12-04 NOTE — PROGRESS NOTES
Hospitalist Progress Note         Kiara Isaac          Daily Progress Note: 12/4/2022      Subjective: The patient is seen for follow  up. 33 yo male, hx of HTN, asthma, non-Hodgkin's lymphoma post chemotherapy, now on radiation, DVT, PE, here for shortness of breath. Pt was seen on 11/25 for similar, diagnosed with Flu A and sent home on Tamiflu. Workup thus far significant for CXR with evidence of bilateral pneumonia, labs suggestive of sepsis. Pt started on Vancomycin, Cefepime, Levaquin. --  The patient is seen for follow up. States that he feels far better today than he did yesterday with less shortness of breath. Currently on RA. T-max of 100.3°F overnight.     Problem List:  Problem List as of 12/4/2022 Date Reviewed: 8/15/2022            Codes Class Noted - Resolved    Shortness of breath ICD-10-CM: R06.02  ICD-9-CM: 786.05  11/27/2022 - Present        Pneumonia ICD-10-CM: J18.9  ICD-9-CM: 486  11/27/2022 - Present        History of asthma ICD-10-CM: Z87.09  ICD-9-CM: V12.69  11/27/2022 - Present        Influenza A ICD-10-CM: J10.1  ICD-9-CM: 487.1  11/27/2022 - Present        Severe sepsis ([facility] ICD-10-CM: A41.9, R65.20  ICD-9-CM: 038.9, 995.92  11/27/2022 - Present        Asthma ICD-10-CM: J45.909  ICD-9-CM: 493.90  5/7/2022 - Present        Acute respiratory failure with hypoxia ([facility] ICD-10-CM: J96.01  ICD-9-CM: 518.81  5/7/2022 - Present        Lymphoma ([facility] ICD-10-CM: C85.90  ICD-9-CM: 202.80  3/1/2022 - Present        Pelvic mass ICD-10-CM: R19.00  ICD-9-CM: 789.30  2/23/2022 - Present        Bilateral pulmonary embolism (HCC) ICD-10-CM: I26.99  ICD-9-CM: 415.19  2/23/2022 - Present        Pulmonary embolism ([address] ICD-10-CM: I26.99  ICD-9-CM: 415.19  2/22/2022 - Present        Right inguinal pain ICD-10-CM: R10.31  ICD-9-CM: 789.03  2/17/2022 - Present        Right groin mass ICD-10-CM: R19.09  ICD-9-CM: 789.39  2/11/2022 - Present        Fall ICD-10-CM: H44.IDBM  ICD-9-CM: E888.9  2/11/2022 - Present       Medications reviewed  Current Facility-Administered Medications   Medication Dose Route Frequency    fluticasone propionate (FLONASE) 50 mcg/actuation nasal spray 2 Spray  2 Spray Both Nostrils DAILY    [START ON 12/6/2022] Vancomycin - please draw level 12/6 1200. Thanks!    Other ONCE    cefTARoline (TEFLARO) 600 mg in 0.9% sodium chloride (MBP/ADV) 50 mL MBP  600 mg IntraVENous Q12H    albuterol-ipratropium (DUO-NEB) 2.5 MG-0.5 MG/3 ML  3 mL Nebulization Q6H PRN    albuterol (PROVENTIL HFA, VENTOLIN HFA, PROAIR HFA) inhaler 2 Puff  2 Puff Inhalation Q6HWA RT    budesonide-formoteroL (SYMBICORT) 160-4.5 mcg/actuation HFA inhaler 2 Puff  2 Puff Inhalation BID RT    traMADoL (ULTRAM) tablet 25 mg  25 mg Oral Q6H PRN    vancomycin (VANCOCIN) 1500 mg in  ml infusion  1,500 mg IntraVENous Q8H    zinc oxide-white petrolatum 20-75 % topical paste   Topical DAILY    0.9% sodium chloride infusion  40 mL/hr IntraVENous CONTINUOUS    sodium chloride (NS) flush 5-10 mL  5-10 mL IntraVENous PRN    sodium chloride (NS) flush 5-40 mL  5-40 mL IntraVENous PRN    acetaminophen (TYLENOL) tablet 650 mg  650 mg Oral Q6H PRN    Or    acetaminophen (TYLENOL) suppository 650 mg  650 mg Rectal Q6H PRN    polyethylene glycol (MIRALAX) packet 17 g  17 g Oral DAILY PRN    bisacodyL (DULCOLAX) tablet 5 mg  5 mg Oral DAILY PRN    ondansetron (ZOFRAN ODT) tablet 4 mg  4 mg Oral Q6H PRN    Or    ondansetron (ZOFRAN) injection 4 mg  4 mg IntraVENous Q6H PRN    sodium chloride (NS) flush 5-10 mL  5-10 mL IntraVENous PRN    metoprolol (LOPRESSOR) injection 5 mg  5 mg IntraVENous Q3H PRN    guaiFENesin-codeine (ROBITUSSIN AC) 100-10 mg/5 mL solution 10 mL  10 mL Oral Q4H PRN    Vancomycin - Pharmacy to Dose   Other Rx Dosing/Monitoring    cetirizine (ZYRTEC) tablet 10 mg  10 mg Oral DAILY    montelukast (SINGULAIR) tablet 10 mg  10 mg Oral DAILY    rivaroxaban (XARELTO) tablet 20 mg  20 mg Oral DAILY    hydrOXYzine (VISTARIL) injection 50 mg  50 mg IntraMUSCular Q6H PRN       Review of Systems:   A comprehensive review of systems was negative except for that written in the HPI. Objective:   Physical Exam:     Visit Vitals  /78 (BP 1 Location: Left upper arm, BP Patient Position: At rest;Lying)   Pulse 93   Temp 98.2 °F (36.8 °C)   Resp 19   Ht 5' 6\" (1.676 m)   Wt 104.3 kg (230 lb)   SpO2 94%   BMI 37.12 kg/m²    O2 Flow Rate (L/min): 1 l/min O2 Device: None (Room air)    Temp (24hrs), Av.9 °F (37.2 °C), Min:98.2 °F (36.8 °C), Max:100.3 °F (37.9 °C)    No intake/output data recorded.  1901 -  0700  In: -   Out: 1600 [Urine:1600]    General:  Alert, cooperative, no distress, appears stated age. Lungs:   Clear to auscultation bilaterally. Chest wall:  No tenderness or deformity. Heart:  Regular rate and rhythm, S1, S2 normal, no murmur, click, rub or gallop. Abdomen:   Soft, non-tender. Bowel sounds normal. No masses,  No organomegaly. Extremities: Extremities normal, atraumatic, no cyanosis or edema. Pulses: 2+ and symmetric all extremities. Skin: Skin color, texture, turgor normal. No rashes or lesions   Neurologic: CNII-XII intact. No gross sensory or motor deficits     Data Review:       Recent Days:  Recent Labs     22  1309 22  0921   WBC 8.0 7.7   HGB 8.8* 9.7*   HCT 25.8* 28.6*   * 104*       Recent Labs     22  1309      K 3.3*      CO2 23   *   BUN 11   CREA 0.62*   CA 8.1*   PHOS 2.4*   ALB 1.5*       No results for input(s): PH, PCO2, PO2, HCO3, FIO2 in the last 72 hours.       24 Hour Results:  Recent Results (from the past 24 hour(s))   VANCOMYCIN, RANDOM    Collection Time: 22  1:09 PM   Result Value Ref Range    Vancomycin, random 14.2 ug/mL   PROCALCITONIN    Collection Time: 22  1:09 PM   Result Value Ref Range    Procalcitonin 2.07 (H) 0 ng/mL   CBC WITH AUTOMATED DIFF    Collection Time: 12/03/22  1:09 PM   Result Value Ref Range    WBC 8.0 4.1 - 11.1 K/uL    RBC 2.99 (L) 4.10 - 5.70 M/uL    HGB 8.8 (L) 12.1 - 17.0 g/dL    HCT 25.8 (L) 36.6 - 50.3 %    MCV 86.3 80.0 - 99.0 FL    MCH 29.4 26.0 - 34.0 PG    MCHC 34.1 30.0 - 36.5 g/dL    RDW 15.6 (H) 11.5 - 14.5 %    PLATELET 677 (L) 220 - 400 K/uL    MPV 11.9 8.9 - 12.9 FL    NRBC 0.4 (H) 0.0  WBC    ABSOLUTE NRBC 0.03 (H) 0.00 - 0.01 K/uL    NEUTROPHILS 86 (H) 32 - 75 %    BAND NEUTROPHILS 4 0 - 6 %    LYMPHOCYTES 3 (L) 12 - 49 %    MONOCYTES 5 5 - 13 %    EOSINOPHILS 0 0 - 7 %    BASOPHILS 0 0 - 1 %    MYELOCYTES 2 (H) 0 %    IMMATURE GRANULOCYTES 0 %    ABS. NEUTROPHILS 7.2 1.8 - 8.0 K/UL    ABS. LYMPHOCYTES 0.2 (L) 0.8 - 3.5 K/UL    ABS. MONOCYTES 0.4 0.0 - 1.0 K/UL    ABS. EOSINOPHILS 0.0 0.0 - 0.4 K/UL    ABS. BASOPHILS 0.0 0.0 - 0.1 K/UL    ABS. IMM.  GRANS. 0.0 K/UL    DF Manual      RBC COMMENTS Anisocytosis  1+       C REACTIVE PROTEIN, QT    Collection Time: 12/03/22  1:09 PM   Result Value Ref Range    C-Reactive protein 12.50 (H) 0.00 - 0.60 mg/dL   RENAL FUNCTION PANEL    Collection Time: 12/03/22  1:09 PM   Result Value Ref Range    Sodium 138 136 - 145 mmol/L    Potassium 3.3 (L) 3.5 - 5.1 mmol/L    Chloride 107 97 - 108 mmol/L    CO2 23 21 - 32 mmol/L    Anion gap 8 5 - 15 mmol/L    Glucose 131 (H) 65 - 100 mg/dL    BUN 11 6 - 20 mg/dL    Creatinine 0.62 (L) 0.70 - 1.30 mg/dL    BUN/Creatinine ratio 18 12 - 20      eGFR >60 >60 ml/min/1.73m2    Calcium 8.1 (L) 8.5 - 10.1 mg/dL    Phosphorus 2.4 (L) 2.6 - 4.7 mg/dL    Albumin 1.5 (L) 3.5 - 5.0 g/dL   CULTURE, BLOOD    Collection Time: 12/03/22  1:09 PM    Specimen: Blood   Result Value Ref Range    Special Requests: No Special Requests  Left  Hand        Culture result: No growth after 5 hours               Assessment/     Acute hypoxic respiratory failure    -Secondary to bilateral pneumonia    -Continue cefepime, vancomycin and Levaquin    -Wean off high flow oxygen as tolerated    Influenza A    -Continue on Tamiflu through November 29    Bilateral pneumonia    -Suspect community-acquired pneumonia    -Continue IV Teflaro and IV vancomycin  Add incentive spirometer for deep breathing  History of non-Hodgkin's lymphoma    -Completed chemotherapy    -Immunocompromise status    Prior history of DVT and PE    -On Xarelto    Benign essential hypertension   -Fairly stable    Hypophosphatemia     -Replete with K phos 1 tab QID x 8 doses      Plan:  Continue supportive care  Treatment plan as outlined above  Add incentive spirometer for deep breathing  Significantly improved  Repeat blood cultures 12/1 remain positive for Gram positive cocci  Repeat blood cultures 12/3 with no growth thus far      Care Plan discussed with: Nurse    Total time spent with patient: 30 minutes.     David Sidhu

## 2022-12-04 NOTE — PROGRESS NOTES
Hospitalist Progress Note         Quentin Shannon MD          Daily Progress Note: 12/4/2022      Subjective: The patient is seen for follow  up. 33 yo male, hx of HTN, asthma, non-Hodgkin's lymphoma post chemotherapy, now on radiation, DVT, PE, here for shortness of breath. Pt was seen on 11/25 for similar, diagnosed with Flu A and sent home on Tamiflu. Workup thus far significant for CXR with evidence of bilateral pneumonia, labs suggestive of sepsis. Pt started on Vancomycin, Cefepime, Levaquin. --  The patient is seen for follow up. States that he feels far better today than he did yesterday with less shortness of breath. Currently on RA. T-max of 100.3°F overnight.     Problem List:  Problem List as of 12/4/2022 Date Reviewed: 8/15/2022            Codes Class Noted - Resolved    Shortness of breath ICD-10-CM: R06.02  ICD-9-CM: 786.05  11/27/2022 - Present        Pneumonia ICD-10-CM: J18.9  ICD-9-CM: 486  11/27/2022 - Present        History of asthma ICD-10-CM: Z87.09  ICD-9-CM: V12.69  11/27/2022 - Present        Influenza A ICD-10-CM: J10.1  ICD-9-CM: 487.1  11/27/2022 - Present        Severe sepsis ([facility] ICD-10-CM: A41.9, R65.20  ICD-9-CM: 038.9, 995.92  11/27/2022 - Present        Asthma ICD-10-CM: J45.909  ICD-9-CM: 493.90  5/7/2022 - Present        Acute respiratory failure with hypoxia ([facility] ICD-10-CM: J96.01  ICD-9-CM: 518.81  5/7/2022 - Present        Lymphoma ([facility] ICD-10-CM: C85.90  ICD-9-CM: 202.80  3/1/2022 - Present        Pelvic mass ICD-10-CM: R19.00  ICD-9-CM: 789.30  2/23/2022 - Present        Bilateral pulmonary embolism (HCC) ICD-10-CM: I26.99  ICD-9-CM: 415.19  2/23/2022 - Present        Pulmonary embolism ([address] ICD-10-CM: I26.99  ICD-9-CM: 415.19  2/22/2022 - Present        Right inguinal pain ICD-10-CM: R10.31  ICD-9-CM: 789.03  2/17/2022 - Present        Right groin mass ICD-10-CM: R19.09  ICD-9-CM: 789.39  2/11/2022 - Present        Fall ICD-10-CM: C97.DJJA  ICD-9-CM: E888.9  2/11/2022 - Present       Medications reviewed  Current Facility-Administered Medications   Medication Dose Route Frequency    fluticasone propionate (FLONASE) 50 mcg/actuation nasal spray 2 Spray  2 Spray Both Nostrils DAILY    [START ON 12/6/2022] Vancomycin - please draw level 12/6 1200. Thanks!    Other ONCE    cefTARoline (TEFLARO) 600 mg in 0.9% sodium chloride (MBP/ADV) 50 mL MBP  600 mg IntraVENous Q12H    albuterol-ipratropium (DUO-NEB) 2.5 MG-0.5 MG/3 ML  3 mL Nebulization Q6H PRN    albuterol (PROVENTIL HFA, VENTOLIN HFA, PROAIR HFA) inhaler 2 Puff  2 Puff Inhalation Q6HWA RT    budesonide-formoteroL (SYMBICORT) 160-4.5 mcg/actuation HFA inhaler 2 Puff  2 Puff Inhalation BID RT    traMADoL (ULTRAM) tablet 25 mg  25 mg Oral Q6H PRN    vancomycin (VANCOCIN) 1500 mg in  ml infusion  1,500 mg IntraVENous Q8H    zinc oxide-white petrolatum 20-75 % topical paste   Topical DAILY    0.9% sodium chloride infusion  40 mL/hr IntraVENous CONTINUOUS    sodium chloride (NS) flush 5-10 mL  5-10 mL IntraVENous PRN    sodium chloride (NS) flush 5-40 mL  5-40 mL IntraVENous PRN    acetaminophen (TYLENOL) tablet 650 mg  650 mg Oral Q6H PRN    Or    acetaminophen (TYLENOL) suppository 650 mg  650 mg Rectal Q6H PRN    polyethylene glycol (MIRALAX) packet 17 g  17 g Oral DAILY PRN    bisacodyL (DULCOLAX) tablet 5 mg  5 mg Oral DAILY PRN    ondansetron (ZOFRAN ODT) tablet 4 mg  4 mg Oral Q6H PRN    Or    ondansetron (ZOFRAN) injection 4 mg  4 mg IntraVENous Q6H PRN    sodium chloride (NS) flush 5-10 mL  5-10 mL IntraVENous PRN    metoprolol (LOPRESSOR) injection 5 mg  5 mg IntraVENous Q3H PRN    guaiFENesin-codeine (ROBITUSSIN AC) 100-10 mg/5 mL solution 10 mL  10 mL Oral Q4H PRN    Vancomycin - Pharmacy to Dose   Other Rx Dosing/Monitoring    cetirizine (ZYRTEC) tablet 10 mg  10 mg Oral DAILY    montelukast (SINGULAIR) tablet 10 mg  10 mg Oral DAILY    rivaroxaban (XARELTO) tablet 20 mg  20 mg Oral DAILY    hydrOXYzine (VISTARIL) injection 50 mg  50 mg IntraMUSCular Q6H PRN       Review of Systems:   A comprehensive review of systems was negative except for that written in the HPI. Objective:   Physical Exam:     Visit Vitals  /78 (BP 1 Location: Left upper arm, BP Patient Position: At rest;Lying)   Pulse 93   Temp 98.2 °F (36.8 °C)   Resp 19   Ht 5' 6\" (1.676 m)   Wt 104.3 kg (230 lb)   SpO2 94%   BMI 37.12 kg/m²    O2 Flow Rate (L/min): 1 l/min O2 Device: None (Room air)    Temp (24hrs), Av °F (37.2 °C), Min:98.2 °F (36.8 °C), Max:100.3 °F (37.9 °C)    No intake/output data recorded.  1901 -  0700  In: -   Out: 1600 [Urine:1600]    General:  Alert, cooperative, no distress, appears stated age. Lungs:   Clear to auscultation bilaterally. Chest wall:  No tenderness or deformity. Heart:  Regular rate and rhythm, S1, S2 normal, no murmur, click, rub or gallop. Abdomen:   Soft, non-tender. Bowel sounds normal. No masses,  No organomegaly. Extremities: Extremities normal, atraumatic, no cyanosis or edema. Pulses: 2+ and symmetric all extremities. Skin: Skin color, texture, turgor normal. No rashes or lesions   Neurologic: CNII-XII intact. No gross sensory or motor deficits     Data Review:       Recent Days:  Recent Labs     22  1309 22  0921   WBC 8.0 7.7   HGB 8.8* 9.7*   HCT 25.8* 28.6*   * 104*       Recent Labs     22  1309      K 3.3*      CO2 23   *   BUN 11   CREA 0.62*   CA 8.1*   PHOS 2.4*   ALB 1.5*       No results for input(s): PH, PCO2, PO2, HCO3, FIO2 in the last 72 hours.       24 Hour Results:  Recent Results (from the past 24 hour(s))   VANCOMYCIN, RANDOM    Collection Time: 22  1:09 PM   Result Value Ref Range    Vancomycin, random 14.2 ug/mL   PROCALCITONIN    Collection Time: 22  1:09 PM   Result Value Ref Range    Procalcitonin 2.07 (H) 0 ng/mL   CBC WITH AUTOMATED DIFF    Collection Time: 12/03/22  1:09 PM   Result Value Ref Range    WBC 8.0 4.1 - 11.1 K/uL    RBC 2.99 (L) 4.10 - 5.70 M/uL    HGB 8.8 (L) 12.1 - 17.0 g/dL    HCT 25.8 (L) 36.6 - 50.3 %    MCV 86.3 80.0 - 99.0 FL    MCH 29.4 26.0 - 34.0 PG    MCHC 34.1 30.0 - 36.5 g/dL    RDW 15.6 (H) 11.5 - 14.5 %    PLATELET 609 (L) 943 - 400 K/uL    MPV 11.9 8.9 - 12.9 FL    NRBC 0.4 (H) 0.0  WBC    ABSOLUTE NRBC 0.03 (H) 0.00 - 0.01 K/uL    NEUTROPHILS 86 (H) 32 - 75 %    BAND NEUTROPHILS 4 0 - 6 %    LYMPHOCYTES 3 (L) 12 - 49 %    MONOCYTES 5 5 - 13 %    EOSINOPHILS 0 0 - 7 %    BASOPHILS 0 0 - 1 %    MYELOCYTES 2 (H) 0 %    IMMATURE GRANULOCYTES 0 %    ABS. NEUTROPHILS 7.2 1.8 - 8.0 K/UL    ABS. LYMPHOCYTES 0.2 (L) 0.8 - 3.5 K/UL    ABS. MONOCYTES 0.4 0.0 - 1.0 K/UL    ABS. EOSINOPHILS 0.0 0.0 - 0.4 K/UL    ABS. BASOPHILS 0.0 0.0 - 0.1 K/UL    ABS. IMM.  GRANS. 0.0 K/UL    DF Manual      RBC COMMENTS Anisocytosis  1+       C REACTIVE PROTEIN, QT    Collection Time: 12/03/22  1:09 PM   Result Value Ref Range    C-Reactive protein 12.50 (H) 0.00 - 0.60 mg/dL   RENAL FUNCTION PANEL    Collection Time: 12/03/22  1:09 PM   Result Value Ref Range    Sodium 138 136 - 145 mmol/L    Potassium 3.3 (L) 3.5 - 5.1 mmol/L    Chloride 107 97 - 108 mmol/L    CO2 23 21 - 32 mmol/L    Anion gap 8 5 - 15 mmol/L    Glucose 131 (H) 65 - 100 mg/dL    BUN 11 6 - 20 mg/dL    Creatinine 0.62 (L) 0.70 - 1.30 mg/dL    BUN/Creatinine ratio 18 12 - 20      eGFR >60 >60 ml/min/1.73m2    Calcium 8.1 (L) 8.5 - 10.1 mg/dL    Phosphorus 2.4 (L) 2.6 - 4.7 mg/dL    Albumin 1.5 (L) 3.5 - 5.0 g/dL   CULTURE, BLOOD    Collection Time: 12/03/22  1:09 PM    Specimen: Blood   Result Value Ref Range    Special Requests: No Special Requests  Left  Hand        Culture result: No growth after 5 hours               Assessment/     Acute hypoxic respiratory failure    -Secondary to bilateral pneumonia    -Continue cefepime, vancomycin and Levaquin    -Wean off high flow oxygen as tolerated    Influenza A    -Continue on Tamiflu through November 29    Bilateral pneumonia    -Suspect community-acquired pneumonia    -Continue IV Zosyn and IV vancomycin  Add incentive spirometer for deep breathing  History of non-Hodgkin's lymphoma    -Completed chemotherapy    -Immunocompromise status    Prior history of DVT and PE    -On Xarelto    Benign essential hypertension   -Fairly stable    Hypophosphatemia     -Replete with K phos 1 tab QID x 8 doses      Plan:  Continue supportive care  Treatment plan as outlined above  Add incentive spirometer for deep breathing  Significantly improved  Repeat blood cultures 12/1 remain positive for Gram positive cocci  Antibiotics changed to Teflaro and vancomycin      Care Plan discussed with: Nurse    Total time spent with patient: 30 minutes.     Cindy Agudelo MD

## 2022-12-05 LAB
CRP SERPL-MCNC: 12.4 MG/DL (ref 0–0.6)
PROCALCITONIN SERPL-MCNC: 1.02 NG/ML

## 2022-12-05 PROCEDURE — 84145 PROCALCITONIN (PCT): CPT

## 2022-12-05 PROCEDURE — 94762 N-INVAS EAR/PLS OXIMTRY CONT: CPT

## 2022-12-05 PROCEDURE — 65660000001 HC RM ICU INTERMED STEPDOWN

## 2022-12-05 PROCEDURE — 74011250636 HC RX REV CODE- 250/636: Performed by: INTERNAL MEDICINE

## 2022-12-05 PROCEDURE — 74011000258 HC RX REV CODE- 258: Performed by: INTERNAL MEDICINE

## 2022-12-05 PROCEDURE — 74011250636 HC RX REV CODE- 250/636: Performed by: NURSE PRACTITIONER

## 2022-12-05 PROCEDURE — 36415 COLL VENOUS BLD VENIPUNCTURE: CPT

## 2022-12-05 PROCEDURE — 74011250637 HC RX REV CODE- 250/637: Performed by: NURSE PRACTITIONER

## 2022-12-05 PROCEDURE — 94640 AIRWAY INHALATION TREATMENT: CPT

## 2022-12-05 PROCEDURE — 94761 N-INVAS EAR/PLS OXIMETRY MLT: CPT

## 2022-12-05 PROCEDURE — 86140 C-REACTIVE PROTEIN: CPT

## 2022-12-05 PROCEDURE — 74011250637 HC RX REV CODE- 250/637: Performed by: INTERNAL MEDICINE

## 2022-12-05 PROCEDURE — 99232 SBSQ HOSP IP/OBS MODERATE 35: CPT | Performed by: INTERNAL MEDICINE

## 2022-12-05 RX ADMIN — Medication 1500 MG: at 05:39

## 2022-12-05 RX ADMIN — MONTELUKAST 10 MG: 10 TABLET, FILM COATED ORAL at 09:05

## 2022-12-05 RX ADMIN — CEFTAROLINE FOSAMIL 600 MG: 600 POWDER, FOR SOLUTION INTRAVENOUS at 00:22

## 2022-12-05 RX ADMIN — FLUTICASONE PROPIONATE 2 SPRAY: 50 SPRAY, METERED NASAL at 09:06

## 2022-12-05 RX ADMIN — ALBUTEROL SULFATE 2 PUFF: 108 AEROSOL, METERED RESPIRATORY (INHALATION) at 08:34

## 2022-12-05 RX ADMIN — BUDESONIDE AND FORMOTEROL FUMARATE DIHYDRATE 2 PUFF: 160; 4.5 AEROSOL RESPIRATORY (INHALATION) at 19:39

## 2022-12-05 RX ADMIN — ALBUTEROL SULFATE 2 PUFF: 108 AEROSOL, METERED RESPIRATORY (INHALATION) at 13:19

## 2022-12-05 RX ADMIN — CEFTAROLINE FOSAMIL 600 MG: 600 POWDER, FOR SOLUTION INTRAVENOUS at 11:00

## 2022-12-05 RX ADMIN — Medication 1500 MG: at 21:17

## 2022-12-05 RX ADMIN — Medication 1500 MG: at 13:11

## 2022-12-05 RX ADMIN — CETIRIZINE HYDROCHLORIDE 10 MG: 10 TABLET, FILM COATED ORAL at 09:05

## 2022-12-05 RX ADMIN — RIVAROXABAN 20 MG: 10 TABLET, FILM COATED ORAL at 09:05

## 2022-12-05 RX ADMIN — WHITE PETROLATUM,ZINC OXIDE: 20; 75 PASTE TOPICAL at 09:00

## 2022-12-05 RX ADMIN — ALBUTEROL SULFATE 2 PUFF: 108 AEROSOL, METERED RESPIRATORY (INHALATION) at 19:40

## 2022-12-05 RX ADMIN — BUDESONIDE AND FORMOTEROL FUMARATE DIHYDRATE 2 PUFF: 160; 4.5 AEROSOL RESPIRATORY (INHALATION) at 08:34

## 2022-12-05 RX ADMIN — SODIUM CHLORIDE 40 ML/HR: 9 INJECTION, SOLUTION INTRAVENOUS at 06:49

## 2022-12-05 NOTE — PROGRESS NOTES
Hospitalist Progress Note         Tru Seaman MD          Daily Progress Note: 12/5/2022      Subjective: The patient is seen for follow  up. 35 yo male, hx of HTN, asthma, non-Hodgkin's lymphoma post chemotherapy, now on radiation, DVT, PE, here for shortness of breath. Pt was seen on 11/25 for similar, diagnosed with Flu A and sent home on Tamiflu. Workup thus far significant for CXR with evidence of bilateral pneumonia, labs suggestive of sepsis. Pt started on Vancomycin, Cefepime, Levaquin. --  The patient is seen for follow up. States that he feels far better today than he did yesterday with less shortness of breath. Currently on RA. T-max of 99.7°F overnight.  Blood cultures from 12/3 remains negative    Problem List:  Problem List as of 12/5/2022 Date Reviewed: 8/15/2022            Codes Class Noted - Resolved    Shortness of breath ICD-10-CM: R06.02  ICD-9-CM: 786.05  11/27/2022 - Present        Pneumonia ICD-10-CM: J18.9  ICD-9-CM: 486  11/27/2022 - Present        History of asthma ICD-10-CM: Z87.09  ICD-9-CM: V12.69  11/27/2022 - Present        Influenza A ICD-10-CM: J10.1  ICD-9-CM: 487.1  11/27/2022 - Present        Severe sepsis ([facility] ICD-10-CM: A41.9, R65.20  ICD-9-CM: 038.9, 995.92  11/27/2022 - Present        Asthma ICD-10-CM: J45.909  ICD-9-CM: 493.90  5/7/2022 - Present        Acute respiratory failure with hypoxia ([facility] ICD-10-CM: J96.01  ICD-9-CM: 518.81  5/7/2022 - Present        Lymphoma ([facility] ICD-10-CM: C85.90  ICD-9-CM: 202.80  3/1/2022 - Present        Pelvic mass ICD-10-CM: R19.00  ICD-9-CM: 789.30  2/23/2022 - Present        Bilateral pulmonary embolism (HCC) ICD-10-CM: I26.99  ICD-9-CM: 415.19  2/23/2022 - Present        Pulmonary embolism ([address] ICD-10-CM: I26.99  ICD-9-CM: 415.19  2/22/2022 - Present        Right inguinal pain ICD-10-CM: R10.31  ICD-9-CM: 789.03  2/17/2022 - Present        Right groin mass ICD-10-CM: R19.09  ICD-9-CM: 789.39 2/11/2022 - Present        Fall ICD-10-CM: W19. Rich Tyler  ICD-9-CM: E888.9  2/11/2022 - Present       Medications reviewed  Current Facility-Administered Medications   Medication Dose Route Frequency    fluticasone propionate (FLONASE) 50 mcg/actuation nasal spray 2 Spray  2 Spray Both Nostrils DAILY    [START ON 12/6/2022] Vancomycin - please draw level 12/6 1200. Thanks!    Other ONCE    cefTARoline (TEFLARO) 600 mg in 0.9% sodium chloride (MBP/ADV) 50 mL MBP  600 mg IntraVENous Q12H    albuterol-ipratropium (DUO-NEB) 2.5 MG-0.5 MG/3 ML  3 mL Nebulization Q6H PRN    albuterol (PROVENTIL HFA, VENTOLIN HFA, PROAIR HFA) inhaler 2 Puff  2 Puff Inhalation Q6HWA RT    budesonide-formoteroL (SYMBICORT) 160-4.5 mcg/actuation HFA inhaler 2 Puff  2 Puff Inhalation BID RT    traMADoL (ULTRAM) tablet 25 mg  25 mg Oral Q6H PRN    vancomycin (VANCOCIN) 1500 mg in  ml infusion  1,500 mg IntraVENous Q8H    zinc oxide-white petrolatum 20-75 % topical paste   Topical DAILY    0.9% sodium chloride infusion  40 mL/hr IntraVENous CONTINUOUS    sodium chloride (NS) flush 5-10 mL  5-10 mL IntraVENous PRN    sodium chloride (NS) flush 5-40 mL  5-40 mL IntraVENous PRN    acetaminophen (TYLENOL) tablet 650 mg  650 mg Oral Q6H PRN    Or    acetaminophen (TYLENOL) suppository 650 mg  650 mg Rectal Q6H PRN    polyethylene glycol (MIRALAX) packet 17 g  17 g Oral DAILY PRN    bisacodyL (DULCOLAX) tablet 5 mg  5 mg Oral DAILY PRN    ondansetron (ZOFRAN ODT) tablet 4 mg  4 mg Oral Q6H PRN    Or    ondansetron (ZOFRAN) injection 4 mg  4 mg IntraVENous Q6H PRN    sodium chloride (NS) flush 5-10 mL  5-10 mL IntraVENous PRN    metoprolol (LOPRESSOR) injection 5 mg  5 mg IntraVENous Q3H PRN    guaiFENesin-codeine (ROBITUSSIN AC) 100-10 mg/5 mL solution 10 mL  10 mL Oral Q4H PRN    Vancomycin - Pharmacy to Dose   Other Rx Dosing/Monitoring    cetirizine (ZYRTEC) tablet 10 mg  10 mg Oral DAILY    montelukast (SINGULAIR) tablet 10 mg  10 mg Oral DAILY rivaroxaban (XARELTO) tablet 20 mg  20 mg Oral DAILY    hydrOXYzine (VISTARIL) injection 50 mg  50 mg IntraMUSCular Q6H PRN       Review of Systems:   A comprehensive review of systems was negative except for that written in the HPI. Objective:   Physical Exam:     Visit Vitals  /79 (BP 1 Location: Right upper arm, BP Patient Position: Semi fowlers)   Pulse 91   Temp 98.6 °F (37 °C)   Resp 17   Ht 5' 6\" (1.676 m)   Wt 104.3 kg (230 lb)   SpO2 94%   BMI 37.12 kg/m²    O2 Flow Rate (L/min): 1 l/min O2 Device: None (Room air)    Temp (24hrs), Av.7 °F (37.1 °C), Min:98.2 °F (36.8 °C), Max:99.5 °F (37.5 °C)    No intake/output data recorded.  1901 -  0700  In: -   Out: 2250 [Urine:2250]    General:  Alert, cooperative, no distress, appears stated age. Lungs:   Clear to auscultation bilaterally. Chest wall:  No tenderness or deformity. Heart:  Regular rate and rhythm, S1, S2 normal, no murmur, click, rub or gallop. Abdomen:   Soft, non-tender. Bowel sounds normal. No masses,  No organomegaly. Extremities: Extremities normal, atraumatic, no cyanosis or edema. Pulses: 2+ and symmetric all extremities. Skin: Skin color, texture, turgor normal. No rashes or lesions   Neurologic: CNII-XII intact. No gross sensory or motor deficits     Data Review:       Recent Days:  Recent Labs     22  1309   WBC 8.0   HGB 8.8*   HCT 25.8*   *       Recent Labs     22  1309      K 3.3*      CO2 23   *   BUN 11   CREA 0.62*   CA 8.1*   PHOS 2.4*   ALB 1.5*       No results for input(s): PH, PCO2, PO2, HCO3, FIO2 in the last 72 hours.       24 Hour Results:  Recent Results (from the past 24 hour(s))   C REACTIVE PROTEIN, QT    Collection Time: 22  9:25 AM   Result Value Ref Range    C-Reactive protein 12.40 (H) 0.00 - 0.60 mg/dL   PROCALCITONIN    Collection Time: 22  9:25 AM   Result Value Ref Range    Procalcitonin 1.02 (H) 0 ng/mL Assessment/     Acute hypoxic respiratory failure    -Resolved. ON RA    -Secondary to bilateral pneumonia    -Continue Teflaro and vancomycin      Influenza A    -Completed Tamiflu    Bilateral pneumonia    -Suspect community-acquired pneumonia    -Continue IV Teflaro and vancomycin  Add incentive spirometer for deep breathing    History of non-Hodgkin's lymphoma    -Completed chemotherapy    -Immunocompromise status    Prior history of DVT and PE    -On Xarelto    Benign essential hypertension   -Fairly stable    Hypophosphatemia     -Repleted      Plan:  Continue supportive care  Repeat blood cultures 12/3 remain negative  Antibiotics changed to Teflaro and vancomycin  Await infectious disease clearance and recommendation for home IV antibiotics. Will probably need PICC line placement for home IV antibiotics      Care Plan discussed with: Nurse    Total time spent with patient: 30 minutes.     Tera Rutledge MD

## 2022-12-05 NOTE — PROGRESS NOTES
Problem: Risk for Spread of Infection  Goal: Prevent transmission of infectious organism to others  Description: Prevent the transmission of infectious organisms to other patients, staff members, and visitors. Outcome: Progressing Towards Goal     Problem: Patient Education:  Go to Education Activity  Goal: Patient/Family Education  Outcome: Progressing Towards Goal     Problem: Falls - Risk of  Goal: *Absence of Falls  Description: Document Radha Bundy Fall Risk and appropriate interventions in the flowsheet.   Outcome: Progressing Towards Goal  Note: Fall Risk Interventions:  Mobility Interventions: Patient to call before getting OOB         Medication Interventions: Teach patient to arise slowly    Elimination Interventions: Call light in reach              Problem: Patient Education: Go to Patient Education Activity  Goal: Patient/Family Education  Outcome: Progressing Towards Goal     Problem: Patient Education: Go to Patient Education Activity  Goal: Patient/Family Education  Outcome: Progressing Towards Goal     Problem: Pneumonia: Day 1  Goal: Off Pathway (Use only if patient is Off Pathway)  Outcome: Progressing Towards Goal  Goal: Activity/Safety  Outcome: Progressing Towards Goal  Goal: Consults, if ordered  Outcome: Progressing Towards Goal  Goal: Diagnostic Test/Procedures  Outcome: Progressing Towards Goal  Goal: Nutrition/Diet  Outcome: Progressing Towards Goal  Goal: Medications  Outcome: Progressing Towards Goal  Goal: Respiratory  Outcome: Progressing Towards Goal  Goal: Treatments/Interventions/Procedures  Outcome: Progressing Towards Goal  Goal: Psychosocial  Outcome: Progressing Towards Goal  Goal: *Oxygen saturation within defined limits  Outcome: Progressing Towards Goal  Goal: *Influenza vaccine administered (October-March)  Outcome: Progressing Towards Goal  Goal: *Pneumoccocal vaccine administered  Outcome: Progressing Towards Goal  Goal: *Hemodynamically stable  Outcome: Progressing Towards Goal  Goal: *Demonstrates progressive activity  Outcome: Progressing Towards Goal  Goal: *Tolerating diet  Outcome: Progressing Towards Goal     Problem: Pneumonia: Day 2  Goal: Off Pathway (Use only if patient is Off Pathway)  Outcome: Progressing Towards Goal  Goal: Activity/Safety  Outcome: Progressing Towards Goal  Goal: Consults, if ordered  Outcome: Progressing Towards Goal  Goal: Diagnostic Test/Procedures  Outcome: Progressing Towards Goal  Goal: Nutrition/Diet  Outcome: Progressing Towards Goal  Goal: Discharge Planning  Outcome: Progressing Towards Goal  Goal: Medications  Outcome: Progressing Towards Goal  Goal: Respiratory  Outcome: Progressing Towards Goal  Goal: Treatments/Interventions/Procedures  Outcome: Progressing Towards Goal  Goal: Psychosocial  Outcome: Progressing Towards Goal  Goal: *Oxygen saturation within defined limits  Outcome: Progressing Towards Goal  Goal: *Hemodynamically stable  Outcome: Progressing Towards Goal  Goal: *Demonstrates progressive activity  Outcome: Progressing Towards Goal  Goal: *Tolerating diet  Outcome: Progressing Towards Goal  Goal: *Optimal pain control at patient's stated goal  Outcome: Progressing Towards Goal     Problem: Pneumonia: Day 3  Goal: Off Pathway (Use only if patient is Off Pathway)  Outcome: Progressing Towards Goal  Goal: Activity/Safety  Outcome: Progressing Towards Goal  Goal: Consults, if ordered  Outcome: Progressing Towards Goal  Goal: Diagnostic Test/Procedures  Outcome: Progressing Towards Goal  Goal: Nutrition/Diet  Outcome: Progressing Towards Goal  Goal: Discharge Planning  Outcome: Progressing Towards Goal  Goal: Medications  Outcome: Progressing Towards Goal  Goal: Respiratory  Outcome: Progressing Towards Goal  Goal: Treatments/Interventions/Procedures  Outcome: Progressing Towards Goal  Goal: Psychosocial  Outcome: Progressing Towards Goal  Goal: *Oxygen saturation within defined limits  Outcome: Progressing Towards Goal  Goal: *Hemodynamically stable  Outcome: Progressing Towards Goal  Goal: *Demonstrates progressive activity  Outcome: Progressing Towards Goal  Goal: *Tolerating diet  Outcome: Progressing Towards Goal  Goal: *Describes available resources and support systems  Outcome: Progressing Towards Goal  Goal: *Optimal pain control at patient's stated goal  Outcome: Progressing Towards Goal     Problem: Pneumonia: Day 4  Goal: Off Pathway (Use only if patient is Off Pathway)  Outcome: Progressing Towards Goal  Goal: Activity/Safety  Outcome: Progressing Towards Goal  Goal: Nutrition/Diet  Outcome: Progressing Towards Goal  Goal: Discharge Planning  Outcome: Progressing Towards Goal  Goal: Medications  Outcome: Progressing Towards Goal  Goal: Respiratory  Outcome: Progressing Towards Goal  Goal: Treatments/Interventions/Procedures  Outcome: Progressing Towards Goal  Goal: Psychosocial  Outcome: Progressing Towards Goal     Problem: Pneumonia: Discharge Outcomes  Goal: *Demonstrates progressive activity  Outcome: Progressing Towards Goal  Goal: *Describes follow-up/return visits to physicians  Outcome: Progressing Towards Goal  Goal: *Tolerating diet  Outcome: Progressing Towards Goal  Goal: *Verbalizes name, dosage, time, side effects, and number of days to continue medications  Outcome: Progressing Towards Goal  Goal: *Influenza immunization  Outcome: Progressing Towards Goal  Goal: *Pneumococcal immunization  Outcome: Progressing Towards Goal  Goal: *Respiratory status at baseline  Outcome: Progressing Towards Goal  Goal: *Vital signs within defined limits  Outcome: Progressing Towards Goal  Goal: *Describes available resources and support systems  Outcome: Progressing Towards Goal  Goal: *Optimal pain control at patient's stated goal  Outcome: Progressing Towards Goal

## 2022-12-05 NOTE — PROGRESS NOTES
Hospitalist Progress Note         Zahra Bias          Daily Progress Note: 12/5/2022      Subjective: The patient is seen for follow  up. 33 yo male, hx of HTN, asthma, non-Hodgkin's lymphoma post chemotherapy, now on radiation, DVT, PE, here for shortness of breath. Pt was seen on 11/25 for similar, diagnosed with Flu A and sent home on Tamiflu. Workup thus far significant for CXR with evidence of bilateral pneumonia, labs suggestive of sepsis. Pt started on Vancomycin, Cefepime, Levaquin. --  The patient is seen for follow up. States that he feels far better today than he did yesterday with less shortness of breath. Currently on RA. No fevers overnight.     Problem List:  Problem List as of 12/5/2022 Date Reviewed: 8/15/2022            Codes Class Noted - Resolved    Shortness of breath ICD-10-CM: R06.02  ICD-9-CM: 786.05  11/27/2022 - Present        Pneumonia ICD-10-CM: J18.9  ICD-9-CM: 486  11/27/2022 - Present        History of asthma ICD-10-CM: Z87.09  ICD-9-CM: V12.69  11/27/2022 - Present        Influenza A ICD-10-CM: J10.1  ICD-9-CM: 487.1  11/27/2022 - Present        Severe sepsis ([facility] ICD-10-CM: A41.9, R65.20  ICD-9-CM: 038.9, 995.92  11/27/2022 - Present        Asthma ICD-10-CM: J45.909  ICD-9-CM: 493.90  5/7/2022 - Present        Acute respiratory failure with hypoxia ([facility] ICD-10-CM: J96.01  ICD-9-CM: 518.81  5/7/2022 - Present        Lymphoma ([facility] ICD-10-CM: C85.90  ICD-9-CM: 202.80  3/1/2022 - Present        Pelvic mass ICD-10-CM: R19.00  ICD-9-CM: 789.30  2/23/2022 - Present        Bilateral pulmonary embolism (H[facility]) ICD-10-CM: I26.99  ICD-9-CM: 415.19  2/23/2022 - Present        Pulmonary embolism ([address] ICD-10-CM: I26.99  ICD-9-CM: 415.19  2/22/2022 - Present        Right inguinal pain ICD-10-CM: R10.31  ICD-9-CM: 789.03  2/17/2022 - Present        Right groin mass ICD-10-CM: R19.09  ICD-9-CM: 789.39  2/11/2022 - Present        Fall ICD-10-CM: N08.WJOC  ICD-9-CM: E888.9  2/11/2022 - Present       Medications reviewed  Current Facility-Administered Medications   Medication Dose Route Frequency    fluticasone propionate (FLONASE) 50 mcg/actuation nasal spray 2 Spray  2 Spray Both Nostrils DAILY    [START ON 12/6/2022] Vancomycin - please draw level 12/6 1200. Thanks!    Other ONCE    cefTARoline (TEFLARO) 600 mg in 0.9% sodium chloride (MBP/ADV) 50 mL MBP  600 mg IntraVENous Q12H    albuterol-ipratropium (DUO-NEB) 2.5 MG-0.5 MG/3 ML  3 mL Nebulization Q6H PRN    albuterol (PROVENTIL HFA, VENTOLIN HFA, PROAIR HFA) inhaler 2 Puff  2 Puff Inhalation Q6HWA RT    budesonide-formoteroL (SYMBICORT) 160-4.5 mcg/actuation HFA inhaler 2 Puff  2 Puff Inhalation BID RT    traMADoL (ULTRAM) tablet 25 mg  25 mg Oral Q6H PRN    vancomycin (VANCOCIN) 1500 mg in  ml infusion  1,500 mg IntraVENous Q8H    zinc oxide-white petrolatum 20-75 % topical paste   Topical DAILY    0.9% sodium chloride infusion  40 mL/hr IntraVENous CONTINUOUS    sodium chloride (NS) flush 5-10 mL  5-10 mL IntraVENous PRN    sodium chloride (NS) flush 5-40 mL  5-40 mL IntraVENous PRN    acetaminophen (TYLENOL) tablet 650 mg  650 mg Oral Q6H PRN    Or    acetaminophen (TYLENOL) suppository 650 mg  650 mg Rectal Q6H PRN    polyethylene glycol (MIRALAX) packet 17 g  17 g Oral DAILY PRN    bisacodyL (DULCOLAX) tablet 5 mg  5 mg Oral DAILY PRN    ondansetron (ZOFRAN ODT) tablet 4 mg  4 mg Oral Q6H PRN    Or    ondansetron (ZOFRAN) injection 4 mg  4 mg IntraVENous Q6H PRN    sodium chloride (NS) flush 5-10 mL  5-10 mL IntraVENous PRN    metoprolol (LOPRESSOR) injection 5 mg  5 mg IntraVENous Q3H PRN    guaiFENesin-codeine (ROBITUSSIN AC) 100-10 mg/5 mL solution 10 mL  10 mL Oral Q4H PRN    Vancomycin - Pharmacy to Dose   Other Rx Dosing/Monitoring    cetirizine (ZYRTEC) tablet 10 mg  10 mg Oral DAILY    montelukast (SINGULAIR) tablet 10 mg  10 mg Oral DAILY    rivaroxaban (XARELTO) tablet 20 mg  20 mg Oral DAILY    hydrOXYzine (VISTARIL) injection 50 mg  50 mg IntraMUSCular Q6H PRN       Review of Systems:   A comprehensive review of systems was negative except for that written in the HPI. Objective:   Physical Exam:     Visit Vitals  /80 (BP 1 Location: Left upper arm, BP Patient Position: Semi fowlers)   Pulse 97   Temp 98.2 °F (36.8 °C)   Resp 18   Ht 5' 6\" (1.676 m)   Wt 104.3 kg (230 lb)   SpO2 97%   BMI 37.12 kg/m²    O2 Flow Rate (L/min): 1 l/min O2 Device: None (Room air)    Temp (24hrs), Av.9 °F (37.2 °C), Min:98.2 °F (36.8 °C), Max:99.7 °F (37.6 °C)    No intake/output data recorded.  1901 -  0700  In: -   Out: 2250 [Urine:2250]    General:  Alert, cooperative, no distress, appears stated age. Lungs:   Clear to auscultation bilaterally. Chest wall:  No tenderness or deformity. Heart:  Regular rate and rhythm, S1, S2 normal, no murmur, click, rub or gallop. Abdomen:   Soft, non-tender. Bowel sounds normal. No masses,  No organomegaly. Extremities: Extremities normal, atraumatic, no cyanosis or edema. Pulses: 2+ and symmetric all extremities. Skin: Skin color, texture, turgor normal. No rashes or lesions   Neurologic: CNII-XII intact. No gross sensory or motor deficits     Data Review:       Recent Days:  Recent Labs     22  1309   WBC 8.0   HGB 8.8*   HCT 25.8*   *       Recent Labs     22  1309      K 3.3*      CO2 23   *   BUN 11   CREA 0.62*   CA 8.1*   PHOS 2.4*   ALB 1.5*       No results for input(s): PH, PCO2, PO2, HCO3, FIO2 in the last 72 hours. 24 Hour Results:  No results found for this or any previous visit (from the past 24 hour(s)).             Assessment/     Acute hypoxic respiratory failure    -Secondary to bilateral pneumonia    -Continue cefepime, vancomycin and Levaquin    -Wean off high flow oxygen as tolerated    Influenza A    -Continue on Tamiflu through     Bilateral pneumonia    -Suspect community-acquired pneumonia    -Continue IV Zosyn and IV vancomycin  Add incentive spirometer for deep breathing  History of non-Hodgkin's lymphoma    -Completed chemotherapy    -Immunocompromise status    Prior history of DVT and PE    -On Xarelto    Benign essential hypertension   -Fairly stable    Hypophosphatemia     -Replete with K phos 1 tab QID x 8 doses      Plan:  Continue supportive care  Treatment plan as outlined above  Add incentive spirometer for deep breathing  Significantly improved  Repeat blood cultures 12/3 with no growth thus far  Antibiotics changed to Teflaro and vancomycin  ID consulted, will discuss results of cultures      Care Plan discussed with: Patient/Family    Total time spent with patient: 30 minutes.     Tinnie Every

## 2022-12-05 NOTE — PROGRESS NOTES
Progress Note    Patient: Tsering Jimenez MRN: [id number]  SSN: [id number]    YOB: 1990  Age: 34 y.o. Sex: male      Admit Date: 11/27/2022    LOS: 8 days     Subjective:   Patient with NHL followed for severe sepsis with persistent MRSA bacteremia now on Vancomycin and Ceftaroline sputum positive for MRSA. He is afebrile with normal WBC and decreasing CRP and procal.  TTE negative for vegetations. Still has Portacath in place. Patient resting comfortably with  no complaints. Objective:     Vitals:    12/05/22 1133 12/05/22 1319 12/05/22 1523 12/05/22 1618   BP: 125/79  122/75    Pulse: 91  99    Resp: 17  18    Temp: 98.6 °F (37 °C)  98.4 °F (36.9 °C)    SpO2: 94% 95% 96%    Weight:       Height:    5' 5.98\" (1.676 m)        Intake and Output:  Current Shift: No intake/output data recorded. Last three shifts: 12/03 1901 - 12/05 0700  In: -   Out: 2250 [Urine:2250]    Physical Exam:   Vitals and nursing note reviewed. Constitutional:       General: He is not in acute distress. Appearance: He is ill-appearing. HENT:   Mouth/Throat:      Pharynx: Oropharynx is clear. Eyes:      Pupils: Pupils are equal, round, and reactive to light. Cardiovascular:      Rate and Rhythm: Regular rhythm. Tachycardia present. Heart sounds: No murmur heard. Comments: [id number] right chest, site unremarkable with no erythema   Pulmonary:      Effort: No respiratory distress. Breath sounds: normal   Chest:  no erythema       Abdominal:      General: There is no distension. Palpations: Abdomen is soft. Tenderness: There is no abdominal tenderness. There is no right CVA tenderness, left CVA tenderness or guarding. Genitourinary:     Comments: No Bonilla catheter  Musculoskeletal:      Right lower leg: No edema. Left lower leg: No edema. Skin:     Findings: No rash.    Neurological: nonfocal, no confusion   Psychiatric:    normal behavior      Lab/Data Review: WBC 8,300  , 000  Lactic 1.5 <2.2    CRP 12.40 <13.40 < 9.64 <9.57 <18.50  Procal  1.02 <2.07 <5.20 <11.81 <25.52 <18.89    MRSA Nasal PCR positive    Blood cultures (11/27) Staphylococcus aureus MRSA  Blood cultures (12/1) MRSA   Blood cultures (12/3) Gram positive cocci in clusters  Sputum culture (11/27)  Staphylococcus aureus MRSA  Urine culture (11/27) No growth FINAL    CXR (12/3) Grossly unchanged widespread patchy consolidative airspace disease. Assessment:     Active Problems:    Shortness of breath (11/27/2022)      Pneumonia (11/27/2022)      History of asthma (11/27/2022)      Influenza A (11/27/2022)      Severe sepsis ([address] (11/27/2022)  Severe sepsis with fever, leukopenia, elevated lactic acid and procal, CRP, resolved or resolving  Persistent MRSA bacteremia  , Day #9 IV Vancomycin  Influenza A, status post Tamiflu  Pneumonia, bilateral,   post-viral, secondary to Staphylococcus aureus MRSA, Day #9 IV Vancomycin  Acute hypoxic respiratory failure, on nasal cannuHFNC  Portacath in situ  Asthma  Non-Hodgkins' Lymphoma  MRSA nasal colonization, status post Mupirocin       Comment:  Patient has persistent MRSA bacteremia despite combination therapy with Vancomycin and Ceftaroline. I think that it would be prudent to remove his Portacath at this point. Plan:   1. Continue IV Vancomycin and Ceftaroline for persistent MRSA bacteremia  2. Follow-up repeat blood cultures   3. Recommend removal of Portacath  4.   In am, repeat procal, CRP, blood cultures            Signed By: Yoav Sebastian MD     December 5, 2022

## 2022-12-05 NOTE — PROGRESS NOTES
Problem: Risk for Spread of Infection  Goal: Prevent transmission of infectious organism to others  Description: Prevent the transmission of infectious organisms to other patients, staff members, and visitors.   Outcome: Progressing Towards Goal     Problem: Patient Education:  Go to Education Activity  Goal: Patient/Family Education  Outcome: Progressing Towards Goal     Problem: Patient Education: Go to Patient Education Activity  Goal: Patient/Family Education  Outcome: Progressing Towards Goal     Problem: Pneumonia: Day 1  Goal: Psychosocial  Outcome: Progressing Towards Goal

## 2022-12-05 NOTE — PROGRESS NOTES
Patient DCP will be with his mother or his girlfriend. Patient will require IV abx at home and [facility] services. CM called into room and spoke to patient about choices for infusion and HH. Patient had no preference. Referral sent.

## 2022-12-05 NOTE — PROGRESS NOTES
Comprehensive Nutrition Assessment    Type and Reason for Visit: Initial, RD nutrition re-screen/LOS    Nutrition Recommendations/Plan:   Continue current PO diet     Malnutrition Assessment:  Malnutrition Status:  Mild malnutrition (12/05/22 1618)    Context:  Chronic illness     Findings of the 6 clinical characteristics of malnutrition:   Energy Intake:  Unable to assess  Weight Loss:  10% over 6 months     Body Fat Loss:  Unable to assess,     Muscle Mass Loss:  Unable to assess,    Fluid Accumulation:  No significant fluid accumulation,        Nutrition Assessment:    Admitted for Flu+. Remains inpatient for tx of sepsis 2/2 pna. Unable to interview pt via phone, per EMR intakes >50% at admit but no other intakes document. RN today endorses pt with good appetite, >75% meals, family sometimes brings food as well. No nutrition concerns or interventions at this time. Labs: H/H 8.8/25.8, K+ 3.3, Cr 0.62, Phos 2.4, Alb 1.5. Meds: IVF, Ceftaroline, zyrtec, flonase, guaifenesin, zofran, xarelto, tramadol, vancomycin. Nutrition Related Findings:    Unable to complete NFPE d/t droplet precautions. No hx dysphagia. No N/V/D/C, last BM 12/3. No edema. Wound Type: None    Current Nutrition Intake & Therapies:  Average Meal Intake: %  Average Supplement Intake: None ordered  ADULT DIET Regular    Anthropometric Measures:  Height: 5' 5.98\" (167.6 cm)  Ideal Body Weight (IBW): 142 lbs (65 kg)     Current Body Wt:  104.3 kg (229 lb 15 oz) (12/4), 161.9 % IBW. Current BMI (kg/m2): 37.1        Weight Adjustment: No adjustment                 BMI Category: Obese class 2 (BMI 35.0-39. 9)  Wt Readings from Last 10 Encounters:   12/04/22 104.3 kg (230 lb)   11/25/22 104.3 kg (230 lb)   11/22/22 104.3 kg (230 lb)   08/29/22 116.1 kg (256 lb)   08/10/22 117.9 kg (260 lb)   08/08/22 117.9 kg (260 lb)   07/20/22 113.4 kg (250 lb)   07/01/22 119.3 kg (263 lb)   05/08/22 109.2 kg (240 lb 11.9 oz)   05/07/22 115.2 kg (254 lb) Wt loss x7 months of 10.9kg (9.5%, mild)      Nutrition Diagnosis:   No nutrition diagnosis at this time     Nutrition Interventions:   Food and/or Nutrient Delivery: Continue current diet  Nutrition Education/Counseling: No recommendations at this time  Coordination of Nutrition Care: Continue to monitor while inpatient       Goals:     Goals: PO intake 75% or greater, within 7 days       Nutrition Monitoring and Evaluation:   Behavioral-Environmental Outcomes: None identified  Food/Nutrient Intake Outcomes: Diet advancement/tolerance, Food and nutrient intake  Physical Signs/Symptoms Outcomes: Weight, Biochemical data    Discharge Planning:    No discharge needs at this time    [employer]: Ext [phone], or via [employer]

## 2022-12-06 ENCOUNTER — APPOINTMENT (OUTPATIENT)
Dept: GENERAL RADIOLOGY | Age: 32
DRG: 721 | End: 2022-12-06
Attending: PHYSICIAN ASSISTANT
Payer: MEDICAID

## 2022-12-06 ENCOUNTER — APPOINTMENT (OUTPATIENT)
Dept: INTERVENTIONAL RADIOLOGY/VASCULAR | Age: 32
DRG: 721 | End: 2022-12-06
Attending: INTERNAL MEDICINE
Payer: MEDICAID

## 2022-12-06 LAB
ANION GAP SERPL CALC-SCNC: 8 MMOL/L (ref 5–15)
BUN SERPL-MCNC: 6 MG/DL (ref 6–20)
BUN/CREAT SERPL: 9 (ref 12–20)
CA-I BLD-MCNC: 8 MG/DL (ref 8.5–10.1)
CHLORIDE SERPL-SCNC: 104 MMOL/L (ref 97–108)
CO2 SERPL-SCNC: 24 MMOL/L (ref 21–32)
CREAT SERPL-MCNC: 0.66 MG/DL (ref 0.7–1.3)
CRP SERPL-MCNC: 11.6 MG/DL (ref 0–0.6)
GLUCOSE SERPL-MCNC: 135 MG/DL (ref 65–100)
POTASSIUM SERPL-SCNC: 3.4 MMOL/L (ref 3.5–5.1)
PROCALCITONIN SERPL-MCNC: 0.68 NG/ML
SODIUM SERPL-SCNC: 136 MMOL/L (ref 136–145)
VANCOMYCIN SERPL-MCNC: 9.5 UG/ML

## 2022-12-06 PROCEDURE — 84145 PROCALCITONIN (PCT): CPT

## 2022-12-06 PROCEDURE — 36590 REMOVAL TUNNELED CV CATH: CPT

## 2022-12-06 PROCEDURE — 74011250636 HC RX REV CODE- 250/636: Performed by: INTERNAL MEDICINE

## 2022-12-06 PROCEDURE — 94761 N-INVAS EAR/PLS OXIMETRY MLT: CPT

## 2022-12-06 PROCEDURE — 94640 AIRWAY INHALATION TREATMENT: CPT

## 2022-12-06 PROCEDURE — 71045 X-RAY EXAM CHEST 1 VIEW: CPT

## 2022-12-06 PROCEDURE — 65660000001 HC RM ICU INTERMED STEPDOWN

## 2022-12-06 PROCEDURE — 74011250637 HC RX REV CODE- 250/637: Performed by: INTERNAL MEDICINE

## 2022-12-06 PROCEDURE — 74011000258 HC RX REV CODE- 258: Performed by: INTERNAL MEDICINE

## 2022-12-06 PROCEDURE — 87040 BLOOD CULTURE FOR BACTERIA: CPT

## 2022-12-06 PROCEDURE — 86140 C-REACTIVE PROTEIN: CPT

## 2022-12-06 PROCEDURE — 80202 ASSAY OF VANCOMYCIN: CPT

## 2022-12-06 PROCEDURE — 36415 COLL VENOUS BLD VENIPUNCTURE: CPT

## 2022-12-06 PROCEDURE — 80048 BASIC METABOLIC PNL TOTAL CA: CPT

## 2022-12-06 PROCEDURE — 99232 SBSQ HOSP IP/OBS MODERATE 35: CPT | Performed by: INTERNAL MEDICINE

## 2022-12-06 PROCEDURE — 74011250637 HC RX REV CODE- 250/637: Performed by: NURSE PRACTITIONER

## 2022-12-06 PROCEDURE — 74011250636 HC RX REV CODE- 250/636: Performed by: NURSE PRACTITIONER

## 2022-12-06 RX ADMIN — GUAIFENESIN AND CODEINE PHOSPHATE 10 ML: 10; 100 LIQUID ORAL at 22:38

## 2022-12-06 RX ADMIN — BUDESONIDE AND FORMOTEROL FUMARATE DIHYDRATE 2 PUFF: 160; 4.5 AEROSOL RESPIRATORY (INHALATION) at 20:18

## 2022-12-06 RX ADMIN — RIVAROXABAN 20 MG: 10 TABLET, FILM COATED ORAL at 08:36

## 2022-12-06 RX ADMIN — TRAMADOL HYDROCHLORIDE 25 MG: 50 TABLET, COATED ORAL at 00:24

## 2022-12-06 RX ADMIN — CEFTAROLINE FOSAMIL 600 MG: 600 POWDER, FOR SOLUTION INTRAVENOUS at 00:24

## 2022-12-06 RX ADMIN — FLUTICASONE PROPIONATE 2 SPRAY: 50 SPRAY, METERED NASAL at 09:00

## 2022-12-06 RX ADMIN — Medication 1500 MG: at 05:39

## 2022-12-06 RX ADMIN — WHITE PETROLATUM,ZINC OXIDE: 20; 75 PASTE TOPICAL at 09:00

## 2022-12-06 RX ADMIN — MONTELUKAST 10 MG: 10 TABLET, FILM COATED ORAL at 08:36

## 2022-12-06 RX ADMIN — BUDESONIDE AND FORMOTEROL FUMARATE DIHYDRATE 2 PUFF: 160; 4.5 AEROSOL RESPIRATORY (INHALATION) at 08:38

## 2022-12-06 RX ADMIN — Medication 1500 MG: at 14:32

## 2022-12-06 RX ADMIN — HYDROXYZINE HYDROCHLORIDE 50 MG: 50 INJECTION, SOLUTION INTRAMUSCULAR at 22:38

## 2022-12-06 RX ADMIN — Medication 1500 MG: at 22:00

## 2022-12-06 RX ADMIN — ALBUTEROL SULFATE 2 PUFF: 108 AEROSOL, METERED RESPIRATORY (INHALATION) at 20:18

## 2022-12-06 RX ADMIN — ALBUTEROL SULFATE 2 PUFF: 108 AEROSOL, METERED RESPIRATORY (INHALATION) at 08:38

## 2022-12-06 RX ADMIN — ALBUTEROL SULFATE 2 PUFF: 108 AEROSOL, METERED RESPIRATORY (INHALATION) at 14:06

## 2022-12-06 RX ADMIN — CEFTAROLINE FOSAMIL 600 MG: 600 POWDER, FOR SOLUTION INTRAVENOUS at 11:00

## 2022-12-06 RX ADMIN — CETIRIZINE HYDROCHLORIDE 10 MG: 10 TABLET, FILM COATED ORAL at 08:36

## 2022-12-06 NOTE — PROGRESS NOTES
Right chest portacath removed by IR. Internal sutures applied at site and covered with gauze and tegaderm.

## 2022-12-06 NOTE — PROGRESS NOTES
Vancomycin Dosing Consult  Sanchez Dotson is a 34 y.o. male with HAP and MRSA bacteremia. Pharmacy was consulted by Dr. Amanda Navarro to dose and monitor vancomycin. Today is day 10.     Antibiotic regimen: Vancomycin + Ceftaroline      Temp (24hrs), Av.1 °F (37.3 °C), Min:98.3 °F (36.8 °C), Max:100.1 °F (37.8 °C)    Recent Labs     22  1207   CREA 0.66*   BUN 6     Est CrCl: >100 mL/min  Concomitant nephrotoxic drugs: None    Cultures:    Blood: MRSA, GPC  4/ bottles (final)   Sputum: MRSA, normal heri (final)   Urine: NG (final)   blood - GPCs, MRSA (final)  12/3 blood - GPC in clusters in 1 of 2 bottles (prelim)   Blood: pending    MRSA Swab: Detected     Target range: AUC:BETTE 400-600    Recent level history:  Date/Time Dose & Interval Measured Level (mcg/mL) Associated AUC/BETTE    0416 2000 mg x 1 3.3 N/A    0800 1500 mg IV q12h 6.8 357    0940 1500 mg IV q8h 14.7 554   12/3 1309 1500 mg IV q8h 14.2 492    1207 1500 mg IV q8h 9.5 456        Assessment/Plan:   Afebrile, WBC WNL, procal and CRP trending down   Scr stable  Current regimen is therapeutic, continue vancomycin 1500 mg IV q8h   Vancomycin level scheduled for  @ 1200  Antimicrobial stop date TBD

## 2022-12-06 NOTE — PROGRESS NOTES
Problem: Risk for Spread of Infection  Goal: Prevent transmission of infectious organism to others  Description: Prevent the transmission of infectious organisms to other patients, staff members, and visitors. Outcome: Progressing Towards Goal     Problem: Patient Education:  Go to Education Activity  Goal: Patient/Family Education  Outcome: Progressing Towards Goal     Problem: Falls - Risk of  Goal: *Absence of Falls  Description: Document Dennie Oak Fall Risk and appropriate interventions in the flowsheet.   Outcome: Progressing Towards Goal  Note: Fall Risk Interventions:  Mobility Interventions: PT Consult for mobility concerns         Medication Interventions: Assess postural VS orthostatic hypotension    Elimination Interventions: Call light in reach              Problem: Patient Education: Go to Patient Education Activity  Goal: Patient/Family Education  Outcome: Progressing Towards Goal     Problem: Patient Education: Go to Patient Education Activity  Goal: Patient/Family Education  Outcome: Progressing Towards Goal     Problem: Pneumonia: Day 1  Goal: Off Pathway (Use only if patient is Off Pathway)  Outcome: Progressing Towards Goal  Goal: Activity/Safety  Outcome: Progressing Towards Goal  Goal: Consults, if ordered  Outcome: Progressing Towards Goal  Goal: Diagnostic Test/Procedures  Outcome: Progressing Towards Goal  Goal: Nutrition/Diet  Outcome: Progressing Towards Goal  Goal: Medications  Outcome: Progressing Towards Goal  Goal: Respiratory  Outcome: Progressing Towards Goal  Goal: Treatments/Interventions/Procedures  Outcome: Progressing Towards Goal  Goal: Psychosocial  Outcome: Progressing Towards Goal  Goal: *Oxygen saturation within defined limits  Outcome: Progressing Towards Goal  Goal: *Influenza vaccine administered (October-March)  Outcome: Progressing Towards Goal  Goal: *Pneumoccocal vaccine administered  Outcome: Progressing Towards Goal  Goal: *Hemodynamically stable  Outcome: Progressing Towards Goal  Goal: *Demonstrates progressive activity  Outcome: Progressing Towards Goal  Goal: *Tolerating diet  Outcome: Progressing Towards Goal     Problem: Pneumonia: Day 2  Goal: Off Pathway (Use only if patient is Off Pathway)  Outcome: Progressing Towards Goal  Goal: Activity/Safety  Outcome: Progressing Towards Goal  Goal: Consults, if ordered  Outcome: Progressing Towards Goal  Goal: Diagnostic Test/Procedures  Outcome: Progressing Towards Goal  Goal: Nutrition/Diet  Outcome: Progressing Towards Goal  Goal: Discharge Planning  Outcome: Progressing Towards Goal  Goal: Medications  Outcome: Progressing Towards Goal  Goal: Respiratory  Outcome: Progressing Towards Goal  Goal: Treatments/Interventions/Procedures  Outcome: Progressing Towards Goal  Goal: Psychosocial  Outcome: Progressing Towards Goal  Goal: *Oxygen saturation within defined limits  Outcome: Progressing Towards Goal  Goal: *Hemodynamically stable  Outcome: Progressing Towards Goal  Goal: *Demonstrates progressive activity  Outcome: Progressing Towards Goal  Goal: *Tolerating diet  Outcome: Progressing Towards Goal  Goal: *Optimal pain control at patient's stated goal  Outcome: Progressing Towards Goal     Problem: Pneumonia: Day 3  Goal: Off Pathway (Use only if patient is Off Pathway)  Outcome: Progressing Towards Goal  Goal: Activity/Safety  Outcome: Progressing Towards Goal  Goal: Consults, if ordered  Outcome: Progressing Towards Goal  Goal: Diagnostic Test/Procedures  Outcome: Progressing Towards Goal  Goal: Nutrition/Diet  Outcome: Progressing Towards Goal  Goal: Discharge Planning  Outcome: Progressing Towards Goal  Goal: Medications  Outcome: Progressing Towards Goal  Goal: Respiratory  Outcome: Progressing Towards Goal  Goal: Treatments/Interventions/Procedures  Outcome: Progressing Towards Goal  Goal: Psychosocial  Outcome: Progressing Towards Goal  Goal: *Oxygen saturation within defined limits  Outcome: Progressing Towards Goal  Goal: *Hemodynamically stable  Outcome: Progressing Towards Goal  Goal: *Demonstrates progressive activity  Outcome: Progressing Towards Goal  Goal: *Tolerating diet  Outcome: Progressing Towards Goal  Goal: *Describes available resources and support systems  Outcome: Progressing Towards Goal  Goal: *Optimal pain control at patient's stated goal  Outcome: Progressing Towards Goal     Problem: Pneumonia: Day 4  Goal: Off Pathway (Use only if patient is Off Pathway)  Outcome: Progressing Towards Goal  Goal: Activity/Safety  Outcome: Progressing Towards Goal  Goal: Nutrition/Diet  Outcome: Progressing Towards Goal  Goal: Discharge Planning  Outcome: Progressing Towards Goal  Goal: Medications  Outcome: Progressing Towards Goal  Goal: Respiratory  Outcome: Progressing Towards Goal  Goal: Treatments/Interventions/Procedures  Outcome: Progressing Towards Goal  Goal: Psychosocial  Outcome: Progressing Towards Goal     Problem: Pneumonia: Discharge Outcomes  Goal: *Demonstrates progressive activity  Outcome: Progressing Towards Goal  Goal: *Describes follow-up/return visits to physicians  Outcome: Progressing Towards Goal  Goal: *Tolerating diet  Outcome: Progressing Towards Goal  Goal: *Verbalizes name, dosage, time, side effects, and number of days to continue medications  Outcome: Progressing Towards Goal  Goal: *Influenza immunization  Outcome: Progressing Towards Goal  Goal: *Pneumococcal immunization  Outcome: Progressing Towards Goal  Goal: *Respiratory status at baseline  Outcome: Progressing Towards Goal  Goal: *Vital signs within defined limits  Outcome: Progressing Towards Goal  Goal: *Describes available resources and support systems  Outcome: Progressing Towards Goal  Goal: *Optimal pain control at patient's stated goal  Outcome: Progressing Towards Goal

## 2022-12-06 NOTE — PROGRESS NOTES
Hospitalist Progress Note               Daily Progress Note: 12/6/2022      Subjective:   Hospital course to date:    Mr. Freddie Rosario, a 35 yo male, with a history of HTN, asthma, non-Hodgkin's lymphoma post chemotherapy, now on radiation, DVT, PE, presents to the ED for shortness of breath. Pt was seen on 11/25 for similar, diagnosed with Flu A and sent home on Tamiflu. Workup thus far significant for CXR with evidence of bilateral pneumonia, labs suggestive of severe sepsis. Pt started on Vancomycin, Cefepime, Levaquin. --  The patient is seen for follow up. He states that he feels much better today than yesterday with no shortness of breath. He is currently not on O2, he is on room air. T-max of 99.6°F overnight. Blood cultures show gram + cocci in 1/2 bottles.      Procalcitonin 0.68 trending downwards  CRP 11.6 trending downwards    Problem List:  Problem List as of 12/6/2022 Date Reviewed: 8/15/2022            Codes Class Noted - Resolved    Shortness of breath ICD-10-CM: R06.02  ICD-9-CM: 786.05  11/27/2022 - Present        Pneumonia ICD-10-CM: J18.9  ICD-9-CM: 486  11/27/2022 - Present        History of asthma ICD-10-CM: Z87.09  ICD-9-CM: V12.69  11/27/2022 - Present        Influenza A ICD-10-CM: J10.1  ICD-9-CM: 487.1  11/27/2022 - Present        Severe sepsis ([facility] ICD-10-CM: A41.9, R65.20  ICD-9-CM: 038.9, 995.92  11/27/2022 - Present        Asthma ICD-10-CM: J45.909  ICD-9-CM: 493.90  5/7/2022 - Present        Acute respiratory failure with hypoxia ([facility] ICD-10-CM: J96.01  ICD-9-CM: 518.81  5/7/2022 - Present        Lymphoma ([facility] ICD-10-CM: C85.90  ICD-9-CM: 202.80  3/1/2022 - Present        Pelvic mass ICD-10-CM: R19.00  ICD-9-CM: 789.30  2/23/2022 - Present        Bilateral pulmonary embolism (HCC) ICD-10-CM: I26.99  ICD-9-CM: 415.19  2/23/2022 - Present        Pulmonary embolism ([facility] ICD-10-CM: I26.99  ICD-9-CM: 415.19  2/22/2022 - Present        Right inguinal pain ICD-10-CM: R10.31  ICD-9-CM: 789.03  2/17/2022 - Present        Right groin mass ICD-10-CM: R19.09  ICD-9-CM: 789.39  2/11/2022 - Present        Fall ICD-10-CM: W19. Jody Mac  ICD-9-CM: E888.9  2/11/2022 - Present         Medications reviewed  Current Facility-Administered Medications   Medication Dose Route Frequency    fluticasone propionate (FLONASE) 50 mcg/actuation nasal spray 2 Spray  2 Spray Both Nostrils DAILY    cefTARoline (TEFLARO) 600 mg in 0.9% sodium chloride (MBP/ADV) 50 mL MBP  600 mg IntraVENous Q12H    albuterol-ipratropium (DUO-NEB) 2.5 MG-0.5 MG/3 ML  3 mL Nebulization Q6H PRN    albuterol (PROVENTIL HFA, VENTOLIN HFA, PROAIR HFA) inhaler 2 Puff  2 Puff Inhalation Q6HWA RT    budesonide-formoteroL (SYMBICORT) 160-4.5 mcg/actuation HFA inhaler 2 Puff  2 Puff Inhalation BID RT    vancomycin (VANCOCIN) 1500 mg in  ml infusion  1,500 mg IntraVENous Q8H    zinc oxide-white petrolatum 20-75 % topical paste   Topical DAILY    0.9% sodium chloride infusion  40 mL/hr IntraVENous CONTINUOUS    sodium chloride (NS) flush 5-10 mL  5-10 mL IntraVENous PRN    sodium chloride (NS) flush 5-40 mL  5-40 mL IntraVENous PRN    acetaminophen (TYLENOL) tablet 650 mg  650 mg Oral Q6H PRN    Or    acetaminophen (TYLENOL) suppository 650 mg  650 mg Rectal Q6H PRN    polyethylene glycol (MIRALAX) packet 17 g  17 g Oral DAILY PRN    bisacodyL (DULCOLAX) tablet 5 mg  5 mg Oral DAILY PRN    ondansetron (ZOFRAN ODT) tablet 4 mg  4 mg Oral Q6H PRN    Or    ondansetron (ZOFRAN) injection 4 mg  4 mg IntraVENous Q6H PRN    sodium chloride (NS) flush 5-10 mL  5-10 mL IntraVENous PRN    metoprolol (LOPRESSOR) injection 5 mg  5 mg IntraVENous Q3H PRN    guaiFENesin-codeine (ROBITUSSIN AC) 100-10 mg/5 mL solution 10 mL  10 mL Oral Q4H PRN    Vancomycin - Pharmacy to Dose   Other Rx Dosing/Monitoring    cetirizine (ZYRTEC) tablet 10 mg  10 mg Oral DAILY    montelukast (SINGULAIR) tablet 10 mg  10 mg Oral DAILY    rivaroxaban (XARELTO) tablet 20 mg  20 mg Oral DAILY    hydrOXYzine (VISTARIL) injection 50 mg  50 mg IntraMUSCular Q6H PRN       Review of Systems:   A comprehensive review of systems was negative except for that written in the HPI. Objective:   Physical Exam:     Visit Vitals  /81 (BP 1 Location: Left upper arm, BP Patient Position: Sitting)   Pulse 100   Temp 98.5 °F (36.9 °C)   Resp 20   Ht 5' 5.98\" (1.676 m)   Wt 102.1 kg (225 lb)   SpO2 97%   BMI 36.33 kg/m²    O2 Flow Rate (L/min): 1 l/min O2 Device: None (Room air)    Temp (24hrs), Av.1 °F (37.3 °C), Min:98.3 °F (36.8 °C), Max:100.1 °F (37.8 °C)    701 - 1900  In: -   Out: 200 [Urine:200]   1901 - 700  In: -   Out: 1500 [Urine:1500]    General:   Awake and alert   Lungs:   Clear to auscultation bilaterally. Chest wall:  No tenderness or deformity. Heart:  Regular rhythm, tachycardia, S1, S2 normal, no murmur, click, rub or gallop. Abdomen:   Soft, non-tender. Bowel sounds normal. No masses,  No organomegaly. Extremities: Extremities normal, atraumatic, no cyanosis or edema. Pulses: 2+ and symmetric all extremities. Skin: Skin color, texture, turgor normal. No rashes or lesions   Neurologic: CNII-XII intact. No gross focal deficits         Data Review:       Recent Days:  Recent Labs     22  1309   WBC 8.0   HGB 8.8*   HCT 25.8*   *       Recent Labs     22  1207 22  1309    138   K 3.4* 3.3*    107   CO2 24 23   * 131*   BUN 6 11   CREA 0.66* 0.62*   CA 8.0* 8.1*   PHOS  --  2.4*   ALB  --  1.5*       No results for input(s): PH, PCO2, PO2, HCO3, FIO2 in the last 72 hours.     24 Hour Results:  Recent Results (from the past 24 hour(s))   C REACTIVE PROTEIN, QT    Collection Time: 22 12:07 PM   Result Value Ref Range    C-Reactive protein 11.60 (H) 0.00 - 0.60 mg/dL   PROCALCITONIN    Collection Time: 22 12:07 PM   Result Value Ref Range    Procalcitonin 0.68 (H) 0 ng/mL   VANCOMYCIN, RANDOM    Collection Time: 12/06/22 12:07 PM   Result Value Ref Range    Vancomycin, random 9.5 ug/mL   METABOLIC PANEL, BASIC    Collection Time: 12/06/22 12:07 PM   Result Value Ref Range    Sodium 136 136 - 145 mmol/L    Potassium 3.4 (L) 3.5 - 5.1 mmol/L    Chloride 104 97 - 108 mmol/L    CO2 24 21 - 32 mmol/L    Anion gap 8 5 - 15 mmol/L    Glucose 135 (H) 65 - 100 mg/dL    BUN 6 6 - 20 mg/dL    Creatinine 0.66 (L) 0.70 - 1.30 mg/dL    BUN/Creatinine ratio 9 (L) 12 - 20      eGFR >60 >60 ml/min/1.73m2    Calcium 8.0 (L) 8.5 - 10.1 mg/dL       XR CHEST PA LAT   Final Result   Grossly unchanged widespread patchy consolidative airspace disease. XR CHEST PORT   Final Result   No significant interval change in severe, diffuse airspace infiltrates. XR CHEST PORT   Final Result   Worsening bilateral upper and midlung zone airspace disease, may be accentuated   by lower depth of inspiration. XR CHEST PORT   Final Result   1. Diffuse bilateral airspace disease             Assessment:  Acute hypoxic respiratory failure  - resolved on room air    Influenza A +  - completed Tamiflu    Persistent MRSA bacteremia, suspected due to Port-A-Cath    Bilateral pneumonia    History of non-Hodgkin's lymphoma  - chemotherapy complete  - immunocompromised    Prior history of DVT and PE  - Xarelto    Benign essential hypertension    Hypophosphatemia, resolved    Severe sepsis, resolved/resolving      Plan:  Continue supportive care  Continue IV Vancomycin and Ceftaroline for persistent MRSA  Remove Portacath per ID recommendation    Care Plan discussed with: Patient/Family    Disposition:     Total time spent with patient: 30 minutes.     Josep Miller MD

## 2022-12-06 NOTE — PROGRESS NOTES
Hospitalist Progress Note               Daily Progress Note: 12/6/2022      Subjective:   Hospital course to date:    Mr. Williams Burroughs, a 33 yo male, with a history of HTN, asthma, non-Hodgkin's lymphoma post chemotherapy, now on radiation, DVT, PE, presents to the ED for shortness of breath. Pt was seen on 11/25 for similar, diagnosed with Flu A and sent home on Tamiflu. Workup thus far significant for CXR with evidence of bilateral pneumonia, labs suggestive of severe sepsis. Pt started on Vancomycin, Cefepime, Levaquin. --  The patient is seen for follow up. He states that he feels much better today than yesterday with no shortness of breath. He is currently not on O2, he is on room air. T-max of 99.6°F overnight. Blood cultures show gram + cocci in 1/2 bottles.      Procalcitonin 0.68 trending downwards  CRP 11.6 trending downwards    Problem List:  Problem List as of 12/6/2022 Date Reviewed: 8/15/2022            Codes Class Noted - Resolved    Shortness of breath ICD-10-CM: R06.02  ICD-9-CM: 786.05  11/27/2022 - Present        Pneumonia ICD-10-CM: J18.9  ICD-9-CM: 486  11/27/2022 - Present        History of asthma ICD-10-CM: Z87.09  ICD-9-CM: V12.69  11/27/2022 - Present        Influenza A ICD-10-CM: J10.1  ICD-9-CM: 487.1  11/27/2022 - Present        Severe sepsis ([facility] ICD-10-CM: A41.9, R65.20  ICD-9-CM: 038.9, 995.92  11/27/2022 - Present        Asthma ICD-10-CM: J45.909  ICD-9-CM: 493.90  5/7/2022 - Present        Acute respiratory failure with hypoxia ([facility] ICD-10-CM: J96.01  ICD-9-CM: 518.81  5/7/2022 - Present        Lymphoma ([facility] ICD-10-CM: C85.90  ICD-9-CM: 202.80  3/1/2022 - Present        Pelvic mass ICD-10-CM: R19.00  ICD-9-CM: 789.30  2/23/2022 - Present        Bilateral pulmonary embolism (HCC) ICD-10-CM: I26.99  ICD-9-CM: 415.19  2/23/2022 - Present        Pulmonary embolism ([facility] ICD-10-CM: I26.99  ICD-9-CM: 415.19  2/22/2022 - Present        Right inguinal pain ICD-10-CM: R10.31  ICD-9-CM: 789.03  2/17/2022 - Present        Right groin mass ICD-10-CM: R19.09  ICD-9-CM: 789.39  2/11/2022 - Present        Fall ICD-10-CM: W19. [place]  ICD-9-CM: E888.9  2/11/2022 - Present           Medications reviewed  Current Facility-Administered Medications   Medication Dose Route Frequency    fluticasone propionate (FLONASE) 50 mcg/actuation nasal spray 2 Spray  2 Spray Both Nostrils DAILY    Vancomycin - please draw level 12/6 1200. Thanks!    Other ONCE    cefTARoline (TEFLARO) 600 mg in 0.9% sodium chloride (MBP/ADV) 50 mL MBP  600 mg IntraVENous Q12H    albuterol-ipratropium (DUO-NEB) 2.5 MG-0.5 MG/3 ML  3 mL Nebulization Q6H PRN    albuterol (PROVENTIL HFA, VENTOLIN HFA, PROAIR HFA) inhaler 2 Puff  2 Puff Inhalation Q6HWA RT    budesonide-formoteroL (SYMBICORT) 160-4.5 mcg/actuation HFA inhaler 2 Puff  2 Puff Inhalation BID RT    vancomycin (VANCOCIN) 1500 mg in  ml infusion  1,500 mg IntraVENous Q8H    zinc oxide-white petrolatum 20-75 % topical paste   Topical DAILY    0.9% sodium chloride infusion  40 mL/hr IntraVENous CONTINUOUS    sodium chloride (NS) flush 5-10 mL  5-10 mL IntraVENous PRN    sodium chloride (NS) flush 5-40 mL  5-40 mL IntraVENous PRN    acetaminophen (TYLENOL) tablet 650 mg  650 mg Oral Q6H PRN    Or    acetaminophen (TYLENOL) suppository 650 mg  650 mg Rectal Q6H PRN    polyethylene glycol (MIRALAX) packet 17 g  17 g Oral DAILY PRN    bisacodyL (DULCOLAX) tablet 5 mg  5 mg Oral DAILY PRN    ondansetron (ZOFRAN ODT) tablet 4 mg  4 mg Oral Q6H PRN    Or    ondansetron (ZOFRAN) injection 4 mg  4 mg IntraVENous Q6H PRN    sodium chloride (NS) flush 5-10 mL  5-10 mL IntraVENous PRN    metoprolol (LOPRESSOR) injection 5 mg  5 mg IntraVENous Q3H PRN    guaiFENesin-codeine (ROBITUSSIN AC) 100-10 mg/5 mL solution 10 mL  10 mL Oral Q4H PRN    Vancomycin - Pharmacy to Dose   Other Rx Dosing/Monitoring    cetirizine (ZYRTEC) tablet 10 mg  10 mg Oral DAILY    montelukast (SINGULAIR) tablet 10 mg  10 mg Oral DAILY    rivaroxaban (XARELTO) tablet 20 mg  20 mg Oral DAILY    hydrOXYzine (VISTARIL) injection 50 mg  50 mg IntraMUSCular Q6H PRN       Review of Systems:   A comprehensive review of systems was negative except for that written in the HPI. Objective:   Physical Exam:     Visit Vitals  /70 (BP 1 Location: Left upper arm, BP Patient Position: Lying right side)   Pulse (!) 104   Temp 98.3 °F (36.8 °C)   Resp 20   Ht 5' 5.98\" (1.676 m)   Wt 102.1 kg (225 lb)   SpO2 97%   BMI 36.33 kg/m²    O2 Flow Rate (L/min): 1 l/min O2 Device: None (Room air)    Temp (24hrs), Av.1 °F (37.3 °C), Min:98.3 °F (36.8 °C), Max:100.1 °F (37.8 °C)    No intake/output data recorded.  1901 -  0700  In: -   Out: 1500 [Urine:1500]    General:   Awake and alert   Lungs:   Clear to auscultation bilaterally. Chest wall:  No tenderness or deformity. Heart:  Regular rhythm, tachycardia, S1, S2 normal, no murmur, click, rub or gallop. Abdomen:   Soft, non-tender. Bowel sounds normal. No masses,  No organomegaly. Extremities: Extremities normal, atraumatic, no cyanosis or edema. Pulses: 2+ and symmetric all extremities. Skin: Skin color, texture, turgor normal. No rashes or lesions   Neurologic: CNII-XII intact. No gross focal deficits         Data Review:       Recent Days:  Recent Labs     22  1309   WBC 8.0   HGB 8.8*   HCT 25.8*   *     Recent Labs     22  1309      K 3.3*      CO2 23   *   BUN 11   CREA 0.62*   CA 8.1*   PHOS 2.4*   ALB 1.5*     No results for input(s): PH, PCO2, PO2, HCO3, FIO2 in the last 72 hours. 24 Hour Results:  No results found for this or any previous visit (from the past 24 hour(s)). XR CHEST PA LAT   Final Result   Grossly unchanged widespread patchy consolidative airspace disease. XR CHEST PORT   Final Result   No significant interval change in severe, diffuse airspace infiltrates.       XR CHEST PORT   Final Result   Worsening bilateral upper and midlung zone airspace disease, may be accentuated   by lower depth of inspiration. XR CHEST PORT   Final Result   1. Diffuse bilateral airspace disease           Assessment:  Acute hypoxic respiratory failure  - resolved on room air    Influenza A +  - completed Tamiflu    Bilateral pneumonia    History of non-Hodgkin's lymphoma  - chemotherapy complete  - immunocompromised    Prior history of DVT and PE  - Xarelto    Benign essential hypertension    Hypophosphatemia, resolved    Severe sepsis, resolved/resolving      Plan:  Continue supportive care  Continue IV Vancomycin and Ceftaroline for persistent MRSA  Remove Portacath per ID recommendation    Care Plan discussed with: Patient/Family    Disposition:     Total time spent with patient: 30 minutes.     Meera Juarez

## 2022-12-06 NOTE — PROGRESS NOTES
CM spoke to IR today and confirmed patient will have portacath removal today. MD states patient cannot get PICC with + blood cultures. Anticipate patient will be in hospital for 48-72 more hours. [employer] will cover antibiotics at home and [facility] will follow for [facility].

## 2022-12-06 NOTE — PROGRESS NOTES
Progress Note    Patient: Roberto Stoddard MRN: [id number]  SSN: [id number]    YOB: 1990  Age: 34 y.o. Sex: male      Admit Date: 11/27/2022    LOS: 9 days     Subjective:   Patient with NHL followed for severe sepsis with persistent MRSA bacteremia now on Vancomycin and Ceftaroline sputum positive for MRSA. He is afebrile with normal WBC and decreasing CRP and procal.  TTE negative for vegetations. He underwent removal of his Portacath earlier. He is inaccessible at this time. Spoke to this wife. Objective:     Vitals:    12/06/22 0321 12/06/22 0536 12/06/22 0741 12/06/22 0838   BP: (!) 105/59  119/70    Pulse: (!) 103  (!) 104    Resp: 17  20    Temp: 99.6 °F (37.6 °C)  98.3 °F (36.8 °C)    SpO2: 94%  97% 97%   Weight:  225 lb (102.1 kg)     Height:            Intake and Output:  Current Shift: No intake/output data recorded. Last three shifts: 12/04 1901 - 12/06 0700  In: -   Out: 1500 [Urine:1500]    Physical Exam:   Vitals and nursing note reviewed. Patient unavailable for re-examination  Constitutional:       General: He is not in acute distress. Appearance: He is ill-appearing. HENT:   Mouth/Throat:      Pharynx: Oropharynx is clear. Eyes:      Pupils: Pupils are equal, round, and reactive to light. Cardiovascular:      Rate and Rhythm: Regular rhythm. Tachycardia present. Heart sounds: No murmur heard. Comments: [id number] right chest, site unremarkable with no erythema   Pulmonary:      Effort: No respiratory distress. Breath sounds: normal   Chest:  no erythema        Abdominal:      General: There is no distension. Palpations: Abdomen is soft. Tenderness: There is no abdominal tenderness. There is no right CVA tenderness, left CVA tenderness or guarding. Genitourinary:     Comments: No Bonilla catheter  Musculoskeletal:      Right lower leg: No edema. Left lower leg: No edema. Skin:     Findings: No rash.    Neurological: nonfocal, no confusion   Psychiatric:    normal behavior      Lab/Data Review:     WBC 8,300  , 000  Lactic 1.5 <2.2    CRP 12.40 <13.40 < 9.64 <9.57 <18.50  Procal  1.02 <2.07 <5.20 <11.81 <25.52 <18.89    MRSA Nasal PCR positive    Blood cultures (11/27) Staphylococcus aureus MRSA  Blood cultures (12/1) MRSA   Blood cultures (12/3) Gram positive cocci in clusters  Sputum culture (11/27)  Staphylococcus aureus MRSA  Urine culture (11/27) No growth FINAL    CXR (12/3) Grossly unchanged widespread patchy consolidative airspace disease. Assessment:     Active Problems:    Shortness of breath (11/27/2022)      Pneumonia (11/27/2022)      History of asthma (11/27/2022)      Influenza A (11/27/2022)      Severe sepsis ([facility] (11/27/2022)  Severe sepsis with fever, leukopenia, elevated lactic acid and procal, CRP, resolved or resolving  Persistent MRSA bacteremia  , Day #10 IV Vancomycin, Day #5 IV Ceftaroline  Influenza A, status post Tamiflu  Pneumonia, bilateral,   post-viral, secondary to Staphylococcus aureus MRSA, Day #10 IV Vancomycin  Acute hypoxic respiratory failure, on nasal cannuHFNC  Portacath in situ  Asthma  Non-Hodgkins' Lymphoma  MRSA nasal colonization, status post Mupirocin       Comment:   Portacath has been removed, hopefully we can clear his bacteremia now. Plan:   1. Continue IV Vancomycin and Ceftaroline for persistent MRSA bacteremia; treat for 14 days after first negative blood cultures  2. Follow-up repeat blood cultures   3.   In am, repeat procal, CRP, blood cultures            Signed By: Iraj Taylor MD     December 6, 2022

## 2022-12-06 NOTE — PROGRESS NOTES
Problem: Risk for Spread of Infection  Goal: Prevent transmission of infectious organism to others  Description: Prevent the transmission of infectious organisms to other patients, staff members, and visitors. Outcome: Not Progressing Towards Goal     Problem: Falls - Risk of  Goal: *Absence of Falls  Description: Document Lorna Ramos Fall Risk and appropriate interventions in the flowsheet.   Outcome: Not Progressing Towards Goal  Note: Fall Risk Interventions:  Mobility Interventions: PT Consult for mobility concerns         Medication Interventions: Assess postural VS orthostatic hypotension    Elimination Interventions: Call light in reach              Problem: Patient Education: Go to Patient Education Activity  Goal: Patient/Family Education  Outcome: Not Progressing Towards Goal     Problem: Pneumonia: Day 1  Goal: *Tolerating diet  Outcome: Not Progressing Towards Goal

## 2022-12-07 LAB
BACTERIA SPEC CULT: ABNORMAL
BASOPHILS # BLD: 0 K/UL (ref 0–0.1)
BASOPHILS NFR BLD: 0 % (ref 0–1)
CRP SERPL-MCNC: 10.8 MG/DL (ref 0–0.6)
DIFFERENTIAL METHOD BLD: ABNORMAL
EOSINOPHIL # BLD: 0 K/UL (ref 0–0.4)
EOSINOPHIL NFR BLD: 0 % (ref 0–7)
ERYTHROCYTE [DISTWIDTH] IN BLOOD BY AUTOMATED COUNT: 15.9 % (ref 11.5–14.5)
HCT VFR BLD AUTO: 22.6 % (ref 36.6–50.3)
HGB BLD-MCNC: 7.4 G/DL (ref 12.1–17)
IMM GRANULOCYTES # BLD AUTO: 0 K/UL
IMM GRANULOCYTES NFR BLD AUTO: 0 %
LYMPHOCYTES # BLD: 0.1 K/UL (ref 0.8–3.5)
LYMPHOCYTES NFR BLD: 1 % (ref 12–49)
MCH RBC QN AUTO: 29.4 PG (ref 26–34)
MCHC RBC AUTO-ENTMCNC: 32.7 G/DL (ref 30–36.5)
MCV RBC AUTO: 89.7 FL (ref 80–99)
METAMYELOCYTES NFR BLD MANUAL: 4 %
MONOCYTES # BLD: 0.2 K/UL (ref 0–1)
MONOCYTES NFR BLD: 2 % (ref 5–13)
MYELOCYTES NFR BLD MANUAL: 1 %
NEUTS BAND NFR BLD MANUAL: 9 % (ref 0–6)
NEUTS SEG # BLD: 10.9 K/UL (ref 1.8–8)
NEUTS SEG NFR BLD: 83 % (ref 32–75)
NRBC # BLD: 0.15 K/UL (ref 0–0.01)
NRBC BLD-RTO: 1.3 PER 100 WBC
PLATELET # BLD AUTO: 236 K/UL (ref 150–400)
PLATELET COMMENTS,PCOM: ABNORMAL
PMV BLD AUTO: 10.8 FL (ref 8.9–12.9)
PROCALCITONIN SERPL-MCNC: 0.73 NG/ML
RBC # BLD AUTO: 2.52 M/UL (ref 4.1–5.7)
RBC MORPH BLD: ABNORMAL
RBC MORPH BLD: ABNORMAL
SPECIAL REQUESTS,SREQ: ABNORMAL
WBC # BLD AUTO: 11.9 K/UL (ref 4.1–11.1)
WBC MORPH BLD: ABNORMAL

## 2022-12-07 PROCEDURE — 85025 COMPLETE CBC W/AUTO DIFF WBC: CPT

## 2022-12-07 PROCEDURE — 74011250637 HC RX REV CODE- 250/637: Performed by: INTERNAL MEDICINE

## 2022-12-07 PROCEDURE — 74011250636 HC RX REV CODE- 250/636: Performed by: NURSE PRACTITIONER

## 2022-12-07 PROCEDURE — 36415 COLL VENOUS BLD VENIPUNCTURE: CPT

## 2022-12-07 PROCEDURE — 65660000001 HC RM ICU INTERMED STEPDOWN

## 2022-12-07 PROCEDURE — 74011250636 HC RX REV CODE- 250/636: Performed by: INTERNAL MEDICINE

## 2022-12-07 PROCEDURE — 74011000258 HC RX REV CODE- 258: Performed by: INTERNAL MEDICINE

## 2022-12-07 PROCEDURE — 74011250637 HC RX REV CODE- 250/637: Performed by: NURSE PRACTITIONER

## 2022-12-07 PROCEDURE — 87040 BLOOD CULTURE FOR BACTERIA: CPT

## 2022-12-07 PROCEDURE — 94640 AIRWAY INHALATION TREATMENT: CPT

## 2022-12-07 PROCEDURE — 86140 C-REACTIVE PROTEIN: CPT

## 2022-12-07 PROCEDURE — 99232 SBSQ HOSP IP/OBS MODERATE 35: CPT | Performed by: INTERNAL MEDICINE

## 2022-12-07 PROCEDURE — 84145 PROCALCITONIN (PCT): CPT

## 2022-12-07 RX ORDER — MORPHINE SULFATE 2 MG/ML
2 INJECTION, SOLUTION INTRAMUSCULAR; INTRAVENOUS ONCE
Status: COMPLETED | OUTPATIENT
Start: 2022-12-07 | End: 2022-12-07

## 2022-12-07 RX ADMIN — RIVAROXABAN 20 MG: 10 TABLET, FILM COATED ORAL at 10:07

## 2022-12-07 RX ADMIN — ALBUTEROL SULFATE 2 PUFF: 108 AEROSOL, METERED RESPIRATORY (INHALATION) at 19:51

## 2022-12-07 RX ADMIN — CEFTAROLINE FOSAMIL 600 MG: 600 POWDER, FOR SOLUTION INTRAVENOUS at 00:33

## 2022-12-07 RX ADMIN — FLUTICASONE PROPIONATE 2 SPRAY: 50 SPRAY, METERED NASAL at 10:07

## 2022-12-07 RX ADMIN — WHITE PETROLATUM,ZINC OXIDE: 20; 75 PASTE TOPICAL at 10:07

## 2022-12-07 RX ADMIN — BUDESONIDE AND FORMOTEROL FUMARATE DIHYDRATE 2 PUFF: 160; 4.5 AEROSOL RESPIRATORY (INHALATION) at 09:07

## 2022-12-07 RX ADMIN — Medication 1500 MG: at 22:52

## 2022-12-07 RX ADMIN — Medication 1500 MG: at 05:49

## 2022-12-07 RX ADMIN — ALBUTEROL SULFATE 2 PUFF: 108 AEROSOL, METERED RESPIRATORY (INHALATION) at 13:40

## 2022-12-07 RX ADMIN — CETIRIZINE HYDROCHLORIDE 10 MG: 10 TABLET, FILM COATED ORAL at 10:07

## 2022-12-07 RX ADMIN — MONTELUKAST 10 MG: 10 TABLET, FILM COATED ORAL at 10:07

## 2022-12-07 RX ADMIN — BUDESONIDE AND FORMOTEROL FUMARATE DIHYDRATE 2 PUFF: 160; 4.5 AEROSOL RESPIRATORY (INHALATION) at 19:50

## 2022-12-07 RX ADMIN — MORPHINE SULFATE 2 MG: 2 INJECTION, SOLUTION INTRAMUSCULAR; INTRAVENOUS at 11:20

## 2022-12-07 RX ADMIN — ALBUTEROL SULFATE 2 PUFF: 108 AEROSOL, METERED RESPIRATORY (INHALATION) at 09:07

## 2022-12-07 RX ADMIN — SODIUM CHLORIDE 40 ML/HR: 9 INJECTION, SOLUTION INTRAVENOUS at 10:45

## 2022-12-07 RX ADMIN — CEFTAROLINE FOSAMIL 600 MG: 600 POWDER, FOR SOLUTION INTRAVENOUS at 11:21

## 2022-12-07 RX ADMIN — Medication 1500 MG: at 14:38

## 2022-12-07 NOTE — PROGRESS NOTES
Hospitalist Progress Note               Daily Progress Note: 12/7/2022      Subjective:   Hospital course to date:    Mr. Martha Lindquist, a 33 yo male, with a history of HTN, asthma, non-Hodgkin's lymphoma post chemotherapy, now on radiation, DVT, PE, presents to the ED for shortness of breath. Pt was seen on 11/25 for similar, diagnosed with Flu A and sent home on Tamiflu. Workup thus far significant for CXR with evidence of bilateral pneumonia, labs suggestive of severe sepsis. Pt started on Vancomycin, Cefepime, Levaquin. He has a maggi cath placed for his Non-Hodgkin Lymphoma that was removed on 12/6 and likely is causing the infection. --  The patient is seen for follow up. He states that he feels much better today than yesterday with no shortness of breath. He is currently not on O2, he is on room air. His maggi cath was removed yesterday. He complains of chest tightness, which may be related to the cough.     Blood culture from 12/6 negative to date    Problem List:  Problem List as of 12/7/2022 Date Reviewed: 8/15/2022            Codes Class Noted - Resolved    Shortness of breath ICD-10-CM: R06.02  ICD-9-CM: 786.05  11/27/2022 - Present        Pneumonia ICD-10-CM: J18.9  ICD-9-CM: 486  11/27/2022 - Present        History of asthma ICD-10-CM: Z87.09  ICD-9-CM: V12.69  11/27/2022 - Present        Influenza A ICD-10-CM: J10.1  ICD-9-CM: 487.1  11/27/2022 - Present        Severe sepsis ([facility] ICD-10-CM: A41.9, R65.20  ICD-9-CM: 038.9, 995.92  11/27/2022 - Present        Asthma ICD-10-CM: J45.909  ICD-9-CM: 493.90  5/7/2022 - Present        Acute respiratory failure with hypoxia ([facility] ICD-10-CM: J96.01  ICD-9-CM: 518.81  5/7/2022 - Present        Lymphoma ([facility] ICD-10-CM: C85.90  ICD-9-CM: 202.80  3/1/2022 - Present        Pelvic mass ICD-10-CM: R19.00  ICD-9-CM: 789.30  2/23/2022 - Present        Bilateral pulmonary embolism (HCC) ICD-10-CM: I26.99  ICD-9-CM: 415.19  2/23/2022 - Present Pulmonary embolism [facility]) ICD-10-CM: I26.99  ICD-9-CM: 415.19  2/22/2022 - Present        Right inguinal pain ICD-10-CM: R10.31  ICD-9-CM: 789.03  2/17/2022 - Present        Right groin mass ICD-10-CM: R19.09  ICD-9-CM: 789.39  2/11/2022 - Present        Fall ICD-10-CM: W19. Destini Carpenter  ICD-9-CM: E888.9  2/11/2022 - Present       Medications reviewed  Current Facility-Administered Medications   Medication Dose Route Frequency    [START ON 12/9/2022] Vancomycin - Draw trough at 1200 on 12/9 prior to next dose. Thanks!    Other ONCE    fluticasone propionate (FLONASE) 50 mcg/actuation nasal spray 2 Spray  2 Spray Both Nostrils DAILY    cefTARoline (TEFLARO) 600 mg in 0.9% sodium chloride (MBP/ADV) 50 mL MBP  600 mg IntraVENous Q12H    albuterol-ipratropium (DUO-NEB) 2.5 MG-0.5 MG/3 ML  3 mL Nebulization Q6H PRN    albuterol (PROVENTIL HFA, VENTOLIN HFA, PROAIR HFA) inhaler 2 Puff  2 Puff Inhalation Q6HWA RT    budesonide-formoteroL (SYMBICORT) 160-4.5 mcg/actuation HFA inhaler 2 Puff  2 Puff Inhalation BID RT    vancomycin (VANCOCIN) 1500 mg in  ml infusion  1,500 mg IntraVENous Q8H    zinc oxide-white petrolatum 20-75 % topical paste   Topical DAILY    sodium chloride (NS) flush 5-10 mL  5-10 mL IntraVENous PRN    sodium chloride (NS) flush 5-40 mL  5-40 mL IntraVENous PRN    acetaminophen (TYLENOL) tablet 650 mg  650 mg Oral Q6H PRN    Or    acetaminophen (TYLENOL) suppository 650 mg  650 mg Rectal Q6H PRN    polyethylene glycol (MIRALAX) packet 17 g  17 g Oral DAILY PRN    bisacodyL (DULCOLAX) tablet 5 mg  5 mg Oral DAILY PRN    ondansetron (ZOFRAN ODT) tablet 4 mg  4 mg Oral Q6H PRN    Or    ondansetron (ZOFRAN) injection 4 mg  4 mg IntraVENous Q6H PRN    sodium chloride (NS) flush 5-10 mL  5-10 mL IntraVENous PRN    metoprolol (LOPRESSOR) injection 5 mg  5 mg IntraVENous Q3H PRN    guaiFENesin-codeine (ROBITUSSIN AC) 100-10 mg/5 mL solution 10 mL  10 mL Oral Q4H PRN    Vancomycin - Pharmacy to Dose   Other Rx Dosing/Monitoring    cetirizine (ZYRTEC) tablet 10 mg  10 mg Oral DAILY    montelukast (SINGULAIR) tablet 10 mg  10 mg Oral DAILY    rivaroxaban (XARELTO) tablet 20 mg  20 mg Oral DAILY    hydrOXYzine (VISTARIL) injection 50 mg  50 mg IntraMUSCular Q6H PRN       Review of Systems:   A comprehensive review of systems was negative except for that written in the HPI. Objective:   Physical Exam:     Visit Vitals  BP (!) 112/58 (BP 1 Location: Left upper arm, BP Patient Position: At rest)   Pulse (!) 101   Temp 99.5 °F (37.5 °C)   Resp 19   Ht 5' 5.98\" (1.676 m)   Wt 102.1 kg (225 lb)   SpO2 96%   BMI 36.33 kg/m²    O2 Flow Rate (L/min): 1 l/min O2 Device: None (Room air)    Temp (24hrs), Av.3 °F (37.4 °C), Min:98.4 °F (36.9 °C), Max:100.2 °F (37.9 °C)    No intake/output data recorded.  1901 -  0700  In: -   Out: 600 [Urine:600]    General:   Awake and alert   Lungs:   Clear to auscultation bilaterally. Chest wall:  No tenderness or deformity. Heart:  Regular rhythm, tachycardia, S1, S2 normal, no murmur, click, rub or gallop. Abdomen:   Soft, non-tender. Bowel sounds normal. No masses,  No organomegaly. Extremities: Extremities normal, atraumatic, no cyanosis or edema. Pulses: 2+ and symmetric all extremities. Skin: Skin color, texture, turgor normal. No rashes or lesions   Neurologic: CNII-XII intact. No gross focal deficits         Data Review:       Recent Days:  Recent Labs     22  1057   WBC 11.9*   HGB 7.4*   HCT 22.6*          Recent Labs     22  1207      K 3.4*      CO2 24   *   BUN 6   CREA 0.66*   CA 8.0*       No results for input(s): PH, PCO2, PO2, HCO3, FIO2 in the last 72 hours.     24 Hour Results:  Recent Results (from the past 24 hour(s))   CBC WITH AUTOMATED DIFF    Collection Time: 22 10:57 AM   Result Value Ref Range    WBC 11.9 (H) 4.1 - 11.1 K/uL    RBC 2.52 (L) 4.10 - 5.70 M/uL    HGB 7.4 (L) 12.1 - 17.0 g/dL    HCT 22.6 (L) 36.6 - 50.3 %    MCV 89.7 80.0 - 99.0 FL    MCH 29.4 26.0 - 34.0 PG    MCHC 32.7 30.0 - 36.5 g/dL    RDW 15.9 (H) 11.5 - 14.5 %    PLATELET 489 726 - 913 K/uL    MPV 10.8 8.9 - 12.9 FL    NRBC 1.3 (H) 0.0  WBC    ABSOLUTE NRBC 0.15 (H) 0.00 - 0.01 K/uL    NEUTROPHILS PENDING %    LYMPHOCYTES PENDING %    MONOCYTES PENDING %    EOSINOPHILS PENDING %    BASOPHILS PENDING %    IMMATURE GRANULOCYTES PENDING %    ABS. NEUTROPHILS PENDING K/UL    ABS. LYMPHOCYTES PENDING K/UL    ABS. MONOCYTES PENDING K/UL    ABS. EOSINOPHILS PENDING K/UL    ABS. BASOPHILS PENDING K/UL    ABS. IMM. GRANS. PENDING K/UL    DF PENDING    C REACTIVE PROTEIN, QT    Collection Time: 12/07/22 10:57 AM   Result Value Ref Range    C-Reactive protein 10.80 (H) 0.00 - 0.60 mg/dL   PROCALCITONIN    Collection Time: 12/07/22 10:57 AM   Result Value Ref Range    Procalcitonin 0.73 (H) 0 ng/mL         XR CHEST PORT   Final Result      Unchanged diffuse bilateral airspace disease, compatible with pneumonia. IR REMOVE TUNL CVAD W PORT/PUMP   Final Result   Technically successful removal of a tunneled Portacath. XR CHEST PA LAT   Final Result   Grossly unchanged widespread patchy consolidative airspace disease. XR CHEST PORT   Final Result   No significant interval change in severe, diffuse airspace infiltrates. XR CHEST PORT   Final Result   Worsening bilateral upper and midlung zone airspace disease, may be accentuated   by lower depth of inspiration. XR CHEST PORT   Final Result   1.  Diffuse bilateral airspace disease             Assessment:  Acute hypoxic respiratory failure  - resolved on room air    Influenza A +  - completed Tamiflu    Persistent MRSA bacteremia, suspected due to Port-A-Cath    Bilateral pneumonia    History of non-Hodgkin's lymphoma  - chemotherapy complete  - immunocompromised    Prior history of DVT and PE  - Xarelto    Benign essential hypertension    Hypophosphatemia, resolved    Severe sepsis, resolved/resolving    Plan:  Continue supportive care  Continue IV Vancomycin and Ceftaroline for persistent MRSA  Awaiting negative blood cultures to place PICC line for IV antibiotics 14 days    If blood culture from 12/6  negative tomorrow, can likely proceed with PICC line placement    Discontinue cardiac monitoring    Care Plan discussed with: Patient/Family    Disposition:     Total time spent with patient: 30 minutes.     Saira Atwood MD

## 2022-12-07 NOTE — PROGRESS NOTES
Problem: Patient Education:  Go to Education Activity  Goal: Patient/Family Education  Outcome: Progressing Towards Goal     Problem: Falls - Risk of  Goal: *Absence of Falls  Description: Document Radha Bundy Fall Risk and appropriate interventions in the flowsheet.   Outcome: Progressing Towards Goal  Note: Fall Risk Interventions:  Mobility Interventions: PT Consult for mobility concerns         Medication Interventions: Teach patient to arise slowly    Elimination Interventions: Call light in reach

## 2022-12-07 NOTE — PROGRESS NOTES
Dual skin assessment performed by Aracelis Feldman RN and myself. Patient has a \"burn from radiation\" to his right groin. He also has an incision with a dressing on his right upper chest from where his port was removed. The remaining skin is clean, dry, and intact.

## 2022-12-07 NOTE — PROGRESS NOTES
Progress Note    Patient: Facundo Devries MRN: [id number]  SSN: [id number]    YOB: 1990  Age: 34 y.o. Sex: male      Admit Date: 11/27/2022    LOS: 10 days     Subjective:   Patient with NHL followed for severe sepsis with persistent MRSA bacteremia now on Vancomycin and Ceftaroline sputum positive for MRSA. He is afebrile with normal WBC and decreasing CRP and procal.  TTE negative for vegetations. He underwent removal of his Portacath earlier. He is inaccessible at this time. Spoke to this wife. Objective:     Vitals:    12/07/22 0719 12/07/22 0911 12/07/22 0915 12/07/22 1114   BP: 118/75   (!) 112/58   Pulse: 98   (!) 101   Resp: 20   19   Temp: 98.4 °F (36.9 °C)   99.5 °F (37.5 °C)   SpO2: 96% 96% 96% 96%   Weight:       Height:            Intake and Output:  Current Shift: No intake/output data recorded. Last three shifts: 12/05 1901 - 12/07 0700  In: -   Out: 600 [Urine:600]    Physical Exam:   Vitals and nursing note reviewed. Constitutional:       General: He is not in acute distress. Appearance: He is ill-appearing. HENT:   Mouth/Throat:      Pharynx: Oropharynx is clear. Eyes:      Pupils: Pupils are equal, round, and reactive to light. Cardiovascular:      Rate and Rhythm: Regular rhythm. Tachycardia present. Heart sounds: No murmur heard. Comments: Geort-T-Pmci right chest, site unremarkable with no erythema   Pulmonary:      Effort: No respiratory distress. Breath sounds: normal   Chest:  no erythema        Abdominal:      General: There is no distension. Palpations: Abdomen is soft. Tenderness: There is no abdominal tenderness. There is no right CVA tenderness, left CVA tenderness or guarding. Genitourinary:     Comments: No Bonilla catheter  Musculoskeletal:      Right lower leg: No edema. Left lower leg: No edema. Skin:     Findings: No rash.    Neurological: nonfocal, no confusion   Psychiatric:    normal behavior      Lab/Data Review:     WBC 11,900  ,000  Lactic 1.5 <2.2    CRP 10.80 <  11.60 <12.40 <13.40 < 9.64 <9.57 <18.50  Procal  0.73 <0.68 <1.02 <2.07 <5.20 <11.81 <25.52 <18.89    MRSA Nasal PCR positive    Blood cultures (11/27) Staphylococcus aureus MRSA  Blood cultures (12/1) MRSA   Blood cultures (12/3) MRSA  Blood cultures (12/6) No growth 1 day  Blood cultures (12/7) in process  Sputum culture (11/27)  Staphylococcus aureus MRSA  Urine culture (11/27) No growth FINAL     Assessment:     Active Problems:    Shortness of breath (11/27/2022)      Pneumonia (11/27/2022)      History of asthma (11/27/2022)      Influenza A (11/27/2022)      Severe sepsis ([address] (11/27/2022)  Severe sepsis with fever, leukopenia, elevated lactic acid and procal, CRP, resolved or resolving  Persistent MRSA bacteremia  , Day #10 IV Vancomycin, Day #5 IV Ceftaroline  Influenza A, status post Tamiflu  Pneumonia, bilateral,   post-viral, secondary to Staphylococcus aureus MRSA, Day #10 IV Vancomycin  Acute hypoxic respiratory failure, on nasal cannuHFNC  Portacath in situ  Asthma  Non-Hodgkins' Lymphoma  MRSA nasal colonization, status post Mupirocin       Comment:   Portacath has been removed, hopefully we can clear his bacteremia now. Unclear why WBC increased today while CRP is slowly decreasing. Plan:   1. Continue IV Vancomycin and Ceftaroline for persistent MRSA bacteremia; treat for 14 days after first negative blood cultures  2. Follow-up repeat blood cultures   3. In am, repeat procal, CRP, blood cultures  4.   Repeat CXR prior to discharge         Signed By: Pamella Beasley MD     December 7, 2022

## 2022-12-07 NOTE — PROGRESS NOTES
Hospitalist Progress Note               Daily Progress Note: 12/7/2022      Subjective:   Hospital course to date:    Mr. Romelia Lesch, a 35 yo male, with a history of HTN, asthma, non-Hodgkin's lymphoma post chemotherapy, now on radiation, DVT, PE, presents to the ED for shortness of breath. Pt was seen on 11/25 for similar, diagnosed with Flu A and sent home on Tamiflu. Workup thus far significant for CXR with evidence of bilateral pneumonia, labs suggestive of severe sepsis. Pt started on Vancomycin, Cefepime, Levaquin. He has a maggi cath placed for his Non-Hodgkin Lymphoma that was removed on 12/6 and likely is causing the infection. --  The patient is seen for follow up. He states that he feels much better today than yesterday with no shortness of breath. He is currently not on O2, he is on room air. His maggi cath was removed yesterday. He complains of chest tightness, which may be related to the cough. Culture MRSA + and he is already on Vancomycin.     Problem List:  Problem List as of 12/7/2022 Date Reviewed: 8/15/2022            Codes Class Noted - Resolved    Shortness of breath ICD-10-CM: R06.02  ICD-9-CM: 786.05  11/27/2022 - Present        Pneumonia ICD-10-CM: J18.9  ICD-9-CM: 486  11/27/2022 - Present        History of asthma ICD-10-CM: Z87.09  ICD-9-CM: V12.69  11/27/2022 - Present        Influenza A ICD-10-CM: J10.1  ICD-9-CM: 487.1  11/27/2022 - Present        Severe sepsis ([facility] ICD-10-CM: A41.9, R65.20  ICD-9-CM: 038.9, 995.92  11/27/2022 - Present        Asthma ICD-10-CM: R88.422  ICD-9-CM: 493.90  5/7/2022 - Present        Acute respiratory failure with hypoxia (HCC) ICD-10-CM: J96.01  ICD-9-CM: 518.81  5/7/2022 - Present        Lymphoma ([facility] ICD-10-CM: C85.90  ICD-9-CM: 202.80  3/1/2022 - Present        Pelvic mass ICD-10-CM: R19.00  ICD-9-CM: 789.30  2/23/2022 - Present        Bilateral pulmonary embolism (HCC) ICD-10-CM: I26.99  ICD-9-CM: 415.19  2/23/2022 - Present Pulmonary embolism [facility]) ICD-10-CM: I26.99  ICD-9-CM: 415.19  2/22/2022 - Present        Right inguinal pain ICD-10-CM: R10.31  ICD-9-CM: 789.03  2/17/2022 - Present        Right groin mass ICD-10-CM: R19.09  ICD-9-CM: 789.39  2/11/2022 - Present        Fall ICD-10-CM: W19. Kathia Oliveira  ICD-9-CM: E888.9  2/11/2022 - Present       Medications reviewed  Current Facility-Administered Medications   Medication Dose Route Frequency    [START ON 12/9/2022] Vancomycin - Draw trough at 1200 on 12/9 prior to next dose. Thanks!    Other ONCE    fluticasone propionate (FLONASE) 50 mcg/actuation nasal spray 2 Spray  2 Spray Both Nostrils DAILY    cefTARoline (TEFLARO) 600 mg in 0.9% sodium chloride (MBP/ADV) 50 mL MBP  600 mg IntraVENous Q12H    albuterol-ipratropium (DUO-NEB) 2.5 MG-0.5 MG/3 ML  3 mL Nebulization Q6H PRN    albuterol (PROVENTIL HFA, VENTOLIN HFA, PROAIR HFA) inhaler 2 Puff  2 Puff Inhalation Q6HWA RT    budesonide-formoteroL (SYMBICORT) 160-4.5 mcg/actuation HFA inhaler 2 Puff  2 Puff Inhalation BID RT    vancomycin (VANCOCIN) 1500 mg in  ml infusion  1,500 mg IntraVENous Q8H    zinc oxide-white petrolatum 20-75 % topical paste   Topical DAILY    sodium chloride (NS) flush 5-10 mL  5-10 mL IntraVENous PRN    sodium chloride (NS) flush 5-40 mL  5-40 mL IntraVENous PRN    acetaminophen (TYLENOL) tablet 650 mg  650 mg Oral Q6H PRN    Or    acetaminophen (TYLENOL) suppository 650 mg  650 mg Rectal Q6H PRN    polyethylene glycol (MIRALAX) packet 17 g  17 g Oral DAILY PRN    bisacodyL (DULCOLAX) tablet 5 mg  5 mg Oral DAILY PRN    ondansetron (ZOFRAN ODT) tablet 4 mg  4 mg Oral Q6H PRN    Or    ondansetron (ZOFRAN) injection 4 mg  4 mg IntraVENous Q6H PRN    sodium chloride (NS) flush 5-10 mL  5-10 mL IntraVENous PRN    metoprolol (LOPRESSOR) injection 5 mg  5 mg IntraVENous Q3H PRN    guaiFENesin-codeine (ROBITUSSIN AC) 100-10 mg/5 mL solution 10 mL  10 mL Oral Q4H PRN    Vancomycin - Pharmacy to Dose   Other Rx Dosing/Monitoring    cetirizine (ZYRTEC) tablet 10 mg  10 mg Oral DAILY    montelukast (SINGULAIR) tablet 10 mg  10 mg Oral DAILY    rivaroxaban (XARELTO) tablet 20 mg  20 mg Oral DAILY    hydrOXYzine (VISTARIL) injection 50 mg  50 mg IntraMUSCular Q6H PRN       Review of Systems:   A comprehensive review of systems was negative except for that written in the HPI. Objective:   Physical Exam:     Visit Vitals  BP (!) 112/58 (BP 1 Location: Left upper arm, BP Patient Position: At rest)   Pulse (!) 101   Temp 99.5 °F (37.5 °C)   Resp 19   Ht 5' 5.98\" (1.676 m)   Wt 102.1 kg (225 lb)   SpO2 96%   BMI 36.33 kg/m²    O2 Flow Rate (L/min): 1 l/min O2 Device: None (Room air)    Temp (24hrs), Av.3 °F (37.4 °C), Min:98.4 °F (36.9 °C), Max:100.2 °F (37.9 °C)    No intake/output data recorded.  1901 -  0700  In: -   Out: 600 [Urine:600]    General:   Awake and alert   Lungs:   Clear to auscultation bilaterally. Chest wall:  No tenderness or deformity. Heart:  Regular rhythm, tachycardia, S1, S2 normal, no murmur, click, rub or gallop. Abdomen:   Soft, non-tender. Bowel sounds normal. No masses,  No organomegaly. Extremities: Extremities normal, atraumatic, no cyanosis or edema. Pulses: 2+ and symmetric all extremities. Skin: Skin color, texture, turgor normal. No rashes or lesions   Neurologic: CNII-XII intact. No gross focal deficits         Data Review:       Recent Days:  No results for input(s): WBC, HGB, HCT, PLT, HGBEXT, HCTEXT, PLTEXT, HGBEXT, HCTEXT, PLTEXT in the last 72 hours. Recent Labs     22  1207      K 3.4*      CO2 24   *   BUN 6   CREA 0.66*   CA 8.0*       No results for input(s): PH, PCO2, PO2, HCO3, FIO2 in the last 72 hours. 24 Hour Results:  No results found for this or any previous visit (from the past 24 hour(s)). XR CHEST PORT   Final Result      Unchanged diffuse bilateral airspace disease, compatible with pneumonia.          IR REMOVE TUNL CVAD W PORT/PUMP   Final Result   Technically successful removal of a tunneled Portacath. XR CHEST PA LAT   Final Result   Grossly unchanged widespread patchy consolidative airspace disease. XR CHEST PORT   Final Result   No significant interval change in severe, diffuse airspace infiltrates. XR CHEST PORT   Final Result   Worsening bilateral upper and midlung zone airspace disease, may be accentuated   by lower depth of inspiration. XR CHEST PORT   Final Result   1. Diffuse bilateral airspace disease             Assessment:  Acute hypoxic respiratory failure  - resolved on room air    Influenza A +  - completed Tamiflu    Persistent MRSA bacteremia, suspected due to Port-A-Cath    Bilateral pneumonia    History of non-Hodgkin's lymphoma  - chemotherapy complete  - immunocompromised    Prior history of DVT and PE  - Xarelto    Benign essential hypertension    Hypophosphatemia, resolved    Severe sepsis, resolved/resolving    Plan:  Continue supportive care  Continue IV Vancomycin and Ceftaroline for persistent MRSA  Awaiting negative blood cultures to place PICC line for IV antibiotics 14 days    Care Plan discussed with: Patient/Family    Disposition:     Total time spent with patient: 30 minutes.     Thomas Parr

## 2022-12-07 NOTE — PROGRESS NOTES
Patient DCP is [facility] Patient will need PICC line insertion for infusion of antibiotics at home, but currently awaiting negative blood cultures to place PICC. Patient will need ID clearance for discharge.

## 2022-12-07 NOTE — PROGRESS NOTES
Problem: Risk for Spread of Infection  Goal: Prevent transmission of infectious organism to others  Description: Prevent the transmission of infectious organisms to other patients, staff members, and visitors.   Outcome: Progressing Towards Goal     Problem: Patient Education:  Go to Education Activity  Goal: Patient/Family Education  Outcome: Progressing Towards Goal     Problem: Pneumonia: Day 1  Goal: Activity/Safety  Outcome: Progressing Towards Goal  Goal: Consults, if ordered  Outcome: Progressing Towards Goal  Goal: Treatments/Interventions/Procedures  Outcome: Progressing Towards Goal     Problem: Pneumonia: Day 1  Goal: Consults, if ordered  Outcome: Progressing Towards Goal

## 2022-12-08 ENCOUNTER — APPOINTMENT (OUTPATIENT)
Dept: GENERAL RADIOLOGY | Age: 32
DRG: 721 | End: 2022-12-08
Attending: INTERNAL MEDICINE
Payer: MEDICAID

## 2022-12-08 VITALS
HEART RATE: 92 BPM | OXYGEN SATURATION: 96 % | WEIGHT: 227.6 LBS | TEMPERATURE: 98.3 F | BODY MASS INDEX: 36.58 KG/M2 | RESPIRATION RATE: 20 BRPM | HEIGHT: 66 IN | SYSTOLIC BLOOD PRESSURE: 119 MMHG | DIASTOLIC BLOOD PRESSURE: 77 MMHG

## 2022-12-08 LAB
BASOPHILS # BLD: 0 K/UL (ref 0–0.1)
BASOPHILS NFR BLD: 0 % (ref 0–1)
CRP SERPL-MCNC: 10.4 MG/DL (ref 0–0.6)
DIFFERENTIAL METHOD BLD: ABNORMAL
EOSINOPHIL # BLD: 0.1 K/UL (ref 0–0.4)
EOSINOPHIL NFR BLD: 1 % (ref 0–7)
ERYTHROCYTE [DISTWIDTH] IN BLOOD BY AUTOMATED COUNT: 16 % (ref 11.5–14.5)
HCT VFR BLD AUTO: 22.6 % (ref 36.6–50.3)
HGB BLD-MCNC: 7.4 G/DL (ref 12.1–17)
IMM GRANULOCYTES # BLD AUTO: 0.6 K/UL (ref 0–0.04)
IMM GRANULOCYTES NFR BLD AUTO: 6 % (ref 0–0.5)
LYMPHOCYTES # BLD: 0.4 K/UL (ref 0.8–3.5)
LYMPHOCYTES NFR BLD: 4 % (ref 12–49)
MCH RBC QN AUTO: 29.4 PG (ref 26–34)
MCHC RBC AUTO-ENTMCNC: 32.7 G/DL (ref 30–36.5)
MCV RBC AUTO: 89.7 FL (ref 80–99)
MONOCYTES # BLD: 0.5 K/UL (ref 0–1)
MONOCYTES NFR BLD: 5 % (ref 5–13)
NEUTS SEG # BLD: 7.5 K/UL (ref 1.8–8)
NEUTS SEG NFR BLD: 84 % (ref 32–75)
NRBC # BLD: 0.07 K/UL (ref 0–0.01)
NRBC BLD-RTO: 0.8 PER 100 WBC
PLATELET # BLD AUTO: 283 K/UL (ref 150–400)
PMV BLD AUTO: 10.2 FL (ref 8.9–12.9)
PROCALCITONIN SERPL-MCNC: 0.51 NG/ML
RBC # BLD AUTO: 2.52 M/UL (ref 4.1–5.7)
WBC # BLD AUTO: 9 K/UL (ref 4.1–11.1)

## 2022-12-08 PROCEDURE — 74011000258 HC RX REV CODE- 258: Performed by: INTERNAL MEDICINE

## 2022-12-08 PROCEDURE — 85025 COMPLETE CBC W/AUTO DIFF WBC: CPT

## 2022-12-08 PROCEDURE — 74011250636 HC RX REV CODE- 250/636: Performed by: INTERNAL MEDICINE

## 2022-12-08 PROCEDURE — 74011250637 HC RX REV CODE- 250/637: Performed by: INTERNAL MEDICINE

## 2022-12-08 PROCEDURE — 74011250637 HC RX REV CODE- 250/637: Performed by: NURSE PRACTITIONER

## 2022-12-08 PROCEDURE — 86140 C-REACTIVE PROTEIN: CPT

## 2022-12-08 PROCEDURE — 94640 AIRWAY INHALATION TREATMENT: CPT

## 2022-12-08 PROCEDURE — 94761 N-INVAS EAR/PLS OXIMETRY MLT: CPT

## 2022-12-08 PROCEDURE — 74011250636 HC RX REV CODE- 250/636: Performed by: NURSE PRACTITIONER

## 2022-12-08 PROCEDURE — 71046 X-RAY EXAM CHEST 2 VIEWS: CPT

## 2022-12-08 PROCEDURE — 36415 COLL VENOUS BLD VENIPUNCTURE: CPT

## 2022-12-08 PROCEDURE — 84145 PROCALCITONIN (PCT): CPT

## 2022-12-08 PROCEDURE — 36569 INSJ PICC 5 YR+ W/O IMAGING: CPT

## 2022-12-08 PROCEDURE — 87040 BLOOD CULTURE FOR BACTERIA: CPT

## 2022-12-08 RX ORDER — VANCOMYCIN HYDROCHLORIDE 10 G/100ML
1500 INJECTION, POWDER, LYOPHILIZED, FOR SOLUTION INTRAVENOUS EVERY 8 HOURS
Qty: 1 EACH | Refills: 0 | Status: SHIPPED | OUTPATIENT
Start: 2022-12-08 | End: 2022-12-13

## 2022-12-08 RX ADMIN — CEFTAROLINE FOSAMIL 600 MG: 600 POWDER, FOR SOLUTION INTRAVENOUS at 15:03

## 2022-12-08 RX ADMIN — CEFTAROLINE FOSAMIL 600 MG: 600 POWDER, FOR SOLUTION INTRAVENOUS at 01:12

## 2022-12-08 RX ADMIN — ALBUTEROL SULFATE 2 PUFF: 108 AEROSOL, METERED RESPIRATORY (INHALATION) at 08:57

## 2022-12-08 RX ADMIN — BUDESONIDE AND FORMOTEROL FUMARATE DIHYDRATE 2 PUFF: 160; 4.5 AEROSOL RESPIRATORY (INHALATION) at 08:57

## 2022-12-08 RX ADMIN — FLUTICASONE PROPIONATE 2 SPRAY: 50 SPRAY, METERED NASAL at 09:56

## 2022-12-08 RX ADMIN — CETIRIZINE HYDROCHLORIDE 10 MG: 10 TABLET, FILM COATED ORAL at 09:56

## 2022-12-08 RX ADMIN — ALBUTEROL SULFATE 2 PUFF: 108 AEROSOL, METERED RESPIRATORY (INHALATION) at 13:59

## 2022-12-08 RX ADMIN — WHITE PETROLATUM,ZINC OXIDE: 20; 75 PASTE TOPICAL at 10:00

## 2022-12-08 RX ADMIN — RIVAROXABAN 20 MG: 10 TABLET, FILM COATED ORAL at 09:56

## 2022-12-08 RX ADMIN — MONTELUKAST 10 MG: 10 TABLET, FILM COATED ORAL at 09:56

## 2022-12-08 RX ADMIN — Medication 1500 MG: at 05:52

## 2022-12-08 NOTE — PROGRESS NOTES
Problem: Risk for Spread of Infection  Goal: Prevent transmission of infectious organism to others  Description: Prevent the transmission of infectious organisms to other patients, staff members, and visitors. Outcome: Progressing Towards Goal     Problem: Patient Education:  Go to Education Activity  Goal: Patient/Family Education  Outcome: Progressing Towards Goal     Problem: Falls - Risk of  Goal: *Absence of Falls  Description: Document [place] Fall Risk and appropriate interventions in the flowsheet.   Outcome: Progressing Towards Goal  Note: Fall Risk Interventions:  Mobility Interventions: PT Consult for mobility concerns         Medication Interventions: Teach patient to arise slowly    Elimination Interventions: Call light in reach              Problem: Patient Education: Go to Patient Education Activity  Goal: Patient/Family Education  Outcome: Progressing Towards Goal     Problem: Patient Education: Go to Patient Education Activity  Goal: Patient/Family Education  Outcome: Progressing Towards Goal     Problem: Pneumonia: Day 1  Goal: Off Pathway (Use only if patient is Off Pathway)  Outcome: Progressing Towards Goal  Goal: Activity/Safety  Outcome: Progressing Towards Goal  Goal: Consults, if ordered  Outcome: Progressing Towards Goal  Goal: Diagnostic Test/Procedures  Outcome: Progressing Towards Goal  Goal: Nutrition/Diet  Outcome: Progressing Towards Goal  Goal: Medications  Outcome: Progressing Towards Goal  Goal: Respiratory  Outcome: Progressing Towards Goal  Goal: Treatments/Interventions/Procedures  Outcome: Progressing Towards Goal  Goal: Psychosocial  Outcome: Progressing Towards Goal  Goal: *Oxygen saturation within defined limits  Outcome: Progressing Towards Goal  Goal: *Influenza vaccine administered (October-March)  Outcome: Progressing Towards Goal  Goal: *Pneumoccocal vaccine administered  Outcome: Progressing Towards Goal  Goal: *Hemodynamically stable  Outcome: Progressing Towards Goal  Goal: *Demonstrates progressive activity  Outcome: Progressing Towards Goal  Goal: *Tolerating diet  Outcome: Progressing Towards Goal     Problem: Pneumonia: Day 2  Goal: Off Pathway (Use only if patient is Off Pathway)  Outcome: Progressing Towards Goal  Goal: Activity/Safety  Outcome: Progressing Towards Goal  Goal: Consults, if ordered  Outcome: Progressing Towards Goal  Goal: Diagnostic Test/Procedures  Outcome: Progressing Towards Goal  Goal: Nutrition/Diet  Outcome: Progressing Towards Goal  Goal: Discharge Planning  Outcome: Progressing Towards Goal  Goal: Medications  Outcome: Progressing Towards Goal  Goal: Respiratory  Outcome: Progressing Towards Goal  Goal: Treatments/Interventions/Procedures  Outcome: Progressing Towards Goal  Goal: Psychosocial  Outcome: Progressing Towards Goal  Goal: *Oxygen saturation within defined limits  Outcome: Progressing Towards Goal  Goal: *Hemodynamically stable  Outcome: Progressing Towards Goal  Goal: *Demonstrates progressive activity  Outcome: Progressing Towards Goal  Goal: *Tolerating diet  Outcome: Progressing Towards Goal  Goal: *Optimal pain control at patient's stated goal  Outcome: Progressing Towards Goal     Problem: Pneumonia: Day 3  Goal: Off Pathway (Use only if patient is Off Pathway)  Outcome: Progressing Towards Goal  Goal: Activity/Safety  Outcome: Progressing Towards Goal  Goal: Consults, if ordered  Outcome: Progressing Towards Goal  Goal: Diagnostic Test/Procedures  Outcome: Progressing Towards Goal  Goal: Nutrition/Diet  Outcome: Progressing Towards Goal  Goal: Discharge Planning  Outcome: Progressing Towards Goal  Goal: Medications  Outcome: Progressing Towards Goal  Goal: Respiratory  Outcome: Progressing Towards Goal  Goal: Treatments/Interventions/Procedures  Outcome: Progressing Towards Goal  Goal: Psychosocial  Outcome: Progressing Towards Goal  Goal: *Oxygen saturation within defined limits  Outcome: Progressing Towards Goal  Goal: *Hemodynamically stable  Outcome: Progressing Towards Goal  Goal: *Demonstrates progressive activity  Outcome: Progressing Towards Goal  Goal: *Tolerating diet  Outcome: Progressing Towards Goal  Goal: *Describes available resources and support systems  Outcome: Progressing Towards Goal  Goal: *Optimal pain control at patient's stated goal  Outcome: Progressing Towards Goal     Problem: Pneumonia: Day 4  Goal: Off Pathway (Use only if patient is Off Pathway)  Outcome: Progressing Towards Goal  Goal: Activity/Safety  Outcome: Progressing Towards Goal  Goal: Nutrition/Diet  Outcome: Progressing Towards Goal  Goal: Discharge Planning  Outcome: Progressing Towards Goal  Goal: Medications  Outcome: Progressing Towards Goal  Goal: Respiratory  Outcome: Progressing Towards Goal  Goal: Treatments/Interventions/Procedures  Outcome: Progressing Towards Goal  Goal: Psychosocial  Outcome: Progressing Towards Goal     Problem: Pneumonia: Discharge Outcomes  Goal: *Demonstrates progressive activity  Outcome: Progressing Towards Goal  Goal: *Describes follow-up/return visits to physicians  Outcome: Progressing Towards Goal  Goal: *Tolerating diet  Outcome: Progressing Towards Goal  Goal: *Verbalizes name, dosage, time, side effects, and number of days to continue medications  Outcome: Progressing Towards Goal  Goal: *Influenza immunization  Outcome: Progressing Towards Goal  Goal: *Pneumococcal immunization  Outcome: Progressing Towards Goal  Goal: *Respiratory status at baseline  Outcome: Progressing Towards Goal  Goal: *Vital signs within defined limits  Outcome: Progressing Towards Goal  Goal: *Describes available resources and support systems  Outcome: Progressing Towards Goal  Goal: *Optimal pain control at patient's stated goal  Outcome: Progressing Towards Goal     Problem: Discharge Planning  Goal: *Discharge to safe environment  Outcome: Progressing Towards Goal  Goal: *Knowledge of medication management  Outcome: Progressing Towards Goal  Goal: *Knowledge of discharge instructions  Outcome: Progressing Towards Goal     Problem: Patient Education: Go to Patient Education Activity  Goal: Patient/Family Education  Outcome: Progressing Towards Goal     Problem: Pressure Injury - Risk of  Goal: *Prevention of pressure injury  Description: Document [place] Scale and appropriate interventions in the flowsheet.   Outcome: Progressing Towards Goal  Note: Pressure Injury Interventions:  Sensory Interventions: Assess changes in LOC    Moisture Interventions: Apply protective barrier, creams and emollients    Activity Interventions: Increase time out of bed    Mobility Interventions: PT/OT evaluation    Nutrition Interventions: Document food/fluid/supplement intake    Friction and Shear Interventions: Minimize layers                Problem: Patient Education: Go to Patient Education Activity  Goal: Patient/Family Education  Outcome: Progressing Towards Goal     Problem: Pain  Goal: *Control of Pain  Outcome: Progressing Towards Goal  Goal: *PALLIATIVE CARE:  Alleviation of Pain  Outcome: Progressing Towards Goal     Problem: Patient Education: Go to Patient Education Activity  Goal: Patient/Family Education  Outcome: Progressing Towards Goal

## 2022-12-08 NOTE — PROGRESS NOTES
PICC Placement Note    PRE-PROCEDURE VERIFICATION  Correct Procedure: yes  Correct Site:  yes  Temperature: Temp: 98.6 °F (37 °C), Temperature Source: Temp Source: Oral  Recent Labs     12/07/22  1057 12/06/22  1207   BUN  --  6   CREA  --  0.66*     --    WBC 11.9*  --      Allergies: Patient has no known allergies. Education materials for PICC Care given to family: yes. See Patient Education activity for further details. PICC Booklet placed on chart: yes    PROCEDURE DETAIL  A single lumen PICC line was started for antibiotic therapy. The following documentation is in addition to the PICC properties in the lines/airways flowsheet :  Lot #: [id number]  xylocaine used: yes  Mid-Arm Circumference: 35 (cm)  Internal Catheter Length: 42 (cm)  Internal Catheter Total Length: 42 (cm)  Vein Selection for PICC:right brachial  Central Line Bundle followed yes  Complication Related to Insertion: none    The placement was verified by TPS: yes. The TPS results state the tip location is on the right side and the tip overlies the lower superior vena cava.      Line is okay to use: yes    Samanta Saenz RN

## 2022-12-08 NOTE — PROGRESS NOTES
Problem: Falls - Risk of  Goal: *Absence of Falls  Description: Document Celia [place] Fall Risk and appropriate interventions in the flowsheet.   Outcome: Progressing Towards Goal  Note: Fall Risk Interventions:  Mobility Interventions: PT Consult for mobility concerns         Medication Interventions: Teach patient to arise slowly    Elimination Interventions: Call light in reach              Problem: Patient Education:  Go to Education Activity  Goal: Patient/Family Education  Outcome: Progressing Towards Goal

## 2022-12-08 NOTE — PROGRESS NOTES
Physician Discharge Summary     Patient ID:    Mauro De La Cruz  [id number]  1 y.o.  1990    Admit date: 11/27/2022    Discharge date : 12/8/2022      Final Diagnoses:   Pneumonia [J18.9]  History of asthma [Z87.09]  Shortness of breath [R06.02]  Influenza A [J10.1]  Severe sepsis ([address] [A41.9, R65.20]      Reason for Hospitalization:    Mr. Mel Wen, a 35 yo male, with a history of HTN, asthma, non-Hodgkin's lymphoma post chemotherapy, now on radiation, DVT, PE, presents to the ED for shortness of breath. Pt was seen on 11/25 for similar, diagnosed with Flu A and sent home on Tamiflu. Workup thus far significant for CXR with evidence of bilateral pneumonia, labs suggestive of severe sepsis. Pt started on Vancomycin, Cefepime, Levaquin. Initial lab levels on 11/22 show WBC 5.3, HGB 14.4, Platelet 978. Hospital Course:     He was placed on high flow O2 with an FiO2 of 80%, 50 L/min, eventually converted to NC 1L/min, and then on room air. He has a port-a-cath placed for his Non-Hodgkin Lymphoma that was removed on 12/6 and likely is causing the infection. The port-a-cath was removed on 12/6. PICC line was inserted for IV antibiotics on 12/8. Patient was seen on 12/8 and was stable for discharge. He was discharged with IV Vancomycin. Discharge Medications:   Current Discharge Medication List        START taking these medications    Details   !! vancomycin (VANCOCIN) 10 gram solr 1,500 mg by IntraVENous route every eight (8) hours for 14 doses. Indications: a systemic inflammatory response called sepsis due to an infection with bacteria, bacterial pneumonia caused by Staphylococcus, blood infection caused by Staphylococcus bacteria  Qty: 1 Each, Refills: 0  Start date: 12/8/2022, End date: 12/13/2022      !! vancomycin (VANCOCIN) 10 gram solr 1,500 mg by IntraVENous route every eight (8) hours for 14 days.  Indications: blood infection caused by Staphylococcus bacteria  Qty: 57195 mg, Refills: 0  Start date: 12/1/2022, End date: 12/15/2022       !! - Potential duplicate medications found. Please discuss with provider. Follow up Care:    1. Dorothea Schilling MD in 1-2 weeks. Please call to set up an appointment shortly after discharge. Diet:  Regular Diet    Disposition:  Home. Advanced Directive:   FULL x   DNR      Discharge Exam:  General:  Alert, cooperative, no distress, appears stated age. Lungs:   Clear to auscultation bilaterally. Chest wall:  No tenderness or deformity. Heart:  Regular rate and rhythm, S1, S2 normal, no murmur, click, rub or gallop. Abdomen:   Soft, non-tender. Bowel sounds normal. No masses,  No organomegaly. Extremities: Extremities normal, atraumatic, no cyanosis or edema. Pulses: 2+ and symmetric all extremities. Skin: Skin color, texture, turgor normal. No rashes or lesions   Neurologic: CNII-XII intact. No gross sensory or motor deficits        CONSULTATIONS: Pulmonary/Intensive care and ID    Significant Diagnostic Studies:   11/27/2022: BUN 20 mg/dL (Ref range: 6 - 20 mg/dL); Calcium 8.7 mg/dL (Ref range: 8.5 - 10.1 mg/dL); CO2 20 mmol/L (L; Ref range: 21 - 32 mmol/L); Creatinine 1.50 mg/dL (H; Ref range: 0.70 - 1.30 mg/dL); Glucose 185 mg/dL (H; Ref range: 65 - 100 mg/dL); HCT 38.0 % (Ref range: 36.6 - 50.3 %); HGB 13.0 g/dL (Ref range: 12.1 - 17.0 g/dL); Potassium 3.8 mmol/L (Ref range: 3.5 - 5.1 mmol/L); Sodium 129 mmol/L (L; Ref range: 136 - 145 mmol/L)  11/28/2022: BUN 16 mg/dL (Ref range: 6 - 20 mg/dL); Calcium 8.8 mg/dL (Ref range: 8.5 - 10.1 mg/dL); CO2 20 mmol/L (L; Ref range: 21 - 32 mmol/L); Creatinine 1.03 mg/dL (Ref range: 0.70 - 1.30 mg/dL); Glucose 163 mg/dL (H; Ref range: 65 - 100 mg/dL); HCT 31.7 % (L; Ref range: 36.6 - 50.3 %); HGB 10.7 g/dL (L; Ref range: 12.1 - 17.0 g/dL); Potassium 4.3 mmol/L (Ref range: 3.5 - 5.1 mmol/L);  Sodium 135 mmol/L (L; Ref range: 136 - 145 mmol/L)  Recent Labs     12/08/22  1219 12/07/22  1057   WBC 9.0 11.9*   HGB 7.4* 7.4*   HCT 22.6* 22.6*    236     Recent Labs     12/06/22  1207      K 3.4*      CO2 24   BUN 6   CREA 0.66*   *   CA 8.0*     No results for input(s): ALT, AP, TBIL, TBILI, TP, ALB, GLOB, GGT, AML, LPSE in the last 72 hours. No lab exists for component: SGOT, GPT, AMYP, HLPSE  No results for input(s): INR, PTP, APTT, INREXT in the last 72 hours. No results for input(s): FE, TIBC, PSAT, FERR in the last 72 hours. No results for input(s): PH, PCO2, PO2 in the last 72 hours. No results for input(s): CPK, CKMB in the last 72 hours.     No lab exists for component: TROPONINI  Lab Results   Component Value Date/Time    Glucose (POC) 108 08/17/2022 07:01 AM    Glucose (POC) 125 (H) 07/20/2022 07:14 AM    Glucose (POC) 163 (H) 05/10/2022 06:08 AM    Glucose (POC) 171 (H) 05/09/2022 11:32 PM    Glucose (POC) 126 (H) 05/09/2022 01:20 PM       Discharge time spent 35 minutes    Signed:  Rhiannon Holloway  12/8/2022  3:30 PM

## 2022-12-08 NOTE — PROGRESS NOTES
Physician Discharge Summary     Patient ID:    Luz Washington  [id number]  21 y.o.  1990    Admit date: 11/27/2022    Discharge date : 12/8/2022      Final Diagnoses:   Pneumonia [J18.9]  History of asthma [Z87.09]  Shortness of breath [R06.02]  Influenza A [J10.1]  Severe sepsis ([facility] [address] [A41.9, R65.20]  Persistent MRSA bacteremia  Essential hypertension  Hypophosphatemia  History of DVT and PE  History of non-Hodgkin's lymphoma    Reason for Hospitalization:    Mr. Randolph Orozco, a 33 yo male, with a history of HTN, asthma, non-Hodgkin's lymphoma post chemotherapy, now on radiation, DVT, PE, presents to the ED for shortness of breath. Pt was seen on 11/25 for similar, diagnosed with Flu A and sent home on Tamiflu. Workup thus far significant for CXR with evidence of bilateral pneumonia, labs suggestive of severe sepsis. Pt started on Vancomycin, Cefepime, Levaquin. Initial lab levels on 11/22 show WBC 5.3, HGB 14.4, Platelet 352. Hospital Course:     He was placed on high flow O2 with an FiO2 of 80%, 50 L/min, eventually converted to NC 1L/min, and then on room air. He was treated with Tamiflu for his influenza with improvement in symptoms. He has a port-a-cath placed for his Non-Hodgkin Lymphoma that was removed on 12/6 and likely is causing the infection. The port-a-cath was removed on 12/6. Follow-up chest x-ray showed improved infiltrates    PICC line was inserted for IV antibiotics on 12/8. Patient was seen on 12/8 and was stable for discharge. He was discharged with IV Vancomycin for weeks following last negative blood culture. Discharge Medications:   Current Discharge Medication List        START taking these medications    Details   !! vancomycin (VANCOCIN) 10 gram solr 1,500 mg by IntraVENous route every eight (8) hours for 14 doses.  Indications: a systemic inflammatory response called sepsis due to an infection with bacteria, bacterial pneumonia caused by Staphylococcus, blood infection caused by Staphylococcus bacteria  Qty: 1 Each, Refills: 0  Start date: 12/8/2022, End date: 12/13/2022      !! vancomycin (VANCOCIN) 10 gram solr 1,500 mg by IntraVENous route every eight (8) hours for 14 days. Indications: blood infection caused by Staphylococcus bacteria  Qty: 25598 mg, Refills: 0  Start date: 12/1/2022, End date: 12/15/2022       !! - Potential duplicate medications found. Please discuss with provider. Follow up Care:    1. Valentine Palumbo MD in 1-2 weeks. Please call to set up an appointment shortly after discharge. Diet:  Regular Diet    Disposition:  Home. Advanced Directive:   FULL x   DNR      Discharge Exam:  General:  Alert, cooperative, no distress, appears stated age. Lungs:   Clear to auscultation bilaterally. Chest wall:  No tenderness or deformity. Heart:  Regular rate and rhythm, S1, S2 normal, no murmur, click, rub or gallop. Abdomen:   Soft, non-tender. Bowel sounds normal. No masses,  No organomegaly. Extremities: Extremities normal, atraumatic, no cyanosis or edema. Pulses: 2+ and symmetric all extremities. Skin: Skin color, texture, turgor normal. No rashes or lesions   Neurologic: CNII-XII intact. No gross sensory or motor deficits        CONSULTATIONS: Pulmonary/Intensive care and ID    Significant Diagnostic Studies:   11/27/2022: BUN 20 mg/dL (Ref range: 6 - 20 mg/dL); Calcium 8.7 mg/dL (Ref range: 8.5 - 10.1 mg/dL); CO2 20 mmol/L (L; Ref range: 21 - 32 mmol/L); Creatinine 1.50 mg/dL (H; Ref range: 0.70 - 1.30 mg/dL); Glucose 185 mg/dL (H; Ref range: 65 - 100 mg/dL); HCT 38.0 % (Ref range: 36.6 - 50.3 %); HGB 13.0 g/dL (Ref range: 12.1 - 17.0 g/dL); Potassium 3.8 mmol/L (Ref range: 3.5 - 5.1 mmol/L); Sodium 129 mmol/L (L; Ref range: 136 - 145 mmol/L)  11/28/2022: BUN 16 mg/dL (Ref range: 6 - 20 mg/dL);  Calcium 8.8 mg/dL (Ref range: 8.5 - 10.1 mg/dL); CO2 20 mmol/L (L; Ref range: 21 - 32 mmol/L); Creatinine 1.03 mg/dL (Ref range: 0.70 - 1.30 mg/dL); Glucose 163 mg/dL (H; Ref range: 65 - 100 mg/dL); HCT 31.7 % (L; Ref range: 36.6 - 50.3 %); HGB 10.7 g/dL (L; Ref range: 12.1 - 17.0 g/dL); Potassium 4.3 mmol/L (Ref range: 3.5 - 5.1 mmol/L); Sodium 135 mmol/L (L; Ref range: 136 - 145 mmol/L)  Recent Labs     12/08/22  1219 12/07/22  1057   WBC 9.0 11.9*   HGB 7.4* 7.4*   HCT 22.6* 22.6*    236       Recent Labs     12/06/22  1207      K 3.4*      CO2 24   BUN 6   CREA 0.66*   *   CA 8.0*       No results for input(s): ALT, AP, TBIL, TBILI, TP, ALB, GLOB, GGT, AML, LPSE in the last 72 hours. No lab exists for component: SGOT, GPT, AMYP, HLPSE  No results for input(s): INR, PTP, APTT, INREXT, INREXT in the last 72 hours. No results for input(s): FE, TIBC, PSAT, FERR in the last 72 hours. No results for input(s): PH, PCO2, PO2 in the last 72 hours. No results for input(s): CPK, CKMB in the last 72 hours.     No lab exists for component: TROPONINI  Lab Results   Component Value Date/Time    Glucose (POC) 108 08/17/2022 07:01 AM    Glucose (POC) 125 (H) 07/20/2022 07:14 AM    Glucose (POC) 163 (H) 05/10/2022 06:08 AM    Glucose (POC) 171 (H) 05/09/2022 11:32 PM    Glucose (POC) 126 (H) 05/09/2022 01:20 PM       Discharge time spent 35 minutes    Signed:  Walton Leventhal, MD  12/8/2022  3:30 PM

## 2022-12-08 NOTE — PROGRESS NOTES
Hospitalist Progress Note               Daily Progress Note: 12/8/2022      Subjective:   Hospital course to date:    Mr. Jerzy Martinez, a 33 yo male, with a history of HTN, asthma, non-Hodgkin's lymphoma post chemotherapy, now on radiation, DVT, PE, presents to the ED for shortness of breath. Pt was seen on 11/25 for similar, diagnosed with Flu A and sent home on Tamiflu. Workup thus far significant for CXR with evidence of bilateral pneumonia, labs suggestive of severe sepsis. Pt started on Vancomycin, Cefepime, Levaquin. He has a port-a-cath placed for his Non-Hodgkin Lymphoma that was removed on 12/6 and likely is causing the infection. The port-a-cath was removed on 12/6.  --  The patient is seen for follow up. He states that he feels much better today, with no shortness of breath. He notes the chest tightness has subsided as well. Blood culture from 12/6 negative to date  WBC 11.9 trending upwards  CRP 10.8 trending downwards    PICC line can be inserted for IV antibiotics at home and then he can be discharged.     Problem List:  Problem List as of 12/8/2022 Date Reviewed: 8/15/2022            Codes Class Noted - Resolved    Shortness of breath ICD-10-CM: R06.02  ICD-9-CM: 786.05  11/27/2022 - Present        Pneumonia ICD-10-CM: J18.9  ICD-9-CM: 486  11/27/2022 - Present        History of asthma ICD-10-CM: Z87.09  ICD-9-CM: V12.69  11/27/2022 - Present        Influenza A ICD-10-CM: J10.1  ICD-9-CM: 487.1  11/27/2022 - Present        Severe sepsis ([facility] [address] ICD-10-CM: A41.9, R65.20  ICD-9-CM: 038.9, 995.92  11/27/2022 - Present        Asthma ICD-10-CM: P42.208  ICD-9-CM: 493.90  5/7/2022 - Present        Acute respiratory failure with hypoxia ([facility] [address] ICD-10-CM: J96.01  ICD-9-CM: 518.81  5/7/2022 - Present        Lymphoma ([facility] [address] ICD-10-CM: C85.90  ICD-9-CM: 202.80  3/1/2022 - Present        Pelvic mass ICD-10-CM: R19.00  ICD-9-CM: 789.30  2/23/2022 - Present        Bilateral pulmonary embolism (HCC) ICD-10-CM: I26.99  ICD-9-CM: 415.19  2/23/2022 - Present        Pulmonary embolism ([facility] [address] ICD-10-CM: I26.99  ICD-9-CM: 415.19  2/22/2022 - Present        Right inguinal pain ICD-10-CM: R10.31  ICD-9-CM: 789.03  2/17/2022 - Present        Right groin mass ICD-10-CM: R19.09  ICD-9-CM: 789.39  2/11/2022 - Present        Fall ICD-10-CM: W19. Azucena Morales  ICD-9-CM: E888.9  2/11/2022 - Present       Medications reviewed  Current Facility-Administered Medications   Medication Dose Route Frequency    [START ON 12/9/2022] Vancomycin - Draw trough at 1200 on 12/9 prior to next dose. Thanks!    Other ONCE    fluticasone propionate (FLONASE) 50 mcg/actuation nasal spray 2 Spray  2 Spray Both Nostrils DAILY    cefTARoline (TEFLARO) 600 mg in 0.9% sodium chloride (MBP/ADV) 50 mL MBP  600 mg IntraVENous Q12H    albuterol-ipratropium (DUO-NEB) 2.5 MG-0.5 MG/3 ML  3 mL Nebulization Q6H PRN    albuterol (PROVENTIL HFA, VENTOLIN HFA, PROAIR HFA) inhaler 2 Puff  2 Puff Inhalation Q6HWA RT    budesonide-formoteroL (SYMBICORT) 160-4.5 mcg/actuation HFA inhaler 2 Puff  2 Puff Inhalation BID RT    vancomycin (VANCOCIN) 1500 mg in  ml infusion  1,500 mg IntraVENous Q8H    zinc oxide-white petrolatum 20-75 % topical paste   Topical DAILY    sodium chloride (NS) flush 5-10 mL  5-10 mL IntraVENous PRN    sodium chloride (NS) flush 5-40 mL  5-40 mL IntraVENous PRN    acetaminophen (TYLENOL) tablet 650 mg  650 mg Oral Q6H PRN    Or    acetaminophen (TYLENOL) suppository 650 mg  650 mg Rectal Q6H PRN    polyethylene glycol (MIRALAX) packet 17 g  17 g Oral DAILY PRN    bisacodyL (DULCOLAX) tablet 5 mg  5 mg Oral DAILY PRN    ondansetron (ZOFRAN ODT) tablet 4 mg  4 mg Oral Q6H PRN    Or    ondansetron (ZOFRAN) injection 4 mg  4 mg IntraVENous Q6H PRN    sodium chloride (NS) flush 5-10 mL  5-10 mL IntraVENous PRN    metoprolol (LOPRESSOR) injection 5 mg  5 mg IntraVENous Q3H PRN    guaiFENesin-codeine (ROBITUSSIN AC) 100-10 mg/5 mL solution 10 mL  10 mL Oral Q4H PRN    Vancomycin - Pharmacy to Dose   Other Rx Dosing/Monitoring    cetirizine (ZYRTEC) tablet 10 mg  10 mg Oral DAILY    montelukast (SINGULAIR) tablet 10 mg  10 mg Oral DAILY    rivaroxaban (XARELTO) tablet 20 mg  20 mg Oral DAILY    hydrOXYzine (VISTARIL) injection 50 mg  50 mg IntraMUSCular Q6H PRN       Review of Systems:   A comprehensive review of systems was negative except for that written in the HPI. Objective:   Physical Exam:     Visit Vitals  /72 (BP 1 Location: Left upper arm, BP Patient Position: At rest;Lying)   Pulse 88   Temp 98.6 °F (37 °C)   Resp 18   Ht 5' 5.98\" (1.676 m)   Wt 103.2 kg (227 lb 9.6 oz)   SpO2 96%   BMI 36.75 kg/m²    O2 Flow Rate (L/min): 1 l/min O2 Device: None (Room air)    Temp (24hrs), Av °F (37.2 °C), Min:98.6 °F (37 °C), Max:99.5 °F (37.5 °C)    No intake/output data recorded.  1901 -  0700  In: 2767 [P.O.:1480]  Out: 1050 [Urine:1050]    General:   Awake and alert   Lungs:   Clear to auscultation bilaterally. Chest wall:  No tenderness or deformity. Heart:  Regular rhythm, tachycardia, S1, S2 normal, no murmur, click, rub or gallop. Abdomen:   Soft, non-tender. Bowel sounds normal. No masses,  No organomegaly. Extremities: Extremities normal, atraumatic, no cyanosis or edema. Pulses: 2+ and symmetric all extremities. Skin: Skin color, texture, turgor normal. No rashes or lesions   Neurologic: CNII-XII intact. No gross focal deficits         Data Review:       Recent Days:  Recent Labs     22  1057   WBC 11.9*   HGB 7.4*   HCT 22.6*          Recent Labs     22  1207      K 3.4*      CO2 24   *   BUN 6   CREA 0.66*   CA 8.0*       No results for input(s): PH, PCO2, PO2, HCO3, FIO2 in the last 72 hours.     24 Hour Results:  Recent Results (from the past 24 hour(s))   CBC WITH AUTOMATED DIFF    Collection Time: 22 10:57 AM   Result Value Ref Range    WBC 11.9 (H) 4.1 - 11.1 K/uL    RBC 2.52 (L) 4.10 - 5.70 M/uL    HGB 7.4 (L) 12.1 - 17.0 g/dL    HCT 22.6 (L) 36.6 - 50.3 %    MCV 89.7 80.0 - 99.0 FL    MCH 29.4 26.0 - 34.0 PG    MCHC 32.7 30.0 - 36.5 g/dL    RDW 15.9 (H) 11.5 - 14.5 %    PLATELET 166 632 - 369 K/uL    MPV 10.8 8.9 - 12.9 FL    NRBC 1.3 (H) 0.0  WBC    ABSOLUTE NRBC 0.15 (H) 0.00 - 0.01 K/uL    NEUTROPHILS 83 (H) 32 - 75 %    BAND NEUTROPHILS 9 (H) 0 - 6 %    LYMPHOCYTES 1 (L) 12 - 49 %    MONOCYTES 2 (L) 5 - 13 %    EOSINOPHILS 0 0 - 7 %    BASOPHILS 0 0 - 1 %    METAMYELOCYTES 4 (H) 0 %    MYELOCYTES 1 (H) 0 %    IMMATURE GRANULOCYTES 0 %    ABS. NEUTROPHILS 10.9 (H) 1.8 - 8.0 K/UL    ABS. LYMPHOCYTES 0.1 (L) 0.8 - 3.5 K/UL    ABS. MONOCYTES 0.2 0.0 - 1.0 K/UL    ABS. EOSINOPHILS 0.0 0.0 - 0.4 K/UL    ABS. BASOPHILS 0.0 0.0 - 0.1 K/UL    ABS. IMM. GRANS. 0.0 K/UL    DF Manual      PLATELET COMMENTS Large Platelets      RBC COMMENTS Anisocytosis  1+        RBC COMMENTS Hypochromia  1+        WBC COMMENTS Toxic Granulation     C REACTIVE PROTEIN, QT    Collection Time: 12/07/22 10:57 AM   Result Value Ref Range    C-Reactive protein 10.80 (H) 0.00 - 0.60 mg/dL   PROCALCITONIN    Collection Time: 12/07/22 10:57 AM   Result Value Ref Range    Procalcitonin 0.73 (H) 0 ng/mL         XR CHEST PORT   Final Result      Unchanged diffuse bilateral airspace disease, compatible with pneumonia. IR REMOVE TUNL CVAD W PORT/PUMP   Final Result   Technically successful removal of a tunneled Portacath. XR CHEST PA LAT   Final Result   Grossly unchanged widespread patchy consolidative airspace disease. XR CHEST PORT   Final Result   No significant interval change in severe, diffuse airspace infiltrates. XR CHEST PORT   Final Result   Worsening bilateral upper and midlung zone airspace disease, may be accentuated   by lower depth of inspiration. XR CHEST PORT   Final Result   1.  Diffuse bilateral airspace disease             Assessment:  Acute hypoxic respiratory failure  - resolved on room air    Influenza A +  - completed Tamiflu    Persistent MRSA bacteremia, suspected due to Port-A-Cath    Bilateral pneumonia    History of non-Hodgkin's lymphoma  - chemotherapy complete  - immunocompromised    Prior history of DVT and PE  - Xarelto    Benign essential hypertension    Hypophosphatemia, resolved    Severe sepsis, resolved/resolving    Plan:  Continue supportive care  Continue IV Vancomycin and Ceftaroline for persistent MRSA  PICC line can be placed for IV antibiotics at home and then he can be discharged  Repeat chest xray prior to discharge    Care Plan discussed with: Patient/Family    Disposition:     Total time spent with patient: 30 minutes.     Trudy Islas MD

## 2022-12-08 NOTE — PROGRESS NOTES
12/8/2022      RE: Yeison Howard      To Whom it May Concern: This is to certify that Yeison Howard has been an inpatient at [facility] from  11/27-12/8/22    Please feel free to contact my office if you have any questions or concerns. Thank you for your assistance in this matter.     Sincerely,      Donna Hightower RN

## 2022-12-08 NOTE — PROGRESS NOTES
CM called to [employer]. [facility] will be here shortly to do teaching for IV abx. ABX will then be delivered to his house this evening. Patient reports he will be going to his moms house at [address] [place] [address]. Message sent via [employer].

## 2022-12-08 NOTE — PROGRESS NOTES
Hospitalist Progress Note               Daily Progress Note: 12/8/2022      Subjective:   Hospital course to date:    Mr. Williams Burroughs, a 35 yo male, with a history of HTN, asthma, non-Hodgkin's lymphoma post chemotherapy, now on radiation, DVT, PE, presents to the ED for shortness of breath. Pt was seen on 11/25 for similar, diagnosed with Flu A and sent home on Tamiflu. Workup thus far significant for CXR with evidence of bilateral pneumonia, labs suggestive of severe sepsis. Pt started on Vancomycin, Cefepime, Levaquin. He has a port-a-cath placed for his Non-Hodgkin Lymphoma that was removed on 12/6 and likely is causing the infection. The port-a-cath was removed on 12/6.  --  The patient is seen for follow up. He states that he feels much better today, with no shortness of breath. He notes the chest tightness has subsided as well. Blood culture from 12/6 negative to date  WBC 11.9 trending upwards  CRP 10.8 trending downwards    PICC line can be inserted for IV antibiotics at home and then he can be discharged.     Problem List:  Problem List as of 12/8/2022 Date Reviewed: 8/15/2022            Codes Class Noted - Resolved    Shortness of breath ICD-10-CM: R06.02  ICD-9-CM: 786.05  11/27/2022 - Present        Pneumonia ICD-10-CM: J18.9  ICD-9-CM: 486  11/27/2022 - Present        History of asthma ICD-10-CM: Z87.09  ICD-9-CM: V12.69  11/27/2022 - Present        Influenza A ICD-10-CM: J10.1  ICD-9-CM: 487.1  11/27/2022 - Present        Severe sepsis ([facility] ICD-10-CM: A41.9, R65.20  ICD-9-CM: 038.9, 995.92  11/27/2022 - Present        Asthma ICD-10-CM: C32.314  ICD-9-CM: 493.90  5/7/2022 - Present        Acute respiratory failure with hypoxia ([facility] ICD-10-CM: J96.01  ICD-9-CM: 518.81  5/7/2022 - Present        Lymphoma ([address] ICD-10-CM: C85.90  ICD-9-CM: 202.80  3/1/2022 - Present        Pelvic mass ICD-10-CM: R19.00  ICD-9-CM: 789.30  2/23/2022 - Present        Bilateral pulmonary embolism (HCC) ICD-10-CM: I26.99  ICD-9-CM: 415.19  2/23/2022 - Present        Pulmonary embolism ([address] ICD-10-CM: I26.99  ICD-9-CM: 415.19  2/22/2022 - Present        Right inguinal pain ICD-10-CM: R10.31  ICD-9-CM: 789.03  2/17/2022 - Present        Right groin mass ICD-10-CM: R19.09  ICD-9-CM: 789.39  2/11/2022 - Present        Fall ICD-10-CM: W19. Marletta Popper  ICD-9-CM: E888.9  2/11/2022 - Present       Medications reviewed  Current Facility-Administered Medications   Medication Dose Route Frequency    [START ON 12/9/2022] Vancomycin - Draw trough at 1200 on 12/9 prior to next dose. Thanks!    Other ONCE    fluticasone propionate (FLONASE) 50 mcg/actuation nasal spray 2 Spray  2 Spray Both Nostrils DAILY    cefTARoline (TEFLARO) 600 mg in 0.9% sodium chloride (MBP/ADV) 50 mL MBP  600 mg IntraVENous Q12H    albuterol-ipratropium (DUO-NEB) 2.5 MG-0.5 MG/3 ML  3 mL Nebulization Q6H PRN    albuterol (PROVENTIL HFA, VENTOLIN HFA, PROAIR HFA) inhaler 2 Puff  2 Puff Inhalation Q6HWA RT    budesonide-formoteroL (SYMBICORT) 160-4.5 mcg/actuation HFA inhaler 2 Puff  2 Puff Inhalation BID RT    vancomycin (VANCOCIN) 1500 mg in  ml infusion  1,500 mg IntraVENous Q8H    zinc oxide-white petrolatum 20-75 % topical paste   Topical DAILY    sodium chloride (NS) flush 5-10 mL  5-10 mL IntraVENous PRN    sodium chloride (NS) flush 5-40 mL  5-40 mL IntraVENous PRN    acetaminophen (TYLENOL) tablet 650 mg  650 mg Oral Q6H PRN    Or    acetaminophen (TYLENOL) suppository 650 mg  650 mg Rectal Q6H PRN    polyethylene glycol (MIRALAX) packet 17 g  17 g Oral DAILY PRN    bisacodyL (DULCOLAX) tablet 5 mg  5 mg Oral DAILY PRN    ondansetron (ZOFRAN ODT) tablet 4 mg  4 mg Oral Q6H PRN    Or    ondansetron (ZOFRAN) injection 4 mg  4 mg IntraVENous Q6H PRN    sodium chloride (NS) flush 5-10 mL  5-10 mL IntraVENous PRN    metoprolol (LOPRESSOR) injection 5 mg  5 mg IntraVENous Q3H PRN    guaiFENesin-codeine (ROBITUSSIN AC) 100-10 mg/5 mL solution 10 mL  10 mL Oral Q4H PRN    Vancomycin - Pharmacy to Dose   Other Rx Dosing/Monitoring    cetirizine (ZYRTEC) tablet 10 mg  10 mg Oral DAILY    montelukast (SINGULAIR) tablet 10 mg  10 mg Oral DAILY    rivaroxaban (XARELTO) tablet 20 mg  20 mg Oral DAILY    hydrOXYzine (VISTARIL) injection 50 mg  50 mg IntraMUSCular Q6H PRN       Review of Systems:   A comprehensive review of systems was negative except for that written in the HPI. Objective:   Physical Exam:     Visit Vitals  /72 (BP 1 Location: Left upper arm, BP Patient Position: At rest;Lying)   Pulse 88   Temp 98.6 °F (37 °C)   Resp 18   Ht 5' 5.98\" (1.676 m)   Wt 103.2 kg (227 lb 9.6 oz)   SpO2 94%   BMI 36.75 kg/m²    O2 Flow Rate (L/min): 1 l/min O2 Device: None (Room air)    Temp (24hrs), Av °F (37.2 °C), Min:98.6 °F (37 °C), Max:99.5 °F (37.5 °C)    No intake/output data recorded.  1901 -  0700  In: 0743 [P.O.:1480]  Out: 1050 [Urine:1050]    General:   Awake and alert   Lungs:   Clear to auscultation bilaterally. Chest wall:  No tenderness or deformity. Heart:  Regular rhythm, tachycardia, S1, S2 normal, no murmur, click, rub or gallop. Abdomen:   Soft, non-tender. Bowel sounds normal. No masses,  No organomegaly. Extremities: Extremities normal, atraumatic, no cyanosis or edema. Pulses: 2+ and symmetric all extremities. Skin: Skin color, texture, turgor normal. No rashes or lesions   Neurologic: CNII-XII intact. No gross focal deficits         Data Review:       Recent Days:  Recent Labs     22  1057   WBC 11.9*   HGB 7.4*   HCT 22.6*          Recent Labs     22  1207      K 3.4*      CO2 24   *   BUN 6   CREA 0.66*   CA 8.0*       No results for input(s): PH, PCO2, PO2, HCO3, FIO2 in the last 72 hours.     24 Hour Results:  Recent Results (from the past 24 hour(s))   CBC WITH AUTOMATED DIFF    Collection Time: 22 10:57 AM   Result Value Ref Range    WBC 11.9 (H) 4.1 - 11.1 K/uL    RBC 2.52 (L) 4.10 - 5.70 M/uL    HGB 7.4 (L) 12.1 - 17.0 g/dL    HCT 22.6 (L) 36.6 - 50.3 %    MCV 89.7 80.0 - 99.0 FL    MCH 29.4 26.0 - 34.0 PG    MCHC 32.7 30.0 - 36.5 g/dL    RDW 15.9 (H) 11.5 - 14.5 %    PLATELET 038 623 - 392 K/uL    MPV 10.8 8.9 - 12.9 FL    NRBC 1.3 (H) 0.0  WBC    ABSOLUTE NRBC 0.15 (H) 0.00 - 0.01 K/uL    NEUTROPHILS 83 (H) 32 - 75 %    BAND NEUTROPHILS 9 (H) 0 - 6 %    LYMPHOCYTES 1 (L) 12 - 49 %    MONOCYTES 2 (L) 5 - 13 %    EOSINOPHILS 0 0 - 7 %    BASOPHILS 0 0 - 1 %    METAMYELOCYTES 4 (H) 0 %    MYELOCYTES 1 (H) 0 %    IMMATURE GRANULOCYTES 0 %    ABS. NEUTROPHILS 10.9 (H) 1.8 - 8.0 K/UL    ABS. LYMPHOCYTES 0.1 (L) 0.8 - 3.5 K/UL    ABS. MONOCYTES 0.2 0.0 - 1.0 K/UL    ABS. EOSINOPHILS 0.0 0.0 - 0.4 K/UL    ABS. BASOPHILS 0.0 0.0 - 0.1 K/UL    ABS. IMM. GRANS. 0.0 K/UL    DF Manual      PLATELET COMMENTS Large Platelets      RBC COMMENTS Anisocytosis  1+        RBC COMMENTS Hypochromia  1+        WBC COMMENTS Toxic Granulation     C REACTIVE PROTEIN, QT    Collection Time: 12/07/22 10:57 AM   Result Value Ref Range    C-Reactive protein 10.80 (H) 0.00 - 0.60 mg/dL   PROCALCITONIN    Collection Time: 12/07/22 10:57 AM   Result Value Ref Range    Procalcitonin 0.73 (H) 0 ng/mL         XR CHEST PORT   Final Result      Unchanged diffuse bilateral airspace disease, compatible with pneumonia. IR REMOVE TUNL CVAD W PORT/PUMP   Final Result   Technically successful removal of a tunneled Portacath. XR CHEST PA LAT   Final Result   Grossly unchanged widespread patchy consolidative airspace disease. XR CHEST PORT   Final Result   No significant interval change in severe, diffuse airspace infiltrates. XR CHEST PORT   Final Result   Worsening bilateral upper and midlung zone airspace disease, may be accentuated   by lower depth of inspiration. XR CHEST PORT   Final Result   1.  Diffuse bilateral airspace disease             Assessment:  Acute hypoxic respiratory failure  - resolved on room air    Influenza A +  - completed Tamiflu    Persistent MRSA bacteremia, suspected due to Port-A-Cath    Bilateral pneumonia    History of non-Hodgkin's lymphoma  - chemotherapy complete  - immunocompromised    Prior history of DVT and PE  - Xarelto    Benign essential hypertension    Hypophosphatemia, resolved    Severe sepsis, resolved/resolving    Plan:  Continue supportive care  Continue IV Vancomycin and Ceftaroline for persistent MRSA  PICC line can be placed for IV antibiotics at home and then he can be discharged  Repeat chest xray prior to discharge    Care Plan discussed with: Patient/Family    Disposition:     Total time spent with patient: 30 minutes.     Mariajose Odom

## 2022-12-08 NOTE — PROGRESS NOTES
Spiritual Care Assessment/Progress Note  [facility]      NAME: Sin Barton      MRN: [id number]  AGE: 34 y.o. SEX: male  Oriental orthodox Affiliation: No preference   Language: English     12/8/2022     Total Time (in minutes): 30     Spiritual Assessment begun in [address] through conversation with:         [x]Patient        [x] Family    [] Friend(s)        Reason for Consult: Initial/Spiritual assessment, patient floor     Spiritual beliefs: (Please include comment if needed)     [x] Identifies with a gaby tradition:         [] Supported by a gaby community:            [] Claims no spiritual orientation:           [] Seeking spiritual identity:                [] Adheres to an individual form of spirituality:           [] Not able to assess:                           Identified resources for coping:      [x] Prayer                               [] Music                  [] Guided Imagery     [x] Family/friends                 [] Pet visits     [] Devotional reading                         [] Unknown     [] Other:                                               Interventions offered during this visit: (See comments for more details)    Patient Interventions: Affirmation of emotions/emotional suffering, Affirmation of gaby, Catharsis/review of pertinent events in supportive environment, Coping skills reviewed/reinforced, Guidance concerning next steps/process to be expected, Initial/Spiritual assessment, patient floor, Iconic (affirming the presence of God/Higher Power), Prayer (assurance of), Oriental orthodox beliefs/image of God discussed     Family/Friend(s):  Affirmation of gaby, Catharsis/review of pertinent events in supportive environment, Coping skills reviewed/reinforced, Initial Assessment, Iconic (affirming the presence of God/Higher Power)     Plan of Care:     [] Support spiritual and/or cultural needs    [] Support AMD and/or advance care planning process      [] Support grieving process   [] Coordinate Rites and/or Rituals    [] Coordination with community clergy   [] No spiritual needs identified at this time   [] Detailed Plan of Care below (See Comments)  [] Make referral to Music Therapy  [] Make referral to Pet Therapy     [] Make referral to Addiction services  [] Make referral to [facility]  [] Make referral to Spiritual Care Partner  [] No future visits requested        [x] Contact Spiritual Care for further referrals     Comments: The purpose of the visit was to do a spiritual assessment on the patient. The patient shared that he was excited and hopeful sense was about to be discharged. His wife was visiting at the time, however they welcomed the visit. He shared that he is excited to get back home to his kids, and that it means a lot to him to be back with his family. He mentioned that they were loving and supportive of him. His wife expressed being a main support system for him, and her hope was to get him and back home. The  listened empathically, encouraged ongoing reflection, assurance of prayer, and provided the ministry of presence. [address] D.Min, M.Div.  [address] can be reached by calling the  at [facility]  [phone]

## 2022-12-08 NOTE — PROGRESS NOTES
Progress Note    Patient: Katherin Kemp MRN: [id number]  SSN: [id number]    YOB: 1990  Age: 34 y.o. Sex: male      Admit Date: 11/27/2022    LOS: 11 days     Subjective:   Patient with NHL followed for severe sepsis with persistent MRSA bacteremia now on Vancomycin and Ceftaroline sputum positive for MRSA. He is afebrile with normal WBC and decreasing CRP and procal.  TTE negative for vegetations. He underwent removal of his Portacath  and repeat blood cultures negative so far. PICC line in place. Patient resting comfortably with no complaints. Objective:     Vitals:    12/08/22 0239 12/08/22 0316 12/08/22 0715 12/08/22 0857   BP: (!) 109/58  136/72    Pulse: (!) 104  88    Resp: 18  18    Temp: 98.8 °F (37.1 °C)  98.6 °F (37 °C)    SpO2: 95%  94% 96%   Weight:  227 lb 9.6 oz (103.2 kg)     Height:            Intake and Output:  Current Shift: 12/08 0701 - 12/08 1900  In: 500 [P.O.:500]  Out: -   Last three shifts: 12/06 1901 - 12/08 0700  In: 1480 [P.O.:1480]  Out: 1050 [Urine:1050]    Physical Exam:   Vitals and nursing note reviewed. Constitutional:       General: He is not in acute distress. Appearance: He is ill-appearing. HENT:   Mouth/Throat:      Pharynx: Oropharynx is clear. Eyes:      Pupils: Pupils are equal, round, and reactive to light. Cardiovascular:      Rate and Rhythm: Regular rhythm. Tachycardia present. Heart sounds: No murmur heard. Comments: [id number] removed, site covered with gauze   Pulmonary:      Effort: No respiratory distress. Breath sounds: normal    Abdominal:      General: There is no distension. Palpations: Abdomen is soft. Tenderness: There is no abdominal tenderness. There is no right CVA tenderness, left CVA tenderness or guarding. Genitourinary:     Comments: No Bonilla catheter  Musculoskeletal:      Right lower leg: No edema. Left lower leg: No edema. Skin:     Findings: No rash.    Neurological: nonfocal, no confusion   Psychiatric:    normal behavior      Lab/Data Review:     WBC 9.000  Lactic 1.5 <2.2    CRP 10.80 <  11.60 <12.40 <13.40 < 9.64 <9.57 <18.50  Procal  0.51 <0.73 <0.68 <1.02 <2.07 <5.20 <11.81 <25.52 <18.89    MRSA Nasal PCR positive    Blood cultures (11/27) Staphylococcus aureus MRSA  Blood cultures (12/1) MRSA   Blood cultures (12/3) MRSA  Blood cultures (12/6) No growth 2 days  Blood cultures (12/7) in process  Sputum culture (11/27)  Staphylococcus aureus MRSA  Urine culture (11/27) No growth FINAL     CXR (12/8) Bilateral interstitial and multifocal patchy airspace disease,  improved. Previous CXR      Assessment:     Active Problems:    Shortness of breath (11/27/2022)      Pneumonia (11/27/2022)      History of asthma (11/27/2022)      Influenza A (11/27/2022)      Severe sepsis ([facility] [address] (11/27/2022)  Severe sepsis with fever, leukopenia, elevated lactic acid and procal, CRP, resolved or resolving  Persistent MRSA bacteremia  , Day #11 IV Vancomycin, Day #5 IV Ceftaroline  Influenza A, status post Tamiflu  Pneumonia, bilateral,   post-viral, secondary to Staphylococcus aureus MRSA, Day #11 IV Vancomycin  Acute hypoxic respiratory failure, on nasal cannuHFNC  Portacath in situ  Asthma  Non-Hodgkins' Lymphoma  MRSA nasal colonization, status post Mupirocin       Comment:   Repeat blood cultures negative at 2 days. WBC normal and CRP decreasing. CXR improving. Plan:   1.  Reasonable to discharge on IV Vancomycin for 14 days; monitor CBC, CRP, Vancomycin trough, BMP every Monday  2. Follow-up repeat blood cultures   3.   Cleared for discharge from ID standpoint         Signed By: Maryellen James MD     December 8, 2022

## 2022-12-11 LAB
BACTERIA SPEC CULT: NORMAL
SPECIAL REQUESTS,SREQ: NORMAL

## 2022-12-12 NOTE — DISCHARGE SUMMARY
Physician Discharge Summary     Patient ID:    Be Hunt  [id number]  23 y.o.  1990    Admit date: 11/27/2022    Discharge date : 12/8/2022      Final Diagnoses:   Pneumonia [J18.9]  History of asthma [Z87.09]  Shortness of breath [R06.02]  Influenza A [J10.1]  Severe sepsis ([facility] [address] [A41.9, R65.20]  Persistent MRSA bacteremia  Essential hypertension  Hypophosphatemia  History of DVT and PE  History of non-Hodgkin's lymphoma    Reason for Hospitalization:    Mr. Kyle Hill, a 33 yo male, with a history of HTN, asthma, non-Hodgkin's lymphoma post chemotherapy, now on radiation, DVT, PE, presents to the ED for shortness of breath. Pt was seen on 11/25 for similar, diagnosed with Flu A and sent home on Tamiflu. Workup thus far significant for CXR with evidence of bilateral pneumonia, labs suggestive of severe sepsis. Pt started on Vancomycin, Cefepime, Levaquin. Initial lab levels on 11/22 show WBC 5.3, HGB 14.4, Platelet 398. Hospital Course:     He was placed on high flow O2 with an FiO2 of 80%, 50 L/min, eventually converted to NC 1L/min, and then on room air. He was treated with Tamiflu for his influenza with improvement in symptoms. He has a port-a-cath placed for his Non-Hodgkin Lymphoma that was removed on 12/6 and likely is causing the infection. The port-a-cath was removed on 12/6. Follow-up chest x-ray showed improved infiltrates    PICC line was inserted for IV antibiotics on 12/8. Patient was seen on 12/8 and was stable for discharge. He was discharged with IV Vancomycin for weeks following last negative blood culture. Discharge Medications:   Discharge Medication List as of 12/8/2022  3:49 PM        START taking these medications    Details   !! vancomycin (VANCOCIN) 10 gram solr 1,500 mg by IntraVENous route every eight (8) hours for 14 doses.  Indications: a systemic inflammatory response called sepsis due to an infection with bacteria, bacterial pneumonia caused by Staphylococcus, blood infection caused by Staphylococcus bacter ia, Print, Disp-1 Each, R-0      !! vancomycin (VANCOCIN) 10 gram solr 1,500 mg by IntraVENous route every eight (8) hours for 14 days. Indications: blood infection caused by Staphylococcus bacteria, Print, Disp-90180 mg, R-0       !! - Potential duplicate medications found. Please discuss with provider. CONTINUE these medications which have NOT CHANGED    Details   acetaminophen (TYLENOL) 325 mg tablet Take 2 Tablets by mouth four (4) times daily as needed for Pain., Normal, Disp-20 Tablet, R-0      guaiFENesin (ROBITUSSIN) 100 mg/5 mL liquid Take 10 mL by mouth three (3) times daily as needed for Cough., Normal, Disp-200 mL, R-0      albuterol sulfate 90 mcg/actuation aebs Take 1-2 Puffs by inhalation every four (4) hours as needed for Wheezing or Shortness of Breath., Normal, Disp-1 Each, R-0      diphenoxylate-atropine (LomotiL) 2.5-0.025 mg per tablet Take 1 Tablet by mouth four (4) times daily as needed for Diarrhea (Take after each loose stool. ). Max Daily Amount: 4 Tablets., Normal, Disp-20 Tablet, R-0      ondansetron (ZOFRAN ODT) 4 mg disintegrating tablet Take 1 Tablet by mouth every eight (8) hours as needed for Nausea or Vomiting., Normal, Disp-20 Tablet, R-0      cetirizine (ZYRTEC) 10 mg tablet Take 1 Tablet by mouth daily. , Historical Med      montelukast (SINGULAIR) 10 mg tablet Take 1 Tablet by mouth daily. , Historical Med      fluticasone propionate (FLONASE) 50 mcg/actuation nasal spray 1 [place] by Both Nostrils route as needed., Historical Med      predniSONE (DELTASONE) 20 mg tablet Take 20 mg by mouth two (2) times a day., Normal, Disp-10 Tablet, R-0      Xarelto 20 mg tab tablet Take 20 mg by mouth daily. , Historical Med, JERRY      albuterol (ACCUNEB) 0.63 mg/3 mL nebulizer solution 3 mL by Nebulization route as needed., Historical Med      Symbicort 160-4.5 mcg/actuation HFAA Take 2 Puffs by inhalation daily., Historical Med, JERRY           STOP taking these medications       oxymetazoline (Afrin, oxymetazoline,) 0.05 % nasal spray Comments:   Reason for Stopping:         oseltamivir (Tamiflu) 75 mg capsule Comments:   Reason for Stopping: Follow up Care:    1. Guerita Sanchez MD in 1-2 weeks. Please call to set up an appointment shortly after discharge. Diet:  Regular Diet    Disposition:  Home. Advanced Directive:   FULL x   DNR      Discharge Exam:  General:  Alert, cooperative, no distress, appears stated age. Lungs:   Clear to auscultation bilaterally. Chest wall:  No tenderness or deformity. Heart:  Regular rate and rhythm, S1, S2 normal, no murmur, click, rub or gallop. Abdomen:   Soft, non-tender. Bowel sounds normal. No masses,  No organomegaly. Extremities: Extremities normal, atraumatic, no cyanosis or edema. Pulses: 2+ and symmetric all extremities. Skin: Skin color, texture, turgor normal. No rashes or lesions   Neurologic: CNII-XII intact. No gross sensory or motor deficits        CONSULTATIONS: Pulmonary/Intensive care and ID    Significant Diagnostic Studies:   11/27/2022: BUN 20 mg/dL (Ref range: 6 - 20 mg/dL); Calcium 8.7 mg/dL (Ref range: 8.5 - 10.1 mg/dL); CO2 20 mmol/L (L; Ref range: 21 - 32 mmol/L); Creatinine 1.50 mg/dL (H; Ref range: 0.70 - 1.30 mg/dL); Glucose 185 mg/dL (H; Ref range: 65 - 100 mg/dL); HCT 38.0 % (Ref range: 36.6 - 50.3 %); HGB 13.0 g/dL (Ref range: 12.1 - 17.0 g/dL); Potassium 3.8 mmol/L (Ref range: 3.5 - 5.1 mmol/L); Sodium 129 mmol/L (L; Ref range: 136 - 145 mmol/L)  11/28/2022: BUN 16 mg/dL (Ref range: 6 - 20 mg/dL); Calcium 8.8 mg/dL (Ref range: 8.5 - 10.1 mg/dL); CO2 20 mmol/L (L; Ref range: 21 - 32 mmol/L); Creatinine 1.03 mg/dL (Ref range: 0.70 - 1.30 mg/dL); Glucose 163 mg/dL (H; Ref range: 65 - 100 mg/dL); HCT 31.7 % (L; Ref range: 36.6 - 50.3 %); HGB 10.7 g/dL (L; Ref range: 12.1 - 17.0 g/dL);  Potassium 4.3 mmol/L (Ref range: 3.5 - 5.1 mmol/L); Sodium 135 mmol/L (L; Ref range: 136 - 145 mmol/L)  No results for input(s): WBC, HGB, HCT, PLT, HGBEXT, HCTEXT, PLTEXT, HGBEXT, HCTEXT, PLTEXT in the last 72 hours. No results for input(s): NA, K, CL, CO2, BUN, CREA, GLU, CA, MG, PHOS, URICA in the last 72 hours. No results for input(s): ALT, AP, TBIL, TBILI, TP, ALB, GLOB, GGT, AML, LPSE in the last 72 hours. No lab exists for component: SGOT, GPT, AMYP, HLPSE  No results for input(s): INR, PTP, APTT, INREXT, INREXT in the last 72 hours. No results for input(s): FE, TIBC, PSAT, FERR in the last 72 hours. No results for input(s): PH, PCO2, PO2 in the last 72 hours. No results for input(s): CPK, CKMB in the last 72 hours.     No lab exists for component: TROPONINI  Lab Results   Component Value Date/Time    Glucose (POC) 108 08/17/2022 07:01 AM    Glucose (POC) 125 (H) 07/20/2022 07:14 AM    Glucose (POC) 163 (H) 05/10/2022 06:08 AM    Glucose (POC) 171 (H) 05/09/2022 11:32 PM    Glucose (POC) 126 (H) 05/09/2022 01:20 PM       Discharge time spent 35 minutes    Signed:  Azael Perkins MD  12/12/2022  3:30 PM

## 2022-12-13 ENCOUNTER — TELEPHONE (OUTPATIENT)
Dept: INFECTIOUS DISEASES | Age: 32
End: 2022-12-13

## 2022-12-13 NOTE — TELEPHONE ENCOUNTER
Mariela Robles from [address] [place] [address] [employer] called in stating the patient told him that he is suppose to continue his abx until the 22nd. Mariela Robles informed me that if the patient is suppose to continue with his abx he would need another prescription because he ha finished what was prescribed for the patient. Mariela Robles would like for you to call him to clarify what he is suppose to do.  Mariela Robles can be reached at [phone]

## 2022-12-20 ENCOUNTER — HOSPITAL ENCOUNTER (OUTPATIENT)
Dept: RADIATION THERAPY | Age: 32
Discharge: HOME | End: 2022-12-20

## 2022-12-23 ENCOUNTER — TELEPHONE (OUTPATIENT)
Dept: INFECTIOUS DISEASES | Age: 32
End: 2022-12-23

## 2022-12-23 NOTE — TELEPHONE ENCOUNTER
[facility] called in wanting to know if pt's picc line could be removed she can be reached at [phone]

## 2022-12-27 PROBLEM — J10.1 INFLUENZA A: Status: RESOLVED | Noted: 2022-11-27 | Resolved: 2022-12-27

## 2023-01-10 ENCOUNTER — TRANSCRIBE ORDER (OUTPATIENT)
Dept: SCHEDULING | Age: 33
End: 2023-01-10

## 2023-01-10 DIAGNOSIS — C83.36 RETICULOSARCOMA OF INTRAPELVIC LYMPH NODES (HCC): Primary | ICD-10-CM

## 2023-02-03 ENCOUNTER — HOSPITAL ENCOUNTER (EMERGENCY)
Age: 33
Discharge: HOME | End: 2023-02-03
Attending: EMERGENCY MEDICINE
Payer: MEDICAID

## 2023-02-03 VITALS
TEMPERATURE: 98.8 F | HEART RATE: 78 BPM | RESPIRATION RATE: 18 BRPM | HEIGHT: 66 IN | DIASTOLIC BLOOD PRESSURE: 89 MMHG | SYSTOLIC BLOOD PRESSURE: 137 MMHG | WEIGHT: 240 LBS | OXYGEN SATURATION: 98 % | BODY MASS INDEX: 38.57 KG/M2

## 2023-02-03 DIAGNOSIS — R11.2 NAUSEA AND VOMITING, UNSPECIFIED VOMITING TYPE: ICD-10-CM

## 2023-02-03 DIAGNOSIS — R51.9 INTRACTABLE HEADACHE, UNSPECIFIED CHRONICITY PATTERN, UNSPECIFIED HEADACHE TYPE: ICD-10-CM

## 2023-02-03 DIAGNOSIS — R19.7 DIARRHEA, UNSPECIFIED TYPE: Primary | ICD-10-CM

## 2023-02-03 PROCEDURE — 74011250636 HC RX REV CODE- 250/636

## 2023-02-03 PROCEDURE — 96374 THER/PROPH/DIAG INJ IV PUSH: CPT

## 2023-02-03 PROCEDURE — 96361 HYDRATE IV INFUSION ADD-ON: CPT

## 2023-02-03 PROCEDURE — 99284 EMERGENCY DEPT VISIT MOD MDM: CPT

## 2023-02-03 RX ORDER — METOCLOPRAMIDE HYDROCHLORIDE 5 MG/ML
10 INJECTION INTRAMUSCULAR; INTRAVENOUS
Status: COMPLETED | OUTPATIENT
Start: 2023-02-03 | End: 2023-02-03

## 2023-02-03 RX ORDER — ONDANSETRON 4 MG/1
4 TABLET, ORALLY DISINTEGRATING ORAL
Qty: 10 TABLET | Refills: 0 | Status: SHIPPED | OUTPATIENT
Start: 2023-02-03

## 2023-02-03 RX ORDER — ACETAMINOPHEN 325 MG/1
650 TABLET ORAL
Status: DISCONTINUED | OUTPATIENT
Start: 2023-02-03 | End: 2023-02-03

## 2023-02-03 RX ORDER — BUTALBITAL, ACETAMINOPHEN AND CAFFEINE 300; 40; 50 MG/1; MG/1; MG/1
1 CAPSULE ORAL
Qty: 10 CAPSULE | Refills: 0 | Status: SHIPPED | OUTPATIENT
Start: 2023-02-03

## 2023-02-03 RX ORDER — BUTALBITAL, ACETAMINOPHEN AND CAFFEINE 50; 325; 40 MG/1; MG/1; MG/1
1 TABLET ORAL
Status: DISCONTINUED | OUTPATIENT
Start: 2023-02-03 | End: 2023-02-03

## 2023-02-03 RX ADMIN — METOCLOPRAMIDE 10 MG: 5 INJECTION, SOLUTION INTRAMUSCULAR; INTRAVENOUS at 14:51

## 2023-02-03 RX ADMIN — SODIUM CHLORIDE 1000 ML: 9 INJECTION, SOLUTION INTRAVENOUS at 14:51

## 2023-02-03 NOTE — ED TRIAGE NOTES
Pt c/o nausea and headache. Sx began last night. No hx of migraines. Has tried tylenol with little relief.

## 2023-02-03 NOTE — DISCHARGE INSTRUCTIONS
Please return to the emergency department if you do not start to feel better within the next 24 hours. Return immediately if you develop fever, feeling faint, passing out, extremity weakness, abdominal pain, unable to eat or drink, confusion, chest pain, difficulty breathing, or for any other symptoms that are concerning to you. Thank you! Thank you for allowing me to care for you in the emergency department. It is my goal to provide you with excellent care. If you have not received excellent quality care, please ask to speak to the nurse manager. Please fill out the survey that will come to you by mail or email since we listen to your feedback! Below you will find a list of your tests from today's visit. Should you have any questions, please do not hesitate to call the emergency department. Labs  No results found for this or any previous visit (from the past 12 hour(s)). Radiologic Studies  No orders to display     CT Results  (Last 48 hours)      None          CXR Results  (Last 48 hours)      None          ------------------------------------------------------------------------------------------------------------  The exam and treatment you received in the Emergency Department were for an urgent problem and are not intended as complete care. It is important that you follow-up with a doctor, nurse practitioner, or physician assistant to:  (1) confirm your diagnosis,  (2) re-evaluation of changes in your illness and treatment, and  (3) for ongoing care. Please take your discharge instructions with you when you go to your follow-up appointment. If you have any problem arranging a follow-up appointment, contact the Emergency Department. If your symptoms become worse or you do not improve as expected and you are unable to reach your health care provider, please return to the Emergency Department. We are available 24 hours a day.      If a prescription has been provided, please have it filled as soon as possible to prevent a delay in treatment. If you have any questions or reservations about taking the medication due to side effects or interactions with other medications, please call your primary care provider or contact the ER.

## 2023-02-03 NOTE — ED PROVIDER NOTES
[facility] EMERGENCY DEPARTMENT  EMERGENCY DEPARTMENT HISTORY AND PHYSICAL EXAM      Date: 2/3/2023  Patient Name: Zenia Arias  MRN: [id number]  YOB: 1990  Date of evaluation: 2/3/2023  Provider: Jazmine Ruff NP   Note Started: 2:47 PM 2/3/23    HISTORY OF PRESENT ILLNESS     Chief Complaint   Patient presents with    Headache    Nausea       History Provided By: Patient    HPI: Zenia Arias, 34 y.o. male with past medical history of asthma, hypertension DVT/PE on Xarelto, lymphoma (s/p chemo and radiation, in remission since last December), presents ambulatory into the emergency department accompanied by his significant other with complaints of left-sided headache vomiting and diarrhea that started last night; prior to this, reports being in his usual state of health. He has had about 5-6 episodes of non bloody, non bilious emesis and 3 episodes of non bloody, watery, brown-colored diarrhea. Headache is described as constant and achy, denies hx of migraines/frequent headaches. Denies fever,  photophobia/phonophobia, neck pain, vision changes, extremity weakness, temporal tenderness, ear pain, jaw pain, chest pain, difficulty breathing, back pain, abdominal pain. He took some Tylenol around 8 AM, without relief. Upon arrival to the ED pt is alert and oriented x 3, well-appearing, and interacting appropriately; no obvious distress noted. PAST MEDICAL HISTORY   Past Medical History:  Past Medical History:   Diagnosis Date    Asthma     DVT (deep venous thrombosis) ([address]     Hypertension     Lymphoma ([address]     pt states dx feb 2022    PE (pulmonary thromboembolism) ([address]     Thromboembolus ([address]     pt states of right leg feb 2022       Past Surgical History:  Past Surgical History:   Procedure Laterality Date    HX OTHER SURGICAL Right 03/02/2022    PICC line placed 3/2 and removed a week later.      IR FLUORO GUIDE PLC CVAD  3/30/2022    IR INSERT TUNL CVC W PORT OVER 5 YEARS  3/30/2022 IR [de-identified] TUNL CVAD W PORT/PUMP  2022       Family History:  Family History   Problem Relation Age of Onset    Diabetes Mother     Hypertension Mother     No Known Problems Father     Myasthenia Gravis Maternal Grandmother     Cancer Maternal Grandfather        Social History:  Social History     Tobacco Use    Smoking status: Former     Years: 1.00     Types: Cigarettes     Quit date:      Years since quittin.0    Smokeless tobacco: Never   Vaping Use    Vaping Use: Never used   Substance Use Topics    Alcohol use: Not Currently    Drug use: Not Currently     Types: Marijuana     Comment: daily       Allergies:  No Known Allergies    PCP: Zaheer Mae MD    Current Meds:   Previous Medications    ACETAMINOPHEN (TYLENOL) 325 MG TABLET    Take 2 Tablets by mouth four (4) times daily as needed for Pain. ALBUTEROL (ACCUNEB) 0.63 MG/3 ML NEBULIZER SOLUTION    3 mL by Nebulization route as needed. ALBUTEROL SULFATE 90 MCG/ACTUATION AEBS    Take 1-2 Puffs by inhalation every four (4) hours as needed for Wheezing or Shortness of Breath. CETIRIZINE (ZYRTEC) 10 MG TABLET    Take 1 Tablet by mouth daily. DIPHENOXYLATE-ATROPINE (LOMOTIL) 2.5-0.025 MG PER TABLET    Take 1 Tablet by mouth four (4) times daily as needed for Diarrhea (Take after each loose stool. ). Max Daily Amount: 4 Tablets. FLUTICASONE PROPIONATE (FLONASE) 50 MCG/ACTUATION NASAL SPRAY    1 [place] by Both Nostrils route as needed. GUAIFENESIN (ROBITUSSIN) 100 MG/5 ML LIQUID    Take 10 mL by mouth three (3) times daily as needed for Cough. MONTELUKAST (SINGULAIR) 10 MG TABLET    Take 1 Tablet by mouth daily. PREDNISONE (DELTASONE) 20 MG TABLET    Take 20 mg by mouth two (2) times a day. SYMBICORT 160-4.5 MCG/ACTUATION HFAA    Take 2 Puffs by inhalation daily. XARELTO 20 MG TAB TABLET    Take 20 mg by mouth daily.        REVIEW OF SYSTEMS   Review of Systems   Constitutional:  Positive for appetite change and fatigue. Negative for activity change, chills, diaphoresis and fever. HENT:  Negative for congestion, ear pain, rhinorrhea and sore throat. Eyes:  Negative for photophobia, pain and visual disturbance. Respiratory:  Negative for cough, chest tightness and shortness of breath. Cardiovascular:  Negative for chest pain, palpitations and leg swelling. Gastrointestinal:  Positive for diarrhea, nausea and vomiting. Negative for abdominal distention, abdominal pain, blood in stool and constipation. Genitourinary:  Negative for difficulty urinating, dysuria, flank pain, frequency, hematuria and urgency. Musculoskeletal:  Negative for back pain and myalgias. Skin:  Negative for rash and wound. Neurological:  Positive for light-headedness and headaches. Negative for dizziness, facial asymmetry, speech difficulty, weakness and numbness. Positives and Pertinent negatives as per HPI. PHYSICAL EXAM     ED Triage Vitals [02/03/23 1348]   ED Encounter Vitals Group      /89      Pulse (Heart Rate) 78      Resp Rate 18      Temp 98.8 °F (37.1 °C)      Temp src       O2 Sat (%) 98 %      Weight 240 lb      Height 5' 6\"      Physical Exam  Constitutional:       General: He is not in acute distress. Appearance: Normal appearance. He is normal weight. He is not ill-appearing, toxic-appearing or diaphoretic. HENT:      Head: Normocephalic and atraumatic. Mouth/Throat:      Mouth: Mucous membranes are moist.      Pharynx: Oropharynx is clear. Eyes:      General:         Right eye: No discharge. Left eye: No discharge. Extraocular Movements: Extraocular movements intact. Conjunctiva/sclera: Conjunctivae normal.   Cardiovascular:      Rate and Rhythm: Normal rate and regular rhythm. Pulses: Normal pulses. Heart sounds: Normal heart sounds. Pulmonary:      Effort: Pulmonary effort is normal.      Breath sounds: Normal breath sounds.    Abdominal:      General: Abdomen is flat. Bowel sounds are normal. There is no distension. Palpations: Abdomen is soft. Tenderness: There is no abdominal tenderness. There is no guarding. Musculoskeletal:      Cervical back: Normal range of motion and neck supple. No rigidity or tenderness. Right lower leg: No edema. Left lower leg: No edema. Lymphadenopathy:      Cervical: No cervical adenopathy. Skin:     General: Skin is warm and dry. Coloration: Skin is not jaundiced or pale. Findings: No rash. Neurological:      Mental Status: He is alert and oriented to person, place, and time. GCS: GCS eye subscore is 4. GCS verbal subscore is 5. GCS motor subscore is 6. Cranial Nerves: No cranial nerve deficit, dysarthria or facial asymmetry. Sensory: No sensory deficit. Motor: No weakness. Coordination: Coordination normal.      Comments:     No temporal tenderness   Psychiatric:         Mood and Affect: Mood normal.         Behavior: Behavior normal.         Thought Content: Thought content normal.         Judgment: Judgment normal.       SCREENINGS               No data recorded        LAB, EKG AND DIAGNOSTIC RESULTS       PROCEDURES   Unless otherwise noted below, none.   Performed by: Neena Gao NP   Procedures      CRITICAL CARE TIME       ED COURSE and DIFFERENTIAL DIAGNOSIS/MDM   Vitals:    Vitals:    02/03/23 1348   BP: 137/89   Pulse: 78   Resp: 18   Temp: 98.8 °F (37.1 °C)   SpO2: 98%   Weight: 108.9 kg (240 lb)   Height: 5' 6\" (1.676 m)        Patient was given the following medications:  Medications   sodium chloride 0.9 % bolus infusion 1,000 mL (1,000 mL IntraVENous New Bag 2/3/23 1451)   metoclopramide HCl (REGLAN) injection 10 mg (10 mg IntraVENous Given 2/3/23 1451)         CONSULTS: (Who and What was discussed)  None     Chronic Conditions: past medical history of asthma, hypertension DVT/PE on Xarelto, lymphoma (s/p chemo and radiation, in remission since last December),  Social Determinants affecting Dx or Tx: None     Records Reviewed (source and summary of external notes): Prior medical records and Nursing notes    CC/HPI Summary, DDx, ED Course, and Reassessment. Disposition Considerations (Tests not done, Shared Decision Making, Pt Expectation of Test or Treatment.):     presents ambulatory into the emergency department accompanied by his significant other with complaints of left-sided headache vomiting and diarrhea that started last night; prior to this, reports being in his usual state of health. He has had about 5-6 episodes of non bloody, non bilious emesis and 3 episodes of non bloody, watery, brown-colored diarrhea. Headache is described as constant and achy, denies hx of migraines/frequent headaches. Denies fever,  photophobia/phonophobia, neck pain, vision changes, extremity weakness, temporal tenderness, ear pain, jaw pain, chest pain, difficulty breathing, back pain, abdominal pain. He took some Tylenol around 8 AM, without relief. Nausea/Vomiting/diarrhea:  Most likely gastroenteritis although gastritis, colitis, IBD, IBS on the differential.  Will treat with antiemetic and IVF to replete losses. Will instruct pt to push clear fluids, small amounts frequently until improving, then advance diet as tolerated. Imodium OTC prn for diarrhea. May use Gatorade or  Pedialyte for rehydration. May use BRAT diet. Call or office visit prn if symptoms not responding as expected or develops high fever, significant abdominal pain or bloody stool. DDx: migraine, cluster HA, tension HA, dehydration/lack of proper hydration, lack of proper sleep, stress. Less likely pseudotumor cerebri, SAH, ICH, cerebral dural venous thrombosis, or meningitis given the course, story and physical exam.  Analgesics and fluids ordered. If pain is not improved after receiving fluids and analgesics, will discuss the possibility of further work-up.   Will PO challenge when pt is no longer nauseated. Counseled on the importance of good hydration and 3 meals a day as well as good sleep hygiene. Patient much improved, declining further work-up at this time but states he will return if he does not feel better within the next 24 hours and at that time he will consent to work-up. Shared decision making employed and pt is in agreement with plan of care. ED Course as of 02/03/23 1633   Fri Feb 03, 2023   1539 Patient reports headache is much improved, but not completely resolved. No longer complains of nausea or feeling lightheaded. Ambulatory unassisted to the restroom and back with a steady gait. Will p.o. challenge and give him a dose of Fioricet and reassess. Shared decision making utilized, patient in agreement with plan of care. [LM]   1609 Tolerating p.o. challenge, waiting for additional analgesics to be administered then will reassess. [LM]   1626 Patient states he feels significantly better, symptoms almost completely resolved. He does not want to wait for any further medication for his headache instead would like this prescription sent to his pharmacy. He has been advised to return to the emergency department if his symptoms do not start to improve within the next 24 hours, or to return immediately if his symptoms worsen or he develops any new symptoms, including but not limited to: Fever, feeling faint, extremity weakness, abdominal pain, unable to eat or drink, confusion, chest pain, difficulty breathing. [LM]      ED Course User Index  [LM] Seng Ly NP         FINAL IMPRESSION     1. Diarrhea, unspecified type    2. Nausea and vomiting, unspecified vomiting type    3. Intractable headache, unspecified chronicity pattern, unspecified headache type          DISPOSITION/PLAN   Discharged    Discharge Note: The patient is stable for discharge home. The signs, symptoms, diagnosis, and discharge instructions have been discussed, understanding conveyed, and agreed upon. The patient is to follow up as recommended or return to ER should their symptoms worsen. PATIENT REFERRED TO:  Follow-up Information       Follow up With Specialties Details Why Contact Info    Ilene Elena MD Internal Medicine Physician In 2 days Reevaluation [address]  [phone]                DISCHARGE MEDICATIONS:  Current Discharge Medication List        START taking these medications    Details   butalbital-acetaminophen-caff (Fioricet) -40 mg per capsule Take 1 Capsule by mouth every four (4) hours as needed for Headache. Qty: 10 Capsule, Refills: 0  Start date: 2/3/2023           CONTINUE these medications which have CHANGED    Details   ondansetron (ZOFRAN ODT) 4 mg disintegrating tablet Take 1 Tablet by mouth every eight (8) hours as needed for Nausea or Vomiting. Qty: 10 Tablet, Refills: 0  Start date: 2/3/2023               DISCONTINUED MEDICATIONS:  Current Discharge Medication List          I am the Primary Clinician of Record: Teto Flowers NP (electronically signed)    (Please note that parts of this dictation were completed with voice recognition software. Quite often unanticipated grammatical, syntax, homophones, and other interpretive errors are inadvertently transcribed by the computer software. Please disregards these errors.  Please excuse any errors that have escaped final proofreading.)

## 2023-02-03 NOTE — Clinical Note
[address] EMERGENCY DEPARTMENT  [address] [address]  [phone]    Work/School Note    Date: 2/3/2023    To Whom It May concern:      Evan Lantigua was seen and treated today in the emergency room by the following provider(s):  Attending Provider: Geri Sellers MD  Nurse Practitioner: Moses Adams NP. Evan Lantigua is excused from work/school on 02/03/23. He is clear to return to work/school on 02/04/23. Please excuse Shanna Bunn from work today, she brought Virginia Payne to the emergency room and remained with him during his entire visit.     Sincerely,          John Paul Meyer NP

## 2023-02-06 ENCOUNTER — HOSPITAL ENCOUNTER (OUTPATIENT)
Dept: PET IMAGING | Age: 33
Discharge: HOME | End: 2023-02-06
Attending: INTERNAL MEDICINE
Payer: MEDICAID

## 2023-02-06 DIAGNOSIS — C83.36 RETICULOSARCOMA OF INTRAPELVIC LYMPH NODES (HCC): ICD-10-CM

## 2023-02-06 PROCEDURE — 78815 PET IMAGE W/CT SKULL-THIGH: CPT

## 2023-02-06 RX ORDER — FLUDEOXYGLUCOSE F-18 200 MCI/ML
10 INJECTION INTRAVENOUS ONCE
Status: COMPLETED | OUTPATIENT
Start: 2023-02-06 | End: 2023-02-06

## 2023-02-06 RX ADMIN — FLUDEOXYGLUCOSE F-18 10 MILLICURIE: 200 INJECTION INTRAVENOUS at 13:11

## 2023-02-20 NOTE — TELEPHONE ENCOUNTER
----- Message from Mily Barrow MD sent at 2/17/2022  5:20 AM EST -----  Can you make urgent referral surgical oncology at [facility] for large groin mass  Thank you
Referral has been put in.
Statement Selected

## 2023-08-07 ENCOUNTER — HOSPITAL ENCOUNTER (OUTPATIENT)
Facility: HOSPITAL | Age: 33
Discharge: HOME | End: 2023-08-10
Attending: INTERNAL MEDICINE
Payer: MEDICAID

## 2023-08-07 DIAGNOSIS — C83.36 RETICULOSARCOMA OF INTRAPELVIC LYMPH NODES (HCC): ICD-10-CM

## 2023-08-07 PROCEDURE — 78815 PET IMAGE W/CT SKULL-THIGH: CPT

## 2023-08-07 PROCEDURE — A9552 F18 FDG: HCPCS | Performed by: INTERNAL MEDICINE

## 2023-08-07 PROCEDURE — 3430000000 HC RX DIAGNOSTIC RADIOPHARMACEUTICAL: Performed by: INTERNAL MEDICINE

## 2023-08-07 RX ORDER — FLUDEOXYGLUCOSE F-18 500 MCI/ML
10.01 INJECTION INTRAVENOUS
Status: COMPLETED | OUTPATIENT
Start: 2023-08-07 | End: 2023-08-07

## 2023-08-07 RX ADMIN — FLUDEOXYGLUCOSE F-18 10.01 MILLICURIE: 500 INJECTION INTRAVENOUS at 11:00

## 2023-08-09 ENCOUNTER — HOSPITAL ENCOUNTER (OUTPATIENT)
Facility: HOSPITAL | Age: 33
Discharge: HOME | End: 2023-08-12

## 2024-04-01 NOTE — PROGRESS NOTES
Completed task please see media for scanned/faxed forms    Will update prescription for max daily dose as soon as provider responds.   Received Pt awake alert and orientedx 3. Pt appears very diaphoretic,Pt states he has problems with excessive sweating all his life. Pts Tape from [place] sites are falling off.both heplocks reinforced,Pts has a palpable noduke in his Rt Thigh ans RT groin area,Pts RT leg remains very edematous,area around the nodules is very hard,Pt remains on a heparin drip continously,Pt has # 20 angiocath in Rt and Left AC.,site intact,no reddness,no edema,heparin infusing in the Rt AC,the patient denies any Pain or discomfort at this time

## 2024-06-15 ENCOUNTER — HOSPITAL ENCOUNTER (EMERGENCY)
Facility: HOSPITAL | Age: 34
Discharge: HOME | End: 2024-06-15
Payer: MEDICAID

## 2024-06-15 VITALS
SYSTOLIC BLOOD PRESSURE: 141 MMHG | TEMPERATURE: 98.2 F | HEART RATE: 64 BPM | BODY MASS INDEX: 39.37 KG/M2 | RESPIRATION RATE: 16 BRPM | WEIGHT: 245 LBS | DIASTOLIC BLOOD PRESSURE: 82 MMHG | OXYGEN SATURATION: 98 % | HEIGHT: 66 IN

## 2024-06-15 DIAGNOSIS — K08.89 DENTALGIA: Primary | ICD-10-CM

## 2024-06-15 PROCEDURE — 96372 THER/PROPH/DIAG INJ SC/IM: CPT

## 2024-06-15 PROCEDURE — 6360000002 HC RX W HCPCS

## 2024-06-15 PROCEDURE — 99284 EMERGENCY DEPT VISIT MOD MDM: CPT

## 2024-06-15 PROCEDURE — 6370000000 HC RX 637 (ALT 250 FOR IP)

## 2024-06-15 RX ORDER — KETOROLAC TROMETHAMINE 30 MG/ML
30 INJECTION, SOLUTION INTRAMUSCULAR; INTRAVENOUS
Status: COMPLETED | OUTPATIENT
Start: 2024-06-15 | End: 2024-06-15

## 2024-06-15 RX ORDER — DIPHENHYDRAMINE HCL 12.5MG/5ML
12.5 LIQUID (ML) ORAL
Status: COMPLETED | OUTPATIENT
Start: 2024-06-15 | End: 2024-06-15

## 2024-06-15 RX ORDER — LIDOCAINE HYDROCHLORIDE 20 MG/ML
15 SOLUTION OROPHARYNGEAL
Status: COMPLETED | OUTPATIENT
Start: 2024-06-15 | End: 2024-06-15

## 2024-06-15 RX ORDER — KETOROLAC TROMETHAMINE 10 MG/1
10 TABLET, FILM COATED ORAL EVERY 6 HOURS PRN
Qty: 20 TABLET | Refills: 0 | Status: SHIPPED | OUTPATIENT
Start: 2024-06-15

## 2024-06-15 RX ORDER — PENICILLIN V POTASSIUM 500 MG/1
500 TABLET ORAL 4 TIMES DAILY
Qty: 28 TABLET | Refills: 0 | Status: SHIPPED | OUTPATIENT
Start: 2024-06-15 | End: 2024-06-22

## 2024-06-15 RX ADMIN — DIPHENHYDRAMINE HYDROCHLORIDE 12.5 MG: 25 SOLUTION ORAL at 16:25

## 2024-06-15 RX ADMIN — BENZOCAINE, BUTAMBEN, AND TETRACAINE HYDROCHLORIDE 1 SPRAY: .028; .004; .004 AEROSOL, SPRAY TOPICAL at 16:26

## 2024-06-15 RX ADMIN — LIDOCAINE HYDROCHLORIDE 15 ML: 20 SOLUTION ORAL at 16:25

## 2024-06-15 RX ADMIN — KETOROLAC TROMETHAMINE 30 MG: 30 INJECTION, SOLUTION INTRAMUSCULAR at 16:26

## 2024-06-15 ASSESSMENT — PAIN DESCRIPTION - LOCATION: LOCATION: MOUTH

## 2024-06-15 ASSESSMENT — PAIN SCALES - GENERAL: PAINLEVEL_OUTOF10: 10

## 2024-06-15 NOTE — DISCHARGE INSTRUCTIONS
Dentist/Dental resources:    Emergency Dental Care   [facility]   [address] [place] [address]   Monday, Wednesday, Friday: 8am-5pm   Tuesday and Thursday: 8am-6pm   Phone: [phone]   $70 for Emergency Care   $60 for first routine care, then pay by sliding scale based upon income     [address] [place] [address]   Phone: [phone], select option (2) to confirm time for treatment     [facility]   [address] [place] [address]   Monday-Friday: 8am-4pm   Phone: [phone]     [facility]   [facility], [facility] [address]   Phone: [phone] to confirm a time for emergency treatment   Pediatrics: [phone]   $75 per tooth, extractions only     [facility] ([facility]) [address] [address] [phone]    [facility]  [address]  [place] [address] [phone]    [facility]  [address] [place] [address]  [phone]    [facility]   [address] [facility] [address]   Phone [phone] or [phone]   Hours [id number] (extractions)   Simple tooth extraction: $60 per tooth, $55 per x-ray     [facility] (in [facility] Residents only   Phone: [phone], leave message saying you need an appointment to register   Hours: Wed 6-9p     Non-Urgent Dental Care Clinics   [facility]   [address] [place] [address]   Dental Clinic: [phone]   Oral Surgery Clinic: [phone]

## 2024-06-16 ENCOUNTER — HOSPITAL ENCOUNTER (EMERGENCY)
Facility: HOSPITAL | Age: 34
Discharge: HOME | End: 2024-06-16
Attending: FAMILY MEDICINE
Payer: MEDICAID

## 2024-06-16 VITALS
SYSTOLIC BLOOD PRESSURE: 149 MMHG | HEART RATE: 68 BPM | OXYGEN SATURATION: 97 % | BODY MASS INDEX: 38.57 KG/M2 | TEMPERATURE: 98.7 F | WEIGHT: 240 LBS | HEIGHT: 66 IN | RESPIRATION RATE: 16 BRPM | DIASTOLIC BLOOD PRESSURE: 86 MMHG

## 2024-06-16 DIAGNOSIS — K04.7 DENTAL INFECTION: Primary | ICD-10-CM

## 2024-06-16 PROCEDURE — 96372 THER/PROPH/DIAG INJ SC/IM: CPT

## 2024-06-16 PROCEDURE — 6360000002 HC RX W HCPCS: Performed by: FAMILY MEDICINE

## 2024-06-16 PROCEDURE — 99284 EMERGENCY DEPT VISIT MOD MDM: CPT

## 2024-06-16 RX ORDER — KETOROLAC TROMETHAMINE 30 MG/ML
30 INJECTION, SOLUTION INTRAMUSCULAR; INTRAVENOUS
Status: COMPLETED | OUTPATIENT
Start: 2024-06-16 | End: 2024-06-16

## 2024-06-16 RX ORDER — AMOXICILLIN AND CLAVULANATE POTASSIUM 875; 125 MG/1; MG/1
1 TABLET, FILM COATED ORAL 2 TIMES DAILY
Qty: 20 TABLET | Refills: 0 | Status: SHIPPED | OUTPATIENT
Start: 2024-06-16 | End: 2024-06-26

## 2024-06-16 RX ADMIN — KETOROLAC TROMETHAMINE 30 MG: 30 INJECTION, SOLUTION INTRAMUSCULAR; INTRAVENOUS at 10:57

## 2024-06-16 ASSESSMENT — LIFESTYLE VARIABLES
HOW OFTEN DO YOU HAVE A DRINK CONTAINING ALCOHOL: NEVER
HOW MANY STANDARD DRINKS CONTAINING ALCOHOL DO YOU HAVE ON A TYPICAL DAY: PATIENT DOES NOT DRINK

## 2024-06-16 ASSESSMENT — PAIN DESCRIPTION - ORIENTATION: ORIENTATION: RIGHT

## 2024-06-16 ASSESSMENT — PAIN DESCRIPTION - DESCRIPTORS: DESCRIPTORS: THROBBING

## 2024-06-16 ASSESSMENT — PAIN DESCRIPTION - PAIN TYPE: TYPE: ACUTE PAIN

## 2024-06-16 ASSESSMENT — PAIN DESCRIPTION - LOCATION: LOCATION: FACE

## 2024-06-16 ASSESSMENT — PAIN SCALES - GENERAL: PAINLEVEL_OUTOF10: 5

## 2024-06-16 ASSESSMENT — PAIN - FUNCTIONAL ASSESSMENT: PAIN_FUNCTIONAL_ASSESSMENT: 0-10

## 2024-06-16 NOTE — ED TRIAGE NOTES
Patient reports waking up with right facial swelling. Seen in the ED yesterday for dental pain given abx and pain meds.

## 2024-06-16 NOTE — ED PROVIDER NOTES
tablet Take 1 tablet by mouth every 8 hours as neededHistorical Med      predniSONE (DELTASONE) 20 MG tablet Take 1 tablet by mouth 2 times dailyHistorical Med      rivaroxaban (XARELTO) 20 MG TABS tablet Take 1 tablet by mouth dailyHistorical Med               2.  Return to ED if worse       3.      Medication List        START taking these medications      amoxicillin-clavulanate 875-125 MG per tablet  Commonly known as: AUGMENTIN  Take 1 tablet by mouth 2 times daily for 10 days            ASK your doctor about these medications      acetaminophen 325 MG tablet  Commonly known as: TYLENOL     albuterol 0.63 MG/3ML nebulizer solution  Commonly known as: ACCUNEB     Albuterol Sulfate (sensor) 108 (90 Base) MCG/ACT Aepb     butalbital-APAP-caffeine -40 MG Caps per capsule     cetirizine 10 MG tablet  Commonly known as: ZYRTEC     diphenoxylate-atropine 2.5-0.025 MG per tablet  Commonly known as: LOMOTIL     fluticasone 50 MCG/ACT nasal spray  Commonly known as: FLONASE     guaiFENesin 100 MG/5ML liquid  Commonly known as: ROBITUSSIN     ketorolac 10 MG tablet  Commonly known as: TORADOL  Take 1 tablet by mouth every 6 hours as needed for Pain     montelukast 10 MG tablet  Commonly known as: SINGULAIR     ondansetron 4 MG disintegrating tablet  Commonly known as: ZOFRAN-ODT     penicillin v potassium 500 MG tablet  Commonly known as: VEETID  Take 1 tablet by mouth 4 times daily for 7 days     predniSONE 20 MG tablet  Commonly known as: DELTASONE     Symbicort 160-4.5 MCG/ACT Aero  Generic drug: budesonide-formoterol     Xarelto 20 MG Tabs tablet  Generic drug: rivaroxaban               Where to Get Your Medications        These medications were sent to [facility] [address] [place] [address] - [address] [place] [address] - P [phone] - F [phone]  [address] [place] [address]      Phone: [phone]   amoxicillin-clavulanate 875-125 MG per tablet         4. PATIENT REFERRED TO:  [facility]

## 2024-09-30 ENCOUNTER — HOSPITAL ENCOUNTER (OUTPATIENT)
Facility: HOSPITAL | Age: 34
Discharge: HOME | End: 2024-10-03
Attending: INTERNAL MEDICINE
Payer: MEDICAID

## 2024-09-30 DIAGNOSIS — C83.36 RETICULOSARCOMA OF INTRAPELVIC LYMPH NODES (HCC): ICD-10-CM

## 2024-09-30 PROCEDURE — A9609 HC RX DIAGNOSTIC RADIOPHARMACEUTICAL: HCPCS | Performed by: INTERNAL MEDICINE

## 2024-09-30 PROCEDURE — 78815 PET IMAGE W/CT SKULL-THIGH: CPT

## 2024-09-30 PROCEDURE — 3430000000 HC RX DIAGNOSTIC RADIOPHARMACEUTICAL: Performed by: INTERNAL MEDICINE

## 2024-09-30 RX ORDER — FLUDEOXYGLUCOSE F-18 500 MCI/ML
10 INJECTION INTRAVENOUS
Status: COMPLETED | OUTPATIENT
Start: 2024-09-30 | End: 2024-09-30

## 2024-09-30 RX ADMIN — FLUDEOXYGLUCOSE F-18 11.1 MILLICURIE: 500 INJECTION INTRAVENOUS at 11:40

## (undated) DEVICE — DRAIN WND PENRS RADPQ 0.25X12 --

## (undated) DEVICE — SOLUTION IRRIG 500ML 0.9% SOD CHL USP POUR PLAS BTL

## (undated) DEVICE — SOUTHSIDE TURNOVER: Brand: MEDLINE INDUSTRIES, INC.

## (undated) DEVICE — DRAPE,REIN 53X77,STERILE: Brand: MEDLINE

## (undated) DEVICE — SUT PROL 3-0 36IN SH DA BLU --

## (undated) DEVICE — SUTURE SZ 0 27IN 5/8 CIR UR-6  TAPER PT VIOLET ABSRB VICRYL J603H

## (undated) DEVICE — 3M™ TEGADERM™ TRANSPARENT FILM DRESSING FRAME STYLE, 1626W, 4 IN X 4-3/4 IN (10 CM X 12 CM), 50/CT 4CT/CASE: Brand: 3M™ TEGADERM™

## (undated) DEVICE — SPONGE: SPECIALTY PEANUT XR 100/CS: Brand: MEDICAL ACTION INDUSTRIES

## (undated) DEVICE — SYR LR LCK 1ML GRAD NSAF 30ML --

## (undated) DEVICE — 3M™ STERI-STRIP™ REINFORCED ADHESIVE SKIN CLOSURES, R1547, 1/2 IN X 4 IN (12 MM X 100 MM), 6 STRIPS/ENVELOPE: Brand: 3M™ STERI-STRIP™

## (undated) DEVICE — 3M™ STERI-STRIP™ REINFORCED ADHESIVE SKIN CLOSURES, R1542, 1/4 IN X 1-1/2 IN (6 MM X 38 MM), 6 STRIPS/ENVELOPE: Brand: 3M™ STERI-STRIP™

## (undated) DEVICE — 3-0 COATED VICRYL PLUS UNDYED 1X27" SH --

## (undated) DEVICE — PREP SKN CHLRAPRP APL 26ML STR --

## (undated) DEVICE — SYRINGE IRRIG 60ML SFT PLIABLE BLB EZ TO GRP 1 HND USE W/

## (undated) DEVICE — Device: Brand: JELCO

## (undated) DEVICE — 3M™ SURGICAL CLIPPER WITH PIVOTING HEAD, 9660, 50/CASE: Brand: 3M™

## (undated) DEVICE — GLOVE ORANGE PI 7 1/2   MSG9075

## (undated) DEVICE — INTENDED FOR TISSUE SEPARATION, AND OTHER PROCEDURES THAT REQUIRE A SHARP SURGICAL BLADE TO PUNCTURE OR CUT.: Brand: BARD-PARKER ® CARBON RIB-BACK BLADES

## (undated) DEVICE — TOWEL SURG W17XL27IN STD BLU COT NONFENESTRATED PREWASHED

## (undated) DEVICE — SUT MONOCRYL PLUS UD 4-0 --

## (undated) DEVICE — SUTURE ABSORBABLE BRAIDED 2-0 12X18 IN COAT VIO VICRYL + VCP105G

## (undated) DEVICE — ADHESIVE LIQ H2O INSOLUBLE 3 CC LO RISK N STN MASTISOL

## (undated) DEVICE — TUBING INSUFFLATOR HEAT HUMIDIFIED SMK EVAC SET PNEUMOCLEAR

## (undated) DEVICE — GARMENT,MEDLINE,DVT,INT,CALF,MED, GEN2: Brand: MEDLINE

## (undated) DEVICE — MINOR GENERAL PACK: Brand: MEDLINE INDUSTRIES, INC.